# Patient Record
Sex: FEMALE | Race: BLACK OR AFRICAN AMERICAN | NOT HISPANIC OR LATINO | Employment: UNEMPLOYED | URBAN - METROPOLITAN AREA
[De-identification: names, ages, dates, MRNs, and addresses within clinical notes are randomized per-mention and may not be internally consistent; named-entity substitution may affect disease eponyms.]

---

## 2021-10-29 ENCOUNTER — HOSPITAL ENCOUNTER (INPATIENT)
Facility: HOSPITAL | Age: 51
LOS: 4 days | Discharge: HOME | DRG: 691 | End: 2021-11-03
Attending: EMERGENCY MEDICINE | Admitting: INTERNAL MEDICINE
Payer: COMMERCIAL

## 2021-10-29 ENCOUNTER — APPOINTMENT (EMERGENCY)
Dept: RADIOLOGY | Facility: HOSPITAL | Age: 51
DRG: 691 | End: 2021-10-29
Payer: COMMERCIAL

## 2021-10-29 DIAGNOSIS — E85.81 PRIMARY AMYLOIDOSIS (HCC): ICD-10-CM

## 2021-10-29 DIAGNOSIS — H05.9 DISORDER OF ORBIT: ICD-10-CM

## 2021-10-29 DIAGNOSIS — M54.41 CHRONIC BILATERAL LOW BACK PAIN WITH BILATERAL SCIATICA: ICD-10-CM

## 2021-10-29 DIAGNOSIS — N17.9 ACUTE KIDNEY INJURY (HCC): ICD-10-CM

## 2021-10-29 DIAGNOSIS — I10 PRIMARY HYPERTENSION: ICD-10-CM

## 2021-10-29 DIAGNOSIS — M54.42 CHRONIC BILATERAL LOW BACK PAIN WITH BILATERAL SCIATICA: ICD-10-CM

## 2021-10-29 DIAGNOSIS — C90.00 MULTIPLE MYELOMA (HCC): ICD-10-CM

## 2021-10-29 DIAGNOSIS — E86.1 HYPOVOLEMIA: ICD-10-CM

## 2021-10-29 DIAGNOSIS — A41.9 SEPSIS (HCC): Primary | ICD-10-CM

## 2021-10-29 DIAGNOSIS — Z72.0 TOBACCO ABUSE: ICD-10-CM

## 2021-10-29 DIAGNOSIS — G89.29 CHRONIC BILATERAL LOW BACK PAIN WITH BILATERAL SCIATICA: ICD-10-CM

## 2021-10-29 DIAGNOSIS — B33.8 RSV (RESPIRATORY SYNCYTIAL VIRUS INFECTION): ICD-10-CM

## 2021-10-29 LAB
ALBUMIN SERPL BCP-MCNC: 2.1 G/DL (ref 3.5–5)
ALP SERPL-CCNC: 70 U/L (ref 46–116)
ALT SERPL W P-5'-P-CCNC: 39 U/L (ref 12–78)
ANION GAP SERPL CALCULATED.3IONS-SCNC: 5 MMOL/L (ref 4–13)
APTT PPP: 37 SECONDS (ref 23–37)
AST SERPL W P-5'-P-CCNC: 85 U/L (ref 5–45)
ATRIAL RATE: 69 BPM
ATRIAL RATE: 71 BPM
BASOPHILS # BLD AUTO: 0.05 THOUSANDS/ΜL (ref 0–0.1)
BASOPHILS NFR BLD AUTO: 1 % (ref 0–1)
BILIRUB SERPL-MCNC: 0.31 MG/DL (ref 0.2–1)
BILIRUB UR QL STRIP: ABNORMAL
BUN SERPL-MCNC: 26 MG/DL (ref 5–25)
CALCIUM ALBUM COR SERPL-MCNC: 9.9 MG/DL (ref 8.3–10.1)
CALCIUM SERPL-MCNC: 8.4 MG/DL (ref 8.3–10.1)
CHLORIDE SERPL-SCNC: 96 MMOL/L (ref 100–108)
CLARITY UR: ABNORMAL
CO2 SERPL-SCNC: 27 MMOL/L (ref 21–32)
COLOR UR: ABNORMAL
CREAT SERPL-MCNC: 2.75 MG/DL (ref 0.6–1.3)
EOSINOPHIL # BLD AUTO: 0.03 THOUSAND/ΜL (ref 0–0.61)
EOSINOPHIL NFR BLD AUTO: 1 % (ref 0–6)
ERYTHROCYTE [DISTWIDTH] IN BLOOD BY AUTOMATED COUNT: 17.2 % (ref 11.6–15.1)
GFR SERPL CREATININE-BSD FRML MDRD: 22 ML/MIN/1.73SQ M
GLUCOSE SERPL-MCNC: 103 MG/DL (ref 65–140)
GLUCOSE UR STRIP-MCNC: NEGATIVE MG/DL
HCT VFR BLD AUTO: 30.7 % (ref 34.8–46.1)
HGB BLD-MCNC: 9.9 G/DL (ref 11.5–15.4)
HGB UR QL STRIP.AUTO: ABNORMAL
IMM GRANULOCYTES # BLD AUTO: 0.12 THOUSAND/UL (ref 0–0.2)
IMM GRANULOCYTES NFR BLD AUTO: 2 % (ref 0–2)
INR PPP: 1.15 (ref 0.84–1.19)
KETONES UR STRIP-MCNC: NEGATIVE MG/DL
LACTATE SERPL-SCNC: 0.9 MMOL/L (ref 0.5–2)
LEUKOCYTE ESTERASE UR QL STRIP: ABNORMAL
LYMPHOCYTES # BLD AUTO: 2.56 THOUSANDS/ΜL (ref 0.6–4.47)
LYMPHOCYTES NFR BLD AUTO: 45 % (ref 14–44)
MCH RBC QN AUTO: 26.1 PG (ref 26.8–34.3)
MCHC RBC AUTO-ENTMCNC: 32.2 G/DL (ref 31.4–37.4)
MCV RBC AUTO: 81 FL (ref 82–98)
MONOCYTES # BLD AUTO: 0.88 THOUSAND/ΜL (ref 0.17–1.22)
MONOCYTES NFR BLD AUTO: 16 % (ref 4–12)
NEUTROPHILS # BLD AUTO: 1.95 THOUSANDS/ΜL (ref 1.85–7.62)
NEUTS SEG NFR BLD AUTO: 35 % (ref 43–75)
NITRITE UR QL STRIP: NEGATIVE
NRBC BLD AUTO-RTO: 0 /100 WBCS
P AXIS: 82 DEGREES
P AXIS: 92 DEGREES
PH UR STRIP.AUTO: 5.5 [PH]
PLATELET # BLD AUTO: 553 THOUSANDS/UL (ref 149–390)
PMV BLD AUTO: 9.9 FL (ref 8.9–12.7)
POTASSIUM SERPL-SCNC: 3.5 MMOL/L (ref 3.5–5.3)
PR INTERVAL: 144 MS
PR INTERVAL: 146 MS
PROCALCITONIN SERPL-MCNC: 0.69 NG/ML
PROT SERPL-MCNC: 6.7 G/DL (ref 6.4–8.2)
PROT UR STRIP-MCNC: ABNORMAL MG/DL
PROTHROMBIN TIME: 14.2 SECONDS (ref 11.6–14.5)
QRS AXIS: -10 DEGREES
QRS AXIS: 5 DEGREES
QRSD INTERVAL: 110 MS
QRSD INTERVAL: 110 MS
QT INTERVAL: 396 MS
QT INTERVAL: 402 MS
QTC INTERVAL: 424 MS
QTC INTERVAL: 436 MS
RBC # BLD AUTO: 3.8 MILLION/UL (ref 3.81–5.12)
SODIUM SERPL-SCNC: 128 MMOL/L (ref 136–145)
SP GR UR STRIP.AUTO: 1.01 (ref 1–1.03)
T WAVE AXIS: 51 DEGREES
T WAVE AXIS: 51 DEGREES
UROBILINOGEN UR QL STRIP.AUTO: 1 E.U./DL
VENTRICULAR RATE: 69 BPM
VENTRICULAR RATE: 71 BPM
WBC # BLD AUTO: 5.59 THOUSAND/UL (ref 4.31–10.16)

## 2021-10-29 PROCEDURE — 80053 COMPREHEN METABOLIC PANEL: CPT

## 2021-10-29 PROCEDURE — 71046 X-RAY EXAM CHEST 2 VIEWS: CPT

## 2021-10-29 PROCEDURE — 36415 COLL VENOUS BLD VENIPUNCTURE: CPT

## 2021-10-29 PROCEDURE — 84145 PROCALCITONIN (PCT): CPT

## 2021-10-29 PROCEDURE — 81001 URINALYSIS AUTO W/SCOPE: CPT

## 2021-10-29 PROCEDURE — 83605 ASSAY OF LACTIC ACID: CPT

## 2021-10-29 PROCEDURE — 99291 CRITICAL CARE FIRST HOUR: CPT | Performed by: EMERGENCY MEDICINE

## 2021-10-29 PROCEDURE — 93010 ELECTROCARDIOGRAM REPORT: CPT | Performed by: INTERNAL MEDICINE

## 2021-10-29 PROCEDURE — 85730 THROMBOPLASTIN TIME PARTIAL: CPT

## 2021-10-29 PROCEDURE — 96366 THER/PROPH/DIAG IV INF ADDON: CPT

## 2021-10-29 PROCEDURE — 85610 PROTHROMBIN TIME: CPT

## 2021-10-29 PROCEDURE — 96365 THER/PROPH/DIAG IV INF INIT: CPT

## 2021-10-29 PROCEDURE — 99285 EMERGENCY DEPT VISIT HI MDM: CPT

## 2021-10-29 PROCEDURE — 85025 COMPLETE CBC W/AUTO DIFF WBC: CPT

## 2021-10-29 PROCEDURE — 87040 BLOOD CULTURE FOR BACTERIA: CPT

## 2021-10-29 PROCEDURE — 87086 URINE CULTURE/COLONY COUNT: CPT

## 2021-10-29 PROCEDURE — 93005 ELECTROCARDIOGRAM TRACING: CPT

## 2021-10-29 RX ORDER — SODIUM CHLORIDE, SODIUM GLUCONATE, SODIUM ACETATE, POTASSIUM CHLORIDE, MAGNESIUM CHLORIDE, SODIUM PHOSPHATE, DIBASIC, AND POTASSIUM PHOSPHATE .53; .5; .37; .037; .03; .012; .00082 G/100ML; G/100ML; G/100ML; G/100ML; G/100ML; G/100ML; G/100ML
1000 INJECTION, SOLUTION INTRAVENOUS ONCE
Status: COMPLETED | OUTPATIENT
Start: 2021-10-29 | End: 2021-10-30

## 2021-10-29 RX ORDER — HYDROMORPHONE HCL/PF 1 MG/ML
1 SYRINGE (ML) INJECTION ONCE
Status: COMPLETED | OUTPATIENT
Start: 2021-10-29 | End: 2021-10-30

## 2021-10-29 RX ORDER — PREGABALIN 100 MG/1
100 CAPSULE ORAL ONCE
Status: COMPLETED | OUTPATIENT
Start: 2021-10-29 | End: 2021-10-30

## 2021-10-29 RX ORDER — SODIUM CHLORIDE, SODIUM GLUCONATE, SODIUM ACETATE, POTASSIUM CHLORIDE, MAGNESIUM CHLORIDE, SODIUM PHOSPHATE, DIBASIC, AND POTASSIUM PHOSPHATE .53; .5; .37; .037; .03; .012; .00082 G/100ML; G/100ML; G/100ML; G/100ML; G/100ML; G/100ML; G/100ML
500 INJECTION, SOLUTION INTRAVENOUS ONCE
Status: COMPLETED | OUTPATIENT
Start: 2021-10-29 | End: 2021-10-30

## 2021-10-29 RX ADMIN — CEFTRIAXONE SODIUM 2000 MG: 10 INJECTION, POWDER, FOR SOLUTION INTRAVENOUS at 22:27

## 2021-10-29 RX ADMIN — SODIUM CHLORIDE, SODIUM GLUCONATE, SODIUM ACETATE, POTASSIUM CHLORIDE, MAGNESIUM CHLORIDE, SODIUM PHOSPHATE, DIBASIC, AND POTASSIUM PHOSPHATE 1000 ML: .53; .5; .37; .037; .03; .012; .00082 INJECTION, SOLUTION INTRAVENOUS at 23:40

## 2021-10-29 RX ADMIN — SODIUM CHLORIDE, SODIUM GLUCONATE, SODIUM ACETATE, POTASSIUM CHLORIDE, MAGNESIUM CHLORIDE, SODIUM PHOSPHATE, DIBASIC, AND POTASSIUM PHOSPHATE 1000 ML: .53; .5; .37; .037; .03; .012; .00082 INJECTION, SOLUTION INTRAVENOUS at 22:24

## 2021-10-30 ENCOUNTER — APPOINTMENT (INPATIENT)
Dept: RADIOLOGY | Facility: HOSPITAL | Age: 51
DRG: 691 | End: 2021-10-30
Payer: COMMERCIAL

## 2021-10-30 PROBLEM — R31.0 GROSS HEMATURIA: Status: ACTIVE | Noted: 2021-10-30

## 2021-10-30 PROBLEM — H05.9 DISORDER OF ORBIT: Status: ACTIVE | Noted: 2021-05-19

## 2021-10-30 PROBLEM — I95.9 HYPOTENSION: Status: RESOLVED | Noted: 2021-10-30 | Resolved: 2021-10-30

## 2021-10-30 PROBLEM — G89.29 CHRONIC BILATERAL LOW BACK PAIN WITH BILATERAL SCIATICA: Status: ACTIVE | Noted: 2020-07-11

## 2021-10-30 PROBLEM — R30.0 DYSURIA: Status: ACTIVE | Noted: 2021-10-30

## 2021-10-30 PROBLEM — N17.9 ACUTE KIDNEY INJURY (HCC): Status: ACTIVE | Noted: 2021-02-10

## 2021-10-30 PROBLEM — I10 HYPERTENSION: Status: ACTIVE | Noted: 2018-08-22

## 2021-10-30 PROBLEM — E85.81: Status: ACTIVE | Noted: 2021-10-30

## 2021-10-30 PROBLEM — I95.9 HYPOTENSION: Status: ACTIVE | Noted: 2021-10-30

## 2021-10-30 PROBLEM — C90.00 MULTIPLE MYELOMA (HCC): Status: ACTIVE | Noted: 2021-10-30

## 2021-10-30 PROBLEM — B33.8 RSV (RESPIRATORY SYNCYTIAL VIRUS INFECTION): Status: ACTIVE | Noted: 2021-10-30

## 2021-10-30 PROBLEM — F17.210 CIGARETTE NICOTINE DEPENDENCE WITHOUT COMPLICATION: Status: ACTIVE | Noted: 2021-10-30

## 2021-10-30 PROBLEM — E87.1 HYPONATREMIA: Status: ACTIVE | Noted: 2021-10-30

## 2021-10-30 PROBLEM — M54.41 CHRONIC BILATERAL LOW BACK PAIN WITH BILATERAL SCIATICA: Status: ACTIVE | Noted: 2020-07-11

## 2021-10-30 PROBLEM — M54.42 CHRONIC BILATERAL LOW BACK PAIN WITH BILATERAL SCIATICA: Status: ACTIVE | Noted: 2020-07-11

## 2021-10-30 LAB
ANION GAP SERPL CALCULATED.3IONS-SCNC: 6 MMOL/L (ref 4–13)
BACTERIA UR QL AUTO: ABNORMAL /HPF
BUN SERPL-MCNC: 22 MG/DL (ref 5–25)
CALCIUM SERPL-MCNC: 8 MG/DL (ref 8.3–10.1)
CHLORIDE SERPL-SCNC: 100 MMOL/L (ref 100–108)
CO2 SERPL-SCNC: 26 MMOL/L (ref 21–32)
CREAT SERPL-MCNC: 2.32 MG/DL (ref 0.6–1.3)
ERYTHROCYTE [DISTWIDTH] IN BLOOD BY AUTOMATED COUNT: 17.3 % (ref 11.6–15.1)
FLUAV RNA RESP QL NAA+PROBE: NEGATIVE
FLUBV RNA RESP QL NAA+PROBE: NEGATIVE
GFR SERPL CREATININE-BSD FRML MDRD: 27 ML/MIN/1.73SQ M
GLUCOSE SERPL-MCNC: 94 MG/DL (ref 65–140)
HCT VFR BLD AUTO: 28.4 % (ref 34.8–46.1)
HGB BLD-MCNC: 9.2 G/DL (ref 11.5–15.4)
MCH RBC QN AUTO: 26.4 PG (ref 26.8–34.3)
MCHC RBC AUTO-ENTMCNC: 32.4 G/DL (ref 31.4–37.4)
MCV RBC AUTO: 81 FL (ref 82–98)
NON-SQ EPI CELLS URNS QL MICRO: ABNORMAL /HPF
PLATELET # BLD AUTO: 471 THOUSANDS/UL (ref 149–390)
PMV BLD AUTO: 9.5 FL (ref 8.9–12.7)
POTASSIUM SERPL-SCNC: 3.4 MMOL/L (ref 3.5–5.3)
PROCALCITONIN SERPL-MCNC: 0.53 NG/ML
RBC # BLD AUTO: 3.49 MILLION/UL (ref 3.81–5.12)
RBC #/AREA URNS AUTO: ABNORMAL /HPF
RSV RNA RESP QL NAA+PROBE: POSITIVE
SARS-COV-2 RNA RESP QL NAA+PROBE: NEGATIVE
SODIUM SERPL-SCNC: 132 MMOL/L (ref 136–145)
TSH SERPL DL<=0.05 MIU/L-ACNC: 0.58 UIU/ML (ref 0.36–3.74)
WBC # BLD AUTO: 5.22 THOUSAND/UL (ref 4.31–10.16)
WBC #/AREA URNS AUTO: ABNORMAL /HPF

## 2021-10-30 PROCEDURE — 99254 IP/OBS CNSLTJ NEW/EST MOD 60: CPT | Performed by: INTERNAL MEDICINE

## 2021-10-30 PROCEDURE — 85027 COMPLETE CBC AUTOMATED: CPT | Performed by: INTERNAL MEDICINE

## 2021-10-30 PROCEDURE — 74176 CT ABD & PELVIS W/O CONTRAST: CPT

## 2021-10-30 PROCEDURE — G1004 CDSM NDSC: HCPCS

## 2021-10-30 PROCEDURE — 0241U HB NFCT DS VIR RESP RNA 4 TRGT: CPT

## 2021-10-30 PROCEDURE — 70480 CT ORBIT/EAR/FOSSA W/O DYE: CPT

## 2021-10-30 PROCEDURE — 84443 ASSAY THYROID STIM HORMONE: CPT | Performed by: INTERNAL MEDICINE

## 2021-10-30 PROCEDURE — 99223 1ST HOSP IP/OBS HIGH 75: CPT | Performed by: OTOLARYNGOLOGY

## 2021-10-30 PROCEDURE — 80048 BASIC METABOLIC PNL TOTAL CA: CPT | Performed by: INTERNAL MEDICINE

## 2021-10-30 PROCEDURE — 96375 TX/PRO/DX INJ NEW DRUG ADDON: CPT

## 2021-10-30 PROCEDURE — 84145 PROCALCITONIN (PCT): CPT | Performed by: INTERNAL MEDICINE

## 2021-10-30 PROCEDURE — 36415 COLL VENOUS BLD VENIPUNCTURE: CPT | Performed by: INTERNAL MEDICINE

## 2021-10-30 PROCEDURE — 99223 1ST HOSP IP/OBS HIGH 75: CPT | Performed by: INTERNAL MEDICINE

## 2021-10-30 PROCEDURE — 96368 THER/DIAG CONCURRENT INF: CPT

## 2021-10-30 RX ORDER — GUAIFENESIN/DEXTROMETHORPHAN 100-10MG/5
10 SYRUP ORAL EVERY 4 HOURS PRN
Status: DISCONTINUED | OUTPATIENT
Start: 2021-10-30 | End: 2021-11-03 | Stop reason: HOSPADM

## 2021-10-30 RX ORDER — POTASSIUM CHLORIDE 20 MEQ/1
40 TABLET, EXTENDED RELEASE ORAL ONCE
Status: COMPLETED | OUTPATIENT
Start: 2021-10-30 | End: 2021-10-30

## 2021-10-30 RX ORDER — ONDANSETRON 2 MG/ML
4 INJECTION INTRAMUSCULAR; INTRAVENOUS EVERY 6 HOURS PRN
Status: DISCONTINUED | OUTPATIENT
Start: 2021-10-30 | End: 2021-11-03 | Stop reason: HOSPADM

## 2021-10-30 RX ORDER — PANTOPRAZOLE SODIUM 40 MG/1
40 TABLET, DELAYED RELEASE ORAL DAILY
Status: DISCONTINUED | OUTPATIENT
Start: 2021-10-30 | End: 2021-11-03 | Stop reason: HOSPADM

## 2021-10-30 RX ORDER — ACETAMINOPHEN 325 MG/1
650 TABLET ORAL EVERY 6 HOURS PRN
Status: DISCONTINUED | OUTPATIENT
Start: 2021-10-30 | End: 2021-11-03 | Stop reason: HOSPADM

## 2021-10-30 RX ORDER — LISINOPRIL 30 MG/1
TABLET ORAL
COMMUNITY
Start: 2021-09-03 | End: 2021-11-03 | Stop reason: HOSPADM

## 2021-10-30 RX ORDER — OXYCODONE HYDROCHLORIDE 5 MG/1
5 TABLET ORAL EVERY 4 HOURS PRN
Status: DISCONTINUED | OUTPATIENT
Start: 2021-10-30 | End: 2021-11-01

## 2021-10-30 RX ORDER — DULOXETIN HYDROCHLORIDE 30 MG/1
30 CAPSULE, DELAYED RELEASE ORAL
COMMUNITY
Start: 2021-06-28 | End: 2022-01-06 | Stop reason: SDUPTHER

## 2021-10-30 RX ORDER — PREGABALIN 100 MG/1
100 CAPSULE ORAL
Status: ON HOLD | COMMUNITY
Start: 2021-09-21 | End: 2021-11-03 | Stop reason: SDUPTHER

## 2021-10-30 RX ORDER — ATORVASTATIN CALCIUM 40 MG/1
40 TABLET, FILM COATED ORAL
Status: DISCONTINUED | OUTPATIENT
Start: 2021-10-30 | End: 2021-11-03 | Stop reason: HOSPADM

## 2021-10-30 RX ORDER — ATORVASTATIN CALCIUM 40 MG/1
40 TABLET, FILM COATED ORAL
COMMUNITY

## 2021-10-30 RX ORDER — MONTELUKAST SODIUM 10 MG/1
TABLET ORAL
COMMUNITY
Start: 2021-11-01

## 2021-10-30 RX ORDER — HEPARIN SODIUM 5000 [USP'U]/ML
5000 INJECTION, SOLUTION INTRAVENOUS; SUBCUTANEOUS EVERY 8 HOURS SCHEDULED
Status: DISCONTINUED | OUTPATIENT
Start: 2021-10-30 | End: 2021-10-30

## 2021-10-30 RX ORDER — SODIUM CHLORIDE 9 MG/ML
75 INJECTION, SOLUTION INTRAVENOUS CONTINUOUS
Status: DISCONTINUED | OUTPATIENT
Start: 2021-10-30 | End: 2021-11-02

## 2021-10-30 RX ORDER — POLYVINYL ALCOHOL 14 MG/ML
1 SOLUTION/ DROPS OPHTHALMIC
Status: DISCONTINUED | OUTPATIENT
Start: 2021-10-30 | End: 2021-10-30

## 2021-10-30 RX ORDER — PANTOPRAZOLE SODIUM 40 MG/1
TABLET, DELAYED RELEASE ORAL
COMMUNITY
Start: 2021-09-03 | End: 2022-01-06

## 2021-10-30 RX ORDER — CYCLOBENZAPRINE HCL 10 MG
10 TABLET ORAL
COMMUNITY
End: 2021-11-03 | Stop reason: HOSPADM

## 2021-10-30 RX ORDER — HEPARIN SODIUM 5000 [USP'U]/ML
5000 INJECTION, SOLUTION INTRAVENOUS; SUBCUTANEOUS EVERY 8 HOURS SCHEDULED
Status: DISCONTINUED | OUTPATIENT
Start: 2021-10-30 | End: 2021-11-03 | Stop reason: HOSPADM

## 2021-10-30 RX ORDER — FENTANYL 12 UG/H
1 PATCH TRANSDERMAL
Status: DISCONTINUED | OUTPATIENT
Start: 2021-10-30 | End: 2021-11-03 | Stop reason: HOSPADM

## 2021-10-30 RX ORDER — POLYETHYLENE GLYCOL 400 AND PROPYLENE GLYCOL 4; 3 MG/ML; MG/ML
1 GEL OPHTHALMIC
COMMUNITY

## 2021-10-30 RX ORDER — ACYCLOVIR 400 MG/1
400 TABLET ORAL
COMMUNITY
Start: 2021-11-01

## 2021-10-30 RX ORDER — FLUTICASONE PROPIONATE 50 MCG
SPRAY, SUSPENSION (ML) NASAL
COMMUNITY
Start: 2021-09-03 | End: 2022-01-06 | Stop reason: SDUPTHER

## 2021-10-30 RX ORDER — NICOTINE 21 MG/24HR
1 PATCH, TRANSDERMAL 24 HOURS TRANSDERMAL DAILY
Status: DISCONTINUED | OUTPATIENT
Start: 2021-10-30 | End: 2021-11-03 | Stop reason: HOSPADM

## 2021-10-30 RX ORDER — PREDNISOLONE ACETATE 10 MG/ML
1 SUSPENSION/ DROPS OPHTHALMIC
COMMUNITY
End: 2022-01-06 | Stop reason: CLARIF

## 2021-10-30 RX ORDER — ALBUTEROL SULFATE 90 UG/1
2 AEROSOL, METERED RESPIRATORY (INHALATION) EVERY 6 HOURS PRN
Status: DISCONTINUED | OUTPATIENT
Start: 2021-10-30 | End: 2021-11-03 | Stop reason: HOSPADM

## 2021-10-30 RX ORDER — LORATADINE 10 MG/1
TABLET ORAL
COMMUNITY
Start: 2021-11-01

## 2021-10-30 RX ORDER — PREDNISOLONE ACETATE 10 MG/ML
1 SUSPENSION/ DROPS OPHTHALMIC 3 TIMES DAILY
Status: DISCONTINUED | OUTPATIENT
Start: 2021-10-30 | End: 2021-11-03 | Stop reason: HOSPADM

## 2021-10-30 RX ORDER — OXYCODONE HYDROCHLORIDE 5 MG/1
5 TABLET ORAL
COMMUNITY
Start: 2021-10-19 | End: 2021-11-03 | Stop reason: HOSPADM

## 2021-10-30 RX ORDER — FLUTICASONE PROPIONATE 50 MCG
2 SPRAY, SUSPENSION (ML) NASAL 2 TIMES DAILY
Status: DISCONTINUED | OUTPATIENT
Start: 2021-10-30 | End: 2021-11-03 | Stop reason: HOSPADM

## 2021-10-30 RX ORDER — ECHINACEA PURPUREA EXTRACT 125 MG
1 TABLET ORAL EVERY 2 HOUR PRN
Status: DISCONTINUED | OUTPATIENT
Start: 2021-10-30 | End: 2021-11-03 | Stop reason: HOSPADM

## 2021-10-30 RX ORDER — DULOXETIN HYDROCHLORIDE 30 MG/1
30 CAPSULE, DELAYED RELEASE ORAL DAILY
Status: DISCONTINUED | OUTPATIENT
Start: 2021-10-30 | End: 2021-11-03 | Stop reason: HOSPADM

## 2021-10-30 RX ORDER — MELATONIN
1000 DAILY
Status: DISCONTINUED | OUTPATIENT
Start: 2021-10-30 | End: 2021-11-03 | Stop reason: HOSPADM

## 2021-10-30 RX ORDER — FLUTICASONE PROPIONATE 50 MCG
2 SPRAY, SUSPENSION (ML) NASAL DAILY
Status: DISCONTINUED | OUTPATIENT
Start: 2021-10-30 | End: 2021-10-30

## 2021-10-30 RX ORDER — FENTANYL 12 UG/H
12 PATCH TRANSDERMAL
COMMUNITY
Start: 2021-10-19 | End: 2022-01-13 | Stop reason: ALTCHOICE

## 2021-10-30 RX ADMIN — FLUTICASONE PROPIONATE 2 SPRAY: 50 SPRAY, METERED NASAL at 08:50

## 2021-10-30 RX ADMIN — PREDNISOLONE ACETATE 1 DROP: 10 SUSPENSION/ DROPS OPHTHALMIC at 08:50

## 2021-10-30 RX ADMIN — GUAIFENESIN AND DEXTROMETHORPHAN 10 ML: 100; 10 SYRUP ORAL at 08:49

## 2021-10-30 RX ADMIN — VANCOMYCIN HYDROCHLORIDE 1500 MG: 10 INJECTION, POWDER, LYOPHILIZED, FOR SOLUTION INTRAVENOUS at 00:01

## 2021-10-30 RX ADMIN — ATORVASTATIN CALCIUM 40 MG: 40 TABLET, FILM COATED ORAL at 17:01

## 2021-10-30 RX ADMIN — SODIUM CHLORIDE, SODIUM GLUCONATE, SODIUM ACETATE, POTASSIUM CHLORIDE, MAGNESIUM CHLORIDE, SODIUM PHOSPHATE, DIBASIC, AND POTASSIUM PHOSPHATE 500 ML: .53; .5; .37; .037; .03; .012; .00082 INJECTION, SOLUTION INTRAVENOUS at 02:48

## 2021-10-30 RX ADMIN — PREDNISOLONE ACETATE 1 DROP: 10 SUSPENSION/ DROPS OPHTHALMIC at 22:28

## 2021-10-30 RX ADMIN — Medication 1000 UNITS: at 08:50

## 2021-10-30 RX ADMIN — GUAIFENESIN AND DEXTROMETHORPHAN 10 ML: 100; 10 SYRUP ORAL at 18:18

## 2021-10-30 RX ADMIN — OXYCODONE HYDROCHLORIDE 5 MG: 5 TABLET ORAL at 17:08

## 2021-10-30 RX ADMIN — OXYCODONE HYDROCHLORIDE 5 MG: 5 TABLET ORAL at 06:30

## 2021-10-30 RX ADMIN — PANTOPRAZOLE SODIUM 40 MG: 40 TABLET, DELAYED RELEASE ORAL at 08:50

## 2021-10-30 RX ADMIN — PREGABALIN 100 MG: 100 CAPSULE ORAL at 00:15

## 2021-10-30 RX ADMIN — SODIUM CHLORIDE 75 ML/HR: 0.9 INJECTION, SOLUTION INTRAVENOUS at 08:54

## 2021-10-30 RX ADMIN — CEFTRIAXONE SODIUM 1000 MG: 10 INJECTION, POWDER, FOR SOLUTION INTRAVENOUS at 22:01

## 2021-10-30 RX ADMIN — HEPARIN SODIUM 5000 UNITS: 5000 INJECTION INTRAVENOUS; SUBCUTANEOUS at 22:07

## 2021-10-30 RX ADMIN — PREDNISOLONE ACETATE 1 DROP: 10 SUSPENSION/ DROPS OPHTHALMIC at 17:01

## 2021-10-30 RX ADMIN — FENTANYL 1 PATCH: 12 PATCH, EXTENDED RELEASE TRANSDERMAL at 11:32

## 2021-10-30 RX ADMIN — ALBUTEROL SULFATE 2 PUFF: 90 AEROSOL, METERED RESPIRATORY (INHALATION) at 18:18

## 2021-10-30 RX ADMIN — OXYCODONE HYDROCHLORIDE 5 MG: 5 TABLET ORAL at 12:58

## 2021-10-30 RX ADMIN — FLUTICASONE PROPIONATE 2 SPRAY: 50 SPRAY, METERED NASAL at 17:07

## 2021-10-30 RX ADMIN — SODIUM CHLORIDE 75 ML/HR: 0.9 INJECTION, SOLUTION INTRAVENOUS at 20:09

## 2021-10-30 RX ADMIN — HYDROMORPHONE HYDROCHLORIDE 1 MG: 1 INJECTION, SOLUTION INTRAMUSCULAR; INTRAVENOUS; SUBCUTANEOUS at 00:15

## 2021-10-30 RX ADMIN — POTASSIUM CHLORIDE 40 MEQ: 1500 TABLET, EXTENDED RELEASE ORAL at 08:50

## 2021-10-30 RX ADMIN — DULOXETINE 30 MG: 30 CAPSULE, DELAYED RELEASE ORAL at 08:49

## 2021-10-31 LAB
ALBUMIN SERPL BCP-MCNC: 1.9 G/DL (ref 3.5–5)
ALP SERPL-CCNC: 61 U/L (ref 46–116)
ALT SERPL W P-5'-P-CCNC: 39 U/L (ref 12–78)
ANION GAP SERPL CALCULATED.3IONS-SCNC: 6 MMOL/L (ref 4–13)
AST SERPL W P-5'-P-CCNC: 73 U/L (ref 5–45)
BACTERIA UR CULT: NORMAL
BASOPHILS # BLD AUTO: 0.04 THOUSANDS/ΜL (ref 0–0.1)
BASOPHILS NFR BLD AUTO: 1 % (ref 0–1)
BILIRUB SERPL-MCNC: 0.27 MG/DL (ref 0.2–1)
BUN SERPL-MCNC: 21 MG/DL (ref 5–25)
CALCIUM ALBUM COR SERPL-MCNC: 10.3 MG/DL (ref 8.3–10.1)
CALCIUM SERPL-MCNC: 8.6 MG/DL (ref 8.3–10.1)
CHLORIDE SERPL-SCNC: 104 MMOL/L (ref 100–108)
CO2 SERPL-SCNC: 26 MMOL/L (ref 21–32)
CREAT SERPL-MCNC: 2.29 MG/DL (ref 0.6–1.3)
EOSINOPHIL # BLD AUTO: 0.04 THOUSAND/ΜL (ref 0–0.61)
EOSINOPHIL NFR BLD AUTO: 1 % (ref 0–6)
ERYTHROCYTE [DISTWIDTH] IN BLOOD BY AUTOMATED COUNT: 17.1 % (ref 11.6–15.1)
GFR SERPL CREATININE-BSD FRML MDRD: 27 ML/MIN/1.73SQ M
GLUCOSE SERPL-MCNC: 98 MG/DL (ref 65–140)
HCT VFR BLD AUTO: 27.5 % (ref 34.8–46.1)
HGB BLD-MCNC: 8.9 G/DL (ref 11.5–15.4)
IMM GRANULOCYTES # BLD AUTO: 0.05 THOUSAND/UL (ref 0–0.2)
IMM GRANULOCYTES NFR BLD AUTO: 1 % (ref 0–2)
LYMPHOCYTES # BLD AUTO: 2.59 THOUSANDS/ΜL (ref 0.6–4.47)
LYMPHOCYTES NFR BLD AUTO: 39 % (ref 14–44)
MAGNESIUM SERPL-MCNC: 2.2 MG/DL (ref 1.6–2.6)
MCH RBC QN AUTO: 26.4 PG (ref 26.8–34.3)
MCHC RBC AUTO-ENTMCNC: 32.4 G/DL (ref 31.4–37.4)
MCV RBC AUTO: 82 FL (ref 82–98)
MONOCYTES # BLD AUTO: 0.79 THOUSAND/ΜL (ref 0.17–1.22)
MONOCYTES NFR BLD AUTO: 12 % (ref 4–12)
NEUTROPHILS # BLD AUTO: 3.13 THOUSANDS/ΜL (ref 1.85–7.62)
NEUTS SEG NFR BLD AUTO: 46 % (ref 43–75)
NRBC BLD AUTO-RTO: 0 /100 WBCS
PHOSPHATE SERPL-MCNC: 4.3 MG/DL (ref 2.7–4.5)
PLATELET # BLD AUTO: 557 THOUSANDS/UL (ref 149–390)
PMV BLD AUTO: 9.7 FL (ref 8.9–12.7)
POTASSIUM SERPL-SCNC: 4.1 MMOL/L (ref 3.5–5.3)
PROT SERPL-MCNC: 6.1 G/DL (ref 6.4–8.2)
RBC # BLD AUTO: 3.37 MILLION/UL (ref 3.81–5.12)
SODIUM SERPL-SCNC: 136 MMOL/L (ref 136–145)
WBC # BLD AUTO: 6.64 THOUSAND/UL (ref 4.31–10.16)

## 2021-10-31 PROCEDURE — 99232 SBSQ HOSP IP/OBS MODERATE 35: CPT | Performed by: NURSE PRACTITIONER

## 2021-10-31 PROCEDURE — 85025 COMPLETE CBC W/AUTO DIFF WBC: CPT | Performed by: NURSE PRACTITIONER

## 2021-10-31 PROCEDURE — 99232 SBSQ HOSP IP/OBS MODERATE 35: CPT | Performed by: INTERNAL MEDICINE

## 2021-10-31 PROCEDURE — 80053 COMPREHEN METABOLIC PANEL: CPT | Performed by: NURSE PRACTITIONER

## 2021-10-31 PROCEDURE — 83735 ASSAY OF MAGNESIUM: CPT | Performed by: NURSE PRACTITIONER

## 2021-10-31 PROCEDURE — 84100 ASSAY OF PHOSPHORUS: CPT | Performed by: NURSE PRACTITIONER

## 2021-10-31 RX ORDER — PREGABALIN 75 MG/1
75 CAPSULE ORAL 2 TIMES DAILY
Status: DISCONTINUED | OUTPATIENT
Start: 2021-10-31 | End: 2021-11-03 | Stop reason: HOSPADM

## 2021-10-31 RX ADMIN — PANTOPRAZOLE SODIUM 40 MG: 40 TABLET, DELAYED RELEASE ORAL at 10:15

## 2021-10-31 RX ADMIN — Medication 1000 UNITS: at 10:15

## 2021-10-31 RX ADMIN — ATORVASTATIN CALCIUM 40 MG: 40 TABLET, FILM COATED ORAL at 17:06

## 2021-10-31 RX ADMIN — HEPARIN SODIUM 5000 UNITS: 5000 INJECTION INTRAVENOUS; SUBCUTANEOUS at 04:52

## 2021-10-31 RX ADMIN — FLUTICASONE PROPIONATE 2 SPRAY: 50 SPRAY, METERED NASAL at 17:06

## 2021-10-31 RX ADMIN — PREDNISOLONE ACETATE 1 DROP: 10 SUSPENSION/ DROPS OPHTHALMIC at 10:15

## 2021-10-31 RX ADMIN — PREDNISOLONE ACETATE 1 DROP: 10 SUSPENSION/ DROPS OPHTHALMIC at 17:06

## 2021-10-31 RX ADMIN — ACETAMINOPHEN 650 MG: 325 TABLET, FILM COATED ORAL at 13:18

## 2021-10-31 RX ADMIN — DULOXETINE 30 MG: 30 CAPSULE, DELAYED RELEASE ORAL at 10:15

## 2021-10-31 RX ADMIN — OXYCODONE HYDROCHLORIDE 5 MG: 5 TABLET ORAL at 06:36

## 2021-10-31 RX ADMIN — HEPARIN SODIUM 5000 UNITS: 5000 INJECTION INTRAVENOUS; SUBCUTANEOUS at 22:53

## 2021-10-31 RX ADMIN — HEPARIN SODIUM 5000 UNITS: 5000 INJECTION INTRAVENOUS; SUBCUTANEOUS at 13:19

## 2021-10-31 RX ADMIN — PREGABALIN 75 MG: 75 CAPSULE ORAL at 11:15

## 2021-10-31 RX ADMIN — SODIUM CHLORIDE 75 ML/HR: 0.9 INJECTION, SOLUTION INTRAVENOUS at 22:53

## 2021-10-31 RX ADMIN — OXYCODONE HYDROCHLORIDE 5 MG: 5 TABLET ORAL at 17:06

## 2021-10-31 RX ADMIN — PREGABALIN 75 MG: 75 CAPSULE ORAL at 17:08

## 2021-10-31 RX ADMIN — OXYCODONE HYDROCHLORIDE 5 MG: 5 TABLET ORAL at 10:17

## 2021-11-01 PROBLEM — H92.02 EAR PAIN, LEFT: Status: ACTIVE | Noted: 2021-11-01

## 2021-11-01 LAB
ALBUMIN SERPL BCP-MCNC: 2.1 G/DL (ref 3.5–5)
ALP SERPL-CCNC: 86 U/L (ref 46–116)
ALT SERPL W P-5'-P-CCNC: 45 U/L (ref 12–78)
ANION GAP SERPL CALCULATED.3IONS-SCNC: 6 MMOL/L (ref 4–13)
AST SERPL W P-5'-P-CCNC: 56 U/L (ref 5–45)
BACTERIA UR QL AUTO: ABNORMAL /HPF
BASOPHILS # BLD AUTO: 0.03 THOUSANDS/ΜL (ref 0–0.1)
BASOPHILS NFR BLD AUTO: 0 % (ref 0–1)
BILIRUB SERPL-MCNC: 0.43 MG/DL (ref 0.2–1)
BILIRUB UR QL STRIP: NEGATIVE
BUN SERPL-MCNC: 15 MG/DL (ref 5–25)
CALCIUM ALBUM COR SERPL-MCNC: 10.3 MG/DL (ref 8.3–10.1)
CALCIUM SERPL-MCNC: 8.8 MG/DL (ref 8.3–10.1)
CHLORIDE SERPL-SCNC: 104 MMOL/L (ref 100–108)
CLARITY UR: CLEAR
CO2 SERPL-SCNC: 25 MMOL/L (ref 21–32)
COLOR UR: YELLOW
CREAT SERPL-MCNC: 1.97 MG/DL (ref 0.6–1.3)
EOSINOPHIL # BLD AUTO: 0 THOUSAND/ΜL (ref 0–0.61)
EOSINOPHIL NFR BLD AUTO: 0 % (ref 0–6)
ERYTHROCYTE [DISTWIDTH] IN BLOOD BY AUTOMATED COUNT: 17.2 % (ref 11.6–15.1)
GFR SERPL CREATININE-BSD FRML MDRD: 33 ML/MIN/1.73SQ M
GLUCOSE SERPL-MCNC: 121 MG/DL (ref 65–140)
GLUCOSE UR STRIP-MCNC: NEGATIVE MG/DL
HCT VFR BLD AUTO: 32.1 % (ref 34.8–46.1)
HGB BLD-MCNC: 10.3 G/DL (ref 11.5–15.4)
HGB UR QL STRIP.AUTO: ABNORMAL
HYALINE CASTS #/AREA URNS LPF: ABNORMAL /LPF
IMM GRANULOCYTES # BLD AUTO: 0.13 THOUSAND/UL (ref 0–0.2)
IMM GRANULOCYTES NFR BLD AUTO: 1 % (ref 0–2)
KETONES UR STRIP-MCNC: NEGATIVE MG/DL
LEUKOCYTE ESTERASE UR QL STRIP: NEGATIVE
LYMPHOCYTES # BLD AUTO: 2.12 THOUSANDS/ΜL (ref 0.6–4.47)
LYMPHOCYTES NFR BLD AUTO: 23 % (ref 14–44)
MCH RBC QN AUTO: 26.4 PG (ref 26.8–34.3)
MCHC RBC AUTO-ENTMCNC: 32.1 G/DL (ref 31.4–37.4)
MCV RBC AUTO: 82 FL (ref 82–98)
MONOCYTES # BLD AUTO: 0.67 THOUSAND/ΜL (ref 0.17–1.22)
MONOCYTES NFR BLD AUTO: 7 % (ref 4–12)
NEUTROPHILS # BLD AUTO: 6.1 THOUSANDS/ΜL (ref 1.85–7.62)
NEUTS SEG NFR BLD AUTO: 69 % (ref 43–75)
NITRITE UR QL STRIP: NEGATIVE
NON-SQ EPI CELLS URNS QL MICRO: ABNORMAL /HPF
NRBC BLD AUTO-RTO: 0 /100 WBCS
PH UR STRIP.AUTO: 8 [PH]
PLATELET # BLD AUTO: 712 THOUSANDS/UL (ref 149–390)
PMV BLD AUTO: 9.8 FL (ref 8.9–12.7)
POTASSIUM SERPL-SCNC: 3.6 MMOL/L (ref 3.5–5.3)
PROT SERPL-MCNC: 7 G/DL (ref 6.4–8.2)
PROT UR STRIP-MCNC: ABNORMAL MG/DL
RBC # BLD AUTO: 3.9 MILLION/UL (ref 3.81–5.12)
RBC #/AREA URNS AUTO: ABNORMAL /HPF
SODIUM SERPL-SCNC: 135 MMOL/L (ref 136–145)
SP GR UR STRIP.AUTO: 1.01 (ref 1–1.03)
UROBILINOGEN UR QL STRIP.AUTO: 0.2 E.U./DL
WBC # BLD AUTO: 9.05 THOUSAND/UL (ref 4.31–10.16)
WBC #/AREA URNS AUTO: ABNORMAL /HPF

## 2021-11-01 PROCEDURE — 85025 COMPLETE CBC W/AUTO DIFF WBC: CPT | Performed by: NURSE PRACTITIONER

## 2021-11-01 PROCEDURE — 99232 SBSQ HOSP IP/OBS MODERATE 35: CPT | Performed by: INTERNAL MEDICINE

## 2021-11-01 PROCEDURE — 80053 COMPREHEN METABOLIC PANEL: CPT | Performed by: NURSE PRACTITIONER

## 2021-11-01 PROCEDURE — 99253 IP/OBS CNSLTJ NEW/EST LOW 45: CPT | Performed by: PHYSICIAN ASSISTANT

## 2021-11-01 PROCEDURE — 81001 URINALYSIS AUTO W/SCOPE: CPT | Performed by: INTERNAL MEDICINE

## 2021-11-01 RX ORDER — POTASSIUM CHLORIDE 20 MEQ/1
20 TABLET, EXTENDED RELEASE ORAL ONCE
Status: COMPLETED | OUTPATIENT
Start: 2021-11-01 | End: 2021-11-01

## 2021-11-01 RX ORDER — CIPROFLOXACIN AND DEXAMETHASONE 3; 1 MG/ML; MG/ML
4 SUSPENSION/ DROPS AURICULAR (OTIC) 2 TIMES DAILY
Status: DISCONTINUED | OUTPATIENT
Start: 2021-11-01 | End: 2021-11-03 | Stop reason: HOSPADM

## 2021-11-01 RX ORDER — OXYCODONE HYDROCHLORIDE 5 MG/1
5 TABLET ORAL EVERY 4 HOURS PRN
Status: DISCONTINUED | OUTPATIENT
Start: 2021-11-01 | End: 2021-11-03 | Stop reason: HOSPADM

## 2021-11-01 RX ORDER — HYDROMORPHONE HCL IN WATER/PF 6 MG/30 ML
0.2 PATIENT CONTROLLED ANALGESIA SYRINGE INTRAVENOUS EVERY 6 HOURS PRN
Status: DISCONTINUED | OUTPATIENT
Start: 2021-11-01 | End: 2021-11-03 | Stop reason: HOSPADM

## 2021-11-01 RX ORDER — OXYCODONE HYDROCHLORIDE 10 MG/1
10 TABLET ORAL EVERY 4 HOURS PRN
Status: DISCONTINUED | OUTPATIENT
Start: 2021-11-01 | End: 2021-11-03 | Stop reason: HOSPADM

## 2021-11-01 RX ORDER — AMLODIPINE BESYLATE 5 MG/1
5 TABLET ORAL DAILY
Status: DISCONTINUED | OUTPATIENT
Start: 2021-11-01 | End: 2021-11-02

## 2021-11-01 RX ADMIN — CIPROFLOXACIN AND DEXAMETHASONE 4 DROP: 3; 1 SUSPENSION/ DROPS AURICULAR (OTIC) at 17:30

## 2021-11-01 RX ADMIN — Medication 1000 UNITS: at 08:07

## 2021-11-01 RX ADMIN — HYDROMORPHONE HYDROCHLORIDE 0.2 MG: 0.2 INJECTION, SOLUTION INTRAMUSCULAR; INTRAVENOUS; SUBCUTANEOUS at 13:37

## 2021-11-01 RX ADMIN — AMLODIPINE BESYLATE 5 MG: 5 TABLET ORAL at 11:31

## 2021-11-01 RX ADMIN — HYDROMORPHONE HYDROCHLORIDE 0.2 MG: 0.2 INJECTION, SOLUTION INTRAMUSCULAR; INTRAVENOUS; SUBCUTANEOUS at 19:42

## 2021-11-01 RX ADMIN — HEPARIN SODIUM 5000 UNITS: 5000 INJECTION INTRAVENOUS; SUBCUTANEOUS at 05:15

## 2021-11-01 RX ADMIN — FLUTICASONE PROPIONATE 2 SPRAY: 50 SPRAY, METERED NASAL at 08:07

## 2021-11-01 RX ADMIN — OXYCODONE HYDROCHLORIDE 5 MG: 5 TABLET ORAL at 12:35

## 2021-11-01 RX ADMIN — FLUTICASONE PROPIONATE 2 SPRAY: 50 SPRAY, METERED NASAL at 17:30

## 2021-11-01 RX ADMIN — ATORVASTATIN CALCIUM 40 MG: 40 TABLET, FILM COATED ORAL at 17:30

## 2021-11-01 RX ADMIN — SODIUM CHLORIDE 75 ML/HR: 0.9 INJECTION, SOLUTION INTRAVENOUS at 11:31

## 2021-11-01 RX ADMIN — PREGABALIN 75 MG: 75 CAPSULE ORAL at 08:08

## 2021-11-01 RX ADMIN — DULOXETINE 30 MG: 30 CAPSULE, DELAYED RELEASE ORAL at 08:07

## 2021-11-01 RX ADMIN — OXYCODONE HYDROCHLORIDE 5 MG: 5 TABLET ORAL at 02:28

## 2021-11-01 RX ADMIN — OXYCODONE HYDROCHLORIDE 10 MG: 10 TABLET ORAL at 17:30

## 2021-11-01 RX ADMIN — POTASSIUM CHLORIDE 20 MEQ: 1500 TABLET, EXTENDED RELEASE ORAL at 09:29

## 2021-11-01 RX ADMIN — PREGABALIN 75 MG: 75 CAPSULE ORAL at 17:30

## 2021-11-01 RX ADMIN — OXYCODONE HYDROCHLORIDE 5 MG: 5 TABLET ORAL at 08:07

## 2021-11-01 RX ADMIN — PANTOPRAZOLE SODIUM 40 MG: 40 TABLET, DELAYED RELEASE ORAL at 08:08

## 2021-11-01 RX ADMIN — HEPARIN SODIUM 5000 UNITS: 5000 INJECTION INTRAVENOUS; SUBCUTANEOUS at 13:37

## 2021-11-01 RX ADMIN — GUAIFENESIN AND DEXTROMETHORPHAN 10 ML: 100; 10 SYRUP ORAL at 08:36

## 2021-11-01 RX ADMIN — PREDNISOLONE ACETATE 1 DROP: 10 SUSPENSION/ DROPS OPHTHALMIC at 17:30

## 2021-11-01 RX ADMIN — PREDNISOLONE ACETATE 1 DROP: 10 SUSPENSION/ DROPS OPHTHALMIC at 08:07

## 2021-11-01 RX ADMIN — HEPARIN SODIUM 5000 UNITS: 5000 INJECTION INTRAVENOUS; SUBCUTANEOUS at 21:02

## 2021-11-01 RX ADMIN — OXYCODONE HYDROCHLORIDE 10 MG: 10 TABLET ORAL at 22:55

## 2021-11-02 LAB
ANION GAP SERPL CALCULATED.3IONS-SCNC: 6 MMOL/L (ref 4–13)
BASOPHILS # BLD AUTO: 0.06 THOUSANDS/ΜL (ref 0–0.1)
BASOPHILS NFR BLD AUTO: 0 % (ref 0–1)
BUN SERPL-MCNC: 11 MG/DL (ref 5–25)
C3 SERPL-MCNC: 136 MG/DL (ref 90–180)
C4 SERPL-MCNC: 29 MG/DL (ref 10–40)
CALCIUM SERPL-MCNC: 8.8 MG/DL (ref 8.3–10.1)
CHLORIDE SERPL-SCNC: 101 MMOL/L (ref 100–108)
CO2 SERPL-SCNC: 26 MMOL/L (ref 21–32)
CREAT SERPL-MCNC: 1.53 MG/DL (ref 0.6–1.3)
EOSINOPHIL # BLD AUTO: 0 THOUSAND/ΜL (ref 0–0.61)
EOSINOPHIL NFR BLD AUTO: 0 % (ref 0–6)
ERYTHROCYTE [DISTWIDTH] IN BLOOD BY AUTOMATED COUNT: 17.4 % (ref 11.6–15.1)
GFR SERPL CREATININE-BSD FRML MDRD: 45 ML/MIN/1.73SQ M
GLUCOSE SERPL-MCNC: 119 MG/DL (ref 65–140)
HCT VFR BLD AUTO: 33.4 % (ref 34.8–46.1)
HGB BLD-MCNC: 10.9 G/DL (ref 11.5–15.4)
IMM GRANULOCYTES # BLD AUTO: 0.17 THOUSAND/UL (ref 0–0.2)
IMM GRANULOCYTES NFR BLD AUTO: 1 % (ref 0–2)
LYMPHOCYTES # BLD AUTO: 2.36 THOUSANDS/ΜL (ref 0.6–4.47)
LYMPHOCYTES NFR BLD AUTO: 17 % (ref 14–44)
MCH RBC QN AUTO: 25.8 PG (ref 26.8–34.3)
MCHC RBC AUTO-ENTMCNC: 32.6 G/DL (ref 31.4–37.4)
MCV RBC AUTO: 79 FL (ref 82–98)
MONOCYTES # BLD AUTO: 0.68 THOUSAND/ΜL (ref 0.17–1.22)
MONOCYTES NFR BLD AUTO: 5 % (ref 4–12)
NEUTROPHILS # BLD AUTO: 10.78 THOUSANDS/ΜL (ref 1.85–7.62)
NEUTS SEG NFR BLD AUTO: 77 % (ref 43–75)
NRBC BLD AUTO-RTO: 0 /100 WBCS
PLATELET # BLD AUTO: 731 THOUSANDS/UL (ref 149–390)
PMV BLD AUTO: 9.7 FL (ref 8.9–12.7)
POTASSIUM SERPL-SCNC: 3.1 MMOL/L (ref 3.5–5.3)
RBC # BLD AUTO: 4.22 MILLION/UL (ref 3.81–5.12)
SODIUM SERPL-SCNC: 133 MMOL/L (ref 136–145)
WBC # BLD AUTO: 14.05 THOUSAND/UL (ref 4.31–10.16)

## 2021-11-02 PROCEDURE — 86038 ANTINUCLEAR ANTIBODIES: CPT | Performed by: INTERNAL MEDICINE

## 2021-11-02 PROCEDURE — 99232 SBSQ HOSP IP/OBS MODERATE 35: CPT | Performed by: INTERNAL MEDICINE

## 2021-11-02 PROCEDURE — 99233 SBSQ HOSP IP/OBS HIGH 50: CPT | Performed by: INTERNAL MEDICINE

## 2021-11-02 PROCEDURE — 83520 IMMUNOASSAY QUANT NOS NONAB: CPT | Performed by: INTERNAL MEDICINE

## 2021-11-02 PROCEDURE — 99232 SBSQ HOSP IP/OBS MODERATE 35: CPT | Performed by: GENERAL PRACTICE

## 2021-11-02 PROCEDURE — 85025 COMPLETE CBC W/AUTO DIFF WBC: CPT

## 2021-11-02 PROCEDURE — 80048 BASIC METABOLIC PNL TOTAL CA: CPT

## 2021-11-02 PROCEDURE — 86160 COMPLEMENT ANTIGEN: CPT | Performed by: INTERNAL MEDICINE

## 2021-11-02 PROCEDURE — 86255 FLUORESCENT ANTIBODY SCREEN: CPT | Performed by: INTERNAL MEDICINE

## 2021-11-02 RX ORDER — POTASSIUM CHLORIDE 20 MEQ/1
40 TABLET, EXTENDED RELEASE ORAL ONCE
Status: COMPLETED | OUTPATIENT
Start: 2021-11-02 | End: 2021-11-02

## 2021-11-02 RX ORDER — AMLODIPINE BESYLATE 10 MG/1
10 TABLET ORAL DAILY
Status: DISCONTINUED | OUTPATIENT
Start: 2021-11-03 | End: 2021-11-03 | Stop reason: HOSPADM

## 2021-11-02 RX ORDER — AMOXICILLIN 250 MG
2 CAPSULE ORAL
Status: DISCONTINUED | OUTPATIENT
Start: 2021-11-02 | End: 2021-11-03 | Stop reason: HOSPADM

## 2021-11-02 RX ORDER — AMLODIPINE BESYLATE 5 MG/1
5 TABLET ORAL ONCE
Status: COMPLETED | OUTPATIENT
Start: 2021-11-02 | End: 2021-11-02

## 2021-11-02 RX ORDER — POLYETHYLENE GLYCOL 3350 17 G/17G
17 POWDER, FOR SOLUTION ORAL DAILY
Status: DISCONTINUED | OUTPATIENT
Start: 2021-11-02 | End: 2021-11-03 | Stop reason: HOSPADM

## 2021-11-02 RX ORDER — METHOCARBAMOL 750 MG/1
750 TABLET, FILM COATED ORAL EVERY 6 HOURS SCHEDULED
Status: DISCONTINUED | OUTPATIENT
Start: 2021-11-02 | End: 2021-11-03 | Stop reason: HOSPADM

## 2021-11-02 RX ADMIN — CIPROFLOXACIN AND DEXAMETHASONE 4 DROP: 3; 1 SUSPENSION/ DROPS AURICULAR (OTIC) at 09:22

## 2021-11-02 RX ADMIN — AMLODIPINE BESYLATE 5 MG: 5 TABLET ORAL at 09:22

## 2021-11-02 RX ADMIN — HEPARIN SODIUM 5000 UNITS: 5000 INJECTION INTRAVENOUS; SUBCUTANEOUS at 13:31

## 2021-11-02 RX ADMIN — POLYETHYLENE GLYCOL 3350 17 G: 17 POWDER, FOR SOLUTION ORAL at 12:31

## 2021-11-02 RX ADMIN — FLUTICASONE PROPIONATE 2 SPRAY: 50 SPRAY, METERED NASAL at 16:15

## 2021-11-02 RX ADMIN — DOCUSATE SODIUM AND SENNOSIDES 2 TABLET: 8.6; 5 TABLET ORAL at 23:40

## 2021-11-02 RX ADMIN — Medication 1000 UNITS: at 09:22

## 2021-11-02 RX ADMIN — AMLODIPINE BESYLATE 5 MG: 5 TABLET ORAL at 12:31

## 2021-11-02 RX ADMIN — ATORVASTATIN CALCIUM 40 MG: 40 TABLET, FILM COATED ORAL at 16:14

## 2021-11-02 RX ADMIN — OXYCODONE HYDROCHLORIDE 10 MG: 10 TABLET ORAL at 05:22

## 2021-11-02 RX ADMIN — POTASSIUM CHLORIDE 40 MEQ: 1500 TABLET, EXTENDED RELEASE ORAL at 10:32

## 2021-11-02 RX ADMIN — FLUTICASONE PROPIONATE 2 SPRAY: 50 SPRAY, METERED NASAL at 09:23

## 2021-11-02 RX ADMIN — PREDNISOLONE ACETATE 1 DROP: 10 SUSPENSION/ DROPS OPHTHALMIC at 16:15

## 2021-11-02 RX ADMIN — PREDNISOLONE ACETATE 1 DROP: 10 SUSPENSION/ DROPS OPHTHALMIC at 09:23

## 2021-11-02 RX ADMIN — OXYCODONE HYDROCHLORIDE 10 MG: 10 TABLET ORAL at 10:40

## 2021-11-02 RX ADMIN — METHOCARBAMOL TABLETS 750 MG: 750 TABLET, COATED ORAL at 23:40

## 2021-11-02 RX ADMIN — METHOCARBAMOL TABLETS 750 MG: 750 TABLET, COATED ORAL at 16:14

## 2021-11-02 RX ADMIN — HEPARIN SODIUM 5000 UNITS: 5000 INJECTION INTRAVENOUS; SUBCUTANEOUS at 23:40

## 2021-11-02 RX ADMIN — PREGABALIN 75 MG: 75 CAPSULE ORAL at 09:25

## 2021-11-02 RX ADMIN — CIPROFLOXACIN AND DEXAMETHASONE 4 DROP: 3; 1 SUSPENSION/ DROPS AURICULAR (OTIC) at 16:15

## 2021-11-02 RX ADMIN — PREGABALIN 75 MG: 75 CAPSULE ORAL at 17:51

## 2021-11-02 RX ADMIN — OXYCODONE HYDROCHLORIDE 10 MG: 10 TABLET ORAL at 23:40

## 2021-11-02 RX ADMIN — PANTOPRAZOLE SODIUM 40 MG: 40 TABLET, DELAYED RELEASE ORAL at 09:22

## 2021-11-02 RX ADMIN — DULOXETINE 30 MG: 30 CAPSULE, DELAYED RELEASE ORAL at 09:22

## 2021-11-02 RX ADMIN — ACETAMINOPHEN 650 MG: 325 TABLET, FILM COATED ORAL at 16:14

## 2021-11-02 RX ADMIN — HEPARIN SODIUM 5000 UNITS: 5000 INJECTION INTRAVENOUS; SUBCUTANEOUS at 05:22

## 2021-11-03 ENCOUNTER — TELEPHONE (OUTPATIENT)
Dept: SURGICAL ONCOLOGY | Facility: CLINIC | Age: 51
End: 2021-11-03

## 2021-11-03 VITALS
DIASTOLIC BLOOD PRESSURE: 81 MMHG | WEIGHT: 159 LBS | HEIGHT: 63 IN | BODY MASS INDEX: 28.17 KG/M2 | HEART RATE: 96 BPM | TEMPERATURE: 99.5 F | RESPIRATION RATE: 20 BRPM | OXYGEN SATURATION: 92 % | SYSTOLIC BLOOD PRESSURE: 119 MMHG

## 2021-11-03 LAB
ANION GAP SERPL CALCULATED.3IONS-SCNC: 7 MMOL/L (ref 4–13)
ANION GAP SERPL CALCULATED.3IONS-SCNC: 7 MMOL/L (ref 4–13)
BUN SERPL-MCNC: 14 MG/DL (ref 5–25)
BUN SERPL-MCNC: 15 MG/DL (ref 5–25)
CALCIUM SERPL-MCNC: 8.7 MG/DL (ref 8.3–10.1)
CALCIUM SERPL-MCNC: 8.8 MG/DL (ref 8.3–10.1)
CHLORIDE SERPL-SCNC: 102 MMOL/L (ref 100–108)
CHLORIDE SERPL-SCNC: 103 MMOL/L (ref 100–108)
CO2 SERPL-SCNC: 24 MMOL/L (ref 21–32)
CO2 SERPL-SCNC: 24 MMOL/L (ref 21–32)
CREAT SERPL-MCNC: 1.74 MG/DL (ref 0.6–1.3)
CREAT SERPL-MCNC: 1.87 MG/DL (ref 0.6–1.3)
GFR SERPL CREATININE-BSD FRML MDRD: 35 ML/MIN/1.73SQ M
GFR SERPL CREATININE-BSD FRML MDRD: 38 ML/MIN/1.73SQ M
GLUCOSE SERPL-MCNC: 110 MG/DL (ref 65–140)
GLUCOSE SERPL-MCNC: 123 MG/DL (ref 65–140)
POTASSIUM SERPL-SCNC: 3.7 MMOL/L (ref 3.5–5.3)
POTASSIUM SERPL-SCNC: 3.8 MMOL/L (ref 3.5–5.3)
RYE IGE QN: NEGATIVE
SODIUM SERPL-SCNC: 133 MMOL/L (ref 136–145)
SODIUM SERPL-SCNC: 134 MMOL/L (ref 136–145)

## 2021-11-03 PROCEDURE — 80048 BASIC METABOLIC PNL TOTAL CA: CPT | Performed by: GENERAL PRACTICE

## 2021-11-03 PROCEDURE — 99239 HOSP IP/OBS DSCHRG MGMT >30: CPT | Performed by: GENERAL PRACTICE

## 2021-11-03 PROCEDURE — 80048 BASIC METABOLIC PNL TOTAL CA: CPT | Performed by: INTERNAL MEDICINE

## 2021-11-03 PROCEDURE — 99232 SBSQ HOSP IP/OBS MODERATE 35: CPT | Performed by: INTERNAL MEDICINE

## 2021-11-03 RX ORDER — SODIUM CHLORIDE 9 MG/ML
100 INJECTION, SOLUTION INTRAVENOUS CONTINUOUS
Status: DISPENSED | OUTPATIENT
Start: 2021-11-03 | End: 2021-11-03

## 2021-11-03 RX ORDER — OXYCODONE HYDROCHLORIDE 10 MG/1
10 TABLET ORAL EVERY 8 HOURS PRN
Qty: 15 TABLET | Refills: 0 | Status: SHIPPED | OUTPATIENT
Start: 2021-11-03 | End: 2021-11-13

## 2021-11-03 RX ORDER — NICOTINE 21 MG/24HR
1 PATCH, TRANSDERMAL 24 HOURS TRANSDERMAL DAILY
Qty: 28 PATCH | Refills: 0 | Status: SHIPPED | OUTPATIENT
Start: 2021-11-04 | End: 2022-01-06 | Stop reason: CLARIF

## 2021-11-03 RX ORDER — ALBUTEROL SULFATE 90 UG/1
2 AEROSOL, METERED RESPIRATORY (INHALATION) EVERY 6 HOURS PRN
Qty: 18 G | Refills: 0 | Status: SHIPPED | OUTPATIENT
Start: 2021-11-03

## 2021-11-03 RX ORDER — BISACODYL 10 MG
10 SUPPOSITORY, RECTAL RECTAL DAILY PRN
Qty: 12 SUPPOSITORY | Refills: 0 | Status: SHIPPED | OUTPATIENT
Start: 2021-11-03

## 2021-11-03 RX ORDER — AMOXICILLIN 250 MG
2 CAPSULE ORAL
Qty: 60 TABLET | Refills: 0 | Status: SHIPPED | OUTPATIENT
Start: 2021-11-03

## 2021-11-03 RX ORDER — POLYETHYLENE GLYCOL 3350 17 G/17G
17 POWDER, FOR SOLUTION ORAL DAILY
Qty: 30 EACH | Refills: 0 | Status: SHIPPED | OUTPATIENT
Start: 2021-11-04

## 2021-11-03 RX ORDER — METHOCARBAMOL 750 MG/1
750 TABLET, FILM COATED ORAL EVERY 6 HOURS SCHEDULED
Qty: 120 TABLET | Refills: 0 | Status: SHIPPED | OUTPATIENT
Start: 2021-11-03 | End: 2022-01-06 | Stop reason: SDUPTHER

## 2021-11-03 RX ORDER — ACETAMINOPHEN 500 MG
1000 TABLET ORAL EVERY 8 HOURS
Qty: 180 TABLET | Refills: 0 | Status: SHIPPED | OUTPATIENT
Start: 2021-11-03 | End: 2022-01-06 | Stop reason: CLARIF

## 2021-11-03 RX ORDER — GUAIFENESIN/DEXTROMETHORPHAN 100-10MG/5
10 SYRUP ORAL EVERY 4 HOURS PRN
Qty: 118 ML | Refills: 0 | Status: SHIPPED | OUTPATIENT
Start: 2021-11-03 | End: 2022-01-11

## 2021-11-03 RX ORDER — PREGABALIN 100 MG/1
100 CAPSULE ORAL 2 TIMES DAILY
Qty: 60 CAPSULE | Refills: 0 | Status: SHIPPED | OUTPATIENT
Start: 2021-11-03 | End: 2022-01-06 | Stop reason: SDUPTHER

## 2021-11-03 RX ORDER — CIPROFLOXACIN AND DEXAMETHASONE 3; 1 MG/ML; MG/ML
4 SUSPENSION/ DROPS AURICULAR (OTIC) 2 TIMES DAILY
Qty: 7.5 ML | Refills: 0 | Status: SHIPPED | OUTPATIENT
Start: 2021-11-03 | End: 2022-01-06 | Stop reason: CLARIF

## 2021-11-03 RX ORDER — AMLODIPINE BESYLATE 10 MG/1
10 TABLET ORAL DAILY
Qty: 30 TABLET | Refills: 0 | Status: SHIPPED | OUTPATIENT
Start: 2021-11-04 | End: 2022-02-28 | Stop reason: SDUPTHER

## 2021-11-03 RX ADMIN — PREGABALIN 75 MG: 75 CAPSULE ORAL at 08:44

## 2021-11-03 RX ADMIN — FLUTICASONE PROPIONATE 2 SPRAY: 50 SPRAY, METERED NASAL at 08:45

## 2021-11-03 RX ADMIN — CIPROFLOXACIN AND DEXAMETHASONE 4 DROP: 3; 1 SUSPENSION/ DROPS AURICULAR (OTIC) at 08:45

## 2021-11-03 RX ADMIN — HEPARIN SODIUM 5000 UNITS: 5000 INJECTION INTRAVENOUS; SUBCUTANEOUS at 05:35

## 2021-11-03 RX ADMIN — METHOCARBAMOL TABLETS 750 MG: 750 TABLET, COATED ORAL at 05:35

## 2021-11-03 RX ADMIN — OXYCODONE HYDROCHLORIDE 10 MG: 10 TABLET ORAL at 13:39

## 2021-11-03 RX ADMIN — HEPARIN SODIUM 5000 UNITS: 5000 INJECTION INTRAVENOUS; SUBCUTANEOUS at 13:39

## 2021-11-03 RX ADMIN — GUAIFENESIN AND DEXTROMETHORPHAN 10 ML: 100; 10 SYRUP ORAL at 03:57

## 2021-11-03 RX ADMIN — POLYETHYLENE GLYCOL 3350 17 G: 17 POWDER, FOR SOLUTION ORAL at 08:43

## 2021-11-03 RX ADMIN — PANTOPRAZOLE SODIUM 40 MG: 40 TABLET, DELAYED RELEASE ORAL at 08:44

## 2021-11-03 RX ADMIN — Medication 1000 UNITS: at 08:44

## 2021-11-03 RX ADMIN — AMLODIPINE BESYLATE 10 MG: 10 TABLET ORAL at 08:44

## 2021-11-03 RX ADMIN — METHOCARBAMOL TABLETS 750 MG: 750 TABLET, COATED ORAL at 11:54

## 2021-11-03 RX ADMIN — SODIUM CHLORIDE 100 ML/HR: 0.9 INJECTION, SOLUTION INTRAVENOUS at 11:53

## 2021-11-03 RX ADMIN — OXYCODONE HYDROCHLORIDE 10 MG: 10 TABLET ORAL at 05:37

## 2021-11-03 RX ADMIN — PREDNISOLONE ACETATE 1 DROP: 10 SUSPENSION/ DROPS OPHTHALMIC at 08:45

## 2021-11-03 RX ADMIN — DULOXETINE 30 MG: 30 CAPSULE, DELAYED RELEASE ORAL at 08:44

## 2021-11-04 LAB
BACTERIA BLD CULT: NORMAL
BACTERIA BLD CULT: NORMAL
GBM AB SER IA-ACNC: 6 UNITS (ref 0–20)

## 2021-11-06 LAB
C-ANCA TITR SER IF: NORMAL TITER
MYELOPEROXIDASE AB SER IA-ACNC: <9 U/ML (ref 0–9)
P-ANCA ATYPICAL TITR SER IF: NORMAL TITER
P-ANCA TITR SER IF: NORMAL TITER
PROTEINASE3 AB SER IA-ACNC: <3.5 U/ML (ref 0–3.5)

## 2021-11-12 ENCOUNTER — APPOINTMENT (OUTPATIENT)
Dept: LAB | Facility: HOSPITAL | Age: 51
End: 2021-11-12
Payer: COMMERCIAL

## 2021-11-12 DIAGNOSIS — N17.9 ACUTE RENAL FAILURE, UNSPECIFIED ACUTE RENAL FAILURE TYPE (HCC): ICD-10-CM

## 2021-11-12 LAB
ANION GAP SERPL CALCULATED.3IONS-SCNC: 9 MMOL/L (ref 4–13)
BUN SERPL-MCNC: 19 MG/DL (ref 5–25)
CALCIUM SERPL-MCNC: 8.3 MG/DL (ref 8.3–10.1)
CHLORIDE SERPL-SCNC: 103 MMOL/L (ref 100–108)
CO2 SERPL-SCNC: 29 MMOL/L (ref 21–32)
CREAT SERPL-MCNC: 1.46 MG/DL (ref 0.6–1.3)
GFR SERPL CREATININE-BSD FRML MDRD: 47 ML/MIN/1.73SQ M
GLUCOSE P FAST SERPL-MCNC: 99 MG/DL (ref 65–99)
POTASSIUM SERPL-SCNC: 4.2 MMOL/L (ref 3.5–5.3)
SODIUM SERPL-SCNC: 141 MMOL/L (ref 136–145)

## 2021-11-12 PROCEDURE — 80048 BASIC METABOLIC PNL TOTAL CA: CPT

## 2021-11-12 PROCEDURE — 36415 COLL VENOUS BLD VENIPUNCTURE: CPT

## 2021-11-24 ENCOUNTER — CONSULT (OUTPATIENT)
Dept: PAIN MEDICINE | Facility: CLINIC | Age: 51
End: 2021-11-24
Payer: COMMERCIAL

## 2021-11-24 ENCOUNTER — TELEPHONE (OUTPATIENT)
Dept: HEMATOLOGY ONCOLOGY | Facility: CLINIC | Age: 51
End: 2021-11-24

## 2021-11-24 VITALS
HEART RATE: 85 BPM | WEIGHT: 157 LBS | BODY MASS INDEX: 27.82 KG/M2 | HEIGHT: 63 IN | SYSTOLIC BLOOD PRESSURE: 129 MMHG | DIASTOLIC BLOOD PRESSURE: 81 MMHG

## 2021-11-24 DIAGNOSIS — G89.4 CHRONIC PAIN SYNDROME: Primary | ICD-10-CM

## 2021-11-24 DIAGNOSIS — M54.41 CHRONIC BILATERAL LOW BACK PAIN WITH BILATERAL SCIATICA: ICD-10-CM

## 2021-11-24 DIAGNOSIS — G62.9 NEUROPATHY: ICD-10-CM

## 2021-11-24 DIAGNOSIS — M54.42 CHRONIC BILATERAL LOW BACK PAIN WITH BILATERAL SCIATICA: ICD-10-CM

## 2021-11-24 DIAGNOSIS — E85.81 PRIMARY AMYLOIDOSIS (HCC): ICD-10-CM

## 2021-11-24 DIAGNOSIS — G89.29 CHRONIC BILATERAL LOW BACK PAIN WITH BILATERAL SCIATICA: ICD-10-CM

## 2021-11-24 PROCEDURE — 99244 OFF/OP CNSLTJ NEW/EST MOD 40: CPT | Performed by: STUDENT IN AN ORGANIZED HEALTH CARE EDUCATION/TRAINING PROGRAM

## 2021-12-15 ENCOUNTER — OFFICE VISIT (OUTPATIENT)
Dept: HEMATOLOGY ONCOLOGY | Facility: CLINIC | Age: 51
End: 2021-12-15
Payer: COMMERCIAL

## 2021-12-15 VITALS
WEIGHT: 157 LBS | TEMPERATURE: 97.7 F | DIASTOLIC BLOOD PRESSURE: 92 MMHG | HEIGHT: 63 IN | SYSTOLIC BLOOD PRESSURE: 128 MMHG | HEART RATE: 85 BPM | BODY MASS INDEX: 27.82 KG/M2 | RESPIRATION RATE: 16 BRPM | OXYGEN SATURATION: 97 %

## 2021-12-15 DIAGNOSIS — C90.00 MULTIPLE MYELOMA NOT HAVING ACHIEVED REMISSION (HCC): Primary | ICD-10-CM

## 2021-12-15 DIAGNOSIS — Z51.11 ENCOUNTER FOR ANTINEOPLASTIC CHEMOTHERAPY: ICD-10-CM

## 2021-12-15 DIAGNOSIS — G89.3 CANCER ASSOCIATED PAIN: ICD-10-CM

## 2021-12-15 PROBLEM — B33.8 RSV (RESPIRATORY SYNCYTIAL VIRUS INFECTION): Status: RESOLVED | Noted: 2021-10-30 | Resolved: 2021-12-15

## 2021-12-15 PROCEDURE — 99214 OFFICE O/P EST MOD 30 MIN: CPT | Performed by: INTERNAL MEDICINE

## 2021-12-15 RX ORDER — DEXAMETHASONE 4 MG/1
TABLET ORAL
COMMUNITY
Start: 2021-10-23

## 2021-12-15 RX ORDER — METOPROLOL SUCCINATE 25 MG/1
TABLET, EXTENDED RELEASE ORAL
COMMUNITY
Start: 2021-11-01

## 2021-12-15 RX ORDER — PROCHLORPERAZINE MALEATE 10 MG
TABLET ORAL
COMMUNITY
Start: 2021-10-05

## 2021-12-15 RX ORDER — ONDANSETRON HYDROCHLORIDE 8 MG/1
TABLET, FILM COATED ORAL
COMMUNITY
Start: 2021-10-05

## 2021-12-15 RX ORDER — OXYCODONE HYDROCHLORIDE 5 MG/1
5-10 TABLET ORAL
COMMUNITY
Start: 2021-12-13 | End: 2022-01-13 | Stop reason: SDUPTHER

## 2021-12-15 RX ORDER — CYCLOPHOSPHAMIDE 50 MG/1
550 CAPSULE ORAL
COMMUNITY
Start: 2021-11-01

## 2022-01-06 ENCOUNTER — OFFICE VISIT (OUTPATIENT)
Dept: INTERNAL MEDICINE CLINIC | Facility: CLINIC | Age: 52
End: 2022-01-06
Payer: COMMERCIAL

## 2022-01-06 VITALS
TEMPERATURE: 98.1 F | DIASTOLIC BLOOD PRESSURE: 70 MMHG | HEIGHT: 63 IN | WEIGHT: 161 LBS | OXYGEN SATURATION: 97 % | BODY MASS INDEX: 28.53 KG/M2 | RESPIRATION RATE: 20 BRPM | SYSTOLIC BLOOD PRESSURE: 120 MMHG | HEART RATE: 82 BPM

## 2022-01-06 DIAGNOSIS — F11.20 CONTINUOUS OPIOID DEPENDENCE (HCC): ICD-10-CM

## 2022-01-06 DIAGNOSIS — R09.81 NASAL CONGESTION: ICD-10-CM

## 2022-01-06 DIAGNOSIS — G62.9 NEUROPATHY: ICD-10-CM

## 2022-01-06 DIAGNOSIS — Z12.31 ENCOUNTER FOR SCREENING MAMMOGRAM FOR BREAST CANCER: ICD-10-CM

## 2022-01-06 DIAGNOSIS — R12 HEARTBURN: ICD-10-CM

## 2022-01-06 DIAGNOSIS — I10 PRIMARY HYPERTENSION: Primary | Chronic | ICD-10-CM

## 2022-01-06 DIAGNOSIS — M54.42 CHRONIC BILATERAL LOW BACK PAIN WITH BILATERAL SCIATICA: ICD-10-CM

## 2022-01-06 DIAGNOSIS — M54.41 CHRONIC BILATERAL LOW BACK PAIN WITH BILATERAL SCIATICA: ICD-10-CM

## 2022-01-06 DIAGNOSIS — C90.00 MULTIPLE MYELOMA NOT HAVING ACHIEVED REMISSION (HCC): ICD-10-CM

## 2022-01-06 DIAGNOSIS — G89.29 CHRONIC BILATERAL LOW BACK PAIN WITH BILATERAL SCIATICA: ICD-10-CM

## 2022-01-06 PROBLEM — H92.02 EAR PAIN, LEFT: Status: RESOLVED | Noted: 2021-11-01 | Resolved: 2022-01-06

## 2022-01-06 PROBLEM — R30.0 DYSURIA: Status: RESOLVED | Noted: 2021-10-30 | Resolved: 2022-01-06

## 2022-01-06 PROBLEM — R31.0 GROSS HEMATURIA: Status: RESOLVED | Noted: 2021-10-30 | Resolved: 2022-01-06

## 2022-01-06 PROBLEM — H05.9 DISORDER OF ORBIT: Status: RESOLVED | Noted: 2021-05-19 | Resolved: 2022-01-06

## 2022-01-06 PROBLEM — E87.1 HYPONATREMIA: Status: RESOLVED | Noted: 2021-10-30 | Resolved: 2022-01-06

## 2022-01-06 PROBLEM — N17.9 ACUTE KIDNEY INJURY (HCC): Status: RESOLVED | Noted: 2021-02-10 | Resolved: 2022-01-06

## 2022-01-06 PROCEDURE — 99204 OFFICE O/P NEW MOD 45 MIN: CPT | Performed by: INTERNAL MEDICINE

## 2022-01-06 RX ORDER — PREGABALIN 100 MG/1
100 CAPSULE ORAL 2 TIMES DAILY
Qty: 90 CAPSULE | Refills: 3 | Status: SHIPPED | OUTPATIENT
Start: 2022-01-06 | End: 2022-08-09 | Stop reason: SDUPTHER

## 2022-01-06 RX ORDER — FAMOTIDINE 40 MG/1
40 TABLET, FILM COATED ORAL DAILY
Qty: 90 TABLET | Refills: 1 | Status: SHIPPED | OUTPATIENT
Start: 2022-01-06 | End: 2022-06-21

## 2022-01-06 RX ORDER — GUAIFENESIN/DEXTROMETHORPHAN 100-10MG/5
10 SYRUP ORAL EVERY 4 HOURS PRN
Qty: 118 ML | Refills: 0 | Status: CANCELLED | OUTPATIENT
Start: 2022-01-06

## 2022-01-06 RX ORDER — FLUTICASONE PROPIONATE 50 MCG
2 SPRAY, SUSPENSION (ML) NASAL DAILY
Qty: 16 G | Refills: 5 | Status: SHIPPED | OUTPATIENT
Start: 2022-01-06 | End: 2022-07-06

## 2022-01-06 RX ORDER — METHOCARBAMOL 750 MG/1
750 TABLET, FILM COATED ORAL EVERY 6 HOURS PRN
Qty: 120 TABLET | Refills: 3 | Status: SHIPPED | OUTPATIENT
Start: 2022-01-06

## 2022-01-06 RX ORDER — DULOXETIN HYDROCHLORIDE 30 MG/1
30 CAPSULE, DELAYED RELEASE ORAL DAILY
Qty: 90 CAPSULE | Refills: 1 | Status: SHIPPED | OUTPATIENT
Start: 2022-01-06 | End: 2022-06-21

## 2022-01-06 RX ORDER — PANTOPRAZOLE SODIUM 40 MG/1
40 TABLET, DELAYED RELEASE ORAL DAILY
Status: CANCELLED | OUTPATIENT
Start: 2022-01-06

## 2022-01-06 NOTE — PATIENT INSTRUCTIONS
Chronic Hypertension   AMBULATORY CARE:   Hypertension  is high blood pressure  Your blood pressure is the force of your blood moving against the walls of your arteries  Hypertension causes your blood pressure to get so high that your heart has to work much harder than normal  This can damage your heart  Even if you have hypertension for years, lifestyle changes, medicines, or both can help bring your blood pressure to normal   Call your local emergency number (911 in the [address] [facility]) or have someone call if:   · You have chest pain  · You have any of the following signs of a heart attack:      ? Squeezing, pressure, or pain in your chest    ? You may  also have any of the following:     § Discomfort or pain in your back, neck, jaw, stomach, or arm    § Shortness of breath    § Nausea or vomiting    § Lightheadedness or a sudden cold sweat    · You become confused or have difficulty speaking  · You suddenly feel lightheaded or have trouble breathing  Seek care immediately if:   · You have a severe headache or vision loss  · You have weakness in an arm or leg  Call your doctor or cardiologist if:   · You feel faint, dizzy, confused, or drowsy  · You have been taking your blood pressure medicine but your pressure is higher than your provider says it should be  · You have questions or concerns about your condition or care  Treatment for chronic hypertension  may include medicine to lower your blood pressure and cholesterol levels  A low cholesterol level helps prevent heart disease and makes it easier to control your blood pressure  Heart disease can make your blood pressure harder to control  You may also need to make lifestyle changes  What you need to know about the stages of hypertension:       · Normal blood pressure is 119/79 or lower   Your healthcare provider may only check your blood pressure each year if it stays at a normal level  · Elevated blood pressure is 120/79 to 129/79    This is sometimes called prehypertension  Your healthcare provider may suggest lifestyle changes to help lower your blood pressure to a normal level  He or she may then check it again in 3 to 6 months  · Stage 1 hypertension is 130/80  to 139/89   Your provider may recommend lifestyle changes, medication, and checks every 3 to 6 months until your blood pressure is controlled  · Stage 2 hypertension is 140/90 or higher   Your provider will recommend lifestyle changes and have you take 2 kinds of hypertension medicines  You will also need to have your blood pressure checked monthly until it is controlled  Manage chronic hypertension:   · Check your blood pressure at home  Avoid smoking, caffeine, and exercise at least 30 minutes before checking your blood pressure  Sit and rest for 5 minutes before you take your blood pressure  Extend your arm and support it on a flat surface  Your arm should be at the same level as your heart  Follow the directions that came with your blood pressure monitor  Check your blood pressure 2 times, 1 minute apart, before you take your medicine in the morning  Also check your blood pressure before your evening meal  Keep a record of your readings and bring it to your follow-up visits  Ask your healthcare provider what your blood pressure should be  · Manage any other health conditions you have  Health conditions such as diabetes can increase your risk for hypertension  Follow your healthcare provider's instructions and take all your medicines as directed  Talk to your healthcare provider about any new health conditions you have recently developed  · Ask about all medicines  Certain medicines can increase your blood pressure  Examples include oral birth control pills, decongestants, herbal supplements, and NSAIDs, such as ibuprofen  Your healthcare provider can tell you which medicines are safe for you to take   This includes prescription and over-the-counter medicines  Lifestyle changes you can make to lower your blood pressure: Your provider may want you to make more lifestyle changes if you are having trouble controlling your blood pressure  This may feel difficult over time, especially if you think you are making good changes but your pressure is still high  It might help to focus on one new change at a time  For example, try to add 1 more day of exercise, or exercise for an extra 10 minutes on 2 days  Small changes can make a big difference  Your healthcare provider can also refer you to specialists such as a dietitian who can help you make small changes  Your family members may be included in helping you learn to create lifestyle changes, such as the following:     · Limit sodium (salt) as directed  Too much sodium can affect your fluid balance  Check labels to find low-sodium or no-salt-added foods  Some low-sodium foods use potassium salts for flavor  Too much potassium can also cause health problems  Your healthcare provider will tell you how much sodium and potassium are safe for you to have in a day  He or she may recommend that you limit sodium to 2,300 mg a day  · Follow the meal plan recommended by your healthcare provider  A dietitian or your provider can give you more information on low-sodium plans or the DASH (Dietary Approaches to Stop Hypertension) eating plan  The DASH plan is low in sodium, processed sugar, unhealthy fats, and total fat  It is high in potassium, calcium, and fiber  These can be found in vegetables, fruit, and whole-grain foods  · Be physically active throughout the day  Physical activity, such as exercise, can help control your blood pressure and your weight  Be physically active for at least 30 minutes per day, on most days of the week  Include aerobic activity, such as walking or riding a bicycle  Also include strength training at least 2 times each week   Your healthcare providers can help you create a physical activity plan  · Decrease stress  This may help lower your blood pressure  Learn ways to relax, such as deep breathing or listening to music  · Limit alcohol as directed  Alcohol can increase your blood pressure  A drink of alcohol is 12 ounces of beer, 5 ounces of wine, or 1½ ounces of liquor  · Do not smoke  Nicotine and other chemicals in cigarettes and cigars can increase your blood pressure and also cause lung damage  Ask your healthcare provider for information if you currently smoke and need help to quit  E-cigarettes or smokeless tobacco still contain nicotine  Talk to your healthcare provider before you use these products  Follow up with your doctor or cardiologist as directed: You will need to return to have your blood pressure checked and to have other lab tests done  Write down your questions so you remember to ask them during your visits  © Copyright [employer] 2021 Information is for End User's use only and may not be sold, redistributed or otherwise used for commercial purposes  All illustrations and images included in CareNotes® are the copyrighted property of [employer]  or [facility] Devin Eid   The above information is an  only  It is not intended as medical advice for individual conditions or treatments  Talk to your doctor, nurse or pharmacist before following any medical regimen to see if it is safe and effective for you

## 2022-01-06 NOTE — PROGRESS NOTES
INTERNAL MEDICINE INITIAL OFFICE VISIT  [facility] - [facility] OF [address] [place] [address]    NAME: Martha Rich  AGE: 46 y o  SEX: female  : 1970     DATE: 2022     Assessment and Plan:     1  Primary hypertension    BP controlled  Continue anti-HTN medication  Tobacco cessation advised  2  Chronic bilateral low back pain with bilateral sciatica    Continue medications as prescribed  [place] PDMP [place]  Has seen pain management in the past  Has future appt to see palliative care  She has been on opiates from her former oncologist     - methocarbamol (ROBAXIN) 750 mg tablet; Take 1 tablet (750 mg total) by mouth every 6 (six) hours as needed for muscle spasms  Dispense: 120 tablet; Refill: 3  - pregabalin (LYRICA) 100 mg capsule; Take 1 capsule (100 mg total) by mouth 2 (two) times a day  Dispense: 90 capsule; Refill: 3    3  Heartburn    Would like her try Pepcid over PPI therapy  Would be a safer medication to control heartburn  - famotidine (PEPCID) 40 MG tablet; Take 1 tablet (40 mg total) by mouth daily  Dispense: 90 tablet; Refill: 1    4  Neuropathy    Continue lyrica and cymbalta  Has chronic back issues/radiculopathy     - DULoxetine (CYMBALTA) 30 mg delayed release capsule; Take 1 capsule (30 mg total) by mouth daily  Dispense: 90 capsule; Refill: 1    5  Nasal congestion  - fluticasone (FLONASE) 50 mcg/act nasal spray; 2 sprays into each nostril daily  Dispense: 16 g; Refill: 5    6  Multiple myeloma not having achieved remission ([facility] [address] )    On systemic antineoplastic therapy  Looking into stem cell transplant  She is following with [facility] and [facility]  She gets labs routinely monitored  7  Continuous opioid dependence ([facility] [address] )    On chronic opiates from her prior oncologist  [place]  Stable  8  Encounter for screening mammogram for breast cancer  - Mammo screening bilateral w 3d & cad; Future    BMI Counseling:  Body mass index is 28 52 kg/m²  The BMI is above normal  Nutrition recommendations include encouraging healthy choices of fruits and vegetables, limiting drinks that contain sugar, moderation in carbohydrate intake and increasing intake of lean protein  Rationale for BMI follow-up plan is due to patient being overweight or obese  Depression Screening and Follow-up Plan: Patient was screened for depression during today's encounter  They screened negative with a PHQ-2 score of 0  Tobacco Cessation Counseling: Tobacco cessation counseling was provided  The patient is sincerely urged to quit consumption of tobacco  She is not ready to quit tobacco       Return in about 6 months (around 7/6/2022) for Follow-up  Chief Complaint:     Chief Complaint   Patient presents with    Establish Care      History of Present Illness:     Marla presents to [facility]  Relocated from the [facility] where she was receiving care  Has been diagnosed with IgA kappa multiple myeloma in August 2021  She was seeing [facility]  and has been undergoing systemic antineoplastic therapy  She wants to get stem cell transplant and saw specialist down at [facility]  History of chronic pain and is on opiates  Has appt with palliative care due to chronic pain/cancer associated pain  Also saw Dr Pattie Herrera in the past      HTN is well controlled with medications  No history of stroke or heart attack  Cholesterol has been controlled  Has history of acid reflux and has been out of protonix for 1 month  Notes increase in acid reflux symptoms since being off medication  Takes cymbalta/lyrica for neuropathy  [place]      The following portions of the patient's history were reviewed and updated as appropriate: allergies, current medications, past family history, past medical history, past social history, past surgical history and problem list      Review of Systems:     Review of Systems Constitutional: Negative for appetite change, chills, fatigue and fever  HENT: Negative for congestion, hearing loss, postnasal drip, rhinorrhea, sore throat, tinnitus and trouble swallowing  Eyes: Negative for pain, discharge, redness and visual disturbance  Respiratory: Negative for cough, chest tightness, shortness of breath and wheezing  Cardiovascular: Negative for chest pain, palpitations and leg swelling  Gastrointestinal: Positive for constipation  Negative for abdominal distention, abdominal pain, blood in stool, diarrhea, nausea and vomiting  Endocrine: Negative for cold intolerance, heat intolerance, polydipsia, polyphagia and polyuria  Genitourinary: Negative for difficulty urinating, dysuria, frequency, hematuria and urgency  Musculoskeletal: Positive for arthralgias and back pain  Negative for gait problem, joint swelling, myalgias, neck pain and neck stiffness  Skin: Negative for color change and rash  Neurological: Negative for dizziness, tremors, seizures, syncope, speech difficulty, weakness, light-headedness, numbness and headaches  Hematological: Negative for adenopathy  Does not bruise/bleed easily  Psychiatric/Behavioral: Negative for agitation, behavioral problems, confusion, hallucinations, sleep disturbance and suicidal ideas  The patient is not nervous/anxious         Past Medical History:     Past Medical History:   Diagnosis Date    Cardiomyopathy ([address] )     Chronic back pain     GERD (gastroesophageal reflux disease)     Hypertension     Multiple myeloma (HCC)     Neuropathy        Past Surgical History:     Past Surgical History:   Procedure Laterality Date     SECTION        Social History:     Social History     Socioeconomic History    Marital status: Single     Spouse name: None    Number of children: None    Years of education: None    Highest education level: None   Occupational History    None   Tobacco Use    Smoking status: Heavy Tobacco Smoker     Packs/day: 1 00     Years: 26 00     Pack years: 26 00    Smokeless tobacco: Current User    Tobacco comment: started at 23   Vaping Use    Vaping Use: Never used   Substance and Sexual Activity    Alcohol use: Not Currently    Drug use: Never    Sexual activity: Not Currently     Partners: Male   Other Topics Concern    None   Social History Narrative    None     Social Determinants of Health     Financial Resource Strain: Not on file   Food Insecurity: Not on file   Transportation Needs: Not on file   Physical Activity: Not on file   Stress: Not on file   Social Connections: Not on file   Intimate Partner Violence: Not on file   Housing Stability: Not on file         Family History:     Family History   Problem Relation Age of Onset    No Known Problems Mother     No Known Problems Father     Colon cancer Neg Hx     Diabetes Neg Hx         Current Medications:     Current Outpatient Medications:     acyclovir (ZOVIRAX) 400 MG tablet, Take 400 mg by mouth, Disp: , Rfl:     albuterol (PROVENTIL HFA,VENTOLIN HFA) 90 mcg/act inhaler, Inhale 2 puffs every 6 (six) hours as needed for wheezing or shortness of breath, Disp: 18 g, Rfl: 0    amLODIPine (NORVASC) 10 mg tablet, Take 1 tablet (10 mg total) by mouth daily, Disp: 30 tablet, Rfl: 0    atorvastatin (LIPITOR) 40 mg tablet, Take 40 mg by mouth  , Disp: , Rfl:     bisacodyl (DULCOLAX) 10 mg suppository, Insert 1 suppository (10 mg total) into the rectum daily as needed for constipation, Disp: 12 suppository, Rfl: 0    cyclophosphamide (CYTOXAN) 50 mg capsule, Take 550 mg by mouth, Disp: , Rfl:     dexamethasone (DECADRON) 4 mg tablet, , Disp: , Rfl:     dextromethorphan-guaiFENesin (ROBITUSSIN DM)  mg/5 mL syrup, Take 10 mL by mouth every 4 (four) hours as needed (cough), Disp: 118 mL, Rfl: 0    DULoxetine (CYMBALTA) 30 mg delayed release capsule, Take 1 capsule (30 mg total) by mouth daily, Disp: 90 capsule, Rfl: 1   fentaNYL (DURAGESIC) 12 mcg/hr TD 72 hr patch, Place 12 mcg on the skin, Disp: , Rfl:     fluticasone (FLONASE) 50 mcg/act nasal spray, 2 sprays into each nostril daily, Disp: 16 g, Rfl: 5    ibuprofen (ADVIL,MOTRIN) 100 MG tablet, Take 100 mg by mouth, Disp: , Rfl:     loratadine (CLARITIN) 10 mg tablet, Take 1 tablet by mouth for 1 dose the morning of each Daratumumab Infusion  , Disp: , Rfl:     methocarbamol (ROBAXIN) 750 mg tablet, Take 1 tablet (750 mg total) by mouth every 6 (six) hours as needed for muscle spasms, Disp: 120 tablet, Rfl: 3    metoprolol succinate (TOPROL-XL) 25 mg 24 hr tablet, , Disp: , Rfl:     montelukast (SINGULAIR) 10 mg tablet, Take 1 tablet by mouth for 1 dose the morning of each Daratumumab Infusion  , Disp: , Rfl:     ondansetron (ZOFRAN) 8 mg tablet, take 1 tablet by mouth every 8 hours if needed for nausea or vomiting, Disp: , Rfl:     oxyCODONE (ROXICODONE) 5 immediate release tablet, Take 5-10 mg by mouth, Disp: , Rfl:     Polyethyl Glycol-Propyl Glycol (Systane) 0 4-0 3 % GEL, 1 drop, Disp: , Rfl:     polyethylene glycol (MIRALAX) 17 g packet, Take 17 g by mouth daily, Disp: 30 each, Rfl: 0    pregabalin (LYRICA) 100 mg capsule, Take 1 capsule (100 mg total) by mouth 2 (two) times a day, Disp: 90 capsule, Rfl: 3    prochlorperazine (COMPAZINE) 10 mg tablet, take 1 tablet by mouth every 6 hours if needed for nausea or vomiting, Disp: , Rfl:     senna-docusate sodium (SENOKOT S) 8 6-50 mg per tablet, Take 2 tablets by mouth daily at bedtime, Disp: 60 tablet, Rfl: 0    famotidine (PEPCID) 40 MG tablet, Take 1 tablet (40 mg total) by mouth daily, Disp: 90 tablet, Rfl: 1     Allergies:   No Known Allergies     Physical Exam:     /70 (BP Location: Left arm, Patient Position: Sitting, Cuff Size: Standard)   Pulse 82   Temp 98 1 °F (36 7 °C) (Tympanic)   Resp 20   Ht 5' 3" (1 6 m)   Wt 73 kg (161 lb)   SpO2 97%   BMI 28 52 kg/m²     Physical Exam  Vitals reviewed  Constitutional:       General: She is not in acute distress  Appearance: She is well-developed  She is not diaphoretic  Eyes:      General: No scleral icterus  Right eye: No discharge  Left eye: No discharge  Conjunctiva/sclera: Conjunctivae normal    Neck:      Thyroid: No thyromegaly  Vascular: No JVD  Cardiovascular:      Rate and Rhythm: Normal rate and regular rhythm  Heart sounds: Normal heart sounds  No murmur heard  Pulmonary:      Effort: Pulmonary effort is normal  No respiratory distress  Breath sounds: Normal breath sounds  No wheezing or rales  Abdominal:      General: Bowel sounds are normal  There is no distension  Palpations: Abdomen is soft  Tenderness: There is no abdominal tenderness  Musculoskeletal:      Cervical back: Neck supple  Right lower leg: No edema  Left lower leg: No edema  Lymphadenopathy:      Cervical: No cervical adenopathy  Skin:     General: Skin is warm and dry  Neurological:      Mental Status: She is alert     Psychiatric:         Mood and Affect: Mood normal          Behavior: Behavior normal         Marina Bullock DO  MEDICAL [address]

## 2022-01-07 ENCOUNTER — APPOINTMENT (OUTPATIENT)
Dept: LAB | Facility: HOSPITAL | Age: 52
End: 2022-01-07
Payer: COMMERCIAL

## 2022-01-07 ENCOUNTER — TELEPHONE (OUTPATIENT)
Dept: HEMATOLOGY ONCOLOGY | Facility: CLINIC | Age: 52
End: 2022-01-07

## 2022-01-07 DIAGNOSIS — Z51.11 ENCOUNTER FOR ANTINEOPLASTIC CHEMOTHERAPY: ICD-10-CM

## 2022-01-07 DIAGNOSIS — C90.00 MULTIPLE MYELOMA NOT HAVING ACHIEVED REMISSION (HCC): Primary | ICD-10-CM

## 2022-01-07 LAB
ALBUMIN SERPL BCP-MCNC: 3.4 G/DL (ref 3.5–5)
ALP SERPL-CCNC: 76 U/L (ref 46–116)
ALT SERPL W P-5'-P-CCNC: 27 U/L (ref 12–78)
ANION GAP SERPL CALCULATED.3IONS-SCNC: 8 MMOL/L (ref 4–13)
AST SERPL W P-5'-P-CCNC: 12 U/L (ref 5–45)
BASOPHILS # BLD AUTO: 0.02 THOUSANDS/ΜL (ref 0–0.1)
BASOPHILS NFR BLD AUTO: 0 % (ref 0–1)
BILIRUB SERPL-MCNC: 0.38 MG/DL (ref 0.2–1)
BUN SERPL-MCNC: 14 MG/DL (ref 5–25)
CALCIUM ALBUM COR SERPL-MCNC: 9.4 MG/DL (ref 8.3–10.1)
CALCIUM SERPL-MCNC: 8.9 MG/DL (ref 8.3–10.1)
CHLORIDE SERPL-SCNC: 104 MMOL/L (ref 100–108)
CO2 SERPL-SCNC: 27 MMOL/L (ref 21–32)
CREAT SERPL-MCNC: 0.94 MG/DL (ref 0.6–1.3)
EOSINOPHIL # BLD AUTO: 0.04 THOUSAND/ΜL (ref 0–0.61)
EOSINOPHIL NFR BLD AUTO: 0 % (ref 0–6)
ERYTHROCYTE [DISTWIDTH] IN BLOOD BY AUTOMATED COUNT: 19.1 % (ref 11.6–15.1)
GFR SERPL CREATININE-BSD FRML MDRD: 69 ML/MIN/1.73SQ M
GLUCOSE SERPL-MCNC: 120 MG/DL (ref 65–140)
HCT VFR BLD AUTO: 43.5 % (ref 34.8–46.1)
HGB BLD-MCNC: 13.9 G/DL (ref 11.5–15.4)
IMM GRANULOCYTES # BLD AUTO: 0.04 THOUSAND/UL (ref 0–0.2)
IMM GRANULOCYTES NFR BLD AUTO: 0 % (ref 0–2)
LYMPHOCYTES # BLD AUTO: 2.78 THOUSANDS/ΜL (ref 0.6–4.47)
LYMPHOCYTES NFR BLD AUTO: 26 % (ref 14–44)
MAGNESIUM SERPL-MCNC: 2 MG/DL (ref 1.6–2.6)
MCH RBC QN AUTO: 27.3 PG (ref 26.8–34.3)
MCHC RBC AUTO-ENTMCNC: 32 G/DL (ref 31.4–37.4)
MCV RBC AUTO: 86 FL (ref 82–98)
MONOCYTES # BLD AUTO: 0.8 THOUSAND/ΜL (ref 0.17–1.22)
MONOCYTES NFR BLD AUTO: 8 % (ref 4–12)
NEUTROPHILS # BLD AUTO: 6.9 THOUSANDS/ΜL (ref 1.85–7.62)
NEUTS SEG NFR BLD AUTO: 66 % (ref 43–75)
NRBC BLD AUTO-RTO: 0 /100 WBCS
PLATELET # BLD AUTO: 317 THOUSANDS/UL (ref 149–390)
PMV BLD AUTO: 10.7 FL (ref 8.9–12.7)
POTASSIUM SERPL-SCNC: 3.8 MMOL/L (ref 3.5–5.3)
PROT SERPL-MCNC: 6.8 G/DL (ref 6.4–8.2)
RBC # BLD AUTO: 5.09 MILLION/UL (ref 3.81–5.12)
SODIUM SERPL-SCNC: 139 MMOL/L (ref 136–145)
WBC # BLD AUTO: 10.58 THOUSAND/UL (ref 4.31–10.16)

## 2022-01-07 PROCEDURE — 80053 COMPREHEN METABOLIC PANEL: CPT

## 2022-01-07 PROCEDURE — 85025 COMPLETE CBC W/AUTO DIFF WBC: CPT

## 2022-01-07 PROCEDURE — 36415 COLL VENOUS BLD VENIPUNCTURE: CPT

## 2022-01-07 PROCEDURE — 83735 ASSAY OF MAGNESIUM: CPT

## 2022-01-07 RX ORDER — CYCLOPHOSPHAMIDE 50 MG/1
300 CAPSULE ORAL ONCE
Status: CANCELLED
Start: 2022-01-18 | End: 2022-01-25

## 2022-01-07 RX ORDER — DEXAMETHASONE 4 MG/1
40 TABLET ORAL ONCE
Status: CANCELLED
Start: 2022-01-25

## 2022-01-07 RX ORDER — CYCLOPHOSPHAMIDE 50 MG/1
300 CAPSULE ORAL ONCE
Status: CANCELLED
Start: 2022-01-11 | End: 2022-01-11

## 2022-01-07 RX ORDER — DEXAMETHASONE 4 MG/1
40 TABLET ORAL ONCE
Status: CANCELLED
Start: 2022-01-11

## 2022-01-07 RX ORDER — DEXAMETHASONE 4 MG/1
40 TABLET ORAL ONCE
Status: CANCELLED
Start: 2022-01-18

## 2022-01-07 RX ORDER — CYCLOPHOSPHAMIDE 50 MG/1
300 CAPSULE ORAL ONCE
Status: CANCELLED
Start: 2022-01-25 | End: 2022-02-01

## 2022-01-07 NOTE — TELEPHONE ENCOUNTER
ANA Gutiérrez to get her labs done prior to treatment on Tuesday  Asked she call us back should she have any further questions  Hope line number provided

## 2022-01-11 ENCOUNTER — HOSPITAL ENCOUNTER (OUTPATIENT)
Dept: INFUSION CENTER | Facility: CLINIC | Age: 52
Discharge: HOME | End: 2022-01-11
Payer: COMMERCIAL

## 2022-01-11 ENCOUNTER — OFFICE VISIT (OUTPATIENT)
Dept: HEMATOLOGY ONCOLOGY | Facility: CLINIC | Age: 52
End: 2022-01-11
Payer: COMMERCIAL

## 2022-01-11 ENCOUNTER — TELEPHONE (OUTPATIENT)
Dept: HEMATOLOGY ONCOLOGY | Facility: CLINIC | Age: 52
End: 2022-01-11

## 2022-01-11 VITALS
SYSTOLIC BLOOD PRESSURE: 188 MMHG | HEIGHT: 64 IN | TEMPERATURE: 97 F | RESPIRATION RATE: 18 BRPM | BODY MASS INDEX: 27.48 KG/M2 | WEIGHT: 160.94 LBS | DIASTOLIC BLOOD PRESSURE: 66 MMHG | HEART RATE: 85 BPM

## 2022-01-11 VITALS
SYSTOLIC BLOOD PRESSURE: 110 MMHG | BODY MASS INDEX: 28.35 KG/M2 | HEIGHT: 63 IN | TEMPERATURE: 97 F | WEIGHT: 160 LBS | DIASTOLIC BLOOD PRESSURE: 76 MMHG | HEART RATE: 85 BPM | RESPIRATION RATE: 16 BRPM

## 2022-01-11 DIAGNOSIS — Z51.11 ENCOUNTER FOR ANTINEOPLASTIC CHEMOTHERAPY: ICD-10-CM

## 2022-01-11 DIAGNOSIS — C90.00 MULTIPLE MYELOMA NOT HAVING ACHIEVED REMISSION (HCC): Primary | ICD-10-CM

## 2022-01-11 DIAGNOSIS — C90.00 MULTIPLE MYELOMA NOT HAVING ACHIEVED REMISSION (HCC): ICD-10-CM

## 2022-01-11 DIAGNOSIS — Z51.11 ENCOUNTER FOR ANTINEOPLASTIC CHEMOTHERAPY: Primary | ICD-10-CM

## 2022-01-11 LAB
IGA SERPL-MCNC: 87 MG/DL (ref 70–400)
IGG SERPL-MCNC: 1140 MG/DL (ref 700–1600)
IGM SERPL-MCNC: 40 MG/DL (ref 40–230)

## 2022-01-11 PROCEDURE — 84165 PROTEIN E-PHORESIS SERUM: CPT | Performed by: PATHOLOGY

## 2022-01-11 PROCEDURE — 83521 IG LIGHT CHAINS FREE EACH: CPT | Performed by: INTERNAL MEDICINE

## 2022-01-11 PROCEDURE — 86334 IMMUNOFIX E-PHORESIS SERUM: CPT | Performed by: PATHOLOGY

## 2022-01-11 PROCEDURE — 84165 PROTEIN E-PHORESIS SERUM: CPT | Performed by: INTERNAL MEDICINE

## 2022-01-11 PROCEDURE — 96401 CHEMO ANTI-NEOPL SQ/IM: CPT

## 2022-01-11 PROCEDURE — 86334 IMMUNOFIX E-PHORESIS SERUM: CPT | Performed by: INTERNAL MEDICINE

## 2022-01-11 PROCEDURE — 99214 OFFICE O/P EST MOD 30 MIN: CPT | Performed by: INTERNAL MEDICINE

## 2022-01-11 PROCEDURE — 82784 ASSAY IGA/IGD/IGG/IGM EACH: CPT | Performed by: INTERNAL MEDICINE

## 2022-01-11 RX ORDER — FAMOTIDINE 10 MG
20 TABLET ORAL 2 TIMES DAILY
Status: CANCELLED
Start: 2022-01-11

## 2022-01-11 RX ORDER — DEXAMETHASONE 4 MG/1
40 TABLET ORAL ONCE
Status: COMPLETED | OUTPATIENT
Start: 2022-01-11 | End: 2022-01-11

## 2022-01-11 RX ORDER — ACETAMINOPHEN 325 MG/1
650 TABLET ORAL ONCE
Status: CANCELLED
Start: 2022-01-11 | End: 2022-01-11

## 2022-01-11 RX ADMIN — DEXAMETHASONE 40 MG: 4 TABLET ORAL at 10:16

## 2022-01-11 RX ADMIN — BORTEZOMIB 2.3 MG: 3.5 INJECTION, POWDER, LYOPHILIZED, FOR SOLUTION INTRAVENOUS; SUBCUTANEOUS at 10:18

## 2022-01-11 NOTE — PROGRESS NOTES
Patient took cytoxan at home  Per Dr Jenny Ferrari, going forward, patient not to take cytoxan at home, it will be given at the infusion center  Alexandr Mann RN to inform patient today

## 2022-01-11 NOTE — PROGRESS NOTES
[facility] - Hematology & Medical Oncology  Outpatient Visit Encounter Note      Farhana Ferreira 46 y o  female [id number]/ [id number] Date:  2022      Valerie Escalera is a 46 y o  Is here for post hospital follow-up  She has a diagnosis of IgA kappa multiple myeloma  In review of the chart and talking with the patient,   The following information regarding her  Myeloma is listed    21: Bone marrow right posterior iliac crest, delcalcified trephine biopsy, touch imprint, direct aspirate smear, and peripheral blood smear:    - Plasma cell neoplasm characterized by:  Rosa Standing- Normocellular marrow (estimated cellularity 40%) showing trilineage hematopoiesis with 20% plasma cells,       kappa predominant by immunophenotypic evaluation     - Peripheral blood showing slight normochromic microcytic anemia and slight leukocytosis    - Negative for amyloid deposition confirmed by Congo Red stain  21: bone survey negative for lytic lesions  10/8/21: Free Ratio: 5 79              Immunofixation: IgA kappa monoclonal band present              IgA: 462              Ig              Started Vdr Days 1 and 14 (21 day Cycle)   10/15/21: Multiple myeloma panel in care everywhere              B2 Microglobulin 3 20              Calcium: 8 3              Hgb: 10 5              Free Kappa lambda ratio: 3 70              M Martinez: 0 27               IgA: <10              Ig              IgM: 77    As detailed in the chart, she sees a physician at [address] [place] [address] and also follows at [facility] for possibility of a stem cell transplant  After her diagnosis, she was started on first-line treatment with VRD and thereafter she was started on daratumumab and CyBorD on  and had a negative amyloid staining from an abdominal fat pad biopsy on   She was hospitalized in mid November with RSV infection    Her most recent oncology visit was on 12/07 at [facility] as per documentation she completed cycle 2D8 of María CyBorD on 12/14 and got a toxicity assessment done at that office visit  She has no acute complaints  Denies any fevers chills nausea vomiting abdominal pain shortness of breath cough  Is taking acyclovir for prophylaxis  THIS VISIT    Here for C3D8 treatment  Saw [facility] for initial intake  I have reviewed the relevant past medical, surgical, social and family history  I have also reviewed allergies and medications for this patient  Review of Systems  Review of Systems   All other systems reviewed and are negative  OBJECTIVE     Physical Exam  There were no vitals filed for this visit  Physical Exam  Vitals reviewed  Constitutional:       General: She is not in acute distress  Appearance: She is not ill-appearing, toxic-appearing or diaphoretic  HENT:      Head: Normocephalic and atraumatic  Eyes:      General: No scleral icterus  Extraocular Movements: Extraocular movements intact  Cardiovascular:      Rate and Rhythm: Normal rate  Pulmonary:      Effort: Pulmonary effort is normal  No respiratory distress  Abdominal:      General: There is no distension  Musculoskeletal:         General: Normal range of motion  Cervical back: Normal range of motion and neck supple  Neurological:      General: No focal deficit present  Mental Status: She is alert and oriented to person, place, and time  Gait: Gait normal           Imaging  Relevant imaging reviewed in chart    Labs  Relevant labs reviewed in chart   ASSESSMENT & PLAN      Diagnosis ICD-10-CM Associated Orders   1  Multiple myeloma not having achieved remission (HCC)  C90 00    2  Encounter for antineoplastic chemotherapy  Z51 11          46 y o  Female diagnosed with IgA kappa multiple myeloma in August 2021  She is deemed to be transplant eligible and is undergoing systemic antineoplastic therapy   she got her initial oncology care at [facility], [facility]  and transferred to to [facility] in January 2022 as she moved  · Discussion  · Treatment History  · As listed before, patient was placed on 1L VRD and then switched to María-CyBorD upon assessment by BMT expert and since then has finished 2C of the aforementioned regimen before she transferred her care to [place] [address]  · Started C3D18 on 1/11/22 at [place] [address] with plan to complete up to 7-8C  She saw Dr Noble Bloom yesterday and plan is for autoSCT once she finishes the planned anti-neoplastic therapy  · Disease Details  · Clinical stage from 9/4/2021: RISS Stage II (Beta-2-microglobulin (mg/L): 4 1, Albumin (g/dL): 3 5, ISS: Stage II, High-risk cytogenetics: Absent, LDH: Normal) - Per Signed by Morteza Bejarano MD on 9/4/2021  · Supportive medications  · Acyclovir  · Plan/Labs  · CBC CMP Mg - once a week  · Myeloma labs - D1 of each cycle  To be drawn in the infusion center as ordered in the system    Follow Up    day 1 of each cycle      All questions were answered to the patient's satisfaction during this encounter  They appreciated and thanked me for spending time with them  The patient knows the contact information for our office and know to reach out for any relevant concerns related to this encounter  For all other listed problems and medical diagnosis in his chart - they are managed by PCP and/or other specialists, which patient acknowledges  Dr Bernard Ferrari MD  Hematology & Medical Oncology

## 2022-01-11 NOTE — PROGRESS NOTES
Pt to clinic for darzalex, cytoxan, and velcade, offers no complaints today  Spoke with Caroline Acosta RN and confirmed pt is to receive velcade 1 3 mg/m2 and she is to receive darzalex faspro rather than IV as mentioned MD note  Also confirmed pt is not to receive additional IV hydration while on PO cytoxan per Dr Amrik Rowley  Pt stated she took her PO cytoxan at home this morning prior to coming in, relayed this to Jacquie who stated per Dr Amrik Rowley we can continue with today's treatment minus the cytoxan but for future treatments pt is to get cytoxan at infusion center only  Jacquie confirmed via Dr Amrik Rowley pt is to come in to infusion center on Day 22 of each cycle for PO cytoxan and decadron  After further investigation, noted pt was now on week 10 of her total treatment and had Cycle 3 Day 1 (week 9) last week at [address] [place] [address]  Informed Jacquie who confirmed today is Cycle 3 Day 8 with [facility] and since pt has transitioned to receiving darzalex every other week, she is not due for darzalex today  Dr Amrik Rowley made aware and darzalex taken out of plan today  Janee Arriola also asked if myeloma labs could be drawn today  Pt informed on updates to plan and verbalized understanding  Pt tolerated lab draw via peripheral stick and injection in RUQ of abdomen without complications  Pt aware of next appointment, avs declined

## 2022-01-11 NOTE — TELEPHONE ENCOUNTER
Spoke to Dr Ana Sebastian RN, Sindhu Welsh, to confirm which cycle patient received on 1/4  Sindhu Welsh confirmed patient received G6Y8  Informed Dr Jenny Ferrari, adjusted plan so patient will receive C3D2 today  Alexandr Mann RN aware

## 2022-01-11 NOTE — PROGRESS NOTES
Removed premedications for darzalex on days 8 and 22 as patient is not getting darzalex on those days

## 2022-01-12 ENCOUNTER — HOSPITAL ENCOUNTER (OUTPATIENT)
Dept: INFUSION CENTER | Facility: CLINIC | Age: 52
Discharge: HOME | End: 2022-01-12

## 2022-01-12 LAB
ALBUMIN SERPL ELPH-MCNC: 4.82 G/DL (ref 3.5–5)
ALBUMIN SERPL ELPH-MCNC: 70.9 % (ref 52–65)
ALPHA1 GLOB SERPL ELPH-MCNC: 0.25 G/DL (ref 0.1–0.4)
ALPHA1 GLOB SERPL ELPH-MCNC: 3.7 % (ref 2.5–5)
ALPHA2 GLOB SERPL ELPH-MCNC: 0.55 G/DL (ref 0.4–1.2)
ALPHA2 GLOB SERPL ELPH-MCNC: 8.1 % (ref 7–13)
BETA GLOB ABNORMAL SERPL ELPH-MCNC: 0.29 G/DL (ref 0.4–0.8)
BETA1 GLOB SERPL ELPH-MCNC: 4.2 % (ref 5–13)
BETA2 GLOB SERPL ELPH-MCNC: 2.2 % (ref 2–8)
BETA2+GAMMA GLOB SERPL ELPH-MCNC: 0.15 G/DL (ref 0.2–0.5)
GAMMA GLOB ABNORMAL SERPL ELPH-MCNC: 0.74 G/DL (ref 0.5–1.6)
GAMMA GLOB SERPL ELPH-MCNC: 10.9 % (ref 12–22)
IGG/ALB SER: 2.44 {RATIO} (ref 1.1–1.8)
INTERPRETATION UR IFE-IMP: NORMAL
KAPPA LC FREE SER-MCNC: 18.8 MG/L (ref 3.3–19.4)
KAPPA LC FREE/LAMBDA FREE SER: 2.04 {RATIO} (ref 0.26–1.65)
LAMBDA LC FREE SERPL-MCNC: 9.2 MG/L (ref 5.7–26.3)
M PROTEIN 1 MFR SERPL ELPH: 3.4 %
M PROTEIN 1 SERPL ELPH-MCNC: 0.23 G/DL
PROT PATTERN SERPL ELPH-IMP: ABNORMAL
PROT SERPL-MCNC: 6.8 G/DL (ref 6.4–8.2)

## 2022-01-13 ENCOUNTER — CONSULT (OUTPATIENT)
Dept: PALLIATIVE MEDICINE | Facility: CLINIC | Age: 52
End: 2022-01-13
Payer: COMMERCIAL

## 2022-01-13 ENCOUNTER — SOCIAL WORK (OUTPATIENT)
Dept: PALLIATIVE MEDICINE | Facility: CLINIC | Age: 52
End: 2022-01-13
Payer: COMMERCIAL

## 2022-01-13 VITALS
OXYGEN SATURATION: 96 % | SYSTOLIC BLOOD PRESSURE: 100 MMHG | HEIGHT: 63 IN | DIASTOLIC BLOOD PRESSURE: 76 MMHG | RESPIRATION RATE: 12 BRPM | TEMPERATURE: 97.8 F | HEART RATE: 67 BPM | BODY MASS INDEX: 28.88 KG/M2 | WEIGHT: 163 LBS

## 2022-01-13 DIAGNOSIS — Z51.11 ENCOUNTER FOR ANTINEOPLASTIC CHEMOTHERAPY: ICD-10-CM

## 2022-01-13 DIAGNOSIS — C90.00 MULTIPLE MYELOMA NOT HAVING ACHIEVED REMISSION (HCC): Primary | ICD-10-CM

## 2022-01-13 DIAGNOSIS — Z71.89 COUNSELING AND COORDINATION OF CARE: Primary | ICD-10-CM

## 2022-01-13 DIAGNOSIS — G89.3 CANCER ASSOCIATED PAIN: ICD-10-CM

## 2022-01-13 DIAGNOSIS — R45.89 DEPRESSED MOOD: ICD-10-CM

## 2022-01-13 DIAGNOSIS — Z51.5 PALLIATIVE CARE PATIENT: ICD-10-CM

## 2022-01-13 DIAGNOSIS — F41.8 ANXIETY ABOUT HEALTH: ICD-10-CM

## 2022-01-13 PROCEDURE — 99245 OFF/OP CONSLTJ NEW/EST HI 55: CPT | Performed by: STUDENT IN AN ORGANIZED HEALTH CARE EDUCATION/TRAINING PROGRAM

## 2022-01-13 RX ORDER — OXYCODONE 18 MG/1
18 CAPSULE, EXTENDED RELEASE ORAL
Qty: 14 CAPSULE | Refills: 0 | Status: SHIPPED | OUTPATIENT
Start: 2022-01-13 | End: 2022-01-14 | Stop reason: DRUGHIGH

## 2022-01-13 RX ORDER — OXYCODONE HYDROCHLORIDE 10 MG/1
10-15 TABLET ORAL EVERY 4 HOURS PRN
Qty: 50 TABLET | Refills: 0 | Status: SHIPPED | OUTPATIENT
Start: 2022-01-13 | End: 2022-01-24 | Stop reason: SDUPTHER

## 2022-01-13 RX ORDER — NALOXONE HYDROCHLORIDE 4 MG/.1ML
SPRAY NASAL
Qty: 1 EACH | Refills: 1 | Status: SHIPPED | OUTPATIENT
Start: 2022-01-13

## 2022-01-13 RX ORDER — ACETAMINOPHEN 500 MG
1000 TABLET ORAL EVERY 8 HOURS PRN
Qty: 90 TABLET | Refills: 0 | Status: SHIPPED | OUTPATIENT
Start: 2022-01-13

## 2022-01-13 NOTE — PROGRESS NOTES
Per Gigi Day RN regarding pt's treatment plan:   "After looking at [facility] and Dr Joey Agarwal notes, what is ordered is correct  So she is ordered CYBORD according to the ANDROMEDA Trial   So that is darzalex weekly weeks 1-8, bi-weekly weeks 9-24   Week 25 will be monthly        She came to us week 10, which was this week which is bi-weekly   Week 24 is our C4D22  Gris Barraza is most likely going for a transplant after week 24 and therefore, no additional cycles have been ordered  ANDROMEDA ([id number]) is an ongoing Phase 3, randomized, open-label study investigating the safety and efficacy of DARZALEX FASPRO® (daratumumab and hyaluronidase-fihj) in combination with bortezomib, cyclophosphamide and dexamethasone (D-VCd), compared to VCd alone, for the treatment of adult patients with newly diagnosed light chain (AL) amyloidosis  The study includes 56 patients with newly diagnosed AL amyloidosis with measurable hematologic disease and one or more organs affected  The primary endpoint is overall complete hematologic response rate by intent-to-treat (ITT)  Patients received DARZALEX FASPRO® 1,800 mg/ 30,000 units administered subcutaneously once weekly from weeks 1 to 8, once every 2 weeks from weeks 9 to 24 and once every 4 weeks starting with week 25 until disease progression or unacceptable toxicity or a maximum of 2 years   Among patients who received D-VCd, 74 percent were exposed for 6 months or longer and 32 percent were exposed for greater than one year "

## 2022-01-13 NOTE — PROGRESS NOTES
[facility] after 1st appointment with [facility] Provider, Dr Celia Taveras  Patient had requested information on local therapists which [facility] was able to provide  [facility] VERNELL introduced the roll of [facility] in her care/treatment  Patient was given the opportunity to have questions/concerns addressed  [facility] VERNELL encouraged patient to reach out to  as needed for support and/or resources as needed

## 2022-01-13 NOTE — PROGRESS NOTES
Outpatient Consultation - Palliative and Supportive Care   Sen Clements 46 y o  female [phone]    Assessment & Plan  Problem List Items Addressed This Visit        Other    Multiple myeloma not having achieved remission ([facility] [address] ) - Primary    Relevant Medications    oxyCODONE (ROXICODONE) 10 MG TABS    naloxone (NARCAN) 4 mg/0 1 mL nasal spray    acetaminophen (TYLENOL) 500 mg tablet    oxyCODONE ER (Xtampza ER) 9 MG C12A    Other Relevant Orders    Ambulatory Referral to [facility]    Encounter for antineoplastic chemotherapy    Cancer associated pain    Relevant Medications    oxyCODONE (ROXICODONE) 10 MG TABS    naloxone (NARCAN) 4 mg/0 1 mL nasal spray    acetaminophen (TYLENOL) 500 mg tablet    oxyCODONE ER (Xtampza ER) 9 MG C12A    Palliative care patient    Relevant Medications    oxyCODONE (ROXICODONE) 10 MG TABS    naloxone (NARCAN) 4 mg/0 1 mL nasal spray    acetaminophen (TYLENOL) 500 mg tablet    oxyCODONE ER (Xtampza ER) 9 MG C12A    Other Relevant Orders    Ambulatory Referral to [facility]      Other Visit Diagnoses     Depressed mood        Relevant Orders    Ambulatory Referral to [phone] about health        Relevant Orders    Ambulatory Referral to [facility]        #symptom management  #cancer-related pain   -continue APAP 1000 mg PO Q8H PRN   - max daily 4000 mg   - increase oxy-IR 10-15 mg PO Q4H PRN   - trial oxy-ER 9 mg PO QHS   - Rx for naloxone    Extensive discussion regarding proper and safe opioid use discussed  Discussed opioid agreement in detail with all questions/concerns addressed, signed in office  Discussed contacting [facility] office prior to any deviation from opioid Rx  Patient was taking oxy-IR 30 mg [6 tabs] QAM and QPM PRN [6-12 tabs/day]; discussed increased risk for overdose/oversedation with improper use   Previous use of fentanyl patch 12 5 mcg/h in the past  Will trial oxy-ER 9 mg PO QHS x 2 weeks to see if improved overnight coverage minimizes bolus dosing of IR medications in the AM  Goal is to identify lowest effective dose and over time, goal is to taper off of opioids if not required  Patient agreeable to plan with all questions/concerns addressed  Patient's mother works in correctional facility, reported knowledge of indications + proper administration of naloxone  #neuropathic pain   - continue pregabalin 100 mg PO BID   - continue duloxetine 30 mg PO QDaily    #muscle spasms   - continue methocarbamol 500 mg PO Q6H PRN    #chemotherapy induced nausea   - continue ondansetron 8 mg PO Q8H PRN   - continue prochlorperazine 10 mg PO Q6H PRN    #OIC   - continue senna-docusate 2 tabs PO QHS   - continue miralax 17 g PO QDaily   - continue bisacodyl 10 mg MI QDaily PRN    #anxiety/depression   - continue duloxetine 30 mg PO QDaily   - referral for behavioral health placed    #goals of care   - introduced Palliative and Supportive Care   - no prior AD/LW completed, 5-Wishes provided for review, LSW met independently for introduction for supportive services   - treatment focused care with no limitations at this time   - current plan to compete antineoplastic therapy infusions with ultimate goal to obtain SCT with [facility] in the spring of 2022    #psychosocial support   - emotional support provided   - not currently    - two adult children   - Lyndsay Manzano [daughter]   - Marisa Lucero [mother] [phone]   - one sibling   - Mayi [sister]   - two grandchildren [aged 3 and 2]   - recently moved to [facility] from [facility] one month ago      Next [facility] follow up in 4 weeks  Controlled Substance Review    [place] or [place]  reviewed: No red flags were identified; safe to proceed with prescription  Dara Soulier     PDMP Review       Value Time User    PDMP Reviewed  Yes (P)  1/13/2022  3:57 PM Anthony Wu MD          Medications adjusted this encounter:  Requested Prescriptions     Signed Prescriptions Disp Refills    oxyCODONE (ROXICODONE) 10 MG TABS 50 tablet 0     Sig: Take 1-1 5 tablets (10-15 mg total) by mouth every 4 (four) hours as needed for moderate pain Max Daily Amount: 90 mg    naloxone (NARCAN) 4 mg/0 1 mL nasal spray 1 each 1     Sig: Administer 1 spray into a nostril  If no response after 2-3 minutes, give another dose in the other nostril using a new spray   acetaminophen (TYLENOL) 500 mg tablet 90 tablet 0     Sig: Take 2 tablets (1,000 mg total) by mouth every 8 (eight) hours as needed for mild pain    oxyCODONE ER (Xtampza ER) 9 MG C12A 14 capsule 0     Sig: Take 9 mg by mouth daily at bedtime Max Daily Amount: 9 mg     Orders Placed This Encounter   Procedures    Ambulatory Referral to [facility]       Medications Discontinued During This Encounter   Medication Reason    fentaNYL (DURAGESIC) 12 mcg/hr TD 72 hr patch Therapy completed    oxyCODONE (ROXICODONE) 5 immediate release tablet Reorder    oxyCODONE ER (Xtampza ER) 18 MG C12A Dose adjustment       PPS: 100%      Marla Negron was seen today for symptoms and planning cares related to above illnesses  Above orders were sent electronically, or provided in hardcopy in clinic  I have reviewed the patient's controlled substance dispensing history in the Prescription Drug Monitoring Program in compliance with the [facility] regulations before prescribing any controlled substances  We appreciate the referral, and wish for her to continue to follow with us  If there are questions or concerns, please contact us through our clinic/answering service 24 hours a day, seven days a week      Maria Luz Doherty MD  [facility] Palliative [facility] Support[facility]  [phone]        Visit Information    Accompanied By: No one    Source of History: Self, Medical record    History Limitations: None      History of Present Illness    Marla Escamilla is a 46 y o  female who presents as a referral from Dr Clara Ocampo of Medical Oncology for primary palliative diagnosis of Multiple Myeloma  Consultation is requested for: symptom management, goal of care assessment and decisional support, disease process education and discussion of prognosis, advance care planning, emotional support in the setting of serious illness  Patient currently reports diffuse joint pain, constant, worse at night, onset 2 years ago, which patient attributes to early cancer-related pain, recently diagnosed 08/2021 in [place]  Previous use of gabapentin inefficacious, switched to Lyrica, which initially worked, now less effective, however, wishes to continue  Does not use oxy-IR as prescribed [Rx for oxy-IR 5 mg PO Q4H PRN], patient uses 6 tabs [30 mg oxy-IR] QAM + recently added a 6 tabs [30 mg oxy-IR] QHS for a daily total of 60 mg oxy-IR  Extensive discussion on safe, proper use of opioids and to use only as prescribed  Also use of APAP PRN for mild pain  Also reports neuropathy in bilateral hands  As stated above, gabapentin not effective  Recently started duloxetine, will observe for symptomatic relief  Aggravated with cold weather  Occasional constipation, use of Fe supplement + vitamin C supplementation  Use of bowel regimen with BM daily  Daily fatigue, which limits patient's QOL and ability to perform ADLs for prolonged period, requires multiple rests/naps throughout the day  Denies nausea, vomiting  Appetite variable, completes 1-3 full meals/day  Weight stable  Adequate sleep nightly        Oncology History   Multiple myeloma not having achieved remission ([facility] [address] )   12/15/2021 Initial Diagnosis    Multiple myeloma not having achieved remission ([facility] [address] )     1/11/2022 -  Chemotherapy    daratumumab-hyaluronidase (DARZALEX FASPRO), 1,800 mg, Subcutaneous daratumumab-hyaluronidase, Once, 1 of 4 cycles  bortezomib (VELCADE), 1 3 mg/m2 = 2 3 mg (86 7 % of original dose 1 5 mg/m2), Subcutaneous, Once, 1 of 4 cycles  Dose modification: 1 3 mg/m2 (original dose 1 5 mg/m2, Cycle 1, Reason: Other (Must fill in a comment), Comment: as per previous treating oncologist)  Administration: 2 3 mg (2022)  Cyclophosphamide (CYTOXAN), 300 mg/m2 = 525 mg (100 % of original dose 300 mg/m2), Oral, Once, 1 of 4 cycles  Dose modification: 300 mg/m2 (original dose 300 mg/m2, Cycle 1)         Pertinent Palliative Care Domains    Physical Symptoms: ambulates    Psychological Symptoms: mild anxiety, good insight, coping well    Social Aspects: support system strong with family and friends, recently moved from [facility] and Living Will:   Not on File, not previously completed  Power of :   n/a  POLST:   n/a    Historical Data  Past Medical History:   Diagnosis Date    Cardiomyopathy ([facility] )     Chronic back pain     GERD (gastroesophageal reflux disease)     Hypertension     Multiple myeloma ([facility] )     Neuropathy      Past Surgical History:   Procedure Laterality Date     SECTION       Social History     Socioeconomic History    Marital status: Single     Spouse name: Not on file    Number of children: Not on file    Years of education: Not on file    Highest education level: Not on file   Occupational History    Not on file   Tobacco Use    Smoking status: Heavy Tobacco Smoker     Packs/day: 1 00     Years: 26 00     Pack years: 26 00    Smokeless tobacco: Current User    Tobacco comment: started at 23   Vaping Use    Vaping Use: Never used   Substance and Sexual Activity    Alcohol use: Not Currently    Drug use: Never    Sexual activity: Not Currently     Partners: Male   Other Topics Concern    Not on file   Social History Narrative    Not on file     Social Determinants of Health     Financial Resource Strain: Not on file   Food Insecurity: Not on file   Transportation Needs: Not on file   Physical Activity: Not on file   Stress: Not on file   Social Connections: Not on file   Intimate Partner Violence: Not on file   Housing Stability: Not on file     Family History   Problem Relation Age of Onset    No Known Problems Mother     No Known Problems Father     Colon cancer Neg Hx     Diabetes Neg Hx      No Known Allergies  Current Outpatient Medications   Medication Sig Dispense Refill    acyclovir (ZOVIRAX) 400 MG tablet Take 400 mg by mouth      albuterol (PROVENTIL HFA,VENTOLIN HFA) 90 mcg/act inhaler Inhale 2 puffs every 6 (six) hours as needed for wheezing or shortness of breath 18 g 0    amLODIPine (NORVASC) 10 mg tablet Take 1 tablet (10 mg total) by mouth daily 30 tablet 0    atorvastatin (LIPITOR) 40 mg tablet Take 40 mg by mouth        bisacodyl (DULCOLAX) 10 mg suppository Insert 1 suppository (10 mg total) into the rectum daily as needed for constipation 12 suppository 0    cyclophosphamide (CYTOXAN) 50 mg capsule Take 550 mg by mouth      dexamethasone (DECADRON) 4 mg tablet       DULoxetine (CYMBALTA) 30 mg delayed release capsule Take 1 capsule (30 mg total) by mouth daily 90 capsule 1    famotidine (PEPCID) 40 MG tablet Take 1 tablet (40 mg total) by mouth daily 90 tablet 1    fluticasone (FLONASE) 50 mcg/act nasal spray 2 sprays into each nostril daily 16 g 5    loratadine (CLARITIN) 10 mg tablet Take 1 tablet by mouth for 1 dose the morning of each Daratumumab Infusion   methocarbamol (ROBAXIN) 750 mg tablet Take 1 tablet (750 mg total) by mouth every 6 (six) hours as needed for muscle spasms 120 tablet 3    metoprolol succinate (TOPROL-XL) 25 mg 24 hr tablet       montelukast (SINGULAIR) 10 mg tablet Take 1 tablet by mouth for 1 dose the morning of each Daratumumab Infusion        ondansetron (ZOFRAN) 8 mg tablet take 1 tablet by mouth every 8 hours if needed for nausea or vomiting      oxyCODONE (ROXICODONE) 10 MG TABS Take 1-1 5 tablets (10-15 mg total) by mouth every 4 (four) hours as needed for moderate pain Max Daily Amount: 90 mg 50 tablet 0    Polyethyl Glycol-Propyl Glycol (Systane) 0 4-0 3 % GEL 1 drop      polyethylene glycol (MIRALAX) 17 g packet Take 17 g by mouth daily 30 each 0    pregabalin (LYRICA) 100 mg capsule Take 1 capsule (100 mg total) by mouth 2 (two) times a day 90 capsule 3    prochlorperazine (COMPAZINE) 10 mg tablet take 1 tablet by mouth every 6 hours if needed for nausea or vomiting      senna-docusate sodium (SENOKOT S) 8 6-50 mg per tablet Take 2 tablets by mouth daily at bedtime 60 tablet 0    acetaminophen (TYLENOL) 500 mg tablet Take 2 tablets (1,000 mg total) by mouth every 8 (eight) hours as needed for mild pain 90 tablet 0    naloxone (NARCAN) 4 mg/0 1 mL nasal spray Administer 1 spray into a nostril  If no response after 2-3 minutes, give another dose in the other nostril using a new spray  1 each 1    oxyCODONE ER (Xtampza ER) 9 MG C12A Take 9 mg by mouth daily at bedtime Max Daily Amount: 9 mg 14 capsule 0     No current facility-administered medications for this visit  Review of Systems   Constitutional: Positive for malaise/fatigue  Negative for chills, decreased appetite, fever and weight loss  HENT: Negative for congestion  Eyes: Negative for visual disturbance  Cardiovascular: Negative for chest pain  Respiratory: Negative for shortness of breath  Musculoskeletal: Positive for joint pain  Negative for falls  Gastrointestinal: Positive for constipation  Negative for abdominal pain, nausea and vomiting  Genitourinary: Negative for frequency  Neurological: Positive for paresthesias  Negative for headaches  Psychiatric/Behavioral: The patient does not have insomnia  All other systems reviewed and are negative  Vital Signs    /76 (BP Location: Left arm, Patient Position: Sitting)   Pulse 67   Temp 97 8 °F (36 6 °C) (Tympanic)   Resp 12   Ht 5' 3" (1 6 m)   Wt 73 9 kg (163 lb)   SpO2 96%   BMI 28 87 kg/m²     Physical Exam and Objective Data  Physical Exam  Vitals and nursing note reviewed  Constitutional:       General: She is awake  Appearance: She is not diaphoretic  Comments: Sitting up in chair in NAD  Non-toxic appearing   HENT:      Head: Normocephalic and atraumatic  Right Ear: External ear normal       Left Ear: External ear normal       Nose: No rhinorrhea  Eyes:      Comments: No gaze preference   Cardiovascular:      Rate and Rhythm: Normal rate  Pulmonary:      Effort: No tachypnea, accessory muscle usage or respiratory distress  Comments: Completes full sentences without difficulty  Musculoskeletal:      Cervical back: Normal range of motion  Neurological:      General: No focal deficit present  Mental Status: She is alert and oriented to person, place, and time  Psychiatric:         Attention and Perception: Attention normal          Mood and Affect: Mood and affect normal          Speech: Speech normal          Cognition and Memory: Cognition and memory normal            Radiology and Laboratory:  I personally reviewed and interpreted the following results:    Most Recent COVID-19 Results:  Lab Results   Component Value Date/Time    SARSCOV2 Negative 10/30/2021 12:10 AM       Most Recent Lab Work:  Lab Results   Component Value Date/Time    SODIUM 139 01/07/2022 12:28 PM    K 3 8 01/07/2022 12:28 PM    BUN 14 01/07/2022 12:28 PM    CREATININE 0 94 01/07/2022 12:28 PM    GLUC 120 01/07/2022 12:28 PM     Lab Results   Component Value Date/Time    AST 12 01/07/2022 12:28 PM    ALT 27 01/07/2022 12:28 PM    ALB 3 4 (L) 01/07/2022 12:28 PM     Lab Results   Component Value Date/Time    HGB 13 9 01/07/2022 12:28 PM    WBC 10 58 (H) 01/07/2022 12:28 PM     01/07/2022 12:28 PM    INR 1 15 10/29/2021 09:35 PM    PTT 37 10/29/2021 09:35 PM       Most Recent Imaging [last 30 days]:  No results found        60 minutes was spent face to face with Muller Comment with greater than 50% of the time spent in counseling or coordination of care including discussions of provided medical updates, discussed palliative care, determined goals of care, determined social/family support, discussed plans of care, discussed symptom management, provided psychosocial support  Opioid regimen adjustments with addition of ER formulation + narcan Rx written  Safe opioid use discussion + signed opioid agreement in office  PDMP Reviewed   All of the patient's questions were answered during this discussion

## 2022-01-14 RX ORDER — OXYCODONE 9 MG/1
9 CAPSULE, EXTENDED RELEASE ORAL
Qty: 14 CAPSULE | Refills: 0 | Status: SHIPPED | OUTPATIENT
Start: 2022-01-14 | End: 2022-02-17 | Stop reason: SDUPTHER

## 2022-01-18 ENCOUNTER — HOSPITAL ENCOUNTER (OUTPATIENT)
Dept: INFUSION CENTER | Facility: CLINIC | Age: 52
Discharge: HOME | End: 2022-01-18
Payer: COMMERCIAL

## 2022-01-18 ENCOUNTER — APPOINTMENT (OUTPATIENT)
Dept: LAB | Facility: HOSPITAL | Age: 52
End: 2022-01-18
Payer: COMMERCIAL

## 2022-01-18 VITALS
DIASTOLIC BLOOD PRESSURE: 55 MMHG | HEART RATE: 74 BPM | WEIGHT: 166.01 LBS | TEMPERATURE: 98 F | HEIGHT: 64 IN | BODY MASS INDEX: 28.34 KG/M2 | RESPIRATION RATE: 18 BRPM | SYSTOLIC BLOOD PRESSURE: 101 MMHG

## 2022-01-18 DIAGNOSIS — Z51.11 ENCOUNTER FOR ANTINEOPLASTIC CHEMOTHERAPY: Primary | ICD-10-CM

## 2022-01-18 DIAGNOSIS — C90.00 MYELOMA ASSOCIATED AMYLOIDOSIS (HCC): Primary | ICD-10-CM

## 2022-01-18 DIAGNOSIS — E85.9 MYELOMA ASSOCIATED AMYLOIDOSIS (HCC): Primary | ICD-10-CM

## 2022-01-18 DIAGNOSIS — C90.00 MULTIPLE MYELOMA NOT HAVING ACHIEVED REMISSION (HCC): ICD-10-CM

## 2022-01-18 LAB
ALBUMIN SERPL BCP-MCNC: 3.3 G/DL (ref 3.5–5)
ALP SERPL-CCNC: 74 U/L (ref 46–116)
ALT SERPL W P-5'-P-CCNC: 18 U/L (ref 12–78)
ANION GAP SERPL CALCULATED.3IONS-SCNC: 7 MMOL/L (ref 4–13)
AST SERPL W P-5'-P-CCNC: 11 U/L (ref 5–45)
BASOPHILS # BLD AUTO: 0.02 THOUSANDS/ΜL (ref 0–0.1)
BASOPHILS NFR BLD AUTO: 0 % (ref 0–1)
BILIRUB SERPL-MCNC: 0.38 MG/DL (ref 0.2–1)
BUN SERPL-MCNC: 13 MG/DL (ref 5–25)
CALCIUM ALBUM COR SERPL-MCNC: 9.4 MG/DL (ref 8.3–10.1)
CALCIUM SERPL-MCNC: 8.8 MG/DL (ref 8.3–10.1)
CHLORIDE SERPL-SCNC: 107 MMOL/L (ref 100–108)
CO2 SERPL-SCNC: 27 MMOL/L (ref 21–32)
CREAT SERPL-MCNC: 1.1 MG/DL (ref 0.6–1.3)
EOSINOPHIL # BLD AUTO: 0.2 THOUSAND/ΜL (ref 0–0.61)
EOSINOPHIL NFR BLD AUTO: 2 % (ref 0–6)
ERYTHROCYTE [DISTWIDTH] IN BLOOD BY AUTOMATED COUNT: 18.6 % (ref 11.6–15.1)
GFR SERPL CREATININE-BSD FRML MDRD: 57 ML/MIN/1.73SQ M
GLUCOSE P FAST SERPL-MCNC: 100 MG/DL (ref 65–99)
HCT VFR BLD AUTO: 43.1 % (ref 34.8–46.1)
HGB BLD-MCNC: 13.6 G/DL (ref 11.5–15.4)
IMM GRANULOCYTES # BLD AUTO: 0.03 THOUSAND/UL (ref 0–0.2)
IMM GRANULOCYTES NFR BLD AUTO: 0 % (ref 0–2)
LYMPHOCYTES # BLD AUTO: 4.09 THOUSANDS/ΜL (ref 0.6–4.47)
LYMPHOCYTES NFR BLD AUTO: 46 % (ref 14–44)
MAGNESIUM SERPL-MCNC: 2.2 MG/DL (ref 1.6–2.6)
MCH RBC QN AUTO: 27.5 PG (ref 26.8–34.3)
MCHC RBC AUTO-ENTMCNC: 31.6 G/DL (ref 31.4–37.4)
MCV RBC AUTO: 87 FL (ref 82–98)
MONOCYTES # BLD AUTO: 0.74 THOUSAND/ΜL (ref 0.17–1.22)
MONOCYTES NFR BLD AUTO: 8 % (ref 4–12)
NEUTROPHILS # BLD AUTO: 4 THOUSANDS/ΜL (ref 1.85–7.62)
NEUTS SEG NFR BLD AUTO: 44 % (ref 43–75)
NRBC BLD AUTO-RTO: 0 /100 WBCS
PLATELET # BLD AUTO: 327 THOUSANDS/UL (ref 149–390)
PMV BLD AUTO: 10.7 FL (ref 8.9–12.7)
POTASSIUM SERPL-SCNC: 4.4 MMOL/L (ref 3.5–5.3)
PROT SERPL-MCNC: 6.3 G/DL (ref 6.4–8.2)
RBC # BLD AUTO: 4.94 MILLION/UL (ref 3.81–5.12)
SODIUM SERPL-SCNC: 141 MMOL/L (ref 136–145)
WBC # BLD AUTO: 9.08 THOUSAND/UL (ref 4.31–10.16)

## 2022-01-18 PROCEDURE — 80053 COMPREHEN METABOLIC PANEL: CPT

## 2022-01-18 PROCEDURE — 96401 CHEMO ANTI-NEOPL SQ/IM: CPT

## 2022-01-18 PROCEDURE — 83735 ASSAY OF MAGNESIUM: CPT

## 2022-01-18 PROCEDURE — 36415 COLL VENOUS BLD VENIPUNCTURE: CPT

## 2022-01-18 PROCEDURE — 85025 COMPLETE CBC W/AUTO DIFF WBC: CPT

## 2022-01-18 RX ORDER — DIPHENHYDRAMINE HCL 25 MG
25 TABLET ORAL ONCE
Status: CANCELLED
Start: 2022-01-18 | End: 2022-01-18

## 2022-01-18 RX ORDER — DEXAMETHASONE 4 MG/1
40 TABLET ORAL ONCE
Status: COMPLETED | OUTPATIENT
Start: 2022-01-18 | End: 2022-01-18

## 2022-01-18 RX ORDER — ACETAMINOPHEN 325 MG/1
650 TABLET ORAL ONCE
Status: COMPLETED | OUTPATIENT
Start: 2022-01-18 | End: 2022-01-18

## 2022-01-18 RX ORDER — ACETAMINOPHEN 325 MG/1
650 TABLET ORAL ONCE
Status: CANCELLED
Start: 2022-01-18 | End: 2022-01-18

## 2022-01-18 RX ORDER — DIPHENHYDRAMINE HCL 25 MG
25 TABLET ORAL ONCE
Status: COMPLETED | OUTPATIENT
Start: 2022-01-18 | End: 2022-01-18

## 2022-01-18 RX ORDER — FAMOTIDINE 20 MG/1
20 TABLET, FILM COATED ORAL ONCE
Status: COMPLETED | OUTPATIENT
Start: 2022-01-18 | End: 2022-01-18

## 2022-01-18 RX ORDER — CYCLOPHOSPHAMIDE 50 MG/1
300 CAPSULE ORAL ONCE
Status: COMPLETED | OUTPATIENT
Start: 2022-01-18 | End: 2022-01-18

## 2022-01-18 RX ORDER — FAMOTIDINE 10 MG
20 TABLET ORAL DAILY
Status: CANCELLED
Start: 2022-01-18

## 2022-01-18 RX ORDER — DIPHENHYDRAMINE HCL 25 MG
25 TABLET ORAL EVERY 6 HOURS PRN
Status: CANCELLED
Start: 2022-01-18

## 2022-01-18 RX ADMIN — CYCLOPHOSPHAMIDE 500 MG: 50 CAPSULE ORAL at 15:20

## 2022-01-18 RX ADMIN — BORTEZOMIB 2.3 MG: 3.5 INJECTION, POWDER, LYOPHILIZED, FOR SOLUTION INTRAVENOUS; SUBCUTANEOUS at 15:51

## 2022-01-18 RX ADMIN — FAMOTIDINE 20 MG: 20 TABLET ORAL at 15:13

## 2022-01-18 RX ADMIN — DARATUMUMAB AND HYALURONIDASE-FIHJ (HUMAN RECOMBINANT) 1800 MG: 1800; 30000 INJECTION SUBCUTANEOUS at 16:14

## 2022-01-18 RX ADMIN — DIPHENHYDRAMINE HYDROCHLORIDE 25 MG: 25 TABLET ORAL at 15:13

## 2022-01-18 RX ADMIN — DEXAMETHASONE 40 MG: 4 TABLET ORAL at 15:13

## 2022-01-18 RX ADMIN — ACETAMINOPHEN 650 MG: 325 TABLET, FILM COATED ORAL at 15:13

## 2022-01-18 NOTE — PROGRESS NOTES
Pt presents for Darzalex Faspro and Velcade injection  Pt offers no complaints  Pt took PO pre medications and oral cytoxan without complications  Velcade Injection placed in Left arm  Darzalex Faspro placed in RLQ and LLQ of abdomen  Pt tolerated treatment well  AVS declined  Next appointment reviewed

## 2022-01-18 NOTE — PROGRESS NOTES
Patient taking dexamethasone and cyclophosphamide at the infusion center  Removed communication regarding taking at home

## 2022-01-21 ENCOUNTER — APPOINTMENT (OUTPATIENT)
Dept: LAB | Facility: HOSPITAL | Age: 52
End: 2022-01-21
Payer: COMMERCIAL

## 2022-01-21 DIAGNOSIS — N17.9 ACUTE RENAL FAILURE, UNSPECIFIED ACUTE RENAL FAILURE TYPE (HCC): Primary | ICD-10-CM

## 2022-01-21 LAB
ALBUMIN SERPL BCP-MCNC: 3.4 G/DL (ref 3.5–5)
ALP SERPL-CCNC: 65 U/L (ref 46–116)
ALT SERPL W P-5'-P-CCNC: 24 U/L (ref 12–78)
ANION GAP SERPL CALCULATED.3IONS-SCNC: 8 MMOL/L (ref 4–13)
AST SERPL W P-5'-P-CCNC: 11 U/L (ref 5–45)
BASOPHILS # BLD AUTO: 0.03 THOUSANDS/ΜL (ref 0–0.1)
BASOPHILS NFR BLD AUTO: 0 % (ref 0–1)
BILIRUB SERPL-MCNC: 0.47 MG/DL (ref 0.2–1)
BUN SERPL-MCNC: 14 MG/DL (ref 5–25)
CALCIUM ALBUM COR SERPL-MCNC: 9.2 MG/DL (ref 8.3–10.1)
CALCIUM SERPL-MCNC: 8.7 MG/DL (ref 8.3–10.1)
CHLORIDE SERPL-SCNC: 105 MMOL/L (ref 100–108)
CO2 SERPL-SCNC: 28 MMOL/L (ref 21–32)
CREAT SERPL-MCNC: 1.11 MG/DL (ref 0.6–1.3)
EOSINOPHIL # BLD AUTO: 0.06 THOUSAND/ΜL (ref 0–0.61)
EOSINOPHIL NFR BLD AUTO: 1 % (ref 0–6)
ERYTHROCYTE [DISTWIDTH] IN BLOOD BY AUTOMATED COUNT: 18.3 % (ref 11.6–15.1)
GFR SERPL CREATININE-BSD FRML MDRD: 56 ML/MIN/1.73SQ M
GLUCOSE P FAST SERPL-MCNC: 97 MG/DL (ref 65–99)
HCT VFR BLD AUTO: 43 % (ref 34.8–46.1)
HGB BLD-MCNC: 14 G/DL (ref 11.5–15.4)
IMM GRANULOCYTES # BLD AUTO: 0.02 THOUSAND/UL (ref 0–0.2)
IMM GRANULOCYTES NFR BLD AUTO: 0 % (ref 0–2)
LYMPHOCYTES # BLD AUTO: 3.42 THOUSANDS/ΜL (ref 0.6–4.47)
LYMPHOCYTES NFR BLD AUTO: 39 % (ref 14–44)
MAGNESIUM SERPL-MCNC: 2.1 MG/DL (ref 1.6–2.6)
MCH RBC QN AUTO: 27.9 PG (ref 26.8–34.3)
MCHC RBC AUTO-ENTMCNC: 32.6 G/DL (ref 31.4–37.4)
MCV RBC AUTO: 86 FL (ref 82–98)
MONOCYTES # BLD AUTO: 1 THOUSAND/ΜL (ref 0.17–1.22)
MONOCYTES NFR BLD AUTO: 11 % (ref 4–12)
NEUTROPHILS # BLD AUTO: 4.26 THOUSANDS/ΜL (ref 1.85–7.62)
NEUTS SEG NFR BLD AUTO: 49 % (ref 43–75)
NRBC BLD AUTO-RTO: 0 /100 WBCS
PLATELET # BLD AUTO: 277 THOUSANDS/UL (ref 149–390)
PMV BLD AUTO: 10.8 FL (ref 8.9–12.7)
POTASSIUM SERPL-SCNC: 3.5 MMOL/L (ref 3.5–5.3)
PROT SERPL-MCNC: 6.3 G/DL (ref 6.4–8.2)
RBC # BLD AUTO: 5.01 MILLION/UL (ref 3.81–5.12)
SODIUM SERPL-SCNC: 141 MMOL/L (ref 136–145)
WBC # BLD AUTO: 8.79 THOUSAND/UL (ref 4.31–10.16)

## 2022-01-21 PROCEDURE — 80053 COMPREHEN METABOLIC PANEL: CPT

## 2022-01-21 PROCEDURE — 83735 ASSAY OF MAGNESIUM: CPT

## 2022-01-21 PROCEDURE — 36415 COLL VENOUS BLD VENIPUNCTURE: CPT

## 2022-01-21 PROCEDURE — 85025 COMPLETE CBC W/AUTO DIFF WBC: CPT

## 2022-01-24 ENCOUNTER — TELEPHONE (OUTPATIENT)
Dept: PALLIATIVE MEDICINE | Facility: CLINIC | Age: 52
End: 2022-01-24

## 2022-01-24 DIAGNOSIS — C90.00 MULTIPLE MYELOMA NOT HAVING ACHIEVED REMISSION (HCC): ICD-10-CM

## 2022-01-24 DIAGNOSIS — Z51.5 PALLIATIVE CARE PATIENT: ICD-10-CM

## 2022-01-24 DIAGNOSIS — G89.3 CANCER ASSOCIATED PAIN: ICD-10-CM

## 2022-01-24 NOTE — TELEPHONE ENCOUNTER
[facility] called according to Billie Maldonado need to call [address] number on back of card as they manage prescription     [phone] with nohelia additional information required and documents were faxed to [phone] for urgent review   Should receive a fax back within 24 hrs     REF# [id number]

## 2022-01-25 ENCOUNTER — TELEPHONE (OUTPATIENT)
Dept: PALLIATIVE MEDICINE | Facility: CLINIC | Age: 52
End: 2022-01-25

## 2022-01-25 ENCOUNTER — HOSPITAL ENCOUNTER (OUTPATIENT)
Dept: INFUSION CENTER | Facility: CLINIC | Age: 52
Discharge: HOME | End: 2022-01-25
Payer: COMMERCIAL

## 2022-01-25 VITALS
RESPIRATION RATE: 18 BRPM | SYSTOLIC BLOOD PRESSURE: 139 MMHG | DIASTOLIC BLOOD PRESSURE: 86 MMHG | TEMPERATURE: 97.4 F | HEART RATE: 84 BPM | HEIGHT: 64 IN | WEIGHT: 157.85 LBS | BODY MASS INDEX: 26.95 KG/M2

## 2022-01-25 DIAGNOSIS — Z51.11 ENCOUNTER FOR ANTINEOPLASTIC CHEMOTHERAPY: Primary | ICD-10-CM

## 2022-01-25 DIAGNOSIS — C90.00 MULTIPLE MYELOMA NOT HAVING ACHIEVED REMISSION (HCC): ICD-10-CM

## 2022-01-25 RX ORDER — CYCLOPHOSPHAMIDE 50 MG/1
300 CAPSULE ORAL ONCE
Status: COMPLETED | OUTPATIENT
Start: 2022-01-25 | End: 2022-01-25

## 2022-01-25 RX ORDER — OXYCODONE HYDROCHLORIDE 10 MG/1
10-15 TABLET ORAL EVERY 4 HOURS PRN
Qty: 50 TABLET | Refills: 0 | Status: SHIPPED | OUTPATIENT
Start: 2022-01-25 | End: 2022-02-01 | Stop reason: SDUPTHER

## 2022-01-25 RX ORDER — DEXAMETHASONE 4 MG/1
40 TABLET ORAL ONCE
Status: COMPLETED | OUTPATIENT
Start: 2022-01-25 | End: 2022-01-25

## 2022-01-25 RX ADMIN — DEXAMETHASONE 40 MG: 4 TABLET ORAL at 09:18

## 2022-01-25 RX ADMIN — CYCLOPHOSPHAMIDE 500 MG: 50 CAPSULE ORAL at 09:19

## 2022-01-25 NOTE — TELEPHONE ENCOUNTER
PA approval for xtampza medication received  Faxed the approval letter to [facility]  They will let the patient know when ready for

## 2022-01-25 NOTE — TELEPHONE ENCOUNTER
This nurse received call from patient that the [employer] told her they did not receive [place] approval for the [facility] and that her roxicodone is maxed out per insurance and she is unable to get any more  I tried to reach pharmacy and was on a hold for greater than 30 minutes  I then faxed the approval letter again and wrote a note asking for pharmacy to call [facility] office regarding the Roxicodone issue  Awaiting call back

## 2022-01-25 NOTE — TELEPHONE ENCOUNTER
Dr Shayla Redd can you kindly send new script of oxycodone 10 mg to [facility] in [place] [facility] home star I will resend to you

## 2022-01-25 NOTE — PROGRESS NOTES
Pt to clinic for PO cytoxan and decadron, offers no complaints today, pt lost 9lbs since last week, pt denies she has been having any difficulties with eating and refusing to speak with RD at this time, informed Ramya Goodwin RN of pt's situation, María verbalized understanding and informed Dr Christine Han, pt tolerated infusion without complications, aware of next appointment, avs declined

## 2022-01-26 NOTE — TELEPHONE ENCOUNTER
This nurse called [employer] and spoke with pharmacist that she is aware of approval for xtampza but still rejecting  She will call the insurance for override codes if needed

## 2022-01-31 ENCOUNTER — TELEPHONE (OUTPATIENT)
Dept: INTERNAL MEDICINE CLINIC | Facility: CLINIC | Age: 52
End: 2022-01-31

## 2022-01-31 ENCOUNTER — APPOINTMENT (OUTPATIENT)
Dept: LAB | Facility: HOSPITAL | Age: 52
End: 2022-01-31
Payer: COMMERCIAL

## 2022-01-31 DIAGNOSIS — J01.90 ACUTE NON-RECURRENT SINUSITIS, UNSPECIFIED LOCATION: Primary | ICD-10-CM

## 2022-01-31 DIAGNOSIS — C90.00 MYELOMA ASSOCIATED AMYLOIDOSIS (HCC): Primary | ICD-10-CM

## 2022-01-31 DIAGNOSIS — E85.9 MYELOMA ASSOCIATED AMYLOIDOSIS (HCC): Primary | ICD-10-CM

## 2022-01-31 LAB
ALBUMIN SERPL BCP-MCNC: 3.6 G/DL (ref 3.5–5)
ALP SERPL-CCNC: 72 U/L (ref 46–116)
ALT SERPL W P-5'-P-CCNC: 22 U/L (ref 12–78)
ANION GAP SERPL CALCULATED.3IONS-SCNC: 12 MMOL/L (ref 4–13)
AST SERPL W P-5'-P-CCNC: 14 U/L (ref 5–45)
BASOPHILS # BLD AUTO: 0.03 THOUSANDS/ΜL (ref 0–0.1)
BASOPHILS NFR BLD AUTO: 0 % (ref 0–1)
BILIRUB SERPL-MCNC: 0.43 MG/DL (ref 0.2–1)
BUN SERPL-MCNC: 8 MG/DL (ref 5–25)
CALCIUM SERPL-MCNC: 8.9 MG/DL (ref 8.3–10.1)
CHLORIDE SERPL-SCNC: 104 MMOL/L (ref 100–108)
CO2 SERPL-SCNC: 23 MMOL/L (ref 21–32)
CREAT SERPL-MCNC: 1 MG/DL (ref 0.6–1.3)
EOSINOPHIL # BLD AUTO: 0.19 THOUSAND/ΜL (ref 0–0.61)
EOSINOPHIL NFR BLD AUTO: 2 % (ref 0–6)
ERYTHROCYTE [DISTWIDTH] IN BLOOD BY AUTOMATED COUNT: 17.4 % (ref 11.6–15.1)
GFR SERPL CREATININE-BSD FRML MDRD: 64 ML/MIN/1.73SQ M
GLUCOSE P FAST SERPL-MCNC: 130 MG/DL (ref 65–99)
HCT VFR BLD AUTO: 47.2 % (ref 34.8–46.1)
HGB BLD-MCNC: 15.2 G/DL (ref 11.5–15.4)
IMM GRANULOCYTES # BLD AUTO: 0.02 THOUSAND/UL (ref 0–0.2)
IMM GRANULOCYTES NFR BLD AUTO: 0 % (ref 0–2)
LYMPHOCYTES # BLD AUTO: 1.77 THOUSANDS/ΜL (ref 0.6–4.47)
LYMPHOCYTES NFR BLD AUTO: 22 % (ref 14–44)
MAGNESIUM SERPL-MCNC: 2.3 MG/DL (ref 1.6–2.6)
MCH RBC QN AUTO: 27.9 PG (ref 26.8–34.3)
MCHC RBC AUTO-ENTMCNC: 32.2 G/DL (ref 31.4–37.4)
MCV RBC AUTO: 87 FL (ref 82–98)
MONOCYTES # BLD AUTO: 0.84 THOUSAND/ΜL (ref 0.17–1.22)
MONOCYTES NFR BLD AUTO: 10 % (ref 4–12)
NEUTROPHILS # BLD AUTO: 5.31 THOUSANDS/ΜL (ref 1.85–7.62)
NEUTS SEG NFR BLD AUTO: 66 % (ref 43–75)
NRBC BLD AUTO-RTO: 0 /100 WBCS
PLATELET # BLD AUTO: 258 THOUSANDS/UL (ref 149–390)
PMV BLD AUTO: 11.1 FL (ref 8.9–12.7)
POTASSIUM SERPL-SCNC: 3.3 MMOL/L (ref 3.5–5.3)
PROT SERPL-MCNC: 6.8 G/DL (ref 6.4–8.2)
RBC # BLD AUTO: 5.44 MILLION/UL (ref 3.81–5.12)
SODIUM SERPL-SCNC: 139 MMOL/L (ref 136–145)
WBC # BLD AUTO: 8.16 THOUSAND/UL (ref 4.31–10.16)

## 2022-01-31 PROCEDURE — 36415 COLL VENOUS BLD VENIPUNCTURE: CPT

## 2022-01-31 PROCEDURE — 84165 PROTEIN E-PHORESIS SERUM: CPT

## 2022-01-31 PROCEDURE — 80053 COMPREHEN METABOLIC PANEL: CPT

## 2022-01-31 PROCEDURE — 85025 COMPLETE CBC W/AUTO DIFF WBC: CPT

## 2022-01-31 PROCEDURE — 83735 ASSAY OF MAGNESIUM: CPT

## 2022-01-31 PROCEDURE — 84165 PROTEIN E-PHORESIS SERUM: CPT | Performed by: PATHOLOGY

## 2022-01-31 RX ORDER — AMOXICILLIN AND CLAVULANATE POTASSIUM 875; 125 MG/1; MG/1
1 TABLET, FILM COATED ORAL EVERY 12 HOURS SCHEDULED
Qty: 14 TABLET | Refills: 0 | Status: SHIPPED | OUTPATIENT
Start: 2022-01-31 | End: 2022-02-07

## 2022-01-31 NOTE — TELEPHONE ENCOUNTER
Patient states she is developing a sinus infection with a slight sore throat  She was around small children who had cold symptoms  She is receiving cancer treatments and would like to get something to take before symptoms get worse      Send any medication to [facility] [address]

## 2022-02-01 ENCOUNTER — TELEPHONE (OUTPATIENT)
Dept: HEMATOLOGY ONCOLOGY | Facility: CLINIC | Age: 52
End: 2022-02-01

## 2022-02-01 ENCOUNTER — HOSPITAL ENCOUNTER (OUTPATIENT)
Dept: INFUSION CENTER | Facility: CLINIC | Age: 52
Discharge: HOME | End: 2022-02-01
Payer: COMMERCIAL

## 2022-02-01 VITALS
WEIGHT: 156.6 LBS | DIASTOLIC BLOOD PRESSURE: 88 MMHG | HEIGHT: 64 IN | SYSTOLIC BLOOD PRESSURE: 131 MMHG | HEART RATE: 84 BPM | TEMPERATURE: 97.3 F | RESPIRATION RATE: 17 BRPM | BODY MASS INDEX: 26.73 KG/M2

## 2022-02-01 DIAGNOSIS — C90.00 MULTIPLE MYELOMA NOT HAVING ACHIEVED REMISSION (HCC): ICD-10-CM

## 2022-02-01 DIAGNOSIS — Z51.11 ENCOUNTER FOR ANTINEOPLASTIC CHEMOTHERAPY: ICD-10-CM

## 2022-02-01 DIAGNOSIS — G89.3 CANCER ASSOCIATED PAIN: ICD-10-CM

## 2022-02-01 DIAGNOSIS — C90.00 MULTIPLE MYELOMA NOT HAVING ACHIEVED REMISSION (HCC): Primary | ICD-10-CM

## 2022-02-01 DIAGNOSIS — Z51.11 ENCOUNTER FOR ANTINEOPLASTIC CHEMOTHERAPY: Primary | ICD-10-CM

## 2022-02-01 DIAGNOSIS — Z51.5 PALLIATIVE CARE PATIENT: ICD-10-CM

## 2022-02-01 LAB
IGA SERPL-MCNC: 80 MG/DL (ref 70–400)
IGG SERPL-MCNC: 1030 MG/DL (ref 700–1600)
IGM SERPL-MCNC: 42 MG/DL (ref 40–230)

## 2022-02-01 PROCEDURE — 96401 CHEMO ANTI-NEOPL SQ/IM: CPT

## 2022-02-01 PROCEDURE — 82784 ASSAY IGA/IGD/IGG/IGM EACH: CPT | Performed by: INTERNAL MEDICINE

## 2022-02-01 PROCEDURE — 83521 IG LIGHT CHAINS FREE EACH: CPT | Performed by: INTERNAL MEDICINE

## 2022-02-01 RX ORDER — CYCLOPHOSPHAMIDE 50 MG/1
300 CAPSULE ORAL ONCE
Status: CANCELLED
Start: 2022-02-15 | End: 2022-02-15

## 2022-02-01 RX ORDER — CYCLOPHOSPHAMIDE 50 MG/1
300 CAPSULE ORAL ONCE
Status: CANCELLED
Start: 2022-02-08 | End: 2022-02-08

## 2022-02-01 RX ORDER — DEXAMETHASONE 4 MG/1
40 TABLET ORAL ONCE
Status: CANCELLED
Start: 2022-02-01

## 2022-02-01 RX ORDER — DIPHENHYDRAMINE HCL 25 MG
25 TABLET ORAL ONCE
Status: CANCELLED
Start: 2022-02-01 | End: 2022-02-01

## 2022-02-01 RX ORDER — OXYCODONE HYDROCHLORIDE 10 MG/1
10-15 TABLET ORAL EVERY 4 HOURS PRN
Qty: 50 TABLET | Refills: 0 | Status: SHIPPED | OUTPATIENT
Start: 2022-02-01 | End: 2022-02-08 | Stop reason: SDUPTHER

## 2022-02-01 RX ORDER — FAMOTIDINE 20 MG/1
20 TABLET, FILM COATED ORAL DAILY
Status: DISCONTINUED | OUTPATIENT
Start: 2022-02-01 | End: 2022-02-04 | Stop reason: HOSPADM

## 2022-02-01 RX ORDER — ACETAMINOPHEN 325 MG/1
650 TABLET ORAL ONCE
Status: CANCELLED
Start: 2022-02-01 | End: 2022-02-01

## 2022-02-01 RX ORDER — FAMOTIDINE 20 MG/1
20 TABLET, FILM COATED ORAL DAILY
Status: CANCELLED
Start: 2022-02-01

## 2022-02-01 RX ORDER — CYCLOPHOSPHAMIDE 50 MG/1
300 CAPSULE ORAL ONCE
Status: COMPLETED | OUTPATIENT
Start: 2022-02-01 | End: 2022-02-01

## 2022-02-01 RX ORDER — DIPHENHYDRAMINE HCL 25 MG
25 TABLET ORAL EVERY 6 HOURS PRN
Status: CANCELLED
Start: 2022-02-15

## 2022-02-01 RX ORDER — DEXAMETHASONE 4 MG/1
40 TABLET ORAL ONCE
Status: CANCELLED
Start: 2022-02-08

## 2022-02-01 RX ORDER — DEXAMETHASONE 4 MG/1
40 TABLET ORAL ONCE
Status: CANCELLED
Start: 2022-02-24

## 2022-02-01 RX ORDER — DIPHENHYDRAMINE HCL 25 MG
25 TABLET ORAL ONCE
Status: COMPLETED | OUTPATIENT
Start: 2022-02-01 | End: 2022-02-01

## 2022-02-01 RX ORDER — FAMOTIDINE 20 MG/1
20 TABLET, FILM COATED ORAL DAILY
Status: CANCELLED
Start: 2022-02-15

## 2022-02-01 RX ORDER — ACETAMINOPHEN 325 MG/1
650 TABLET ORAL ONCE
Status: CANCELLED
Start: 2022-02-15 | End: 2022-02-15

## 2022-02-01 RX ORDER — CYCLOPHOSPHAMIDE 50 MG/1
300 CAPSULE ORAL ONCE
Status: CANCELLED
Start: 2022-02-01 | End: 2022-02-01

## 2022-02-01 RX ORDER — ACETAMINOPHEN 325 MG/1
650 TABLET ORAL ONCE
Status: COMPLETED | OUTPATIENT
Start: 2022-02-01 | End: 2022-02-01

## 2022-02-01 RX ORDER — DEXAMETHASONE 4 MG/1
40 TABLET ORAL ONCE
Status: COMPLETED | OUTPATIENT
Start: 2022-02-01 | End: 2022-02-01

## 2022-02-01 RX ORDER — DEXAMETHASONE 4 MG/1
40 TABLET ORAL ONCE
Status: CANCELLED
Start: 2022-02-15

## 2022-02-01 RX ADMIN — DIPHENHYDRAMINE HYDROCHLORIDE 25 MG: 25 TABLET ORAL at 14:23

## 2022-02-01 RX ADMIN — DARATUMUMAB AND HYALURONIDASE-FIHJ (HUMAN RECOMBINANT) 1800 MG: 1800; 30000 INJECTION SUBCUTANEOUS at 15:30

## 2022-02-01 RX ADMIN — ACETAMINOPHEN 650 MG: 325 TABLET, FILM COATED ORAL at 14:23

## 2022-02-01 RX ADMIN — BORTEZOMIB 2.3 MG: 3.5 INJECTION, POWDER, LYOPHILIZED, FOR SOLUTION INTRAVENOUS; SUBCUTANEOUS at 14:40

## 2022-02-01 RX ADMIN — DEXAMETHASONE 40 MG: 4 TABLET ORAL at 14:23

## 2022-02-01 RX ADMIN — CYCLOPHOSPHAMIDE 500 MG: 50 CAPSULE ORAL at 14:36

## 2022-02-01 RX ADMIN — FAMOTIDINE 20 MG: 20 TABLET ORAL at 14:23

## 2022-02-01 NOTE — TELEPHONE ENCOUNTER
Patient called she stated she is also in need of the oxycodone 10 mg and it will need a prior auth  Per patient she will call pharmacy to fax over the form needed to do auth

## 2022-02-01 NOTE — PROGRESS NOTES
Pt requested that she get PO oksana, Fortino Decker RN, switched it for today for her as she has gotten it in the past

## 2022-02-01 NOTE — TELEPHONE ENCOUNTER
Phoned pt to ask what her symptoms were per Dr Schwartz Ship request  Pt feels like she has sinus congestion as she does not leave the house except for blood draw  Pt has not had any Covid exposure that she is aware of  No fever, no vomiting or diarrhea and no loss of sense of taste or smell  Per Dr Fareed Carrasquillo pt may be treated  Pt verbalized understanding

## 2022-02-01 NOTE — PROGRESS NOTES
Pt here for chemotherapy, she states that she has a sinus infection, her PCP started her on augmentin and we are ok to treat  Other then that she is feeling ok  Tolerated treatment without incident  velcade given in the upper right arm, Darzalex Faspro given in the RLQ, LLQ abdomen  Labs drawn periphrally  AVS declined    Walked out in stable condition

## 2022-02-02 LAB
ALBUMIN SERPL ELPH-MCNC: 4.51 G/DL (ref 3.5–5)
ALBUMIN SERPL ELPH-MCNC: 66.3 % (ref 52–65)
ALPHA1 GLOB SERPL ELPH-MCNC: 0.25 G/DL (ref 0.1–0.4)
ALPHA1 GLOB SERPL ELPH-MCNC: 3.7 % (ref 2.5–5)
ALPHA2 GLOB SERPL ELPH-MCNC: 0.64 G/DL (ref 0.4–1.2)
ALPHA2 GLOB SERPL ELPH-MCNC: 9.4 % (ref 7–13)
BETA GLOB ABNORMAL SERPL ELPH-MCNC: 0.34 G/DL (ref 0.4–0.8)
BETA1 GLOB SERPL ELPH-MCNC: 5 % (ref 5–13)
BETA2 GLOB SERPL ELPH-MCNC: 3.3 % (ref 2–8)
BETA2+GAMMA GLOB SERPL ELPH-MCNC: 0.22 G/DL (ref 0.2–0.5)
GAMMA GLOB ABNORMAL SERPL ELPH-MCNC: 0.84 G/DL (ref 0.5–1.6)
GAMMA GLOB SERPL ELPH-MCNC: 12.3 % (ref 12–22)
IGG/ALB SER: 1.97 {RATIO} (ref 1.1–1.8)
M PROTEIN 1 MFR SERPL ELPH: 3.6 %
M PROTEIN 1 SERPL ELPH-MCNC: 0.24 G/DL
PROT PATTERN SERPL ELPH-IMP: ABNORMAL
PROT SERPL-MCNC: 6.8 G/DL (ref 6.4–8.2)

## 2022-02-02 NOTE — TELEPHONE ENCOUNTER
Emeterio  called to check if prior authorization is needed     According to Franca patient picked up her medication and it was covered by insurance

## 2022-02-02 NOTE — TELEPHONE ENCOUNTER
Patient called office today regarding her Ramon Kim  She stated per pharmacy she is not able to pick this medication up till 2/12/2022

## 2022-02-03 LAB — MISCELLANEOUS LAB TEST RESULT: NORMAL

## 2022-02-07 ENCOUNTER — APPOINTMENT (OUTPATIENT)
Dept: LAB | Facility: HOSPITAL | Age: 52
End: 2022-02-07
Payer: COMMERCIAL

## 2022-02-07 DIAGNOSIS — C90.00 MYELOMA ASSOCIATED AMYLOIDOSIS (HCC): ICD-10-CM

## 2022-02-07 DIAGNOSIS — Z51.11 ENCOUNTER FOR ANTINEOPLASTIC CHEMOTHERAPY: Primary | ICD-10-CM

## 2022-02-07 DIAGNOSIS — Z51.11 ENCOUNTER FOR ANTINEOPLASTIC CHEMOTHERAPY: ICD-10-CM

## 2022-02-07 DIAGNOSIS — E85.9 MYELOMA ASSOCIATED AMYLOIDOSIS (HCC): ICD-10-CM

## 2022-02-07 DIAGNOSIS — C90.00 MULTIPLE MYELOMA NOT HAVING ACHIEVED REMISSION (HCC): Primary | ICD-10-CM

## 2022-02-07 LAB
ALBUMIN SERPL BCP-MCNC: 3.3 G/DL (ref 3.5–5)
ALP SERPL-CCNC: 62 U/L (ref 46–116)
ALT SERPL W P-5'-P-CCNC: 19 U/L (ref 12–78)
ANION GAP SERPL CALCULATED.3IONS-SCNC: 9 MMOL/L (ref 4–13)
AST SERPL W P-5'-P-CCNC: 17 U/L (ref 5–45)
BASOPHILS # BLD AUTO: 0.02 THOUSANDS/ΜL (ref 0–0.1)
BASOPHILS NFR BLD AUTO: 0 % (ref 0–1)
BILIRUB SERPL-MCNC: 0.64 MG/DL (ref 0.2–1)
BUN SERPL-MCNC: 9 MG/DL (ref 5–25)
CALCIUM ALBUM COR SERPL-MCNC: 9.2 MG/DL (ref 8.3–10.1)
CALCIUM SERPL-MCNC: 8.6 MG/DL (ref 8.3–10.1)
CHLORIDE SERPL-SCNC: 104 MMOL/L (ref 100–108)
CO2 SERPL-SCNC: 24 MMOL/L (ref 21–32)
CREAT SERPL-MCNC: 0.8 MG/DL (ref 0.6–1.3)
EOSINOPHIL # BLD AUTO: 0.05 THOUSAND/ΜL (ref 0–0.61)
EOSINOPHIL NFR BLD AUTO: 1 % (ref 0–6)
ERYTHROCYTE [DISTWIDTH] IN BLOOD BY AUTOMATED COUNT: 16.3 % (ref 11.6–15.1)
GFR SERPL CREATININE-BSD FRML MDRD: 84 ML/MIN/1.73SQ M
GLUCOSE SERPL-MCNC: 93 MG/DL (ref 65–140)
HCT VFR BLD AUTO: 43.3 % (ref 34.8–46.1)
HGB BLD-MCNC: 14.5 G/DL (ref 11.5–15.4)
IMM GRANULOCYTES # BLD AUTO: 0.03 THOUSAND/UL (ref 0–0.2)
IMM GRANULOCYTES NFR BLD AUTO: 0 % (ref 0–2)
LYMPHOCYTES # BLD AUTO: 2.63 THOUSANDS/ΜL (ref 0.6–4.47)
LYMPHOCYTES NFR BLD AUTO: 24 % (ref 14–44)
MAGNESIUM SERPL-MCNC: 2.2 MG/DL (ref 1.6–2.6)
MCH RBC QN AUTO: 28.4 PG (ref 26.8–34.3)
MCHC RBC AUTO-ENTMCNC: 33.5 G/DL (ref 31.4–37.4)
MCV RBC AUTO: 85 FL (ref 82–98)
MONOCYTES # BLD AUTO: 0.81 THOUSAND/ΜL (ref 0.17–1.22)
MONOCYTES NFR BLD AUTO: 7 % (ref 4–12)
NEUTROPHILS # BLD AUTO: 7.57 THOUSANDS/ΜL (ref 1.85–7.62)
NEUTS SEG NFR BLD AUTO: 68 % (ref 43–75)
NRBC BLD AUTO-RTO: 0 /100 WBCS
PLATELET # BLD AUTO: 261 THOUSANDS/UL (ref 149–390)
PMV BLD AUTO: 10.4 FL (ref 8.9–12.7)
POTASSIUM SERPL-SCNC: 4.3 MMOL/L (ref 3.5–5.3)
PROT SERPL-MCNC: 6.7 G/DL (ref 6.4–8.2)
RBC # BLD AUTO: 5.11 MILLION/UL (ref 3.81–5.12)
SODIUM SERPL-SCNC: 137 MMOL/L (ref 136–145)
WBC # BLD AUTO: 11.11 THOUSAND/UL (ref 4.31–10.16)

## 2022-02-07 PROCEDURE — 85025 COMPLETE CBC W/AUTO DIFF WBC: CPT

## 2022-02-07 PROCEDURE — 83735 ASSAY OF MAGNESIUM: CPT

## 2022-02-07 PROCEDURE — 36415 COLL VENOUS BLD VENIPUNCTURE: CPT

## 2022-02-07 PROCEDURE — 80053 COMPREHEN METABOLIC PANEL: CPT

## 2022-02-08 ENCOUNTER — HOSPITAL ENCOUNTER (OUTPATIENT)
Dept: INFUSION CENTER | Facility: CLINIC | Age: 52
Discharge: HOME | End: 2022-02-08
Payer: COMMERCIAL

## 2022-02-08 ENCOUNTER — TELEPHONE (OUTPATIENT)
Dept: HEMATOLOGY ONCOLOGY | Facility: CLINIC | Age: 52
End: 2022-02-08

## 2022-02-08 ENCOUNTER — OFFICE VISIT (OUTPATIENT)
Dept: HEMATOLOGY ONCOLOGY | Facility: CLINIC | Age: 52
End: 2022-02-08
Payer: COMMERCIAL

## 2022-02-08 VITALS
BODY MASS INDEX: 26.98 KG/M2 | DIASTOLIC BLOOD PRESSURE: 83 MMHG | HEART RATE: 77 BPM | RESPIRATION RATE: 18 BRPM | TEMPERATURE: 98 F | WEIGHT: 158 LBS | SYSTOLIC BLOOD PRESSURE: 124 MMHG | HEIGHT: 64 IN

## 2022-02-08 VITALS
WEIGHT: 158 LBS | BODY MASS INDEX: 26.98 KG/M2 | RESPIRATION RATE: 18 BRPM | HEART RATE: 77 BPM | DIASTOLIC BLOOD PRESSURE: 83 MMHG | HEIGHT: 64 IN | SYSTOLIC BLOOD PRESSURE: 124 MMHG | TEMPERATURE: 98 F

## 2022-02-08 DIAGNOSIS — Z51.5 PALLIATIVE CARE PATIENT: ICD-10-CM

## 2022-02-08 DIAGNOSIS — C90.00 MULTIPLE MYELOMA NOT HAVING ACHIEVED REMISSION (HCC): ICD-10-CM

## 2022-02-08 DIAGNOSIS — G89.3 CANCER ASSOCIATED PAIN: ICD-10-CM

## 2022-02-08 DIAGNOSIS — Z51.11 ENCOUNTER FOR ANTINEOPLASTIC CHEMOTHERAPY: ICD-10-CM

## 2022-02-08 DIAGNOSIS — C90.00 MULTIPLE MYELOMA NOT HAVING ACHIEVED REMISSION (HCC): Primary | ICD-10-CM

## 2022-02-08 DIAGNOSIS — Z51.11 ENCOUNTER FOR ANTINEOPLASTIC CHEMOTHERAPY: Primary | ICD-10-CM

## 2022-02-08 PROCEDURE — 96401 CHEMO ANTI-NEOPL SQ/IM: CPT

## 2022-02-08 PROCEDURE — 99214 OFFICE O/P EST MOD 30 MIN: CPT | Performed by: INTERNAL MEDICINE

## 2022-02-08 RX ORDER — DEXAMETHASONE 4 MG/1
40 TABLET ORAL ONCE
Status: COMPLETED | OUTPATIENT
Start: 2022-02-08 | End: 2022-02-08

## 2022-02-08 RX ORDER — CYCLOPHOSPHAMIDE 50 MG/1
300 CAPSULE ORAL ONCE
Status: COMPLETED | OUTPATIENT
Start: 2022-02-08 | End: 2022-02-08

## 2022-02-08 RX ORDER — OXYCODONE HYDROCHLORIDE 10 MG/1
10-15 TABLET ORAL EVERY 4 HOURS PRN
Qty: 100 TABLET | Refills: 0 | Status: SHIPPED | OUTPATIENT
Start: 2022-02-08 | End: 2022-02-17 | Stop reason: SDUPTHER

## 2022-02-08 RX ADMIN — CYCLOPHOSPHAMIDE 500 MG: 50 CAPSULE ORAL at 09:29

## 2022-02-08 RX ADMIN — DEXAMETHASONE 40 MG: 4 TABLET ORAL at 09:29

## 2022-02-08 RX ADMIN — BORTEZOMIB 2.3 MG: 3.5 INJECTION, POWDER, LYOPHILIZED, FOR SOLUTION INTRAVENOUS; SUBCUTANEOUS at 09:53

## 2022-02-08 NOTE — TELEPHONE ENCOUNTER
----- Message from Magnolia Morrow RN sent at 2/8/2022  1:21 PM EST -----  Regarding: Cancel  Please cancel treatment starting 3/1 which will be cycle 3 and 4    Thanks,  Cynthia Diggs

## 2022-02-08 NOTE — PROGRESS NOTES
[facility] - Hematology & Medical Oncology  Outpatient Visit Encounter Note      Glory Hughes 46 y o  female [id number] Date:  2022      Jonah Dacosta is a 46 y o  Is here for post hospital follow-up  She has a diagnosis of IgA kappa multiple myeloma  In review of the chart and talking with the patient,   The following information regarding her  Myeloma is listed    21: Bone marrow right posterior iliac crest, delcalcified trephine biopsy, touch imprint, direct aspirate smear, and peripheral blood smear:    - Plasma cell neoplasm characterized by:  Herminio Palafox- Normocellular marrow (estimated cellularity 40%) showing trilineage hematopoiesis with 20% plasma cells,       kappa predominant by immunophenotypic evaluation     - Peripheral blood showing slight normochromic microcytic anemia and slight leukocytosis    - Negative for amyloid deposition confirmed by Congo Red stain  21: bone survey negative for lytic lesions  10/8/21: Free Ratio: 5 79              Immunofixation: IgA kappa monoclonal band present              IgA: 462              Ig              Started Vdr Days 1 and 14 (21 day Cycle)   10/15/21: Multiple myeloma panel in care everywhere              B2 Microglobulin 3 20              Calcium: 8 3              Hgb: 10 5              Free Kappa lambda ratio: 3 70              M Martinez: 0 27               IgA: <10              Ig              IgM: 77    As detailed in the chart, she sees a physician at [facility], [facility]  and also follows at [facility] for possibility of a stem cell transplant  After her diagnosis, she was started on first-line treatment with VRD and thereafter she was started on daratumumab and CyBorD on  and had a negative amyloid staining from an abdominal fat pad biopsy on   She was hospitalized in mid November with RSV infection    Her most recent oncology visit was on 12/07 at [facility] as per documentation she completed cycle 2D8 of María CyBorD on 12/14 and got a toxicity assessment done at that office visit  She has no acute complaints  Denies any fevers chills nausea vomiting abdominal pain shortness of breath cough  Is taking acyclovir for prophylaxis  THIS VISIT    Doing well  No acute complaints  Has fatigue  Appetite okay  Denies any f/c/n/v/cp/ap/sob/cough  Denies diarrhea or skin rash  I have reviewed the relevant past medical, surgical, social and family history  I have also reviewed allergies and medications for this patient  Review of Systems  Review of Systems   All other systems reviewed and are negative  OBJECTIVE     Physical Exam  Vitals:    02/08/22 1025   BP: 124/83   Pulse: 77   Resp: 18   Temp: 98 °F (36 7 °C)   Weight: 71 7 kg (158 lb)   Height: 5' 3 9" (1 623 m)       Physical Exam  Vitals reviewed  Constitutional:       General: She is not in acute distress  Appearance: She is not ill-appearing or toxic-appearing  HENT:      Head: Normocephalic and atraumatic  Eyes:      General: No scleral icterus  Extraocular Movements: Extraocular movements intact  Cardiovascular:      Rate and Rhythm: Normal rate  Pulmonary:      Effort: Pulmonary effort is normal  No respiratory distress  Abdominal:      General: There is no distension  Musculoskeletal:         General: Normal range of motion  Cervical back: Normal range of motion and neck supple  Neurological:      General: No focal deficit present  Mental Status: She is alert and oriented to person, place, and time  Gait: Gait normal           Imaging  Relevant imaging reviewed in chart    Labs  Relevant labs reviewed in chart   ASSESSMENT & PLAN      Diagnosis ICD-10-CM Associated Orders   1  Multiple myeloma not having achieved remission (HCC)  C90 00    2  Encounter for antineoplastic chemotherapy  Z51 11    3   Cancer associated pain  G89 3 46 y o  Female diagnosed with IgA kappa multiple myeloma in August 2021  She is deemed to be transplant eligible and is undergoing systemic antineoplastic therapy  she got her initial oncology care at [facility]  and transferred to to [facility] in January 2022 as she moved  · Discussion  · Treatment History  · As listed before, patient was placed on 1L VRD and then switched to María-CyBorD upon assessment by BMT expert and since then has finished 2C of the aforementioned regimen before she transferred her care to [place] [id number]  · Started C4D1 today  · I reached out to Dr Sony Hoang to learn about the timing for planned autoSCT  I look forward to hearing from him  Appreciate his guidance for this patient  · Disease Details  · Clinical stage from 9/4/2021: RISS Stage II (Beta-2-microglobulin (mg/L): 4 1, Albumin (g/dL): 3 5, ISS: Stage II, High-risk cytogenetics: Absent, LDH: Normal) - Per Signed by Federico Ríos MD on 9/4/2021  · Supportive medications  · Acyclovir  · Plan/Labs  · CBC CMP Mg - once a week  · Myeloma labs - D1 of each cycle  To be drawn in the infusion center as ordered in the system    Follow Up    day 1 of each cycle      All questions were answered to the patient's satisfaction during this encounter  They appreciated and thanked me for spending time with them  The patient knows the contact information for our office and know to reach out for any relevant concerns related to this encounter  For all other listed problems and medical diagnosis in his chart - they are managed by PCP and/or other specialists, which patient acknowledges  Dr Dexter Dillon MD  Hematology & Medical Oncology

## 2022-02-08 NOTE — TELEPHONE ENCOUNTER
xtampza was approved and 2 approval lettres were faxed to the pharmacy 1 25     Spoke with patient who verified approval by calling the insurance herself She will [facility] to speak with them She also has her oxycodone refill to   Will check with her in the morning  Patient in agreement with plan

## 2022-02-08 NOTE — TELEPHONE ENCOUNTER
[facility] for Marla asking her to call me back if she has any further questions regarding the patient message I sent her through [address] her that she will be going to [facility] for a transplant later this month and her treatments starting 3/1 will be cancelled  Message sent to [facility]UR[facility] [place]     ===View-only below this line===  ----- Message -----  From: Renaldo Victoria MD  Sent: 2/8/2022  11:11 AM EST  To: JESE Miller,    I got communication from Dr Kirill Palmer for this patient  Please cancel treatment appointments starting 3/1  She will be going down to [facility] for her transplant later this month  Please call patient and inform her of the same when able  This is non-urgent  Dr Kirill Palmer will tell me when its time for her to return to the office with me      Ty!

## 2022-02-08 NOTE — TELEPHONE ENCOUNTER
Rx for oxy-IR increased to 100 tablets given frequency of refills  Patient taking script as written appropriately

## 2022-02-08 NOTE — TELEPHONE ENCOUNTER
Jorje can you look into Gabriel Barahona because she never received her 1st script on it and she is still awaiting for fill   oxyCODONE ER (Xtampza ER) 9 MG C12A

## 2022-02-08 NOTE — PROGRESS NOTES
Pt presents for Velcade injection and PO Cytoxan  Pt offers no complaints  Injection placed in Left abdomen, tolerated well  AVS declined  Next appointment reviewed

## 2022-02-14 ENCOUNTER — APPOINTMENT (OUTPATIENT)
Dept: LAB | Facility: HOSPITAL | Age: 52
End: 2022-02-14
Payer: COMMERCIAL

## 2022-02-14 DIAGNOSIS — C90.00 MULTIPLE MYELOMA NOT HAVING ACHIEVED REMISSION (HCC): ICD-10-CM

## 2022-02-14 DIAGNOSIS — Z51.11 ENCOUNTER FOR ANTINEOPLASTIC CHEMOTHERAPY: ICD-10-CM

## 2022-02-14 LAB
ALBUMIN SERPL BCP-MCNC: 3.5 G/DL (ref 3.5–5)
ALP SERPL-CCNC: 81 U/L (ref 46–116)
ALT SERPL W P-5'-P-CCNC: 28 U/L (ref 12–78)
ANION GAP SERPL CALCULATED.3IONS-SCNC: 8 MMOL/L (ref 4–13)
AST SERPL W P-5'-P-CCNC: 17 U/L (ref 5–45)
BASOPHILS # BLD AUTO: 0.02 THOUSANDS/ΜL (ref 0–0.1)
BASOPHILS NFR BLD AUTO: 0 % (ref 0–1)
BILIRUB SERPL-MCNC: 0.45 MG/DL (ref 0.2–1)
BUN SERPL-MCNC: 14 MG/DL (ref 5–25)
CALCIUM SERPL-MCNC: 9.1 MG/DL (ref 8.3–10.1)
CHLORIDE SERPL-SCNC: 102 MMOL/L (ref 100–108)
CO2 SERPL-SCNC: 28 MMOL/L (ref 21–32)
CREAT SERPL-MCNC: 1.05 MG/DL (ref 0.6–1.3)
EOSINOPHIL # BLD AUTO: 0.11 THOUSAND/ΜL (ref 0–0.61)
EOSINOPHIL NFR BLD AUTO: 1 % (ref 0–6)
ERYTHROCYTE [DISTWIDTH] IN BLOOD BY AUTOMATED COUNT: 16.4 % (ref 11.6–15.1)
GFR SERPL CREATININE-BSD FRML MDRD: 60 ML/MIN/1.73SQ M
GLUCOSE SERPL-MCNC: 127 MG/DL (ref 65–140)
HCT VFR BLD AUTO: 46 % (ref 34.8–46.1)
HGB BLD-MCNC: 15.1 G/DL (ref 11.5–15.4)
IMM GRANULOCYTES # BLD AUTO: 0.04 THOUSAND/UL (ref 0–0.2)
IMM GRANULOCYTES NFR BLD AUTO: 0 % (ref 0–2)
LYMPHOCYTES # BLD AUTO: 2.71 THOUSANDS/ΜL (ref 0.6–4.47)
LYMPHOCYTES NFR BLD AUTO: 26 % (ref 14–44)
MAGNESIUM SERPL-MCNC: 2.3 MG/DL (ref 1.6–2.6)
MCH RBC QN AUTO: 28.8 PG (ref 26.8–34.3)
MCHC RBC AUTO-ENTMCNC: 32.8 G/DL (ref 31.4–37.4)
MCV RBC AUTO: 88 FL (ref 82–98)
MONOCYTES # BLD AUTO: 0.76 THOUSAND/ΜL (ref 0.17–1.22)
MONOCYTES NFR BLD AUTO: 7 % (ref 4–12)
NEUTROPHILS # BLD AUTO: 6.65 THOUSANDS/ΜL (ref 1.85–7.62)
NEUTS SEG NFR BLD AUTO: 66 % (ref 43–75)
NRBC BLD AUTO-RTO: 0 /100 WBCS
PLATELET # BLD AUTO: 382 THOUSANDS/UL (ref 149–390)
PMV BLD AUTO: 10.1 FL (ref 8.9–12.7)
POTASSIUM SERPL-SCNC: 4.1 MMOL/L (ref 3.5–5.3)
PROT SERPL-MCNC: 6.9 G/DL (ref 6.4–8.2)
RBC # BLD AUTO: 5.25 MILLION/UL (ref 3.81–5.12)
SODIUM SERPL-SCNC: 138 MMOL/L (ref 136–145)
WBC # BLD AUTO: 10.29 THOUSAND/UL (ref 4.31–10.16)

## 2022-02-14 PROCEDURE — 85025 COMPLETE CBC W/AUTO DIFF WBC: CPT

## 2022-02-14 PROCEDURE — 80053 COMPREHEN METABOLIC PANEL: CPT

## 2022-02-14 PROCEDURE — 83735 ASSAY OF MAGNESIUM: CPT

## 2022-02-14 PROCEDURE — 36415 COLL VENOUS BLD VENIPUNCTURE: CPT

## 2022-02-15 ENCOUNTER — HOSPITAL ENCOUNTER (OUTPATIENT)
Dept: INFUSION CENTER | Facility: CLINIC | Age: 52
Discharge: HOME | End: 2022-02-15
Payer: COMMERCIAL

## 2022-02-15 VITALS
BODY MASS INDEX: 27.83 KG/M2 | HEIGHT: 64 IN | HEART RATE: 85 BPM | RESPIRATION RATE: 18 BRPM | SYSTOLIC BLOOD PRESSURE: 147 MMHG | WEIGHT: 163 LBS | TEMPERATURE: 98.8 F | DIASTOLIC BLOOD PRESSURE: 86 MMHG | OXYGEN SATURATION: 97 %

## 2022-02-15 DIAGNOSIS — C90.00 MULTIPLE MYELOMA NOT HAVING ACHIEVED REMISSION (HCC): ICD-10-CM

## 2022-02-15 DIAGNOSIS — Z51.11 ENCOUNTER FOR ANTINEOPLASTIC CHEMOTHERAPY: Primary | ICD-10-CM

## 2022-02-15 PROCEDURE — 96401 CHEMO ANTI-NEOPL SQ/IM: CPT

## 2022-02-15 RX ORDER — ACETAMINOPHEN 325 MG/1
650 TABLET ORAL ONCE
Status: COMPLETED | OUTPATIENT
Start: 2022-02-15 | End: 2022-02-15

## 2022-02-15 RX ORDER — DIPHENHYDRAMINE HCL 25 MG
25 TABLET ORAL EVERY 6 HOURS PRN
Status: DISCONTINUED | OUTPATIENT
Start: 2022-02-15 | End: 2022-02-18 | Stop reason: HOSPADM

## 2022-02-15 RX ORDER — FAMOTIDINE 20 MG/1
20 TABLET, FILM COATED ORAL DAILY
Status: DISCONTINUED | OUTPATIENT
Start: 2022-02-15 | End: 2022-02-18 | Stop reason: HOSPADM

## 2022-02-15 RX ORDER — CYCLOPHOSPHAMIDE 50 MG/1
300 CAPSULE ORAL ONCE
Status: COMPLETED | OUTPATIENT
Start: 2022-02-15 | End: 2022-02-15

## 2022-02-15 RX ORDER — DEXAMETHASONE 4 MG/1
40 TABLET ORAL ONCE
Status: COMPLETED | OUTPATIENT
Start: 2022-02-15 | End: 2022-02-15

## 2022-02-15 RX ADMIN — DARATUMUMAB AND HYALURONIDASE-FIHJ (HUMAN RECOMBINANT) 1800 MG: 1800; 30000 INJECTION SUBCUTANEOUS at 16:24

## 2022-02-15 RX ADMIN — DEXAMETHASONE 40 MG: 4 TABLET ORAL at 14:36

## 2022-02-15 RX ADMIN — FAMOTIDINE 20 MG: 20 TABLET ORAL at 14:36

## 2022-02-15 RX ADMIN — BORTEZOMIB 2.3 MG: 3.5 INJECTION, POWDER, LYOPHILIZED, FOR SOLUTION INTRAVENOUS; SUBCUTANEOUS at 16:34

## 2022-02-15 RX ADMIN — CYCLOPHOSPHAMIDE 500 MG: 50 CAPSULE ORAL at 14:37

## 2022-02-15 RX ADMIN — ACETAMINOPHEN 650 MG: 325 TABLET, FILM COATED ORAL at 14:36

## 2022-02-15 RX ADMIN — DIPHENHYDRAMINE HYDROCHLORIDE 25 MG: 25 TABLET ORAL at 15:19

## 2022-02-15 NOTE — PROGRESS NOTES
Pt to clinic for cytoxan, velcade, and darzalex faspro, offers no complaints today, tolerated darzalex in RLQ and LLQ of abdomen and velcade in L thigh without complications, aware of next appointment, avs declined

## 2022-02-16 ENCOUNTER — TELEPHONE (OUTPATIENT)
Dept: HEMATOLOGY ONCOLOGY | Facility: CLINIC | Age: 52
End: 2022-02-16

## 2022-02-16 NOTE — TELEPHONE ENCOUNTER
Confirmed with Dr Kath Bloom, Dr Clem Marsh does not want patient to receive chemotherapy after 2/22 in preparation for BMT  Informed patient, all questions answered  She voiced understanding

## 2022-02-16 NOTE — TELEPHONE ENCOUNTER
----- Message from Joss Logan RN sent at 2/15/2022  2:26 PM EST -----  Regarding: Call TL 2/16  Hey girl,   You wanted me to send you a reminder to call Mario Pandey 2/16 as she just wants to go over her plan again being that she hasn't gotten any schedule from [place] yet as to her transplant

## 2022-02-17 ENCOUNTER — TELEPHONE (OUTPATIENT)
Dept: HEMATOLOGY ONCOLOGY | Facility: CLINIC | Age: 52
End: 2022-02-17

## 2022-02-17 ENCOUNTER — OFFICE VISIT (OUTPATIENT)
Dept: PALLIATIVE MEDICINE | Facility: CLINIC | Age: 52
End: 2022-02-17
Payer: COMMERCIAL

## 2022-02-17 VITALS
SYSTOLIC BLOOD PRESSURE: 130 MMHG | BODY MASS INDEX: 28.76 KG/M2 | HEART RATE: 88 BPM | OXYGEN SATURATION: 99 % | TEMPERATURE: 97.8 F | RESPIRATION RATE: 20 BRPM | DIASTOLIC BLOOD PRESSURE: 80 MMHG | WEIGHT: 167 LBS

## 2022-02-17 DIAGNOSIS — Z51.5 PALLIATIVE CARE PATIENT: ICD-10-CM

## 2022-02-17 DIAGNOSIS — G89.3 CANCER ASSOCIATED PAIN: ICD-10-CM

## 2022-02-17 DIAGNOSIS — C90.00 MULTIPLE MYELOMA NOT HAVING ACHIEVED REMISSION (HCC): Primary | ICD-10-CM

## 2022-02-17 DIAGNOSIS — M54.41 CHRONIC BILATERAL LOW BACK PAIN WITH BILATERAL SCIATICA: ICD-10-CM

## 2022-02-17 DIAGNOSIS — M54.42 CHRONIC BILATERAL LOW BACK PAIN WITH BILATERAL SCIATICA: ICD-10-CM

## 2022-02-17 DIAGNOSIS — G89.29 CHRONIC BILATERAL LOW BACK PAIN WITH BILATERAL SCIATICA: ICD-10-CM

## 2022-02-17 DIAGNOSIS — F11.20 CONTINUOUS OPIOID DEPENDENCE (HCC): ICD-10-CM

## 2022-02-17 PROCEDURE — 99214 OFFICE O/P EST MOD 30 MIN: CPT | Performed by: STUDENT IN AN ORGANIZED HEALTH CARE EDUCATION/TRAINING PROGRAM

## 2022-02-17 RX ORDER — OXYCODONE 9 MG/1
9 CAPSULE, EXTENDED RELEASE ORAL
Qty: 30 CAPSULE | Refills: 0 | Status: SHIPPED | OUTPATIENT
Start: 2022-02-24 | End: 2022-03-24 | Stop reason: SDUPTHER

## 2022-02-17 RX ORDER — OXYCODONE HYDROCHLORIDE 10 MG/1
10-15 TABLET ORAL EVERY 4 HOURS PRN
Qty: 100 TABLET | Refills: 0 | Status: SHIPPED | OUTPATIENT
Start: 2022-02-19 | End: 2022-03-01 | Stop reason: SDUPTHER

## 2022-02-17 NOTE — TELEPHONE ENCOUNTER
Patient will be [place] on 2/22 regarding her stem cell transplant  She is asking for her tx to be rescheduled to either early morning on Monday, 2/21 or any time on Thursday, 2/24  Thank you!

## 2022-02-17 NOTE — TELEPHONE ENCOUNTER
Patient wants to reschedule her treatment on 2/22 due to transportation issues  Can you help? Akiko Campbell - Wife

## 2022-02-17 NOTE — PROGRESS NOTES
Outpatient Follow-Up - Palliative and Supportive Care   Juancarlos Bolaños 46 y o  female [id number]    Assessment & Plan  Problem List Items Addressed This Visit        Nervous and Auditory    Chronic bilateral low back pain with bilateral sciatica       Other    Multiple myeloma not having achieved remission (HCC) - Primary    Relevant Medications    oxyCODONE ER (Xtampza ER) 9 MG C12A (Start on 2/24/2022)    oxyCODONE (ROXICODONE) 10 MG TABS (Start on 2/19/2022)    Cancer associated pain    Relevant Medications    oxyCODONE ER (Xtampza ER) 9 MG C12A (Start on 2/24/2022)    oxyCODONE (ROXICODONE) 10 MG TABS (Start on 2/19/2022)    Continuous opioid dependence ([facility] [address] )    Palliative care patient    Relevant Medications    oxyCODONE ER (Xtampza ER) 9 MG C12A (Start on 2/24/2022)    oxyCODONE (ROXICODONE) 10 MG TABS (Start on 2/19/2022)        #symptom management  #cancer-related pain   -continue APAP 1000 mg PO Q8H PRN              - max daily 4000 mg   - continue oxy-IR 10-15 mg PO Q4H PRN   - reports use of 6 tabs/day   - continue oxy-ER 9 mg PO QHS   - prior Rx for naloxone     #neuropathic pain   - continue pregabalin 100 mg PO BID   - continue duloxetine 30 mg PO QDaily     #muscle spasms   - continue methocarbamol 500 mg PO Q6H PRN     #chemotherapy induced nausea   - continue ondansetron 8 mg PO Q8H PRN   - continue prochlorperazine 10 mg PO Q6H PRN     #OIC   - continue senna-docusate 2 tabs PO QHS   - continue miralax 17 g PO QDaily   - continue bisacodyl 10 mg WA QDaily PRN     #anxiety/depression   - continue duloxetine 30 mg PO QDaily   - referral for behavioral health placed    #goals of care   - scheduled appointment for initial testing for SCT on 02/23/2022   - follow up appointment for scheduling of SCT on 03/10/2022    #psychosocial support   - emotional support provided   - not currently    - two adult children              - Maxmara [daughter]              - Percilla Distad [mother] [phone]   - one sibling              - Mayi [sister]   - two grandchildren [aged 3 and 2]   - recently moved to [place] from [place] one month ago      Next [facility] Follow up in 4 weeks  Controlled Substance Review    [place] or [place]  reviewed: No red flags were identified; safe to proceed with prescription  [place] PDMP Review       Value Time User    PDMP Reviewed  Yes (P)  2/17/2022  2:04 PM Abraham Harkins MD          Medications adjusted this encounter:  Requested Prescriptions     Signed Prescriptions Disp Refills    oxyCODONE ER (Xtampza ER) 9 MG C12A 30 capsule 0     Sig: Take 9 mg by mouth daily at bedtime Max Daily Amount: 9 mg    oxyCODONE (ROXICODONE) 10 MG TABS 100 tablet 0     Sig: Take 1-1 5 tablets (10-15 mg total) by mouth every 4 (four) hours as needed (cancer-related pain) Max Daily Amount: 90 mg     No orders of the defined types were placed in this encounter  Medications Discontinued During This Encounter   Medication Reason    oxyCODONE ER (Xtampza ER) 9 MG C12A Reorder    oxyCODONE (ROXICODONE) 10 MG TABS Reorder         Parth Pleasure was seen today for symptoms and planning cares related to above illnesses  I have reviewed the patient's controlled substance dispensing history in the Prescription Drug Monitoring Program in compliance with the [place] regulations before prescribing any controlled substances  They are invited to continue to follow with us  If there are questions or concerns, please contact us through our clinic/answering service 24 hours a day, seven days a week  Abraham Harkins MD  [facility] Palliative and Supportive Care  [phone]      Visit Information    Accompanied By: No one    Source of History: Self, Medical record    History Limitations: None      History of Present Illness    Marla Rose is a 46 y o  female who presents in follow up of symptoms related to multiple myeloma   Pertinent issues include: symptom management, pain, neoplasm related, assessment of goals of care, advance care planning  Patient reports overall doing well  Pain adequately managed on current regimen, addition of oxy-ER overnight with improved overnight symptoms  Overall decreased use of oxy-IR since initiation of oxy-ER, with use of 6 tabs/day  Pain remains diffuse bony + diffuse myalgia + neuropathic pain  Denies nausea, vomiting  Appetite intact  Completes 2 meals/day + snacks  Recently 4 lb weight gain over the past week  BM every other day, regular, no use of bowel regimen  Sleep improves, now 9-10 hours continuous with no overnight awakenings given improved pain management  Past medical, surgical, social, and family histories are reviewed and pertinent updates are made  Review of Systems   Constitutional: Negative for chills, decreased appetite, fever, malaise/fatigue and weight loss  HENT: Negative for congestion  Eyes: Negative for visual disturbance  Cardiovascular: Negative for chest pain  Respiratory: Negative for shortness of breath  Musculoskeletal: Positive for back pain and myalgias  Negative for falls and neck pain  Diffuse osseous pain   Gastrointestinal: Negative for abdominal pain, constipation, nausea and vomiting  Genitourinary: Negative for frequency  Neurological: Negative for headaches  Psychiatric/Behavioral: The patient does not have insomnia  All other systems reviewed and are negative  Vital Signs    /80 (BP Location: Left arm, Cuff Size: Standard)   Pulse 88   Temp 97 8 °F (36 6 °C) (Temporal)   Resp 20   Wt 75 8 kg (167 lb)   SpO2 99%   BMI 28 76 kg/m²     Physical Exam and Objective Data  Physical Exam  Vitals and nursing note reviewed  Constitutional:       General: She is awake  Appearance: She is not diaphoretic  Comments: Sitting up comfortably in NAD  Non-toxic appearing   HENT:      Head: Normocephalic and atraumatic        Right Ear: External ear normal  Left Ear: External ear normal       Nose: No rhinorrhea  Eyes:      Comments: No gaze preference   Cardiovascular:      Rate and Rhythm: Normal rate  Pulmonary:      Effort: No tachypnea, accessory muscle usage or respiratory distress  Comments: Completes full sentences without difficulty  Musculoskeletal:      Cervical back: Normal range of motion  Neurological:      General: No focal deficit present  Mental Status: She is alert and oriented to person, place, and time  Psychiatric:         Attention and Perception: Attention normal          Mood and Affect: Mood and affect normal          Speech: Speech normal          Cognition and Memory: Cognition and memory normal            Radiology and Laboratory:  I personally reviewed and interpreted the following results:    Most Recent COVID-19 Results:  Lab Results   Component Value Date/Time    SARSCOV2 Negative 10/30/2021 12:10 AM       Most Recent Lab Work:  Lab Results   Component Value Date/Time    SODIUM 138 02/14/2022 10:41 AM    K 4 1 02/14/2022 10:41 AM    BUN 14 02/14/2022 10:41 AM    CREATININE 1 05 02/14/2022 10:41 AM    GLUC 127 02/14/2022 10:41 AM     Lab Results   Component Value Date/Time    AST 17 02/14/2022 10:41 AM    ALT 28 02/14/2022 10:41 AM    ALB 3 5 02/14/2022 10:41 AM     Lab Results   Component Value Date/Time    HGB 15 1 02/14/2022 10:41 AM    WBC 10 29 (H) 02/14/2022 10:41 AM     02/14/2022 10:41 AM    INR 1 15 10/29/2021 09:35 PM    PTT 37 10/29/2021 09:35 PM       Most Recent Imaging [last 30 days]:  No results found  30 minutes was spent face to face with Rogelio Snyder with greater than 50% of the time spent in counseling or coordination of care including discussions of provided medical updates, discussed palliative care, determined goals of care, determined social/family support, discussed plans of care, discussed symptom management, provided psychosocial support  Medication refills  PDMP Reviewed  All of the patient's or agent's questions were answered during this discussion

## 2022-02-21 ENCOUNTER — APPOINTMENT (OUTPATIENT)
Dept: LAB | Facility: HOSPITAL | Age: 52
End: 2022-02-21
Payer: COMMERCIAL

## 2022-02-21 DIAGNOSIS — Z51.11 ENCOUNTER FOR ANTINEOPLASTIC CHEMOTHERAPY: ICD-10-CM

## 2022-02-21 DIAGNOSIS — C90.00 MULTIPLE MYELOMA NOT HAVING ACHIEVED REMISSION (HCC): ICD-10-CM

## 2022-02-21 LAB
ALBUMIN SERPL BCP-MCNC: 3.3 G/DL (ref 3.5–5)
ALP SERPL-CCNC: 90 U/L (ref 46–116)
ALT SERPL W P-5'-P-CCNC: 20 U/L (ref 12–78)
ANION GAP SERPL CALCULATED.3IONS-SCNC: 9 MMOL/L (ref 4–13)
AST SERPL W P-5'-P-CCNC: 9 U/L (ref 5–45)
BASOPHILS # BLD AUTO: 0.03 THOUSANDS/ΜL (ref 0–0.1)
BASOPHILS NFR BLD AUTO: 0 % (ref 0–1)
BILIRUB SERPL-MCNC: 0.31 MG/DL (ref 0.2–1)
BUN SERPL-MCNC: 12 MG/DL (ref 5–25)
CALCIUM ALBUM COR SERPL-MCNC: 8.9 MG/DL (ref 8.3–10.1)
CALCIUM SERPL-MCNC: 8.3 MG/DL (ref 8.3–10.1)
CHLORIDE SERPL-SCNC: 105 MMOL/L (ref 100–108)
CO2 SERPL-SCNC: 26 MMOL/L (ref 21–32)
CREAT SERPL-MCNC: 0.94 MG/DL (ref 0.6–1.3)
EOSINOPHIL # BLD AUTO: 0.21 THOUSAND/ΜL (ref 0–0.61)
EOSINOPHIL NFR BLD AUTO: 2 % (ref 0–6)
ERYTHROCYTE [DISTWIDTH] IN BLOOD BY AUTOMATED COUNT: 16.2 % (ref 11.6–15.1)
GFR SERPL CREATININE-BSD FRML MDRD: 69 ML/MIN/1.73SQ M
GLUCOSE SERPL-MCNC: 91 MG/DL (ref 65–140)
HCT VFR BLD AUTO: 41.5 % (ref 34.8–46.1)
HGB BLD-MCNC: 13.5 G/DL (ref 11.5–15.4)
IMM GRANULOCYTES # BLD AUTO: 0.05 THOUSAND/UL (ref 0–0.2)
IMM GRANULOCYTES NFR BLD AUTO: 1 % (ref 0–2)
LYMPHOCYTES # BLD AUTO: 3.71 THOUSANDS/ΜL (ref 0.6–4.47)
LYMPHOCYTES NFR BLD AUTO: 36 % (ref 14–44)
MAGNESIUM SERPL-MCNC: 2.3 MG/DL (ref 1.6–2.6)
MCH RBC QN AUTO: 28.7 PG (ref 26.8–34.3)
MCHC RBC AUTO-ENTMCNC: 32.5 G/DL (ref 31.4–37.4)
MCV RBC AUTO: 88 FL (ref 82–98)
MONOCYTES # BLD AUTO: 0.91 THOUSAND/ΜL (ref 0.17–1.22)
MONOCYTES NFR BLD AUTO: 9 % (ref 4–12)
NEUTROPHILS # BLD AUTO: 5.46 THOUSANDS/ΜL (ref 1.85–7.62)
NEUTS SEG NFR BLD AUTO: 52 % (ref 43–75)
NRBC BLD AUTO-RTO: 0 /100 WBCS
PLATELET # BLD AUTO: 339 THOUSANDS/UL (ref 149–390)
PMV BLD AUTO: 10.5 FL (ref 8.9–12.7)
POTASSIUM SERPL-SCNC: 3.8 MMOL/L (ref 3.5–5.3)
PROT SERPL-MCNC: 6.2 G/DL (ref 6.4–8.2)
RBC # BLD AUTO: 4.71 MILLION/UL (ref 3.81–5.12)
SODIUM SERPL-SCNC: 140 MMOL/L (ref 136–145)
WBC # BLD AUTO: 10.37 THOUSAND/UL (ref 4.31–10.16)

## 2022-02-21 PROCEDURE — 85025 COMPLETE CBC W/AUTO DIFF WBC: CPT

## 2022-02-21 PROCEDURE — 83735 ASSAY OF MAGNESIUM: CPT

## 2022-02-21 PROCEDURE — 80053 COMPREHEN METABOLIC PANEL: CPT

## 2022-02-21 PROCEDURE — 36415 COLL VENOUS BLD VENIPUNCTURE: CPT

## 2022-02-24 ENCOUNTER — HOSPITAL ENCOUNTER (OUTPATIENT)
Dept: INFUSION CENTER | Facility: CLINIC | Age: 52
Discharge: HOME | End: 2022-02-24
Payer: COMMERCIAL

## 2022-02-24 VITALS
DIASTOLIC BLOOD PRESSURE: 89 MMHG | HEIGHT: 64 IN | SYSTOLIC BLOOD PRESSURE: 147 MMHG | BODY MASS INDEX: 28.6 KG/M2 | HEART RATE: 76 BPM | TEMPERATURE: 98.9 F | WEIGHT: 167.55 LBS | RESPIRATION RATE: 20 BRPM | OXYGEN SATURATION: 95 %

## 2022-02-24 DIAGNOSIS — Z51.11 ENCOUNTER FOR ANTINEOPLASTIC CHEMOTHERAPY: Primary | ICD-10-CM

## 2022-02-24 DIAGNOSIS — C90.00 MULTIPLE MYELOMA NOT HAVING ACHIEVED REMISSION (HCC): ICD-10-CM

## 2022-02-24 RX ORDER — DEXAMETHASONE 4 MG/1
40 TABLET ORAL ONCE
Status: COMPLETED | OUTPATIENT
Start: 2022-02-24 | End: 2022-02-24

## 2022-02-24 RX ORDER — CYCLOPHOSPHAMIDE 50 MG/1
300 CAPSULE ORAL ONCE
Status: COMPLETED | OUTPATIENT
Start: 2022-02-24 | End: 2022-02-24

## 2022-02-24 RX ADMIN — DEXAMETHASONE 40 MG: 4 TABLET ORAL at 09:43

## 2022-02-24 RX ADMIN — CYCLOPHOSPHAMIDE 550 MG: 50 CAPSULE ORAL at 09:45

## 2022-02-24 NOTE — PROGRESS NOTES
Pt to clinic for PO cytoxan and decadron, offers no complaints today, confirmed with Tory Pedersen RN pt is to be treated today and this is her last treatment at infusion center d/t future stem cell transplant, pt tolerated cytoxan and decadron without complications, aware of this is her last infusion appointment, avs declined

## 2022-02-28 ENCOUNTER — TELEPHONE (OUTPATIENT)
Dept: PALLIATIVE MEDICINE | Facility: CLINIC | Age: 52
End: 2022-02-28

## 2022-02-28 DIAGNOSIS — I10 PRIMARY HYPERTENSION: ICD-10-CM

## 2022-02-28 RX ORDER — AMLODIPINE BESYLATE 10 MG/1
10 TABLET ORAL DAILY
Qty: 30 TABLET | Refills: 0 | Status: SHIPPED | OUTPATIENT
Start: 2022-02-28 | End: 2022-04-21 | Stop reason: SDUPTHER

## 2022-02-28 NOTE — TELEPHONE ENCOUNTER
Prior Authorization needed for [facility] ER 9 Mg    ID#  [id number]  Phone#      Pharmacy:    [facility] [phone]

## 2022-02-28 NOTE — TELEPHONE ENCOUNTER
Call made to [address] [place] [address] to the pharmacist regarding Chalice Bastrop refill and she stated it is approved and refill is ready for her     Patient called to relay this message but her mailbox is full and was not able to leave a message

## 2022-02-28 NOTE — TELEPHONE ENCOUNTER
Yoel Hdz is calling to say she cant get her medication per pharmacy needs a prior auth again when it was already done last month please advise

## 2022-02-28 NOTE — TELEPHONE ENCOUNTER
Patient called stated " I call the insurance and a PA is not needed for both of the medications, health insurance requires a quantity for 30 days and not 15 days  "       Please advise

## 2022-03-01 DIAGNOSIS — G89.3 CANCER ASSOCIATED PAIN: ICD-10-CM

## 2022-03-01 DIAGNOSIS — Z51.5 PALLIATIVE CARE PATIENT: ICD-10-CM

## 2022-03-01 DIAGNOSIS — C90.00 MULTIPLE MYELOMA NOT HAVING ACHIEVED REMISSION (HCC): ICD-10-CM

## 2022-03-01 RX ORDER — OXYCODONE HYDROCHLORIDE 10 MG/1
10-15 TABLET ORAL EVERY 4 HOURS PRN
Qty: 100 TABLET | Refills: 0 | Status: SHIPPED | OUTPATIENT
Start: 2022-03-01 | End: 2022-03-10 | Stop reason: SDUPTHER

## 2022-03-01 NOTE — TELEPHONE ENCOUNTER
Primary palliative medicine provider: Dr Chente Carrington      Medication requested: oxycodone 10 mg     If for pain, how has the patient been taking their pain medicine?  As prescribed    Last appointment: 2/17    Next scheduled appointment: 3/17       PDMP review:      02/27/2022 02/17/2022 Xtampza Er 30 0 30 9 MG NA [facility] [employer] 00 / 00 [facility]      1 [id number] 02/27/2022 01/06/2022 Pregabalin 90 0 45 100 MG NA KRISTINA [employer] 74 / 65 [facility]    1 [id number] 02/19/2022 02/17/2022 oxyCODONE  0 11 10  36 [employer] 00 / 00 [place]    1 [id number] 02/10/2022 01/14/2022 Xtampza Er 14 0 14 9 MG NA [facility] [employer] 59 / 32 [place]    1 [id number] 02/08/2022 02/08/2022 oxyCODONE  0 11 10  98 [id number] [employer] 00 / 00 [place]

## 2022-03-02 ENCOUNTER — TELEPHONE (OUTPATIENT)
Dept: HEMATOLOGY ONCOLOGY | Facility: CLINIC | Age: 52
End: 2022-03-02

## 2022-03-03 ENCOUNTER — TELEPHONE (OUTPATIENT)
Dept: HEMATOLOGY ONCOLOGY | Facility: CLINIC | Age: 52
End: 2022-03-03

## 2022-03-08 ENCOUNTER — TELEMEDICINE (OUTPATIENT)
Dept: HEMATOLOGY ONCOLOGY | Facility: CLINIC | Age: 52
End: 2022-03-08
Payer: COMMERCIAL

## 2022-03-08 DIAGNOSIS — Z51.11 ENCOUNTER FOR ANTINEOPLASTIC CHEMOTHERAPY: ICD-10-CM

## 2022-03-08 DIAGNOSIS — C90.00 MULTIPLE MYELOMA NOT HAVING ACHIEVED REMISSION (HCC): Primary | ICD-10-CM

## 2022-03-08 PROCEDURE — G2012 BRIEF CHECK IN BY MD/QHP: HCPCS | Performed by: INTERNAL MEDICINE

## 2022-03-09 ENCOUNTER — TELEPHONE (OUTPATIENT)
Dept: HEMATOLOGY ONCOLOGY | Facility: CLINIC | Age: 52
End: 2022-03-09

## 2022-03-09 NOTE — TELEPHONE ENCOUNTER
Received a voicemail from [facility]  Patient was referred by Dr Jose Alberto Coronado and Tom Victoria is put together a schedule for the patient  She is calling because there's a week that the patient would need a central line dressing change done, on 3/22 or 3/23  She is asking if someone in the office would be able to do the dressing change if she provides patient with all of the supplies   Tom Victoria can be reached at [phone]

## 2022-03-09 NOTE — TELEPHONE ENCOUNTER
LVM for Johnathon Sanford to call back  Infusion center will be able to do a dressing change, we will just need instructions  Once we have this, we will have her added on the days specified

## 2022-03-10 DIAGNOSIS — Z51.5 PALLIATIVE CARE PATIENT: ICD-10-CM

## 2022-03-10 DIAGNOSIS — G89.3 CANCER ASSOCIATED PAIN: ICD-10-CM

## 2022-03-10 DIAGNOSIS — C90.00 MULTIPLE MYELOMA NOT HAVING ACHIEVED REMISSION (HCC): ICD-10-CM

## 2022-03-10 RX ORDER — OXYCODONE HYDROCHLORIDE 10 MG/1
10-15 TABLET ORAL EVERY 4 HOURS PRN
Qty: 100 TABLET | Refills: 0 | Status: SHIPPED | OUTPATIENT
Start: 2022-03-10 | End: 2022-03-22 | Stop reason: SDUPTHER

## 2022-03-10 NOTE — TELEPHONE ENCOUNTER
[id number] 03/01/2022 03/01/2022 oxyCODONE  0 11 10 MG 1239 67 [address] / 00 [place]     1 [id number] 02/27/2022 02/17/2022 Xtampza Er 30 0 30 9 MG NA [employer] 38 / 47 [facility]    1 [id number] 02/27/2022 01/06/2022 Pregabalin 90 0 45 100 [facility] [employer] 83 / 03 [place]    1 [id number] 02/19/2022 02/17/2022 oxyCODONE  0 11 10 MG 1239 67 [id number] [employer] 00 / 00 [place]    1                   Patient called from [facility] area  She is having workup done for her upcoming stem cell transplant and would like a one time fill at this pharmacy

## 2022-03-11 ENCOUNTER — TELEPHONE (OUTPATIENT)
Dept: HEMATOLOGY ONCOLOGY | Facility: CLINIC | Age: 52
End: 2022-03-11

## 2022-03-11 PROBLEM — Z45.2 ENCOUNTER FOR CENTRAL LINE CARE: Status: ACTIVE | Noted: 2022-03-11

## 2022-03-11 NOTE — TELEPHONE ENCOUNTER
Spoke with Joe Mendoza to inform her the infusion center will do the dressing change  Per Joe Mendoza, the line need to be flushed and the flush volume is indicated on the side of each lumen  Line flush protocol was ordered with dressing change instruction included

## 2022-03-11 NOTE — TELEPHONE ENCOUNTER
Joanne Ho calling in from bone marrow transplant, requesting a fax of patients biopsy report from 8/20/21 fax to [phone]

## 2022-03-11 NOTE — PROGRESS NOTES
[facility] - Hematology & Medical Oncology  Outpatient Visit Encounter Note      Jj Avelar 46 y o  female [id number] Date:  3/11/2022    It was my intent to perform this visit via video technology but the patient was not able to do a video connection so the visit was completed via audio telephone only  Telemedicine consent    Patient: Jj Avelar  Provider: Negrito Proctor MD  Provider located at [address] [address]    The patient was identified by name and date of birth  Jj Avelar was informed that this is a telemedicine visit and that the visit is being conducted through Telephone  My office door was closed  No one else was in the room  She acknowledged consent and understanding of privacy and security of the video platform  The patient has agreed to participate and understands they can discontinue the visit at any time  Patient is aware this is a billable service  Marie Poole is a 46 y o  Is here for post hospital follow-up  She has a diagnosis of IgA kappa multiple myeloma  In review of the chart and talking with the patient,   The following information regarding her  Myeloma is listed    8/20/21: Bone marrow right posterior iliac crest, delcalcified trephine biopsy, touch imprint, direct aspirate smear, and peripheral blood smear:    - Plasma cell neoplasm characterized by:  Viki Zapata- Normocellular marrow (estimated cellularity 40%) showing trilineage hematopoiesis with 20% plasma cells,       kappa predominant by immunophenotypic evaluation     - Peripheral blood showing slight normochromic microcytic anemia and slight leukocytosis    - Negative for amyloid deposition confirmed by Congo Red stain  9/9/21: bone survey negative for lytic lesions     10/8/21: Free Ratio: 5 79 Immunofixation: IgA kappa monoclonal band present              IgA: 462              Ig              Started Vdr Days 1 and 14 (21 day Cycle)   10/15/21: Multiple myeloma panel in care everywhere              B2 Microglobulin 3 20              Calcium: 8 3              Hgb: 10 5              Free Kappa lambda ratio: 3 70              M Martinez: 0 27               IgA: <10              Ig              IgM: 77    As detailed in the chart, she sees a physician at [address] [place] [address] and also follows at [facility] for possibility of a stem cell transplant  After her diagnosis, she was started on first-line treatment with VRD and thereafter she was started on daratumumab and CyBorD on  and had a negative amyloid staining from an abdominal fat pad biopsy on   She was hospitalized in mid November with RSV infection  Her most recent oncology visit was on  at [address] [place] [address] as per documentation she completed cycle 2D8 of María CyBorD on  and got a toxicity assessment done at that office visit  She has no acute complaints  Denies any fevers chills nausea vomiting abdominal pain shortness of breath cough  Is taking acyclovir for prophylaxis  THIS VISIT    Doing well  No acute complaints  Is undergoing her stem cell transplant starting the process tomorrow down at [facility]  No fevers chills nausea vomiting abdominal pain chest pain shortness of breath  I have reviewed the relevant past medical, surgical, social and family history  I have also reviewed allergies and medications for this patient  Review of Systems  Review of Systems   All other systems reviewed and are negative          OBJECTIVE     Physical Exam    1  no sign of distress  2  no signs of respiratory distress  3  speaking full sentences  4  alert and oriented  5  organized thought pattern  6  no coughing on the phone  7  no wheezes audible  8  voice is clear, speech not slurred      Imaging  Relevant imaging reviewed in chart    Labs  Relevant labs reviewed in chart   ASSESSMENT & PLAN      Diagnosis ICD-10-CM Associated Orders   1  Multiple myeloma not having achieved remission (HCC)  C90 00    2  Encounter for antineoplastic chemotherapy  Z51 11          46 y o  Female diagnosed with IgA kappa multiple myeloma in August 2021  She is deemed to be transplant eligible and is undergoing systemic antineoplastic therapy  she got her initial oncology care at [facility]  and transferred to to [facility] in January 2022 as she moved  · Discussion  · Treatment History  · As listed before, patient was placed on 1L VRD and then switched to María-CyBorD upon assessment by BMT expert and since then has finished 2C of the aforementioned regimen before she transferred her care to [facility]  · From now onwards, her myeloma therapy is going to be under supervision of her bone marrow transplant specialist as she undergoes tolerated stem cell transplant with process beginning tomorrow  I wish the patient well and look forward to providing her local care when deemed appropriate by her treating physician down in [place]  · Disease Details  · Clinical stage from 9/4/2021: RISS Stage II (Beta-2-microglobulin (mg/L): 4 1, Albumin (g/dL): 3 5, ISS: Stage II, High-risk cytogenetics: Absent, LDH: Normal) - Per Signed by Elsie Martins MD on 9/4/2021  · Supportive medications  · Acyclovir  · Plan/Labs  · Not applicable    Follow Up   To be determined when contacted by her physicians from [place]      All questions were answered to the patient's satisfaction during this encounter  They appreciated and thanked me for spending time with them  The patient knows the contact information for our office and know to reach out for any relevant concerns related to this encounter   For all other listed problems and medical diagnosis in his chart - they are managed by PCP and/or other specialists, which patient acknowledges  Dr Jordon Ocampo MD  Hematology & Medical Oncology

## 2022-03-11 NOTE — PROGRESS NOTES
Port flush protocol added for transplant catheter dressing change that was requested by [facility]

## 2022-03-11 NOTE — TELEPHONE ENCOUNTER
Called patient to notify her of appt  Voicemail is full  Will attempt to call again later in the day

## 2022-03-11 NOTE — TELEPHONE ENCOUNTER
It is a trifusion catheter  10cc sterile saline then high dose heparin    Will ask Jacquie to work on these orders when she is back in the office on Monday

## 2022-03-14 RX ORDER — HEPARIN SODIUM 1000 [USP'U]/ML
1000 INJECTION, SOLUTION INTRAVENOUS; SUBCUTANEOUS ONCE
Status: CANCELLED
Start: 2022-03-23 | End: 2022-03-23

## 2022-03-22 DIAGNOSIS — G89.3 CANCER ASSOCIATED PAIN: ICD-10-CM

## 2022-03-22 DIAGNOSIS — Z51.5 PALLIATIVE CARE PATIENT: ICD-10-CM

## 2022-03-22 DIAGNOSIS — C90.00 MULTIPLE MYELOMA NOT HAVING ACHIEVED REMISSION (HCC): ICD-10-CM

## 2022-03-23 ENCOUNTER — HOSPITAL ENCOUNTER (OUTPATIENT)
Dept: INFUSION CENTER | Facility: CLINIC | Age: 52
Discharge: HOME | End: 2022-03-23
Payer: COMMERCIAL

## 2022-03-23 DIAGNOSIS — Z45.2 ENCOUNTER FOR CENTRAL LINE CARE: ICD-10-CM

## 2022-03-23 DIAGNOSIS — Z51.11 ENCOUNTER FOR ANTINEOPLASTIC CHEMOTHERAPY: ICD-10-CM

## 2022-03-23 DIAGNOSIS — C90.00 MULTIPLE MYELOMA NOT HAVING ACHIEVED REMISSION (HCC): Primary | ICD-10-CM

## 2022-03-23 LAB
ALBUMIN SERPL BCP-MCNC: 3.2 G/DL (ref 3.5–5)
ALP SERPL-CCNC: 107 U/L (ref 46–116)
ALT SERPL W P-5'-P-CCNC: 22 U/L (ref 12–78)
ANION GAP SERPL CALCULATED.3IONS-SCNC: 9 MMOL/L (ref 4–13)
AST SERPL W P-5'-P-CCNC: 10 U/L (ref 5–45)
BASOPHILS # BLD AUTO: 0.03 THOUSANDS/ΜL (ref 0–0.1)
BASOPHILS NFR BLD AUTO: 0 % (ref 0–1)
BILIRUB SERPL-MCNC: 0.74 MG/DL (ref 0.2–1)
BUN SERPL-MCNC: 20 MG/DL (ref 5–25)
CALCIUM ALBUM COR SERPL-MCNC: 9.7 MG/DL (ref 8.3–10.1)
CALCIUM SERPL-MCNC: 9.1 MG/DL (ref 8.3–10.1)
CHLORIDE SERPL-SCNC: 101 MMOL/L (ref 100–108)
CO2 SERPL-SCNC: 27 MMOL/L (ref 21–32)
CREAT SERPL-MCNC: 1 MG/DL (ref 0.6–1.3)
EOSINOPHIL # BLD AUTO: 0.04 THOUSAND/ΜL (ref 0–0.61)
EOSINOPHIL NFR BLD AUTO: 0 % (ref 0–6)
ERYTHROCYTE [DISTWIDTH] IN BLOOD BY AUTOMATED COUNT: 15.2 % (ref 11.6–15.1)
GFR SERPL CREATININE-BSD FRML MDRD: 64 ML/MIN/1.73SQ M
GLUCOSE SERPL-MCNC: 87 MG/DL (ref 65–140)
HCT VFR BLD AUTO: 42.2 % (ref 34.8–46.1)
HGB BLD-MCNC: 13.9 G/DL (ref 11.5–15.4)
IMM GRANULOCYTES # BLD AUTO: 0.09 THOUSAND/UL (ref 0–0.2)
IMM GRANULOCYTES NFR BLD AUTO: 1 % (ref 0–2)
LYMPHOCYTES # BLD AUTO: 2.59 THOUSANDS/ΜL (ref 0.6–4.47)
LYMPHOCYTES NFR BLD AUTO: 17 % (ref 14–44)
MAGNESIUM SERPL-MCNC: 2.6 MG/DL (ref 1.6–2.6)
MCH RBC QN AUTO: 28.3 PG (ref 26.8–34.3)
MCHC RBC AUTO-ENTMCNC: 32.9 G/DL (ref 31.4–37.4)
MCV RBC AUTO: 86 FL (ref 82–98)
MONOCYTES # BLD AUTO: 0.71 THOUSAND/ΜL (ref 0.17–1.22)
MONOCYTES NFR BLD AUTO: 5 % (ref 4–12)
NEUTROPHILS # BLD AUTO: 11.65 THOUSANDS/ΜL (ref 1.85–7.62)
NEUTS SEG NFR BLD AUTO: 77 % (ref 43–75)
NRBC BLD AUTO-RTO: 0 /100 WBCS
PLATELET # BLD AUTO: 463 THOUSANDS/UL (ref 149–390)
PMV BLD AUTO: 10.3 FL (ref 8.9–12.7)
POTASSIUM SERPL-SCNC: 3.4 MMOL/L (ref 3.5–5.3)
PROT SERPL-MCNC: 7.5 G/DL (ref 6.4–8.2)
RBC # BLD AUTO: 4.91 MILLION/UL (ref 3.81–5.12)
SODIUM SERPL-SCNC: 137 MMOL/L (ref 136–145)
WBC # BLD AUTO: 15.11 THOUSAND/UL (ref 4.31–10.16)

## 2022-03-23 PROCEDURE — 80053 COMPREHEN METABOLIC PANEL: CPT | Performed by: INTERNAL MEDICINE

## 2022-03-23 PROCEDURE — 85025 COMPLETE CBC W/AUTO DIFF WBC: CPT | Performed by: INTERNAL MEDICINE

## 2022-03-23 PROCEDURE — 36593 DECLOT VASCULAR DEVICE: CPT

## 2022-03-23 PROCEDURE — 83735 ASSAY OF MAGNESIUM: CPT | Performed by: INTERNAL MEDICINE

## 2022-03-23 RX ORDER — OXYCODONE HYDROCHLORIDE 10 MG/1
10-15 TABLET ORAL EVERY 4 HOURS PRN
Qty: 100 TABLET | Refills: 0 | Status: SHIPPED | OUTPATIENT
Start: 2022-03-23 | End: 2022-04-12 | Stop reason: SDUPTHER

## 2022-03-23 RX ORDER — HEPARIN SODIUM 1000 [USP'U]/ML
1000 INJECTION, SOLUTION INTRAVENOUS; SUBCUTANEOUS ONCE
Status: CANCELLED
Start: 2022-03-23 | End: 2022-03-23

## 2022-03-23 RX ORDER — HEPARIN SODIUM 1000 [USP'U]/ML
1000 INJECTION, SOLUTION INTRAVENOUS; SUBCUTANEOUS ONCE
Status: COMPLETED | OUTPATIENT
Start: 2022-03-23 | End: 2022-03-23

## 2022-03-23 RX ADMIN — HEPARIN SODIUM 1000 UNITS: 1000 INJECTION INTRAVENOUS; SUBCUTANEOUS at 14:32

## 2022-03-23 RX ADMIN — ALTEPLASE 2 MG: 2.2 INJECTION, POWDER, LYOPHILIZED, FOR SOLUTION INTRAVENOUS at 12:11

## 2022-03-23 RX ADMIN — HEPARIN SODIUM 1000 UNITS: 1000 INJECTION INTRAVENOUS; SUBCUTANEOUS at 14:29

## 2022-03-23 RX ADMIN — HEPARIN SODIUM 1000 UNITS: 1000 INJECTION INTRAVENOUS; SUBCUTANEOUS at 14:34

## 2022-03-23 RX ADMIN — ALTEPLASE 2 MG: 2.2 INJECTION, POWDER, LYOPHILIZED, FOR SOLUTION INTRAVENOUS at 12:52

## 2022-03-23 NOTE — PLAN OF CARE
Problem: Potential for Falls  Goal: Patient will remain free of falls  Description: INTERVENTIONS:  - Educate patient/family on patient safety including physical limitations  - Instruct patient to call for assistance with activity   - Consult OT/PT to assist with strengthening/mobility   - Keep Call bell within reach  - Apply yellow socks and bracelet for high fall risk patients  - Consider moving patient to room near nurses station  Outcome: Progressing

## 2022-03-23 NOTE — PROGRESS NOTES
Pt presented for apheresis line dressing change  Pt offered no complaints  1mL of heparin was withdrawn from the blueport without incident  2 2ml of cathflo was instilled into the red and white ports and after 2 hours  blood return was achieved from both ports  All ports were heparin locked  Dressing and caps were changed without incident  Pt declined AVS and was discharged in stable condition

## 2022-03-24 DIAGNOSIS — Z51.5 PALLIATIVE CARE PATIENT: ICD-10-CM

## 2022-03-24 DIAGNOSIS — C90.00 MULTIPLE MYELOMA NOT HAVING ACHIEVED REMISSION (HCC): ICD-10-CM

## 2022-03-24 DIAGNOSIS — G89.3 CANCER ASSOCIATED PAIN: ICD-10-CM

## 2022-03-24 RX ORDER — OXYCODONE 9 MG/1
9 CAPSULE, EXTENDED RELEASE ORAL
Qty: 30 CAPSULE | Refills: 0 | Status: SHIPPED | OUTPATIENT
Start: 2022-03-24 | End: 2022-04-21 | Stop reason: SDUPTHER

## 2022-03-24 NOTE — TELEPHONE ENCOUNTER
Primary palliative medicine provider: Melita Horne    Medication requested: Lucianne Epley    If for pain, how has the patient been taking their pain medicine?      Last appointment:  2 17    Next scheduled appointment:  5 2    PDMP review:    Last filled on 2 27 30/30

## 2022-04-12 DIAGNOSIS — C90.00 MULTIPLE MYELOMA NOT HAVING ACHIEVED REMISSION (HCC): ICD-10-CM

## 2022-04-12 DIAGNOSIS — G89.3 CANCER ASSOCIATED PAIN: ICD-10-CM

## 2022-04-12 DIAGNOSIS — Z51.5 PALLIATIVE CARE PATIENT: ICD-10-CM

## 2022-04-12 RX ORDER — OXYCODONE HYDROCHLORIDE 10 MG/1
10-15 TABLET ORAL EVERY 4 HOURS PRN
Qty: 100 TABLET | Refills: 0 | Status: SHIPPED | OUTPATIENT
Start: 2022-04-12 | End: 2022-04-21 | Stop reason: SDUPTHER

## 2022-04-12 NOTE — TELEPHONE ENCOUNTER
I tried calling patient to inform script was sent  Her mailbox is full and I couldn't leave a message

## 2022-04-21 DIAGNOSIS — C90.00 MULTIPLE MYELOMA NOT HAVING ACHIEVED REMISSION (HCC): ICD-10-CM

## 2022-04-21 DIAGNOSIS — G89.3 CANCER ASSOCIATED PAIN: ICD-10-CM

## 2022-04-21 DIAGNOSIS — Z51.5 PALLIATIVE CARE PATIENT: ICD-10-CM

## 2022-04-21 DIAGNOSIS — I10 PRIMARY HYPERTENSION: ICD-10-CM

## 2022-04-21 RX ORDER — OXYCODONE HYDROCHLORIDE 10 MG/1
10-15 TABLET ORAL EVERY 4 HOURS PRN
Qty: 100 TABLET | Refills: 0 | Status: SHIPPED | OUTPATIENT
Start: 2022-04-23 | End: 2022-05-06 | Stop reason: SDUPTHER

## 2022-04-21 RX ORDER — OXYCODONE 9 MG/1
9 CAPSULE, EXTENDED RELEASE ORAL
Qty: 30 CAPSULE | Refills: 0 | Status: SHIPPED | OUTPATIENT
Start: 2022-04-21 | End: 2022-05-24 | Stop reason: SDUPTHER

## 2022-04-21 RX ORDER — AMLODIPINE BESYLATE 10 MG/1
10 TABLET ORAL DAILY
Qty: 30 TABLET | Refills: 0 | Status: SHIPPED | OUTPATIENT
Start: 2022-04-21 | End: 2022-05-23 | Stop reason: SDUPTHER

## 2022-04-21 NOTE — TELEPHONE ENCOUNTER
Primary palliative medicine provider:   Dr Poornima Arias  Medication requested:  Oxycodone (Roxicodone) 10 mg tabs  Oxycodone ER (XtampzaER) 9 mg C12A    If for pain, how has the patient been taking their pain medicine?      Last appointment: 02/17/2022    Next scheduled appointment: 05/02/2022    PDMP review:      [id number] 03/23/2022 03/23/2022 Morphine Sulfate (Tablet, Extended Release) 60 0 30 30 MG 60 0 [facility] Medicaid 00 / 00 [place]    1 [id number] 03/10/2022 03/09/2022 Morphine Sulfate (Tablet, Extended Release) 30 0 15 30 MG 60 0 [facility] Medicaid 00 / 00 [place]       [id number] 02/27/2022 02/17/2022 Xtampza Er (Capsule, Extended Release) 30 0 30 9 MG NA [employer] 00 / [facility]       [id number] 02/10/2022 01/14/2022 Xtampza Er (Capsule, Extended Release) 14 0 14 9 MG NA [employer] 00 / 00 [place]

## 2022-05-02 ENCOUNTER — OFFICE VISIT (OUTPATIENT)
Dept: PALLIATIVE MEDICINE | Facility: CLINIC | Age: 52
End: 2022-05-02
Payer: COMMERCIAL

## 2022-05-02 VITALS
TEMPERATURE: 98.1 F | DIASTOLIC BLOOD PRESSURE: 60 MMHG | HEART RATE: 90 BPM | OXYGEN SATURATION: 95 % | SYSTOLIC BLOOD PRESSURE: 110 MMHG | WEIGHT: 160.6 LBS | RESPIRATION RATE: 18 BRPM | BODY MASS INDEX: 27.66 KG/M2

## 2022-05-02 DIAGNOSIS — F11.20 CONTINUOUS OPIOID DEPENDENCE (HCC): ICD-10-CM

## 2022-05-02 DIAGNOSIS — C90.00 MULTIPLE MYELOMA NOT HAVING ACHIEVED REMISSION (HCC): Primary | ICD-10-CM

## 2022-05-02 DIAGNOSIS — G89.3 CANCER ASSOCIATED PAIN: ICD-10-CM

## 2022-05-02 DIAGNOSIS — M79.2 NEUROPATHIC PAIN: ICD-10-CM

## 2022-05-02 DIAGNOSIS — Z51.5 PALLIATIVE CARE PATIENT: ICD-10-CM

## 2022-05-02 PROCEDURE — 99214 OFFICE O/P EST MOD 30 MIN: CPT | Performed by: STUDENT IN AN ORGANIZED HEALTH CARE EDUCATION/TRAINING PROGRAM

## 2022-05-02 RX ORDER — LIDOCAINE HCL 4% 4 G/100G
CREAM TOPICAL 4 TIMES DAILY PRN
Qty: 133 G | Refills: 0 | Status: SHIPPED | OUTPATIENT
Start: 2022-05-02

## 2022-05-02 NOTE — PROGRESS NOTES
Outpatient Follow-Up - Palliative and Supportive Care   Socorro Hall 46 y o  female [id number]    Assessment & Plan  Problem List Items Addressed This Visit        Other    Multiple myeloma not having achieved remission ([facility] ) - Primary    Relevant Medications    Lidocaine HCl 4 % CREA    Cancer associated pain    Continuous opioid dependence ([facility] )    Palliative care patient      Other Visit Diagnoses     Neuropathic pain        Relevant Medications    Lidocaine HCl 4 % CREA        #symptom management  #cancer-related pain   -continue APAP 1000 mg PO Q8H PRN              - max daily 4000 mg   - continue oxy-IR 10-15 mg PO Q4H PRN   - continue oxy-ER 9 mg PO QHS    Discussed consideration for opioid taper in upcoming appointments to identify lowest effective dosing  All questions/concerns addressed  Will continue current dosing, as upcoming appointment with [facility] to evaluate post-PBSCT therapy      #neuropathic pain   - continue pregabalin 100 mg PO BID   - continue duloxetine 30 mg PO QDaily   - trial lidocaine cream QID PRN   - information provided for acupressure and acupuncture    Discussed other medications, including TCA, that would require discontinuation of duloxetine x 14 days prior to initiation of other therapies   Will continue current regimen, patient will contact [facility] if plan to taper from duloxetine to trial TCA      #muscle spasms   - continue methocarbamol 500 mg PO Q6H PRN     #chemotherapy induced nausea   - continue ondansetron 8 mg PO Q8H PRN   - continue prochlorperazine 10 mg PO Q6H PRN     #OIC   - continue senna-docusate 2 tabs PO QHS   - continue miralax 17 g PO QDaily   - continue bisacodyl 10 mg SC QDaily PRN     #anxiety/depression   - continue duloxetine 30 mg PO QDaily    #goals of care   - s/p PBSCT on 03/30/2022   - follow up appointment with [facility] on 06/01/2022   - treatment focused care with no limitations at this time   - will continue to follow [facility] for symptoms management and supportive cares    #psychosocial support   - emotional support provided   - not currently    - two adult children              - Dom Saenz [daughter]              - Lyle Yeboah [mother] [phone]   - one sibling              - Mayi [sister]   - two grandchildren [aged 4 and 2]   - recently moved to [facility] from [place] one month ago      Next [facility] Follow up in 4 weeks  Controlled Substance Review    [place]MP or [place]  reviewed: No red flags were identified; safe to proceed with prescription  [place] PDMP Review       Value Time User    PDMP Reviewed  Yes (P)  5/2/2022  7:39 AM Tobin Malagon MD          Medications adjusted this encounter:  Requested Prescriptions     Signed Prescriptions Disp Refills    Lidocaine HCl 4 % CREA 133 g 0     Sig: Apply topically 4 (four) times a day as needed (neuropathic pain)     No orders of the defined types were placed in this encounter  There are no discontinued medications  Sandra Proctor was seen today for symptoms and planning cares related to above illnesses  I have reviewed the patient's controlled substance dispensing history in the Prescription Drug Monitoring Program in compliance with the [facility] regulations before prescribing any controlled substances  They are invited to continue to follow with us  If there are questions or concerns, please contact us through our clinic/answering service 24 hours a day, seven days a week  Tobin Malagon MD  [facility] Palliative and Supportive Care  [phone]      Visit Information    Accompanied By: Mother    Source of History: Self, Medical record    History Limitations: None      History of Present Illness    Marla Madrigal is a 46 y o  female who presents in follow up of symptoms related to multiple myeloma s/p PBSCT [03/30/2022]  Pertinent issues include: symptom management, pain, neoplasm related, depression or anxiety, assessment of goals of care      Patient reports overall "feeling great " Major symptom of distress is persistent neuropathic pain in bilateral hands/feet, use of pregabalin and duloxetine QDaily  Previous use of gabapentin ineffective  Also reports intermittent diffuse body pain, aggravated by cold and rain, known cancer-related pain  Use of oxy-IR x 6 tabs/day, onset 20 minutes, lasting 4-5 hours  Denies nausea, vomiting  Appetite intact, completes 3 meals/day, weight stable  BM every day, no use of bowel regimen  Sleep adequate  Past medical, surgical, social, and family histories are reviewed and pertinent updates are made  Review of Systems   Constitutional: Positive for malaise/fatigue  Negative for chills, decreased appetite, fever and weight loss  HENT: Negative for congestion  Eyes: Negative for visual disturbance  Cardiovascular: Negative for chest pain  Respiratory: Negative for shortness of breath  Musculoskeletal: Positive for back pain and myalgias  Negative for falls  Gastrointestinal: Negative for abdominal pain, constipation, nausea and vomiting  Genitourinary: Negative for frequency  Neurological: Positive for paresthesias  Negative for headaches  Psychiatric/Behavioral: The patient does not have insomnia  All other systems reviewed and are negative  Vital Signs    /60 (BP Location: Left arm, Cuff Size: Standard)   Pulse 90   Temp 98 1 °F (36 7 °C) (Temporal)   Resp 18   Wt 72 8 kg (160 lb 9 6 oz)   SpO2 95%   BMI 27 66 kg/m²     Physical Exam and Objective Data  Physical Exam  Vitals and nursing note reviewed  Constitutional:       General: She is awake  Appearance: She is not diaphoretic  Comments: Sitting up comfortably in NAD  BMI 27 7  Non-toxic appearing   HENT:      Head: Normocephalic and atraumatic  Right Ear: External ear normal       Left Ear: External ear normal       Nose: No rhinorrhea     Eyes:      Comments: No gaze preference   Cardiovascular:      Rate and Rhythm: Normal rate  Pulmonary:      Effort: No tachypnea, accessory muscle usage or respiratory distress  Comments: Completes full sentences without difficulty  Musculoskeletal:      Cervical back: Normal range of motion  Neurological:      General: No focal deficit present  Mental Status: She is alert and oriented to person, place, and time  Psychiatric:         Attention and Perception: Attention normal          Mood and Affect: Mood and affect normal          Speech: Speech normal          Cognition and Memory: Cognition and memory normal            Radiology and Laboratory:  I personally reviewed and interpreted the following results:    Most Recent COVID-19 Results:  Lab Results   Component Value Date/Time    SARSCOV2 Negative 10/30/2021 12:10 AM       Most Recent Lab Work:  Lab Results   Component Value Date/Time    SODIUM 137 03/23/2022 12:04 PM    K 3 4 (L) 03/23/2022 12:04 PM    BUN 20 03/23/2022 12:04 PM    CREATININE 1 00 03/23/2022 12:04 PM    GLUC 87 03/23/2022 12:04 PM     Lab Results   Component Value Date/Time    AST 10 03/23/2022 12:04 PM    ALT 22 03/23/2022 12:04 PM    ALB 3 2 (L) 03/23/2022 12:04 PM     Lab Results   Component Value Date/Time    HGB 13 9 03/23/2022 12:04 PM    WBC 15 11 (H) 03/23/2022 12:04 PM     (H) 03/23/2022 12:04 PM    INR 1 15 10/29/2021 09:35 PM    PTT 37 10/29/2021 09:35 PM       Most Recent Imaging [last 30 days]:  No results found  30 minutes was spent face to face with Giacomo Aguilar and her mother with greater than 50% of the time spent in counseling or coordination of care including discussions of provided medical updates, discussed palliative care, determined goals of care, determined social/family support, discussed plans of care, discussed symptom management, provided psychosocial support  Trial lidocaine cream for neuropathic pain  PDMP Reviewed  All of the patient's or agent's questions were answered during this discussion

## 2022-05-06 DIAGNOSIS — Z51.5 PALLIATIVE CARE PATIENT: ICD-10-CM

## 2022-05-06 DIAGNOSIS — C90.00 MULTIPLE MYELOMA NOT HAVING ACHIEVED REMISSION (HCC): ICD-10-CM

## 2022-05-06 DIAGNOSIS — G89.3 CANCER ASSOCIATED PAIN: ICD-10-CM

## 2022-05-06 RX ORDER — OXYCODONE HYDROCHLORIDE 10 MG/1
10-15 TABLET ORAL EVERY 4 HOURS PRN
Qty: 100 TABLET | Refills: 0 | Status: SHIPPED | OUTPATIENT
Start: 2022-05-06 | End: 2022-05-19 | Stop reason: SDUPTHER

## 2022-05-06 NOTE — TELEPHONE ENCOUNTER
Primary palliative medicine provider:   Ho Barron    Medication requested:  Oxycodone IR 10 mg     If for pain, how has the patient been taking their pain medicine?  1 to 1 5 every 4 hours as needed     Last appointment:    Next scheduled appointment:06/02/22    PDMP review:    04/23/2022 04/21/2022 oxyCODONE HCL (Tablet) 100 0 11 10  36 MYAH GALVAN     Pt needs by tomorrow

## 2022-05-19 DIAGNOSIS — G89.3 CANCER ASSOCIATED PAIN: ICD-10-CM

## 2022-05-19 DIAGNOSIS — C90.00 MULTIPLE MYELOMA NOT HAVING ACHIEVED REMISSION (HCC): ICD-10-CM

## 2022-05-19 DIAGNOSIS — Z51.5 PALLIATIVE CARE PATIENT: ICD-10-CM

## 2022-05-19 RX ORDER — OXYCODONE HYDROCHLORIDE 10 MG/1
10-15 TABLET ORAL EVERY 4 HOURS PRN
Qty: 100 TABLET | Refills: 0 | Status: SHIPPED | OUTPATIENT
Start: 2022-05-19 | End: 2022-06-02 | Stop reason: SDUPTHER

## 2022-05-19 NOTE — TELEPHONE ENCOUNTER
Primary palliative medicine provider:   Dr Ele Mccord    Medication requested:  Oxycodone (Roxicodone) 10 mg tabs    If for pain, how has the patient been taking their pain medicine? Last appointment: 05/02/2022    Next scheduled appointment: 06/02/2022    PDMP review:    [id number] 05/06/2022 05/06/2022 oxyCODONE HCL (Tablet) 100 0 11 10  36 [employer] 00 / 00 [place]       [id number] 04/23/2022 04/21/2022 oxyCODONE HCL (Tablet) 100 0 11 10  16 [address] [employer] 16 / 99 [place]    2 [id number] 04/12/2022 04/12/2022 oxyCODONE HCL IR 10 MG TAB (non-liquid) 100 0 12 10  0 CHRISTOPHER SMITH [employer]   [employer] 0 / 0 [place]

## 2022-05-23 DIAGNOSIS — I10 PRIMARY HYPERTENSION: ICD-10-CM

## 2022-05-23 RX ORDER — AMLODIPINE BESYLATE 10 MG/1
10 TABLET ORAL DAILY
Qty: 30 TABLET | Refills: 3 | Status: SHIPPED | OUTPATIENT
Start: 2022-05-23 | End: 2022-06-22

## 2022-05-24 DIAGNOSIS — Z51.5 PALLIATIVE CARE PATIENT: ICD-10-CM

## 2022-05-24 DIAGNOSIS — G89.3 CANCER ASSOCIATED PAIN: ICD-10-CM

## 2022-05-24 DIAGNOSIS — C90.00 MULTIPLE MYELOMA NOT HAVING ACHIEVED REMISSION (HCC): ICD-10-CM

## 2022-05-24 RX ORDER — OXYCODONE 9 MG/1
9 CAPSULE, EXTENDED RELEASE ORAL
Qty: 30 CAPSULE | Refills: 0 | Status: SHIPPED | OUTPATIENT
Start: 2022-05-24 | End: 2022-06-09 | Stop reason: SDUPTHER

## 2022-05-24 NOTE — TELEPHONE ENCOUNTER
Primary palliative medicine provider:   Rony Leroy    Medication requested:  Xtampza ER 9 mg       If for pain, how has the patient been taking their pain medicine?  1 tablet at HS    Last appointment: 5/02/22    Next scheduled appointment: 06/02/22    PDMP review:    04/25/2022 04/21/2022 Xtampza Er (Capsule, Extended Release) 30 0 30 9 MG NA MYAH NAVA

## 2022-06-02 ENCOUNTER — OFFICE VISIT (OUTPATIENT)
Dept: PALLIATIVE MEDICINE | Facility: CLINIC | Age: 52
End: 2022-06-02
Payer: COMMERCIAL

## 2022-06-02 VITALS
RESPIRATION RATE: 24 BRPM | HEART RATE: 86 BPM | OXYGEN SATURATION: 98 % | BODY MASS INDEX: 27.52 KG/M2 | DIASTOLIC BLOOD PRESSURE: 68 MMHG | WEIGHT: 159.8 LBS | SYSTOLIC BLOOD PRESSURE: 120 MMHG | TEMPERATURE: 98.5 F

## 2022-06-02 DIAGNOSIS — G89.3 CANCER ASSOCIATED PAIN: ICD-10-CM

## 2022-06-02 DIAGNOSIS — G89.29 CHRONIC BILATERAL LOW BACK PAIN WITH BILATERAL SCIATICA: ICD-10-CM

## 2022-06-02 DIAGNOSIS — Z51.5 PALLIATIVE CARE PATIENT: ICD-10-CM

## 2022-06-02 DIAGNOSIS — M54.42 CHRONIC BILATERAL LOW BACK PAIN WITH BILATERAL SCIATICA: ICD-10-CM

## 2022-06-02 DIAGNOSIS — F11.20 CONTINUOUS OPIOID DEPENDENCE (HCC): ICD-10-CM

## 2022-06-02 DIAGNOSIS — M54.41 CHRONIC BILATERAL LOW BACK PAIN WITH BILATERAL SCIATICA: ICD-10-CM

## 2022-06-02 DIAGNOSIS — C90.00 MULTIPLE MYELOMA NOT HAVING ACHIEVED REMISSION (HCC): Primary | ICD-10-CM

## 2022-06-02 DIAGNOSIS — C90.00 MULTIPLE MYELOMA NOT HAVING ACHIEVED REMISSION (HCC): ICD-10-CM

## 2022-06-02 PROCEDURE — 99214 OFFICE O/P EST MOD 30 MIN: CPT | Performed by: STUDENT IN AN ORGANIZED HEALTH CARE EDUCATION/TRAINING PROGRAM

## 2022-06-02 RX ORDER — OXYCODONE HYDROCHLORIDE 10 MG/1
10-15 TABLET ORAL EVERY 4 HOURS PRN
Qty: 100 TABLET | Refills: 0 | Status: CANCELLED | OUTPATIENT
Start: 2022-06-02

## 2022-06-02 RX ORDER — OXYCODONE HYDROCHLORIDE 10 MG/1
10-15 TABLET ORAL EVERY 4 HOURS PRN
Qty: 63 TABLET | Refills: 0 | Status: SHIPPED | OUTPATIENT
Start: 2022-06-02 | End: 2022-06-09

## 2022-06-02 NOTE — TELEPHONE ENCOUNTER
One-week Rx sent to pharmacy  See [facility] clinic note from 06/02/2022

## 2022-06-02 NOTE — TELEPHONE ENCOUNTER
Primary palliative medicine provider: Sue Aschoff    Medication requested: Oxycodone 10 mg   If for pain, how has the patient been taking their pain medicine?      Last appointment:  5 2     Next scheduled appointment:  TODAY    PDMP review:    Last filled 5 20  #100/11 The patient is CLEARED FOR CATARACT SURGERY.

## 2022-06-02 NOTE — PROGRESS NOTES
Outpatient Follow-Up - Palliative and Supportive Care   Carly Montalvo 46 y o  female [id number]    Assessment & Plan  Problem List Items Addressed This Visit        Nervous and Auditory    Chronic bilateral low back pain with bilateral sciatica       Other    Multiple myeloma not having achieved remission (HCC) - Primary    Relevant Medications    oxyCODONE (ROXICODONE) 10 MG TABS    Cancer associated pain    Relevant Medications    oxyCODONE (ROXICODONE) 10 MG TABS    Continuous opioid dependence (HCC)    Palliative care patient    Relevant Medications    oxyCODONE (ROXICODONE) 10 MG TABS        #symptom management  #cancer-related pain   -continue APAP 1000 mg PO Q8H PRN              - max daily 4000 mg   - continue oxy-IR 10-15 mg PO Q4H PRN   - continue oxy-ER 9 mg PO QHS     Patient disclosed concern of her daughter's substance abuse and strong suspicion that some of the patient's medications have been stolen  Recommendation for lock box for safe storage and ensure proper/safe dispensing to patient only  Also recommend one-week prescriptions to ensure low pill count to deter theft and minimize amount of opioids in household  Patient also discussed that the daughter may be moving out in the future, but in the time-being, a lock box and decreased pill count will be the initial management recommendations  All questions/concerns addressed      #neuropathic pain   - continue pregabalin 100 mg PO BID   - continue duloxetine 30 mg PO QDaily   - continuelidocaine cream QID PRN   - information provided for acupressure and acupuncture     Discussed other medications, including TCA, that would require discontinuation of duloxetine x 14 days prior to initiation of other therapies   Will continue current regimen, patient will contact [facility] if plan to taper from duloxetine to trial TCA      #muscle spasms   - continue methocarbamol 500 mg PO Q6H PRN     #chemotherapy induced nausea   - continue ondansetron 8 mg PO Q8H PRN   - continue prochlorperazine 10 mg PO Q6H PRN     #OIC   - continue senna-docusate 2 tabs PO QHS   - continue miralax 17 g PO QDaily   - continue bisacodyl 10 mg [place] QDaily PRN     #anxiety/depression   - continue duloxetine 30 mg PO QDaily    #goals of care   - recent bone marrow biopsy yesterday, results pending   - appointment in 2 weeks with [facility] to discuss treatment plan    #psychosocial support   - emotional support provided   - not currently    - two adult children              - Greg Mead [daughter]              - Richard Howard [mother] [phone]   - one sibling              - Mayi [sister]   - two grandchildren [aged 4 and 2]   - recently moved to [facility] from [place] one month ago      Next [address] Follow up in 4 weeks  Cont[place] Sub[place]    [place]  reviewed: No red flags were identified; safe to proceed with prescription  Irl Riddhi PDMP Review       Value Time User    PDMP Reviewed  Yes (P)  6/2/2022  8:12 AM Ramirez Gibbons MD          Medications adjusted this encounter:  Requested Prescriptions     Signed Prescriptions Disp Refills    oxyCODONE (ROXICODONE) 10 MG TABS 63 tablet 0     Sig: Take 1-1 5 tablets (10-15 mg total) by mouth every 4 (four) hours as needed (cancer-related pain) Max Daily Amount: 90 mg     No orders of the defined types were placed in this encounter  Medications Discontinued During This Encounter   Medication Reason    oxyCODONE (ROXICODONE) 10 MG TABS Reorder         Clau Ness was seen today for symptoms and planning cares related to above illnesses  I have reviewed the patient's controlled substance dispensing history in the Prescription Drug Monitoring Program in compliance with the [facility] regulations before prescribing any controlled substances  They are invited to continue to follow with us    If there are questions or concerns, please contact us through our clinic/answering service 24 hours a day, seven days a week     Gloria Torres MD  [facility] Palliative and Supportive Care  [phone]      Visit Information    Accompanied By: No one    Source of History: Self, Medical record    History Limitations: None      History of Present Illness    Marla Abdullahi is a 46 y o  female who presents in follow up of symptoms related to multiple myeloma s/p PBSCT [03/30/2022]  Pertinent issues include: symptom management, pain, neoplasm related, assessment of goals of care  Patient reports overall doing well from a symptoms management standpoint  Patient with concern for daughter's Toby Tompkins resides in household] substance abuse, suspects theft of patient's opioid medication [at least 7 oxy-ER tablets reported missing]  Patient with previous training in substance abuse counseling  Persistent pain in diffuse joints with localization of shoulders/wrists  Use of oxy-IR x 6-8 tabs/day, onset 30 minutes, lasting 2-3 hours  Denies nausea, vomiting  Appetite intact, completing 3 meals/day  Weight stable  BM regular, every day  Sleep adequate  Past medical, surgical, social, and family histories are reviewed and pertinent updates are made  Review of Systems   Constitutional: Negative for chills, decreased appetite, fever, malaise/fatigue and weight loss  HENT: Negative for congestion  Eyes: Negative for visual disturbance  Cardiovascular: Negative for chest pain  Respiratory: Negative for shortness of breath  Musculoskeletal: Positive for joint pain  Negative for falls  Bilateral shoulder pain  Bilateral wrist pain   Gastrointestinal: Negative for abdominal pain, constipation, nausea and vomiting  Genitourinary: Negative for frequency  Neurological: Negative for headaches  Psychiatric/Behavioral: The patient does not have insomnia  All other systems reviewed and are negative          Vital Signs    /68 (BP Location: Left arm, Cuff Size: Standard)   Pulse 86   Temp 98 5 °F (36 9 °C) (Temporal)   Resp (!) 24   Wt 72 5 kg (159 lb 12 8 oz)   SpO2 98%   BMI 27 52 kg/m²     Physical Exam and Objective Data  Physical Exam  Vitals and nursing note reviewed  Constitutional:       General: She is awake  Appearance: She is not diaphoretic  Comments: Sitting up comfortably in NAD  Non-toxic appearing   HENT:      Head: Normocephalic and atraumatic  Right Ear: External ear normal       Left Ear: External ear normal       Nose: No rhinorrhea  Eyes:      Comments: No gaze preference   Cardiovascular:      Rate and Rhythm: Normal rate  Pulmonary:      Effort: No tachypnea, accessory muscle usage or respiratory distress  Comments: Completes full sentences without difficulty  Musculoskeletal:      Cervical back: Normal range of motion  Neurological:      General: No focal deficit present  Mental Status: She is alert and oriented to person, place, and time     Psychiatric:         Attention and Perception: Attention normal          Mood and Affect: Mood and affect normal          Speech: Speech normal          Cognition and Memory: Cognition and memory normal            Radiology and Laboratory:  I personally reviewed and interpreted the following results:    Most Recent COVID-19 Results:  Lab Results   Component Value Date/Time    SARSCOV2 Negative 10/30/2021 12:10 AM       Most Recent Lab Work:  Lab Results   Component Value Date/Time    SODIUM 137 03/23/2022 12:04 PM    K 3 4 (L) 03/23/2022 12:04 PM    BUN 20 03/23/2022 12:04 PM    CREATININE 1 00 03/23/2022 12:04 PM    GLUC 87 03/23/2022 12:04 PM     Lab Results   Component Value Date/Time    AST 10 03/23/2022 12:04 PM    ALT 22 03/23/2022 12:04 PM    ALB 3 2 (L) 03/23/2022 12:04 PM     Lab Results   Component Value Date/Time    HGB 13 9 03/23/2022 12:04 PM    WBC 15 11 (H) 03/23/2022 12:04 PM     (H) 03/23/2022 12:04 PM    INR 1 15 10/29/2021 09:35 PM    PTT 37 10/29/2021 09:35 PM       Most Recent Imaging [last 30 days]:  No results found  40 minutes was spent face to face with Aakash Jenkins with greater than 50% of the time spent in counseling or coordination of care including discussions of provided medical updates, discussed palliative care, determined goals of care, determined social/family support, discussed plans of care, discussed symptom management, provided psychosocial support  Safe opioid management in household, recommendation for lock box and one-week Rx  PDMP Reviewed  All of the patient's or agent's questions were answered during this discussion

## 2022-06-09 DIAGNOSIS — Z51.5 PALLIATIVE CARE PATIENT: ICD-10-CM

## 2022-06-09 DIAGNOSIS — C90.00 MULTIPLE MYELOMA NOT HAVING ACHIEVED REMISSION (HCC): ICD-10-CM

## 2022-06-09 DIAGNOSIS — G89.3 CANCER ASSOCIATED PAIN: ICD-10-CM

## 2022-06-09 RX ORDER — OXYCODONE 9 MG/1
9 CAPSULE, EXTENDED RELEASE ORAL
Qty: 7 CAPSULE | Refills: 0 | Status: SHIPPED | OUTPATIENT
Start: 2022-06-09 | End: 2022-06-24 | Stop reason: SDUPTHER

## 2022-06-09 RX ORDER — OXYCODONE HYDROCHLORIDE 10 MG/1
10-15 TABLET ORAL EVERY 4 HOURS PRN
Qty: 63 TABLET | Refills: 0 | Status: SHIPPED | OUTPATIENT
Start: 2022-06-09 | End: 2022-06-15 | Stop reason: SDUPTHER

## 2022-06-09 NOTE — TELEPHONE ENCOUNTER
Primary palliative medicine provider: Renzo Polo    Medication requested: OXYCODONE    If for pain, how has the patient been taking their pain medicine?      Last appointment: 6/2/22    Next scheduled appointment: 7/14/22    PDMP review:  7 DAY SUPPLY ON 6/2/22  XTAMPZA: 30 DAY SUPPLY 5/24/22

## 2022-06-15 DIAGNOSIS — Z51.5 PALLIATIVE CARE PATIENT: ICD-10-CM

## 2022-06-15 DIAGNOSIS — G89.3 CANCER ASSOCIATED PAIN: ICD-10-CM

## 2022-06-15 DIAGNOSIS — C90.00 MULTIPLE MYELOMA NOT HAVING ACHIEVED REMISSION (HCC): ICD-10-CM

## 2022-06-15 RX ORDER — OXYCODONE HYDROCHLORIDE 10 MG/1
10-15 TABLET ORAL EVERY 4 HOURS PRN
Qty: 63 TABLET | Refills: 0 | Status: SHIPPED | OUTPATIENT
Start: 2022-06-15 | End: 2022-06-22 | Stop reason: SDUPTHER

## 2022-06-15 NOTE — TELEPHONE ENCOUNTER
Primary palliative medicine provider:   Dr Saray Chahal    Medication requested: Oxycodone(Roxicodone) 10 mg tabs    If for pain, how has the patient been taking their pain medicine?      Last appointment:06/02/2022    Next scheduled appointment: 07/17/2022    PDMP review:  Unable to look at [facility]

## 2022-06-21 DIAGNOSIS — R12 HEARTBURN: ICD-10-CM

## 2022-06-21 DIAGNOSIS — G62.9 NEUROPATHY: ICD-10-CM

## 2022-06-21 RX ORDER — FAMOTIDINE 40 MG/1
TABLET, FILM COATED ORAL
Qty: 90 TABLET | Refills: 1 | Status: SHIPPED | OUTPATIENT
Start: 2022-06-21

## 2022-06-21 RX ORDER — DULOXETIN HYDROCHLORIDE 30 MG/1
CAPSULE, DELAYED RELEASE ORAL
Qty: 90 CAPSULE | Refills: 1 | Status: SHIPPED | OUTPATIENT
Start: 2022-06-21

## 2022-06-22 DIAGNOSIS — C90.00 MULTIPLE MYELOMA NOT HAVING ACHIEVED REMISSION (HCC): ICD-10-CM

## 2022-06-22 DIAGNOSIS — G89.3 CANCER ASSOCIATED PAIN: ICD-10-CM

## 2022-06-22 DIAGNOSIS — Z51.5 PALLIATIVE CARE PATIENT: ICD-10-CM

## 2022-06-22 RX ORDER — OXYCODONE HYDROCHLORIDE 10 MG/1
10-15 TABLET ORAL EVERY 4 HOURS PRN
Qty: 63 TABLET | Refills: 0 | Status: SHIPPED | OUTPATIENT
Start: 2022-06-22 | End: 2022-06-29 | Stop reason: SDUPTHER

## 2022-06-22 NOTE — TELEPHONE ENCOUNTER
Primary palliative medicine provider:   Dr Mark Orellana    Medication requested: Oxycodone (Roxicodone) 10 mg tabs    If for pain, how has the patient been taking their pain medicine?      Last appointment: 06/02/2022    Next scheduled appointment: 07/14/2022    PDMP review:    [id number] 06/15/2022 06/15/2022 oxyCODONE HCL (Tablet) 63 0 7 10  0 [employer] Commercial Insurance 0 / 0 [place]     1 [id number] 06/09/2022 06/09/2022 oxyCODONE HCL (Tablet) 63 0 7 10  0 [employer] 0 / 0 [place]    1 [id number] 06/02/2022 06/02/2022 oxyCODONE HCL (Tablet) 63 0 7 10  0 [employer] 0 / 0 [place]

## 2022-06-24 DIAGNOSIS — C90.00 MULTIPLE MYELOMA NOT HAVING ACHIEVED REMISSION (HCC): ICD-10-CM

## 2022-06-24 DIAGNOSIS — Z51.5 PALLIATIVE CARE PATIENT: ICD-10-CM

## 2022-06-24 DIAGNOSIS — G89.3 CANCER ASSOCIATED PAIN: ICD-10-CM

## 2022-06-24 RX ORDER — OXYCODONE 9 MG/1
9 CAPSULE, EXTENDED RELEASE ORAL
Qty: 7 CAPSULE | Refills: 0 | Status: SHIPPED | OUTPATIENT
Start: 2022-06-24 | End: 2022-06-30 | Stop reason: SDUPTHER

## 2022-06-24 NOTE — TELEPHONE ENCOUNTER
Primary palliative medicine provider:   Marciano Gunn    Medication requested:  Xtampza 9 mg     If for pain, how has the patient been taking their pain medicine?      Last appointment: 6/02    Next scheduled appointment: 7/14    PDMP review:    05/24/2022 05/24/2022 Xtampza Er (Capsule, Extended Release) 30 0 30 9 MG BOBBY Thompson

## 2022-06-28 ENCOUNTER — TELEPHONE (OUTPATIENT)
Dept: HEMATOLOGY ONCOLOGY | Facility: CLINIC | Age: 52
End: 2022-06-28

## 2022-06-28 NOTE — TELEPHONE ENCOUNTER
Call back to Zahra Pineda at [facility] [place] in regards to questions about specialty testing reviewed that [facility] would be the usual testing facility and reviewed that the testing is to rule out cardiac amyloidosis  Zahra Pineda stated will talk with Dr Samir Olvera in regards to having patient get testing done at [facility] [place] due to difficulty of ordering test, per Zahra Pineda  Reviewed call back number if further questions or concerns need to be discussed

## 2022-06-28 NOTE — TELEPHONE ENCOUNTER
CALL RETURN FORM   Reason for patient call? Minerva Anthony from  [facility] is calling in regards to Dr Pierre Azar wanting to have the patient get a Nuclear Med test done  Minerva Anthony wants to know how to go about getting the patient scheduled and what location offers it for the patient to have it done  Patient's primary oncologist? Dr Anshul Appiah   Name of person the patient was calling for? Jacquie   Any additional information to add, if applicable? Best call back number for Minerva Anthony is [phone]   Informed patient that the message will be forwarded to the team and someone will get back to them as soon as possible    Did you relay this information to the patient?  Yes

## 2022-06-29 DIAGNOSIS — C90.00 MULTIPLE MYELOMA NOT HAVING ACHIEVED REMISSION (HCC): ICD-10-CM

## 2022-06-29 DIAGNOSIS — Z51.5 PALLIATIVE CARE PATIENT: ICD-10-CM

## 2022-06-29 DIAGNOSIS — G89.3 CANCER ASSOCIATED PAIN: ICD-10-CM

## 2022-06-29 RX ORDER — OXYCODONE HYDROCHLORIDE 10 MG/1
10-15 TABLET ORAL EVERY 4 HOURS PRN
Qty: 63 TABLET | Refills: 0 | Status: SHIPPED | OUTPATIENT
Start: 2022-06-29 | End: 2022-07-05 | Stop reason: SDUPTHER

## 2022-06-29 NOTE — TELEPHONE ENCOUNTER
Primary palliative medicine provider: Jayna Green    Medication requested: OXY 10    If for pain, how has the patient been taking their pain medicine?      Last appointment: 6/2/22    Next scheduled appointment: 7/14/22    PDMP review:  LAST FILLED 9 DAY SUPPLY ON 6/22

## 2022-06-30 ENCOUNTER — TELEPHONE (OUTPATIENT)
Dept: PALLIATIVE MEDICINE | Facility: CLINIC | Age: 52
End: 2022-06-30

## 2022-06-30 DIAGNOSIS — G89.3 CANCER ASSOCIATED PAIN: ICD-10-CM

## 2022-06-30 DIAGNOSIS — Z51.5 PALLIATIVE CARE PATIENT: ICD-10-CM

## 2022-06-30 DIAGNOSIS — C90.00 MULTIPLE MYELOMA NOT HAVING ACHIEVED REMISSION (HCC): ICD-10-CM

## 2022-06-30 NOTE — TELEPHONE ENCOUNTER
Pt LM on medline this am   Reports following  " I will be getting a new insurance that will start 07/15/22( did not provide name of insurance)  Stated she has a temporary insurance via [address] [place] [address] with ID number [id number]  This is same number currently on record ( [employer])   States she will need Prior Auth for her Oxycodone 10 mg IR last filled 06/29/22 63/9   Xtampza 9 mg ER last filled 6/24 7/7    Called placed to pt for more information to clarify new insurance and need for PA  Unable to leave message as VM is full  Call to [facility]  / [place] scripts at   Spoke to representative Rosalba Nunez  Reviewed pt profile   Policy [id number] effective 09/15/21 to 6/9/22  No indication of new policy set to begin 07/51/01 with this insurance  Ref Number for call [id number]    Call to pharmacy  Pt has been paying out of pocket  Oxycodone 10 mg IR  $10 for 63 tablets  Xtampza 9 mg ER $39 63 for 7 capsules  Provided with Access ph 0   Call to ACCESS Per representative meds are covered but need the proper ICD 10 code   Forms being faxed to office for both meds  Will complete and send with supportive documentation   Karey Molina

## 2022-06-30 NOTE — TELEPHONE ENCOUNTER
[place] from [place] [employer] of [employer] and completed  To be faxed to [phone] along with supportive documentation   Per PDMP , pt will need refill on Xtampza 9 mg ER  Filling weekly  Last filled 06/24/22 7/7     Refill request submitted

## 2022-07-01 NOTE — TELEPHONE ENCOUNTER
Received prior authorization approval for  1  Xtampza  ER 9 mg  through 07/01/23  2  Oxycodone 10 mg IR through  07/01/223     Notified Pharmacy  Pt notified of results  Pt states she has 2 days of xtampza remaining and will need by Tuesday   Please submit refill for Monday or Tuesday fill

## 2022-07-03 RX ORDER — OXYCODONE 9 MG/1
9 CAPSULE, EXTENDED RELEASE ORAL
Qty: 7 CAPSULE | Refills: 0 | Status: SHIPPED | OUTPATIENT
Start: 2022-07-03 | End: 2022-07-13 | Stop reason: SDUPTHER

## 2022-07-05 DIAGNOSIS — G89.3 CANCER ASSOCIATED PAIN: ICD-10-CM

## 2022-07-05 DIAGNOSIS — C90.00 MULTIPLE MYELOMA NOT HAVING ACHIEVED REMISSION (HCC): ICD-10-CM

## 2022-07-05 DIAGNOSIS — Z51.5 PALLIATIVE CARE PATIENT: ICD-10-CM

## 2022-07-05 RX ORDER — OXYCODONE HYDROCHLORIDE 10 MG/1
10-15 TABLET ORAL EVERY 4 HOURS PRN
Qty: 63 TABLET | Refills: 0 | Status: SHIPPED | OUTPATIENT
Start: 2022-07-05 | End: 2022-07-13 | Stop reason: SDUPTHER

## 2022-07-05 NOTE — TELEPHONE ENCOUNTER
Primary palliative medicine provider:   Torrey Espinoza    Medication requested:  Oxycodone 10 mg IR     If for pain, how has the patient been taking their pain medicine? 1 to 1 5 tablets every 4 hours as needed     Last appointment: 05/02/22    Next scheduled appointment: 7/14/22    PDMP review:    06/20/2022 06/20/2022 oxyCODONE HCL (Tablet) 90 0 15 15  0 [facility],        Pt gets refills weekly  See office visit notes

## 2022-07-06 DIAGNOSIS — R09.81 NASAL CONGESTION: ICD-10-CM

## 2022-07-06 RX ORDER — FLUTICASONE PROPIONATE 50 MCG
SPRAY, SUSPENSION (ML) NASAL
Qty: 16 G | Refills: 5 | Status: SHIPPED | OUTPATIENT
Start: 2022-07-06

## 2022-07-13 ENCOUNTER — TELEPHONE (OUTPATIENT)
Dept: PALLIATIVE MEDICINE | Facility: CLINIC | Age: 52
End: 2022-07-13

## 2022-07-13 DIAGNOSIS — G89.3 CANCER ASSOCIATED PAIN: ICD-10-CM

## 2022-07-13 DIAGNOSIS — C90.00 MULTIPLE MYELOMA NOT HAVING ACHIEVED REMISSION (HCC): ICD-10-CM

## 2022-07-13 DIAGNOSIS — Z51.5 PALLIATIVE CARE PATIENT: ICD-10-CM

## 2022-07-13 RX ORDER — OXYCODONE HYDROCHLORIDE 10 MG/1
10-15 TABLET ORAL EVERY 4 HOURS PRN
Qty: 63 TABLET | Refills: 0 | Status: SHIPPED | OUTPATIENT
Start: 2022-07-13 | End: 2022-07-21 | Stop reason: SDUPTHER

## 2022-07-13 RX ORDER — OXYCODONE 9 MG/1
9 CAPSULE, EXTENDED RELEASE ORAL
Qty: 7 CAPSULE | Refills: 0 | Status: SHIPPED | OUTPATIENT
Start: 2022-07-13 | End: 2022-07-21 | Stop reason: SDUPTHER

## 2022-07-13 NOTE — TELEPHONE ENCOUNTER
Per pharmacy and cover my meds, request to do [place] for oxycodone 10 mg IR  Approved 07/01/22 x 1 year  No change in order  Pt is to begin new insurnace this month  Pt pd $10 with discount card  Pharmacist ran x 2 and insists needs pa or need to call insurance  insurnace closed at 430 pm     Attempt to call pt to update and ask if new insurance has begun  Unable to connect   Busy signal      Will try in am

## 2022-07-13 NOTE — TELEPHONE ENCOUNTER
Primary palliative medicine provider:   Dr Bhavesh Tadoe    Medication requested: Oxycodone (roxicodone) 10 mg tabs  Oxycodone ER Tiajuana Petties ER) 9 mg C12A    If for pain, how has the patient been taking their pain medicine?      Last appointment: 06/02/2022    Next scheduled appointment: 07/14/2022    PDMP review:    [id number] 07/06/2022 07/05/2022 oxyCODONE HCL (Tablet) 63 0 7 10  0 [facility] Insurance [employer] 0 / 0 [place]     1 [id number] 07/03/2022 07/03/2022 Xtampza Er (Capsule, Extended Release) 7 0 7 9 MG NA [employer] Medicaid 0 / 0 [place]    1 [id number] 06/29/2022 06/29/2022 oxyCODONE HCL (Tablet) 63 0 9 10  0 [employer] 0 / 0 [place]    1 [id number] 06/24/2022 06/24/2022 Xtampza Er (Capsule, Extended Release) 7 0 7 9 MG NA [employer] 0 / 0 [place]

## 2022-07-14 ENCOUNTER — OFFICE VISIT (OUTPATIENT)
Dept: PALLIATIVE MEDICINE | Facility: CLINIC | Age: 52
End: 2022-07-14
Payer: COMMERCIAL

## 2022-07-14 VITALS
DIASTOLIC BLOOD PRESSURE: 70 MMHG | SYSTOLIC BLOOD PRESSURE: 120 MMHG | OXYGEN SATURATION: 94 % | RESPIRATION RATE: 26 BRPM | WEIGHT: 164.4 LBS | TEMPERATURE: 97.3 F | HEART RATE: 85 BPM | BODY MASS INDEX: 28.31 KG/M2

## 2022-07-14 DIAGNOSIS — C90.00 MULTIPLE MYELOMA NOT HAVING ACHIEVED REMISSION (HCC): Primary | ICD-10-CM

## 2022-07-14 DIAGNOSIS — F11.20 CONTINUOUS OPIOID DEPENDENCE (HCC): ICD-10-CM

## 2022-07-14 DIAGNOSIS — G89.29 CHRONIC BILATERAL LOW BACK PAIN WITH BILATERAL SCIATICA: ICD-10-CM

## 2022-07-14 DIAGNOSIS — G89.3 CANCER ASSOCIATED PAIN: ICD-10-CM

## 2022-07-14 DIAGNOSIS — Z51.5 PALLIATIVE CARE PATIENT: ICD-10-CM

## 2022-07-14 DIAGNOSIS — M54.42 CHRONIC BILATERAL LOW BACK PAIN WITH BILATERAL SCIATICA: ICD-10-CM

## 2022-07-14 DIAGNOSIS — M54.41 CHRONIC BILATERAL LOW BACK PAIN WITH BILATERAL SCIATICA: ICD-10-CM

## 2022-07-14 PROCEDURE — 99214 OFFICE O/P EST MOD 30 MIN: CPT | Performed by: STUDENT IN AN ORGANIZED HEALTH CARE EDUCATION/TRAINING PROGRAM

## 2022-07-14 NOTE — TELEPHONE ENCOUNTER
LMOM requesting pt return call to office to review current and future insurance re medication coverage       Await call back

## 2022-07-14 NOTE — PROGRESS NOTES
Outpatient Follow-Up - Palliative and Supportive Care   Jj Avelar 46 y o  female [id number]    Assessment & Plan  Problem List Items Addressed This Visit        Nervous and Auditory    Chronic bilateral low back pain with bilateral sciatica       Other    Multiple myeloma not having achieved remission (HCC) - Primary    Cancer associated pain    Continuous opioid dependence (HCC)    Palliative care patient        #symptom management  #cancer-related pain   -continue APAP 1000 mg PO Q8H PRN              - max daily 4000 mg   - continue oxy-IR 10-15 mg PO Q4H PRN   - continue oxy-ER 9 mg PO QHS     Patient disclosed concern of her daughter's substance abuse and strong suspicion that some of the patient's medications have been stolen  Recommendation for lock box for safe storage and ensure proper/safe dispensing to patient only  Also recommend one-week prescriptions to ensure low pill count to deter theft and minimize amount of opioids in household  Improved medication safety reported with use of lockbox and one-week prescriptions  Will continue management plan to ensure safe/proper opioid use for patient      #neuropathic pain   - continue pregabalin 100 mg PO BID   - continue duloxetine 30 mg PO QDaily   - continue lidocaine cream QID PRN     #muscle spasms   - continue methocarbamol 500 mg PO Q6H PRN     #chemotherapy induced nausea   - continue ondansetron 8 mg PO Q8H PRN   - continue prochlorperazine 10 mg PO Q6H PRN     #OIC   - continue senna-docusate 2 tabs PO QHS   - continue miralax 17 g PO QDaily   - continue bisacodyl 10 mg [place] QDaily PRN     #anxiety/depression   - continue duloxetine 30 mg PO QDaily    #psychosocial support   - emotional support provided   - not currently    - two adult children              - Michelle Ax [daughter]              - Richard Romero [mother] [phone]   - one sibling              - Mayi [sister]   - two grandchildren [aged 4 and 2]   - recently moved to region from [place] one month ago      Next [address] Follow up in 4 weeks  Controlled Substance Review    [place] or [place]  reviewed: No red flags were identified; safe to proceed with prescription  Melissa Mike PDMP Review       Value Time User    PDMP Reviewed  Yes 7/14/2022 11:30 AM Wale Ribeiro MD          Medications adjusted this encounter:  Requested Prescriptions      No prescriptions requested or ordered in this encounter     No orders of the defined types were placed in this encounter  There are no discontinued medications  Alison Brown was seen today for symptoms and planning cares related to above illnesses  I have reviewed the patient's controlled substance dispensing history in the Prescription Drug Monitoring Program in compliance with the  [employer] regulations before prescribing any controlled substances  They are invited to continue to follow with us  If there are questions or concerns, please contact us through our clinic/answering service 24 hours a day, seven days a week  Wale Ribeiro MD  [facility] Palliative and Supportive Care  [phone]      Visit Information    Accompanied By: No one    Source of History: Self, Medical record    History Limitations: None      History of Present Illness    Marla Sampson is a 46 y o  female who presents in follow up of symptoms related to multiple myeloma s/p PBSCT [03/30/2022]  Pertinent issues include: symptom management, pain, neoplasm related, assessment of goals of care, advance care planning  Patient reports overall doing well  Cancer-related pain adequately managed with current regimen  [address]  currently working patient up for amyloidosis [CT orbits + CT heart], pending insurance change   Patient reports that new [employer] [started date tomorrow] is not accepted by [address] ; Dr Najera Both will try and [facility]  Neuropathy persists, no worsening, however, patient reports that symptom is a distress at times  Denies nausea, vomiting  Appetite intact  Weight stable  Adequate sleep, at time, multiple awakenings in the setting of nocturia  Past medical, surgical, social, and family histories are reviewed and pertinent updates are made  Review of Systems   Constitutional: Positive for malaise/fatigue  Negative for chills, decreased appetite, fever and weight loss  HENT: Negative for congestion  Eyes: Negative for visual disturbance  Cardiovascular: Negative for chest pain  Respiratory: Negative for shortness of breath  Musculoskeletal: Positive for joint pain  Negative for falls and neck pain  Gastrointestinal: Negative for abdominal pain, constipation, nausea and vomiting  Genitourinary: Negative for frequency  Neurological: Positive for paresthesias  Negative for headaches  Psychiatric/Behavioral: The patient does not have insomnia  All other systems reviewed and are negative  Vital Signs    /70 (BP Location: Left arm, Cuff Size: Standard)   Pulse 85   Temp (!) 97 3 °F (36 3 °C) (Temporal)   Resp (!) 26   Wt 74 6 kg (164 lb 6 4 oz)   SpO2 94%   BMI 28 31 kg/m²     Physical Exam and Objective Data  Physical Exam  Vitals and nursing note reviewed  Constitutional:       General: She is awake  Appearance: She is not diaphoretic  Comments: Sitting up comfortably in NAD  BMI 28 3  Non-toxic appearing   HENT:      Head: Normocephalic and atraumatic  Right Ear: External ear normal       Left Ear: External ear normal       Nose: No rhinorrhea  Eyes:      Comments: No gaze preference   Cardiovascular:      Rate and Rhythm: Normal rate  Pulmonary:      Effort: No tachypnea, accessory muscle usage or respiratory distress  Comments: Completes full sentences without difficulty  Musculoskeletal:      Cervical back: Normal range of motion  Neurological:      General: No focal deficit present        Mental Status: She is alert and oriented to person, place, and time  Psychiatric:         Attention and Perception: Attention normal          Mood and Affect: Mood and affect normal          Speech: Speech normal          Cognition and Memory: Cognition and memory normal            Radiology and Laboratory:  I personally reviewed and interpreted the following results:    Most Recent COVID-19 Results:  Lab Results   Component Value Date/Time    SARSCOV2 Negative 10/30/2021 12:10 AM       Most Recent Lab Work:  Lab Results   Component Value Date/Time    SODIUM 137 03/23/2022 12:04 PM    K 3 4 (L) 03/23/2022 12:04 PM    BUN 20 03/23/2022 12:04 PM    CREATININE 1 00 03/23/2022 12:04 PM    GLUC 87 03/23/2022 12:04 PM     Lab Results   Component Value Date/Time    AST 10 03/23/2022 12:04 PM    ALT 22 03/23/2022 12:04 PM    ALB 3 2 (L) 03/23/2022 12:04 PM     Lab Results   Component Value Date/Time    HGB 13 9 03/23/2022 12:04 PM    WBC 15 11 (H) 03/23/2022 12:04 PM     (H) 03/23/2022 12:04 PM    INR 1 15 10/29/2021 09:35 PM    PTT 37 10/29/2021 09:35 PM       Most Recent Imaging [last 30 days]:  No results found  30 minutes was spent face to face with Malina Giordano with greater than 50% of the time spent in counseling or coordination of care including discussions of provided medical updates, discussed palliative care, determined goals of care, determined social/family support, discussed plans of care, discussed symptom management, provided psychosocial support  PDMP Reviewed  All of the patient's or agent's questions were answered during this discussion

## 2022-07-15 ENCOUNTER — HOSPITAL ENCOUNTER (OUTPATIENT)
Dept: NON INVASIVE DIAGNOSTICS | Facility: HOSPITAL | Age: 52
Discharge: HOME | End: 2022-07-15
Payer: COMMERCIAL

## 2022-07-15 VITALS
SYSTOLIC BLOOD PRESSURE: 120 MMHG | HEIGHT: 63 IN | DIASTOLIC BLOOD PRESSURE: 70 MMHG | WEIGHT: 164 LBS | HEART RATE: 85 BPM | BODY MASS INDEX: 29.06 KG/M2

## 2022-07-15 DIAGNOSIS — C90.00 MYELOMA ASSOCIATED AMYLOIDOSIS (HCC): ICD-10-CM

## 2022-07-15 DIAGNOSIS — C90.00 MULTIPLE MYELOMA, REMISSION STATUS UNSPECIFIED (HCC): ICD-10-CM

## 2022-07-15 DIAGNOSIS — E85.9 MYELOMA ASSOCIATED AMYLOIDOSIS (HCC): ICD-10-CM

## 2022-07-15 LAB
AORTIC ROOT: 3 CM
APICAL FOUR CHAMBER EJECTION FRACTION: 60 %
ASCENDING AORTA: 3.3 CM
E WAVE DECELERATION TIME: 214 MS
FRACTIONAL SHORTENING: 40 % (ref 28–44)
INTERVENTRICULAR SEPTUM IN DIASTOLE (PARASTERNAL SHORT AXIS VIEW): 0.9 CM
INTERVENTRICULAR SEPTUM: 0.9 CM (ref 0.6–1.1)
LAAS-AP2: 20.6 CM2
LAAS-AP4: 18.7 CM2
LEFT ATRIUM AREA SYSTOLE SINGLE PLANE A4C: 20 CM2
LEFT ATRIUM SIZE: 4 CM
LEFT INTERNAL DIMENSION IN SYSTOLE: 3.1 CM (ref 2.1–4)
LEFT VENTRICULAR INTERNAL DIMENSION IN DIASTOLE: 5.2 CM (ref 3.5–6)
LEFT VENTRICULAR POSTERIOR WALL IN END DIASTOLE: 0.9 CM
LEFT VENTRICULAR STROKE VOLUME: 89 ML
LVSV (TEICH): 89 ML
MV E'TISSUE VEL-SEP: 6 CM/S
MV PEAK A VEL: 0.82 M/S
MV PEAK E VEL: 67 CM/S
MV STENOSIS PRESSURE HALF TIME: 62 MS
MV VALVE AREA P 1/2 METHOD: 3.55 CM2
RIGHT ATRIUM AREA SYSTOLE A4C: 14.4 CM2
RIGHT VENTRICLE ID DIMENSION: 3.5 CM
SL CV LEFT ATRIUM LENGTH A2C: 5.4 CM
SL CV LV EF: 60
SL CV PED ECHO LEFT VENTRICLE DIASTOLIC VOLUME (MOD BIPLANE) 2D: 129 ML
SL CV PED ECHO LEFT VENTRICLE SYSTOLIC VOLUME (MOD BIPLANE) 2D: 39 ML
TR MAX PG: 35 MMHG
TR PEAK VELOCITY: 3 M/S
TRICUSPID VALVE PEAK REGURGITATION VELOCITY: 2.97 M/S

## 2022-07-15 PROCEDURE — 93306 TTE W/DOPPLER COMPLETE: CPT

## 2022-07-15 PROCEDURE — 93306 TTE W/DOPPLER COMPLETE: CPT | Performed by: INTERNAL MEDICINE

## 2022-07-20 NOTE — TELEPHONE ENCOUNTER
Prior authorization for pt's   Ade Spinner 9 MG er  has been completed and sent along with OFFICE NOTE    VIA COVER MY MEDS    Key  [id number]    REQUESTED URGENT   Awaiting determination

## 2022-07-20 NOTE — TELEPHONE ENCOUNTER
Patient stated all new updated ins  Is now in system  Please advise she is still awaiting her auth to be completed for oxycodone and xtampza  Please advise

## 2022-07-20 NOTE — TELEPHONE ENCOUNTER
Prior authorization for pt's   OXYCODONE 10 MG IR  has been completed and sent along with OFFICE NOTE VIA COVER MY MEDS      Key   [id number]      REQUEST URGENT  Awaiting determination

## 2022-07-21 ENCOUNTER — TELEPHONE (OUTPATIENT)
Dept: HEMATOLOGY ONCOLOGY | Facility: CLINIC | Age: 52
End: 2022-07-21

## 2022-07-21 DIAGNOSIS — G89.3 CANCER ASSOCIATED PAIN: ICD-10-CM

## 2022-07-21 DIAGNOSIS — C90.00 MULTIPLE MYELOMA NOT HAVING ACHIEVED REMISSION (HCC): ICD-10-CM

## 2022-07-21 DIAGNOSIS — Z51.5 PALLIATIVE CARE PATIENT: ICD-10-CM

## 2022-07-21 RX ORDER — OXYCODONE HYDROCHLORIDE 10 MG/1
10-15 TABLET ORAL EVERY 4 HOURS PRN
Qty: 63 TABLET | Refills: 0 | Status: SHIPPED | OUTPATIENT
Start: 2022-07-21 | End: 2022-07-27 | Stop reason: SDUPTHER

## 2022-07-21 RX ORDER — OXYCODONE 9 MG/1
9 CAPSULE, EXTENDED RELEASE ORAL
Qty: 7 CAPSULE | Refills: 0 | Status: SHIPPED | OUTPATIENT
Start: 2022-07-21 | End: 2022-07-27 | Stop reason: SDUPTHER

## 2022-07-21 NOTE — TELEPHONE ENCOUNTER
Patient calling wanting to know if we had her [employer];th ins information to move forward with these auths      [employer]- [place]# [id number] [place] # [id number]

## 2022-07-21 NOTE — TELEPHONE ENCOUNTER
Approval     Oxycodone 10 mg qty 306 for 34 days from 7/21/22-1/21/23    Xtampza ER 9 mg approved 34 qty for 34 days 7/21/22-1/21/23    Patient notified and PW sent to pharmacy

## 2022-07-21 NOTE — TELEPHONE ENCOUNTER
Primary palliative medicine provider:   Dianne Rosen     Medication requested:  Xtampza 9 mg ER  Oxycodone 10 mg     If for pain, how has the patient been taking their pain medicine? Last appointment:    Next scheduled appointment:    PDMP review:      PLEASE SEND ASAP FOR 1 WEEK ORDER

## 2022-07-21 NOTE — TELEPHONE ENCOUNTER
LVM with Dr Margarita Cruz office to call back, direct teams number provided  Need to discuss what studies are required so they can be ordered

## 2022-07-21 NOTE — TELEPHONE ENCOUNTER
----- Message from Peter Carrington MD sent at 7/21/2022 12:46 PM EDT -----  Regarding: FW: Testing requested by Dr Jody Saenz,    If you can please find out what studies are required from Dr Rosalina tracy, I can certainly order them for her       ----- Message -----  From: Natali Latham RN  Sent: 7/21/2022   9:45 AM EDT  To: Peter Carrington MD  Subject: FW: Testing requested by Dr Jeannie Wesley                 How would you like to proceed?    ----- Message -----  From: Deepa Villa  Sent: 7/21/2022   7:55 AM EDT  To: Natali Latham RN  Subject: FW: Testing requested by Dr Jeannie Wesley                   ----- Message -----  From: Lev Ruiz  Sent: 7/20/2022   3:34 PM EDT  To: [facility]  Subject: Testing requested by Dr Misa Barrett afternoon Dr Vanessa Morgan  This is [employer]  I was your patient and you then referred me to Dr Jeannie Wesley at [facility] for a stem cell transplant  It went well! After my 6 week check up he put in for some CAT scans and other testing  I was in between insurances at the time, and unfortunately the new insurance I have is not excepted at [facility]  I was hoping you could reach out to him to discuss this testing and then you could schedule the same tests  I don't know how all this works so I pray that's the solution    Thank you  [employer]   [phone] details…

## 2022-07-27 DIAGNOSIS — Z51.5 PALLIATIVE CARE PATIENT: ICD-10-CM

## 2022-07-27 DIAGNOSIS — G89.3 CANCER ASSOCIATED PAIN: ICD-10-CM

## 2022-07-27 DIAGNOSIS — C90.00 MULTIPLE MYELOMA NOT HAVING ACHIEVED REMISSION (HCC): ICD-10-CM

## 2022-07-27 RX ORDER — OXYCODONE HYDROCHLORIDE 10 MG/1
10-15 TABLET ORAL EVERY 4 HOURS PRN
Qty: 63 TABLET | Refills: 0 | Status: SHIPPED | OUTPATIENT
Start: 2022-07-27 | End: 2022-08-03 | Stop reason: SDUPTHER

## 2022-07-27 RX ORDER — OXYCODONE 9 MG/1
9 CAPSULE, EXTENDED RELEASE ORAL
Qty: 7 CAPSULE | Refills: 0 | Status: SHIPPED | OUTPATIENT
Start: 2022-07-27 | End: 2022-08-03 | Stop reason: SDUPTHER

## 2022-07-27 NOTE — TELEPHONE ENCOUNTER
Primary palliative medicine provider:   Alexis Jones    Medication requested:  Oxycodone 10 mg   Xtampza ER 9 mg     If for pain, how has the patient been taking their pain medicine?      Last appointment: 7/14    Next scheduled appointment: 08/22    PDMP review:    07/21/2022 07/21/2022 Xtampza Er (Capsule, Extended Release) 7 0 7 9 MG NA [employer] 0 / 0 [address]    1 [id number] 07/21/2022 07/21/2022 oxyCODONE HCL (Tablet) 63 0 7 10  0 [address]

## 2022-07-28 ENCOUNTER — TELEPHONE (OUTPATIENT)
Dept: HEMATOLOGY ONCOLOGY | Facility: CLINIC | Age: 52
End: 2022-07-28

## 2022-07-28 DIAGNOSIS — Z51.11 ENCOUNTER FOR ANTINEOPLASTIC CHEMOTHERAPY: ICD-10-CM

## 2022-07-28 DIAGNOSIS — C90.00 MULTIPLE MYELOMA NOT HAVING ACHIEVED REMISSION (HCC): Primary | ICD-10-CM

## 2022-07-28 NOTE — TELEPHONE ENCOUNTER
Spoke to Alphonso at [facility], Dr Esteban Mcginnis office [phone]  Reviewed the tests Dr Regina Hendricks is recommending  CT Scans orbits/temporal bones/skull and CT TAP scheduled for 8/5 at 12:30 and 1pm   Patient also needs a [employer] scan for cardiac amyloidosis  Patient already had the echo completed on 7/15  Informed patient of the above scheduled scan and also let her know I have to order a scan for her heart but I have to be sure I'm ordering the right one therefore, I need to call Nuclear Medicine  She is ok with that, informed her I will reach back out to her on MOnday        Nuclear medicine number in [place] is [phone] open until 2:30pm and in  [phone] open until 2:30pm

## 2022-08-01 DIAGNOSIS — C90.00 MULTIPLE MYELOMA NOT HAVING ACHIEVED REMISSION (HCC): Primary | ICD-10-CM

## 2022-08-01 NOTE — TELEPHONE ENCOUNTER
ANA and sent a [employer] message to patient that her Nuclear Medicine scan for cardiac amyloidosis (NM Cardiac amyloidosis Spect) has been scheduled for 8/10 at 11:30 am at the [facility]  Sent her a Nevro as well  Asked patient to confirm she received my message either by replying to the [employer] message or called the teams line number provided

## 2022-08-03 DIAGNOSIS — C90.00 MULTIPLE MYELOMA NOT HAVING ACHIEVED REMISSION (HCC): ICD-10-CM

## 2022-08-03 DIAGNOSIS — G89.3 CANCER ASSOCIATED PAIN: ICD-10-CM

## 2022-08-03 DIAGNOSIS — Z51.5 PALLIATIVE CARE PATIENT: ICD-10-CM

## 2022-08-03 RX ORDER — OXYCODONE HYDROCHLORIDE 10 MG/1
10-15 TABLET ORAL EVERY 4 HOURS PRN
Qty: 63 TABLET | Refills: 0 | Status: SHIPPED | OUTPATIENT
Start: 2022-08-03 | End: 2022-08-09 | Stop reason: SDUPTHER

## 2022-08-03 RX ORDER — OXYCODONE 9 MG/1
9 CAPSULE, EXTENDED RELEASE ORAL
Qty: 7 CAPSULE | Refills: 0 | Status: SHIPPED | OUTPATIENT
Start: 2022-08-03 | End: 2022-08-09 | Stop reason: SDUPTHER

## 2022-08-03 NOTE — TELEPHONE ENCOUNTER
Primary palliative medicine provider:   Dr Evangelist Lala    Medication requested: Oxycodone (Roxicodone) 10 mg tablets  Oxycodone ER (Xtampza ER) 9 mg C12A    If for pain, how has the patient been taking their pain medicine?      Last appointment: 07/14/2022    Next scheduled appointment:08/22/2022    PDMP review:    [id number] 07/27/2022 07/27/2022 oxyCODONE HCL (Tablet) 63 0 7 10  0 [employer] Commercial Insurance 0 / 0 [place]     1 [id number] 07/27/2022 07/27/2022 Xtampza Er (Capsule, Extended Release) 7 0 7 9 MG NA [employer] 0 / 0 [place]    1 [id number] 07/21/2022 07/21/2022 Xtampza Er (Capsule, Extended Release) 7 0 7 9 MG NA [employer] 0 / 0 [place]    1 [id number] 07/21/2022 07/21/2022 oxyCODONE HCL (Tablet) 63 0 7 10  0 [employer] Commercial Insurance 0 / 0 [place]    1 [id number] 07/13/2022 07/13/2022 Xtampza Er (Capsule, Extended Release) 7 0 7 9 MG NA [employer] Medicaid 0 / 0 [place]

## 2022-08-05 ENCOUNTER — HOSPITAL ENCOUNTER (OUTPATIENT)
Dept: RADIOLOGY | Facility: MEDICAL CENTER | Age: 52
End: 2022-08-05
Payer: COMMERCIAL

## 2022-08-05 ENCOUNTER — HOSPITAL ENCOUNTER (OUTPATIENT)
Dept: RADIOLOGY | Facility: MEDICAL CENTER | Age: 52
Discharge: HOME | End: 2022-08-05
Payer: COMMERCIAL

## 2022-08-05 DIAGNOSIS — C90.00 MULTIPLE MYELOMA NOT HAVING ACHIEVED REMISSION (HCC): ICD-10-CM

## 2022-08-05 DIAGNOSIS — Z51.11 ENCOUNTER FOR ANTINEOPLASTIC CHEMOTHERAPY: ICD-10-CM

## 2022-08-05 PROCEDURE — G1004 CDSM NDSC: HCPCS

## 2022-08-05 PROCEDURE — 70481 CT ORBIT/EAR/FOSSA W/DYE: CPT

## 2022-08-05 PROCEDURE — 71260 CT THORAX DX C+: CPT

## 2022-08-05 PROCEDURE — 74177 CT ABD & PELVIS W/CONTRAST: CPT

## 2022-08-05 RX ADMIN — IOHEXOL 65 ML: 350 INJECTION, SOLUTION INTRAVENOUS at 12:20

## 2022-08-09 DIAGNOSIS — M54.41 CHRONIC BILATERAL LOW BACK PAIN WITH BILATERAL SCIATICA: ICD-10-CM

## 2022-08-09 DIAGNOSIS — C90.00 MULTIPLE MYELOMA NOT HAVING ACHIEVED REMISSION (HCC): ICD-10-CM

## 2022-08-09 DIAGNOSIS — Z51.5 PALLIATIVE CARE PATIENT: ICD-10-CM

## 2022-08-09 DIAGNOSIS — G89.3 CANCER ASSOCIATED PAIN: ICD-10-CM

## 2022-08-09 DIAGNOSIS — G89.29 CHRONIC BILATERAL LOW BACK PAIN WITH BILATERAL SCIATICA: ICD-10-CM

## 2022-08-09 DIAGNOSIS — M54.42 CHRONIC BILATERAL LOW BACK PAIN WITH BILATERAL SCIATICA: ICD-10-CM

## 2022-08-09 RX ORDER — OXYCODONE HYDROCHLORIDE 10 MG/1
10-15 TABLET ORAL EVERY 4 HOURS PRN
Qty: 63 TABLET | Refills: 0 | Status: SHIPPED | OUTPATIENT
Start: 2022-08-09 | End: 2022-08-16 | Stop reason: SDUPTHER

## 2022-08-09 RX ORDER — OXYCODONE 9 MG/1
9 CAPSULE, EXTENDED RELEASE ORAL
Qty: 7 CAPSULE | Refills: 0 | Status: SHIPPED | OUTPATIENT
Start: 2022-08-09 | End: 2022-08-16 | Stop reason: SDUPTHER

## 2022-08-09 RX ORDER — PREGABALIN 100 MG/1
100 CAPSULE ORAL 2 TIMES DAILY
Qty: 90 CAPSULE | Refills: 0 | Status: SHIPPED | OUTPATIENT
Start: 2022-08-09 | End: 2022-09-30

## 2022-08-09 NOTE — TELEPHONE ENCOUNTER
PDMP - Xtampza 9 mg filled 5/24/22 qty 30                   Oxycodone 10 mg 5/20/22 qty 100    Dr Maame Blackwell apt scd- 8/22/22

## 2022-08-11 ENCOUNTER — TELEPHONE (OUTPATIENT)
Dept: HEMATOLOGY ONCOLOGY | Facility: CLINIC | Age: 52
End: 2022-08-11

## 2022-08-11 DIAGNOSIS — E04.1 LEFT THYROID NODULE: Primary | ICD-10-CM

## 2022-08-11 NOTE — TELEPHONE ENCOUNTER
Spoke with patient and reviewed CT result  She was agreeable to proceed with thyroid ultrasound  Pt is okay with return call and VM being left with apt  Pt also verified she received my message for rescheduled scan on 8/15/22 at 1:00 PM, she was agreeable to this apt  Pt is scheduled Tomorrow 8/12/2022 MO at 2:00 PM  Pt was agreeable to this apt  She was advised to go 15 minutes early to register  She was appreciative of my call

## 2022-08-11 NOTE — TELEPHONE ENCOUNTER
----- Message from Billee Essex, MD sent at 8/11/2022 10:44 AM EDT -----  RN to help with placing thyroid ultrasound and helping patient get this appointment

## 2022-08-12 ENCOUNTER — HOSPITAL ENCOUNTER (OUTPATIENT)
Dept: ULTRASOUND IMAGING | Facility: HOSPITAL | Age: 52
Discharge: HOME | End: 2022-08-12
Payer: COMMERCIAL

## 2022-08-12 DIAGNOSIS — E04.1 LEFT THYROID NODULE: ICD-10-CM

## 2022-08-12 PROCEDURE — 76536 US EXAM OF HEAD AND NECK: CPT

## 2022-08-15 ENCOUNTER — HOSPITAL ENCOUNTER (OUTPATIENT)
Dept: NUCLEAR MEDICINE | Facility: HOSPITAL | Age: 52
Discharge: HOME | End: 2022-08-15
Payer: COMMERCIAL

## 2022-08-15 DIAGNOSIS — C90.00 MULTIPLE MYELOMA NOT HAVING ACHIEVED REMISSION (HCC): ICD-10-CM

## 2022-08-15 PROCEDURE — A9538 TC99M PYROPHOSPHATE: HCPCS

## 2022-08-15 PROCEDURE — 78803 RP LOCLZJ TUM SPECT 1 AREA: CPT

## 2022-08-15 PROCEDURE — G1004 CDSM NDSC: HCPCS

## 2022-08-16 ENCOUNTER — PATIENT MESSAGE (OUTPATIENT)
Dept: HEMATOLOGY ONCOLOGY | Facility: CLINIC | Age: 52
End: 2022-08-16

## 2022-08-16 ENCOUNTER — TELEPHONE (OUTPATIENT)
Dept: HEMATOLOGY ONCOLOGY | Facility: CLINIC | Age: 52
End: 2022-08-16

## 2022-08-16 DIAGNOSIS — Z51.5 PALLIATIVE CARE PATIENT: ICD-10-CM

## 2022-08-16 DIAGNOSIS — C90.00 MULTIPLE MYELOMA NOT HAVING ACHIEVED REMISSION (HCC): ICD-10-CM

## 2022-08-16 DIAGNOSIS — G89.3 CANCER ASSOCIATED PAIN: ICD-10-CM

## 2022-08-16 RX ORDER — OXYCODONE 9 MG/1
9 CAPSULE, EXTENDED RELEASE ORAL
Qty: 7 CAPSULE | Refills: 0 | Status: SHIPPED | OUTPATIENT
Start: 2022-08-16 | End: 2022-08-22 | Stop reason: SDUPTHER

## 2022-08-16 RX ORDER — OXYCODONE HYDROCHLORIDE 10 MG/1
10-15 TABLET ORAL EVERY 4 HOURS PRN
Qty: 63 TABLET | Refills: 0 | Status: SHIPPED | OUTPATIENT
Start: 2022-08-16 | End: 2022-08-22 | Stop reason: SDUPTHER

## 2022-08-16 NOTE — TELEPHONE ENCOUNTER
Asked Marla if she has been established with in network BMT specialist   Gianluca Castañeda her Dr Clara Ocampo would like her to be established with a BMT Specialist due to the complexity of her diagnosis  Patient has not looked to see who is in network  Advised her Dr Clara Ocampo and I did some research on [place] to look for in network providers and they do not have any BMT specialists  Informed her I spoke to a representative at [employer] and asked for Dr Kelly Alston to be covered  Patient does not have out of network benefits therefore, a prior authorization form along with patient's clinical history is required to be sent to [employer]  Patient agreeable  Faxed form to [phone]  Form and fax receipt in   Per [employer] rep, it will take up to 3 business days to get an answer  Marla aware, Dr Clara Ocampo aware  Marla would like to see Dr Clara Ocampo to review her results  Scheduled for tomorrow afternoon

## 2022-08-16 NOTE — TELEPHONE ENCOUNTER
Primary palliative medicine provider:   Dr Jesus Manuel Aly    Medication requested: Oxycodone (roxicodone) 10 mg tabs  Oxycodone ER (Xtampza ER) 9 mg C12A    If for pain, how has the patient been taking their pain medicine?      Last appointment:07/14/2022    Next scheduled appointment:08/22/2022    PDMP review:    [id number] 08/09/2022 08/09/2022 Xtampza Er (Capsule, Extended Release) 7 0 7 9 MG NA [employer] 0 / 0 [address]     1 [id number] 08/09/2022 08/09/2022 oxyCODONE HCL (Tablet) 63 0 7 10  0 [employer] 0 / 0 [address]          [id number] 08/03/2022 08/03/2022 oxyCODONE HCL (Tablet) 63 0 7 10  0 [employer] 0 / 0 [address]     1 [id number] 08/03/2022 08/03/2022 Xtampza Er (Capsule, Extended Release) 7 0 7 9 MG NA [employer] 0 / 0 [address]    1 [id number] 07/27/2022 07/27/2022 oxyCODONE HCL (Tablet) 63 0 7 10  0 [employer] 0 / 0 [address]    1 [id number] 07/27/2022 07/27/2022 Xtampza Er (Capsule, Extended Release) 7 0 7 9 MG NA [employer] 0 / 0 [place]

## 2022-08-16 NOTE — TELEPHONE ENCOUNTER
Patient's insurance no longer covers Dr Kirill Palmer  Per Dr Horvath Headings she needs a follow up with BMT  LVM for patient to call back so we can determine if she has established care with someone

## 2022-08-17 ENCOUNTER — OFFICE VISIT (OUTPATIENT)
Dept: HEMATOLOGY ONCOLOGY | Facility: CLINIC | Age: 52
End: 2022-08-17
Payer: COMMERCIAL

## 2022-08-17 VITALS
TEMPERATURE: 98.2 F | SYSTOLIC BLOOD PRESSURE: 105 MMHG | OXYGEN SATURATION: 93 % | HEART RATE: 78 BPM | DIASTOLIC BLOOD PRESSURE: 70 MMHG | WEIGHT: 162 LBS | BODY MASS INDEX: 28.7 KG/M2 | HEIGHT: 63 IN | RESPIRATION RATE: 16 BRPM

## 2022-08-17 DIAGNOSIS — Z94.84 H/O AUTOLOGOUS STEM CELL TRANSPLANT (HCC): ICD-10-CM

## 2022-08-17 DIAGNOSIS — E04.1 THYROID NODULE: ICD-10-CM

## 2022-08-17 DIAGNOSIS — C90.01 MULTIPLE MYELOMA IN REMISSION (HCC): Primary | ICD-10-CM

## 2022-08-17 DIAGNOSIS — E85.9 AMYLOIDOSIS, UNSPECIFIED TYPE (HCC): ICD-10-CM

## 2022-08-17 PROCEDURE — 99215 OFFICE O/P EST HI 40 MIN: CPT | Performed by: INTERNAL MEDICINE

## 2022-08-19 ENCOUNTER — TELEPHONE (OUTPATIENT)
Dept: HEMATOLOGY ONCOLOGY | Facility: CLINIC | Age: 52
End: 2022-08-19

## 2022-08-22 ENCOUNTER — SOCIAL WORK (OUTPATIENT)
Dept: PALLIATIVE MEDICINE | Facility: CLINIC | Age: 52
End: 2022-08-22

## 2022-08-22 ENCOUNTER — OFFICE VISIT (OUTPATIENT)
Dept: PALLIATIVE MEDICINE | Facility: CLINIC | Age: 52
End: 2022-08-22
Payer: COMMERCIAL

## 2022-08-22 ENCOUNTER — TELEPHONE (OUTPATIENT)
Dept: HEMATOLOGY ONCOLOGY | Facility: CLINIC | Age: 52
End: 2022-08-22

## 2022-08-22 VITALS
TEMPERATURE: 97.6 F | DIASTOLIC BLOOD PRESSURE: 60 MMHG | SYSTOLIC BLOOD PRESSURE: 100 MMHG | BODY MASS INDEX: 29.8 KG/M2 | HEART RATE: 75 BPM | WEIGHT: 168.2 LBS | OXYGEN SATURATION: 96 %

## 2022-08-22 DIAGNOSIS — C90.00 MULTIPLE MYELOMA NOT HAVING ACHIEVED REMISSION (HCC): ICD-10-CM

## 2022-08-22 DIAGNOSIS — Z94.84 H/O AUTOLOGOUS STEM CELL TRANSPLANT (HCC): ICD-10-CM

## 2022-08-22 DIAGNOSIS — Z51.5 PALLIATIVE CARE PATIENT: ICD-10-CM

## 2022-08-22 DIAGNOSIS — Z71.89 COUNSELING AND COORDINATION OF CARE: Primary | ICD-10-CM

## 2022-08-22 DIAGNOSIS — G89.3 CANCER ASSOCIATED PAIN: ICD-10-CM

## 2022-08-22 DIAGNOSIS — C90.01 MULTIPLE MYELOMA IN REMISSION (HCC): Primary | ICD-10-CM

## 2022-08-22 DIAGNOSIS — F11.20 CONTINUOUS OPIOID DEPENDENCE (HCC): ICD-10-CM

## 2022-08-22 PROBLEM — Z51.11 ENCOUNTER FOR ANTINEOPLASTIC CHEMOTHERAPY: Status: RESOLVED | Noted: 2021-12-15 | Resolved: 2022-08-22

## 2022-08-22 PROBLEM — E85.9 AMYLOIDOSIS (HCC): Status: ACTIVE | Noted: 2022-08-22

## 2022-08-22 PROCEDURE — 99214 OFFICE O/P EST MOD 30 MIN: CPT | Performed by: STUDENT IN AN ORGANIZED HEALTH CARE EDUCATION/TRAINING PROGRAM

## 2022-08-22 PROCEDURE — NC001 PR NO CHARGE

## 2022-08-22 RX ORDER — OXYCODONE HYDROCHLORIDE 10 MG/1
10-15 TABLET ORAL EVERY 4 HOURS PRN
Qty: 63 TABLET | Refills: 0 | Status: SHIPPED | OUTPATIENT
Start: 2022-08-22 | End: 2022-08-30 | Stop reason: SDUPTHER

## 2022-08-22 RX ORDER — OXYCODONE 9 MG/1
9 CAPSULE, EXTENDED RELEASE ORAL
Qty: 7 CAPSULE | Refills: 0 | Status: SHIPPED | OUTPATIENT
Start: 2022-08-22 | End: 2022-08-30 | Stop reason: SDUPTHER
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
## 2022-08-22 NOTE — PROGRESS NOTES
[facility] - Hematology & Medical Oncology  Outpatient Visit Encounter Note        SUBJECTIVE      Angle Archibald is a 46 y o  Is here for post hospital follow-up  She has a diagnosis of IgA kappa multiple myeloma  In review of the chart and talking with the patient,   The following information regarding her  Myeloma is listed    21: Bone marrow right posterior iliac crest, delcalcified trephine biopsy, touch imprint, direct aspirate smear, and peripheral blood smear:    - Plasma cell neoplasm characterized by:  Ayana Tam- Normocellular marrow (estimated cellularity 40%) showing trilineage hematopoiesis with 20% plasma cells,       kappa predominant by immunophenotypic evaluation     - Peripheral blood showing slight normochromic microcytic anemia and slight leukocytosis    - Negative for amyloid deposition confirmed by Congo Red stain  21: bone survey negative for lytic lesions  10/8/21: Free Ratio: 5 79              Immunofixation: IgA kappa monoclonal band present              IgA: 462              Ig              Started Vdr Days 1 and 14 (21 day Cycle)   10/15/21: Multiple myeloma panel in care everywhere              B2 Microglobulin 3 20              Calcium: 8 3              Hgb: 10 5              Free Kappa lambda ratio: 3 70              M Martinez: 0 27               IgA: <10              Ig              IgM: 77    As detailed in the chart, she sees a physician at [address] [place] [address] and also follows at [facility] for possibility of a stem cell transplant  After her diagnosis, she was started on first-line treatment with VRD and thereafter she was started on daratumumab and CyBorD on  and had a negative amyloid staining from an abdominal fat pad biopsy on   She was hospitalized in mid November with RSV infection    Her most recent oncology visit was on  at [address] [place] [address] as per documentation she completed cycle 2D8 of María Sukumar on 12/14 and got a toxicity assessment done at that office visit  She has no acute complaints  Denies any fevers chills nausea vomiting abdominal pain shortness of breath cough  Is taking acyclovir for prophylaxis  THIS VISIT    She seeing for the 1st time after undergoing autologous stem cell transplant  She had consolidation management with melphalan that was thereafter followed by an autologous stem cell transplant infusion on 03/30/2022  Since then, due to insurance changes, her oncologist at [facility] is not in network  The most recent visit with him was on 06/16/2022  At that time, per his assessment, patient was felt to be in remission and recommendations were for active surveillance with no antineoplastic systemic therapy management  Today she is here with her mother  She has no acute complaints  She says she is doing well and back to her usual life but remains fatigued  No headaches visual oral or throat symptoms  No chest or abdominal symptoms  No neurological or genitourinary symptoms  I have reviewed the relevant past medical, surgical, social and family history  I have also reviewed allergies and medications for this patient  Review of Systems  Review of Systems   All other systems reviewed and are negative  OBJECTIVE     Physical Exam    Vitals:    08/17/22 1425   BP: 105/70   Pulse: 78   Resp: 16   Temp: 98 2 °F (36 8 °C)   SpO2: 93%       Physical Exam  Vitals and nursing note reviewed  Constitutional:       General: She is not in acute distress  Appearance: She is well-developed  HENT:      Head: Normocephalic and atraumatic  Eyes:      Conjunctiva/sclera: Conjunctivae normal    Cardiovascular:      Rate and Rhythm: Normal rate and regular rhythm  Heart sounds: No murmur heard  Pulmonary:      Effort: Pulmonary effort is normal  No respiratory distress  Breath sounds: Normal breath sounds  Abdominal:      Palpations: Abdomen is soft  Tenderness: There is no abdominal tenderness  Musculoskeletal:      Cervical back: Neck supple  Skin:     General: Skin is warm and dry  Neurological:      Mental Status: She is alert  Imaging  Relevant imaging reviewed in chart    Labs  Relevant labs reviewed in chart   ASSESSMENT & PLAN      Diagnosis ICD-10-CM Associated Orders   1  Multiple myeloma in remission (HCC)  C90 01    2  Thyroid nodule  E04 1 Ambulatory referral to Surgical Oncology   3  H/O autologous stem cell transplant ([facility] )  Z94 84    4  Amyloidosis, unspecified type ([facility] )  E85 9          46 y o  Female diagnosed with IgA kappa multiple myeloma in August 2021  This was R-ISS Stage 2 with induction therapy with RVD and then María-CyBorD followed by autoSCT on 3/30/22 at [facility]  Per her BMT doctor, clinically, signs/symptoms were highly suspicious for clinical amyloidosis as well  After autoSCT, she is deemed to be in CR1 with MRD negative state with negative SPEP, normal KLR with repeat PETCT showing no lytic lesions  · Discussion  · Treatment History  · As listed before, patient was placed on 1L VRD and then switched to María-CyBorD upon assessment by BMT expert and since then has finished 2C of the aforementioned regimen before she transferred her care to [facility]  · As detailed in Dr Cortés Linear note, recommendation from him for surveillance  Unfortunately, her insurance change leads to a situation with no BMT coverage per review of local BMT doctors  I have requested a prior authorization to allow her to continue care with Dr Modesto Johnson as his speciality is required for best practice care for her  I explained this to the patient  · If not, then she plans to change her insurance that allows coverage with Dr Modesto Johnson and [facility]  · Recommend consult with Dr Carlos Pedro for thyroid nodule assessment    · Disease Details  · Clinical stage from 9/4/2021: RISS Stage II (Beta-2-microglobulin (mg/L): 4 1, Albumin (g/dL): 3 5, ISS: Stage II, High-risk cytogenetics: Absent, LDH: Normal) - Per Signed by Nasir Wilson MD on 9/4/2021  · Plan/Labs  · TBD based on pending insurance    Follow Up   TBD      All questions were answered to the patient's satisfaction during this encounter  They appreciated and thanked me for spending time with them  The patient knows the contact information for our office and know to reach out for any relevant concerns related to this encounter  For all other listed problems and medical diagnosis in his chart - they are managed by PCP and/or other specialists, which patient acknowledges  Dr Candace Gibbs MD  Hematology & Medical Oncology

## 2022-08-22 NOTE — TELEPHONE ENCOUNTER
Spoke to [employer] at [employer] regarding prior auth request to see Dr Lanette Mccoy at [facility]  See notes from last week  Authorization was approved, Auth# [id number] approved from 8/16/22 to 11/16/22  An  Extension will need to be sent to renew every three months by sending a prior auth request as we did before  The same form can be sent per the representative  Informed Trinette of the above and asked her to get in touch with Dr Meryle Rummer office to setup a follow up  Spoke with Anand Armendariz at Dr Meryle Rummer office, provided the update and auth number  She will inform her finance office at [facility] and also inform Dr Lanette Mccoy to coordinate a follow up appt  Reminder set to resend form November 1st 2022  Dr Fitz Sosa updated
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
## 2022-08-23 NOTE — TELEPHONE ENCOUNTER
Received a call back from Alphonso from [facility], she stated that [facility] will not accept Marla's insurance with or without an authorization  Patient was counseled in [place] on which insurance to choose however, patient had declinded per Alphonso  Informed Alphonso I will provide Marla with the financial counselor's phone number  Informed Marla of the above  Informed her that Dr Leverette Gaucher and I spent over an hour on [employer] website to find a specialist for her and we were unable to find one  It is extremely important she establish with a BMT specialist, therefore we would like her to touch base with the financial counselor at [place] [phone]  I advised her that Dr Leverette Gaucher would like an update after her conversation  Patient stated she will call us at the end of the week  She thanked Eduardo Davis for spending so much time on her case  Advised her again to please provide us with an update after she speaks with [facility]  She voiced understanding

## 2022-08-24 ENCOUNTER — PATIENT OUTREACH (OUTPATIENT)
Dept: HEMATOLOGY ONCOLOGY | Facility: CLINIC | Age: 52
End: 2022-08-24

## 2022-08-30 DIAGNOSIS — C90.00 MULTIPLE MYELOMA NOT HAVING ACHIEVED REMISSION (HCC): ICD-10-CM

## 2022-08-30 DIAGNOSIS — G89.3 CANCER ASSOCIATED PAIN: ICD-10-CM

## 2022-08-30 DIAGNOSIS — Z51.5 PALLIATIVE CARE PATIENT: ICD-10-CM

## 2022-08-30 RX ORDER — OXYCODONE HYDROCHLORIDE 10 MG/1
10-15 TABLET ORAL EVERY 4 HOURS PRN
Qty: 63 TABLET | Refills: 0 | Status: SHIPPED | OUTPATIENT
Start: 2022-08-30 | End: 2022-09-05 | Stop reason: SDUPTHER

## 2022-08-30 RX ORDER — OXYCODONE 9 MG/1
9 CAPSULE, EXTENDED RELEASE ORAL
Qty: 7 CAPSULE | Refills: 0 | Status: SHIPPED | OUTPATIENT
Start: 2022-08-30 | End: 2022-09-05 | Stop reason: SDUPTHER

## 2022-08-30 NOTE — TELEPHONE ENCOUNTER
Primary palliative medicine provider: Dr Frank Ellison     Medication requested: oxycodone 10 mg, oxycodone ER (extampza ER) 9 mg     If for pain, how has the patient been taking their pain medicine? Last appointment: 8/22     Next scheduled appointment: 9/22     PDMP review:  PDMP web down at this time

## 2022-08-31 RX ORDER — ACYCLOVIR 800 MG/1
TABLET ORAL
COMMUNITY
Start: 2022-08-22

## 2022-09-02 ENCOUNTER — OFFICE VISIT (OUTPATIENT)
Dept: INTERNAL MEDICINE CLINIC | Facility: CLINIC | Age: 52
End: 2022-09-02
Payer: COMMERCIAL

## 2022-09-02 VITALS
OXYGEN SATURATION: 96 % | DIASTOLIC BLOOD PRESSURE: 78 MMHG | SYSTOLIC BLOOD PRESSURE: 120 MMHG | RESPIRATION RATE: 20 BRPM | HEIGHT: 63 IN | BODY MASS INDEX: 29.95 KG/M2 | WEIGHT: 169 LBS | TEMPERATURE: 98.8 F | HEART RATE: 80 BPM

## 2022-09-02 DIAGNOSIS — G89.29 CHRONIC BILATERAL LOW BACK PAIN WITH BILATERAL SCIATICA: Chronic | ICD-10-CM

## 2022-09-02 DIAGNOSIS — M54.41 CHRONIC BILATERAL LOW BACK PAIN WITH BILATERAL SCIATICA: Chronic | ICD-10-CM

## 2022-09-02 DIAGNOSIS — Z12.31 ENCOUNTER FOR SCREENING MAMMOGRAM FOR BREAST CANCER: Primary | ICD-10-CM

## 2022-09-02 DIAGNOSIS — H93.13 TINNITUS OF BOTH EARS: ICD-10-CM

## 2022-09-02 DIAGNOSIS — R06.02 SHORTNESS OF BREATH: ICD-10-CM

## 2022-09-02 DIAGNOSIS — I10 PRIMARY HYPERTENSION: Chronic | ICD-10-CM

## 2022-09-02 DIAGNOSIS — M54.42 CHRONIC BILATERAL LOW BACK PAIN WITH BILATERAL SCIATICA: Chronic | ICD-10-CM

## 2022-09-02 PROCEDURE — 99214 OFFICE O/P EST MOD 30 MIN: CPT | Performed by: INTERNAL MEDICINE

## 2022-09-02 RX ORDER — ALBUTEROL SULFATE 90 UG/1
2 AEROSOL, METERED RESPIRATORY (INHALATION) EVERY 6 HOURS PRN
Qty: 18 G | Refills: 5 | Status: SHIPPED | OUTPATIENT
Start: 2022-09-02

## 2022-09-02 NOTE — PATIENT INSTRUCTIONS
Chronic Hypertension   AMBULATORY CARE:   Hypertension  is high blood pressure  Your blood pressure is the force of your blood moving against the walls of your arteries  Hypertension causes your blood pressure to get so high that your heart has to work much harder than normal  This can damage your heart  Even if you have hypertension for years, lifestyle changes, medicines, or both can help bring your blood pressure to normal   Call your local emergency number (911 in the [facility] [facility]) or have someone call if:   You have chest pain  You have any of the following signs of a heart attack:      Squeezing, pressure, or pain in your chest    You may  also have any of the following:     Discomfort or pain in your back, neck, jaw, stomach, or arm    Shortness of breath    Nausea or vomiting    Lightheadedness or a sudden cold sweat    You become confused or have difficulty speaking  You suddenly feel lightheaded or have trouble breathing  Seek care immediately if:   You have a severe headache or vision loss  You have weakness in an arm or leg  Call your doctor or cardiologist if:   You feel faint, dizzy, confused, or drowsy  You have been taking your blood pressure medicine but your pressure is higher than your provider says it should be  You have questions or concerns about your condition or care  Treatment for chronic hypertension  may include medicine to lower your blood pressure and cholesterol levels  A low cholesterol level helps prevent heart disease and makes it easier to control your blood pressure  Heart disease can make your blood pressure harder to control  You may also need to make lifestyle changes  What you need to know about the stages of hypertension:       Normal blood pressure is 119/79 or lower   Your healthcare provider may only check your blood pressure each year if it stays at a normal level  Elevated blood pressure is 120/79 to 129/79   This is sometimes called prehypertension  Your healthcare provider may suggest lifestyle changes to help lower your blood pressure to a normal level  He or she may then check it again in 3 to 6 months  Stage 1 hypertension is 130/80  to 139/89   Your provider may recommend lifestyle changes, medication, and checks every 3 to 6 months until your blood pressure is controlled  Stage 2 hypertension is 140/90 or higher   Your provider will recommend lifestyle changes and have you take 2 kinds of hypertension medicines  You will also need to have your blood pressure checked monthly until it is controlled  Manage chronic hypertension:   Check your blood pressure at home  Avoid smoking, caffeine, and exercise at least 30 minutes before checking your blood pressure  Sit and rest for 5 minutes before you take your blood pressure  Extend your arm and support it on a flat surface  Your arm should be at the same level as your heart  Follow the directions that came with your blood pressure monitor  Check your blood pressure 2 times, 1 minute apart, before you take your medicine in the morning  Also check your blood pressure before your evening meal  Keep a record of your readings and bring it to your follow-up visits  Ask your healthcare provider what your blood pressure should be  Manage any other health conditions you have  Health conditions such as diabetes can increase your risk for hypertension  Follow your healthcare provider's instructions and take all your medicines as directed  Talk to your healthcare provider about any new health conditions you have recently developed  Ask about all medicines  Certain medicines can increase your blood pressure  Examples include oral birth control pills, decongestants, herbal supplements, and NSAIDs, such as ibuprofen  Your healthcare provider can tell you which medicines are safe for you to take  This includes prescription and over-the-counter medicines      Lifestyle changes you can make to lower your blood pressure: Your provider may want you to make more lifestyle changes if you are having trouble controlling your blood pressure  This may feel difficult over time, especially if you think you are making good changes but your pressure is still high  It might help to focus on one new change at a time  For example, try to add 1 more day of exercise, or exercise for an extra 10 minutes on 2 days  Small changes can make a big difference  Your healthcare provider can also refer you to specialists such as a dietitian who can help you make small changes  Your family members may be included in helping you learn to create lifestyle changes, such as the following:     Limit sodium (salt) as directed  Too much sodium can affect your fluid balance  Check labels to find low-sodium or no-salt-added foods  Some low-sodium foods use potassium salts for flavor  Too much potassium can also cause health problems  Your healthcare provider will tell you how much sodium and potassium are safe for you to have in a day  He or she may recommend that you limit sodium to 2,300 mg a day  Follow the meal plan recommended by your healthcare provider  A dietitian or your provider can give you more information on low-sodium plans or the DASH (Dietary Approaches to Stop Hypertension) eating plan  The DASH plan is low in sodium, processed sugar, unhealthy fats, and total fat  It is high in potassium, calcium, and fiber  These can be found in vegetables, fruit, and whole-grain foods  Be physically active throughout the day  Physical activity, such as exercise, can help control your blood pressure and your weight  Be physically active for at least 30 minutes per day, on most days of the week  Include aerobic activity, such as walking or riding a bicycle  Also include strength training at least 2 times each week  Your healthcare providers can help you create a physical activity plan  Decrease stress    This may help lower your blood pressure  Learn ways to relax, such as deep breathing or listening to music  Limit alcohol as directed  Alcohol can increase your blood pressure  A drink of alcohol is 12 ounces of beer, 5 ounces of wine, or 1½ ounces of liquor  Do not smoke  Nicotine and other chemicals in cigarettes and cigars can increase your blood pressure and also cause lung damage  Ask your healthcare provider for information if you currently smoke and need help to quit  E-cigarettes or smokeless tobacco still contain nicotine  Talk to your healthcare provider before you use these products  Follow up with your doctor or cardiologist as directed: You will need to return to have your blood pressure checked and to have other lab tests done  Write down your questions so you remember to ask them during your visits  © Copyright [employer] 2022 Information is for End User's use only and may not be sold, redistributed or otherwise used for commercial purposes  All illustrations and images included in CareNotes® are the copyrighted property of A D A M , Inc  or [facility] Devin Eid   The above information is an  only  It is not intended as medical advice for individual conditions or treatments  Talk to your doctor, nurse or pharmacist before following any medical regimen to see if it is safe and effective for you

## 2022-09-03 PROBLEM — M54.41 CHRONIC BILATERAL LOW BACK PAIN WITH BILATERAL SCIATICA: Chronic | Status: ACTIVE | Noted: 2020-07-11

## 2022-09-03 PROBLEM — F11.20 CONTINUOUS OPIOID DEPENDENCE (HCC): Chronic | Status: ACTIVE | Noted: 2022-01-06

## 2022-09-03 PROBLEM — M54.42 CHRONIC BILATERAL LOW BACK PAIN WITH BILATERAL SCIATICA: Chronic | Status: ACTIVE | Noted: 2020-07-11

## 2022-09-03 PROBLEM — F17.210 CIGARETTE NICOTINE DEPENDENCE WITHOUT COMPLICATION: Chronic | Status: ACTIVE | Noted: 2021-10-30

## 2022-09-03 PROBLEM — G89.29 CHRONIC BILATERAL LOW BACK PAIN WITH BILATERAL SCIATICA: Chronic | Status: ACTIVE | Noted: 2020-07-11

## 2022-09-03 PROBLEM — C90.01 MULTIPLE MYELOMA IN REMISSION (HCC): Chronic | Status: ACTIVE | Noted: 2021-12-15

## 2022-09-03 NOTE — PROGRESS NOTES
[facility] - [facility]ETTE [facility]    NAME: Jay Cruz  AGE: 46 y o  SEX: female  : 1970     DATE: 9/3/2022     Assessment and Plan:     1  Primary hypertension    Blood pressure is controlled  Continue anti-HTN therapy as prescribed  2  Shortness of breath    Needs refill of albuterol  Sent to pharmacy  - albuterol (PROVENTIL HFA,VENTOLIN HFA) 90 mcg/act inhaler; Inhale 2 puffs every 6 (six) hours as needed for wheezing or shortness of breath  Dispense: 18 g; Refill: 5    3  Tinnitus of both ears    Denies any significant hearing loss  ENT referral was provided  - Ambulatory Referral to Otolaryngology; Future    4  Encounter for screening mammogram for breast cancer  - Mammo screening bilateral w 3d & cad; Future     5  Chronic bilateral low back pain with bilateral sciatica    On opiates from palliative care  Continue cymbalta and lyrica due to neuropathic component  Tobacco Cessation Counseling: Tobacco cessation counseling was provided  The patient is sincerely urged to quit consumption of tobacco  She is not ready to quit tobacco         Return in about 6 months (around 3/2/2023) for Follow-up  History of Present Illness:     Since she was last seen, she has undergone autologous stem cell transplant  She continues to deal with fatigue, chronic pain, neuropathy  On chronic opiates from palliative care  On lyrica for neuropathy  Also complaining of tinnitus  Would like to see ENT  Objective:     /78 (BP Location: Left arm, Patient Position: Sitting, Cuff Size: Standard)   Pulse 80   Temp 98 8 °F (37 1 °C) (Tympanic)   Resp 20   Ht 5' 3" (1 6 m)   Wt 76 7 kg (169 lb)   SpO2 96%   BMI 29 94 kg/m²     Physical Exam  Constitutional:       General: She is not in acute distress  Appearance: She is not ill-appearing  Cardiovascular:      Rate and Rhythm: Normal rate and regular rhythm  Heart sounds: No murmur heard    Pulmonary: Effort: Pulmonary effort is normal  No respiratory distress  Breath sounds: No wheezing  Abdominal:      General: Bowel sounds are normal  There is no distension  Tenderness: There is no abdominal tenderness  Musculoskeletal:      Right lower leg: No edema  Left lower leg: No edema  Neurological:      Mental Status: She is alert         Regi Hernandez DO  [facility]

## 2022-09-05 DIAGNOSIS — Z51.5 PALLIATIVE CARE PATIENT: ICD-10-CM

## 2022-09-05 DIAGNOSIS — G89.3 CANCER ASSOCIATED PAIN: ICD-10-CM

## 2022-09-05 DIAGNOSIS — C90.00 MULTIPLE MYELOMA NOT HAVING ACHIEVED REMISSION (HCC): ICD-10-CM

## 2022-09-05 NOTE — TELEPHONE ENCOUNTER
**Needs pre-auth for both meds**        Medication Refill Request     oxyCODONE (ROXICODONE) 10 MG TABS    Take 1-1 5 tablets (10-15 mg total) by mouth every 4 (four) hours as needed (cancer-related pain) Max Daily Amount: 90 mg,     #63     Verified pharmacy   [x]  Verified ordering Provider   [x]  Does patient have enough for the next 3 days? Yes [x] No []    Medication Refill Request     oxyCODONE ER (Xtampza ER) 9 MG C12A    Take 9 mg by mouth daily at bedtime Max Daily Amount: 9 mg, 8/30/2022,    Dispense: 7 capsule      #7    Verified pharmacy   [x]  Verified ordering Provider   [x]  Does patient have enough for the next 3 days?  Yes [x] No []

## 2022-09-07 RX ORDER — OXYCODONE HYDROCHLORIDE 10 MG/1
10-15 TABLET ORAL EVERY 4 HOURS PRN
Qty: 63 TABLET | Refills: 0 | Status: SHIPPED | OUTPATIENT
Start: 2022-09-07 | End: 2022-09-13 | Stop reason: SDUPTHER

## 2022-09-07 RX ORDER — OXYCODONE 9 MG/1
9 CAPSULE, EXTENDED RELEASE ORAL
Qty: 7 CAPSULE | Refills: 0 | Status: SHIPPED | OUTPATIENT
Start: 2022-09-07 | End: 2022-09-13 | Stop reason: SDUPTHER

## 2022-09-07 NOTE — TELEPHONE ENCOUNTER
No pa needed fotr Xtampza or Oxycodone  See approvals in media dated 7/21/22  Effective to 01/21/23  Verified with        Pt has been notified of active approval

## 2022-09-13 DIAGNOSIS — C90.00 MULTIPLE MYELOMA NOT HAVING ACHIEVED REMISSION (HCC): ICD-10-CM

## 2022-09-13 DIAGNOSIS — G89.3 CANCER ASSOCIATED PAIN: ICD-10-CM

## 2022-09-13 DIAGNOSIS — Z51.5 PALLIATIVE CARE PATIENT: ICD-10-CM

## 2022-09-13 PROBLEM — E04.1 THYROID NODULE: Status: ACTIVE | Noted: 2022-09-13

## 2022-09-13 RX ORDER — OXYCODONE 9 MG/1
9 CAPSULE, EXTENDED RELEASE ORAL
Qty: 7 CAPSULE | Refills: 0 | Status: SHIPPED | OUTPATIENT
Start: 2022-09-13 | End: 2022-09-22 | Stop reason: SDUPTHER

## 2022-09-13 RX ORDER — OXYCODONE HYDROCHLORIDE 10 MG/1
10-15 TABLET ORAL EVERY 4 HOURS PRN
Qty: 63 TABLET | Refills: 0 | Status: SHIPPED | OUTPATIENT
Start: 2022-09-13 | End: 2022-09-22 | Stop reason: SDUPTHER

## 2022-09-13 NOTE — TELEPHONE ENCOUNTER
Primary palliative medicine provider: Dr Norman Sharpe     Medication requested: oxycodone 10 mg   xtampza er 9 mg     If for pain, how has the patient been taking their pain medicine?      Last appointment: 8/22     Next scheduled appointment:9/22     PDMP review:    PATIENT ID PRESCRIPTION # FILLED WRITTEN DRUG LABEL QTY DAYS STRENGTH MME** PRESCRIBER PHARMACY PAYMENT REFILL #/AUTH STATE DETAIL   1 [id number] 09/07/2022 09/07/2022 oxyCODONE HCL (Tablet) 63 0 7 10  0 [employer] 0 / 0 [place]    1 [id number] 09/07/2022 09/07/2022 Xtampza Er (Capsule, Extended Release) 7 0 7 9 MG NA [employer] 0 / 0 [place]    1 [id number] 08/30/2022 08/30/2022 Xtampza Er (Capsule, Extended Release) 5 0 5 9 MG NA [employer] 0 / 0 [place]    1 [id number] 08/30/2022 08/30/2022 oxyCODONE HCL (Tablet) 63 0 16 10 MG 59 06 [employer] 0 / 0 [place]    1 [id number] 08/22/2022 08/22/2022 Xtampza Er (Capsule, Extended Release) 7 0 7 9 MG NA [employer] 0 / 0 [place]

## 2022-09-15 ENCOUNTER — CONSULT (OUTPATIENT)
Dept: SURGICAL ONCOLOGY | Facility: CLINIC | Age: 52
End: 2022-09-15
Payer: COMMERCIAL

## 2022-09-15 VITALS
WEIGHT: 166.2 LBS | SYSTOLIC BLOOD PRESSURE: 108 MMHG | OXYGEN SATURATION: 95 % | DIASTOLIC BLOOD PRESSURE: 68 MMHG | BODY MASS INDEX: 29.45 KG/M2 | RESPIRATION RATE: 18 BRPM | TEMPERATURE: 98.7 F | HEART RATE: 102 BPM | HEIGHT: 63 IN

## 2022-09-15 DIAGNOSIS — E04.1 THYROID NODULE: Primary | ICD-10-CM

## 2022-09-15 PROCEDURE — 99244 OFF/OP CNSLTJ NEW/EST MOD 40: CPT | Performed by: SURGERY

## 2022-09-15 NOTE — PROGRESS NOTES
Surgical Oncology Consult       [facility] [address] [facility] [address] [facility]  [address] [facility]  [address]  [place] [address]  [phone]    Julien Hernandez  1970  [id number]  [facility] ONC [facility] [place]  [address] [facility]  [address]  [address] [facility]  [phone]    Diagnoses and all orders for this visit:    Thyroid nodule  -     Ambulatory referral to Surgical Oncology  -     [place] [facility] Future  -     T4, free; Future  -     TSH, 3rd generation; Future        Chief Complaint   Patient presents with    Consult     Single 2 2 cm Left sided nodule       Return in about 4 weeks (around 10/13/2022) for Office Visit      Oncology History   Multiple myeloma in remission ([facility] [address] )   12/15/2021 Initial Diagnosis    Multiple myeloma not having achieved remission ([facility] [address] )     1/11/2022 - 2/24/2022 Chemotherapy    daratumumab-hyaluronidase (DARZALEX FASPRO), 1,800 mg, Subcutaneous daratumumab-hyaluronidase, Once, 2 of 4 cycles  Administration: 1,800 mg (1/18/2022), 1,800 mg (2/1/2022), 1,800 mg (2/15/2022)  bortezomib (VELCADE), 1 3 mg/m2 = 2 3 mg (86 7 % of original dose 1 5 mg/m2), Subcutaneous, Once, 2 of 4 cycles  Dose modification: 1 3 mg/m2 (original dose 1 5 mg/m2, Cycle 1, Reason: Other (Must fill in a comment), Comment: as per previous treating oncologist)  Administration: 2 3 mg (1/18/2022), 2 3 mg (1/11/2022), 2 3 mg (2/1/2022), 2 3 mg (2/8/2022), 2 3 mg (2/15/2022)  Cyclophosphamide (CYTOXAN), 300 mg/m2 = 500 mg (100 % of original dose 300 mg/m2), Oral, Once, 2 of 4 cycles  Dose modification: 300 mg/m2 (original dose 300 mg/m2, Cycle 1)  Administration: 500 mg (1/18/2022), 500 mg (1/25/2022), 500 mg (2/1/2022), 500 mg (2/8/2022), 500 mg (2/15/2022), 550 mg (2/24/2022)         History of Present Illness:  49-year-old female who was having imaging in follow-up of her multiple myeloma that was treated with stem cell transplant  CT from August 5, 2022 revealed a 1 8 cm left thyroid nodule  Thyroid ultrasound from August 12, 2022 reveals a left lower pole nodule measuring 2 2 x 2 x 1 6 cm  Biopsy was recommended  This was TR 4  Personally reviewed the films  She comes in now to discuss further therapy  She denies any dysphagia or hoarseness  No history of radiation to her head or neck  No family history of thyroid cancer  No family history of thyroid problems  She has not had any recent thyroid function studies  Review of Systems  Complete ROS Surg Onc:   Constitutional: The patient denies new or recent history of general fatigue, no recent weight loss, no change in appetite  Eyes: No complaints of visual problems, no scleral icterus  ENT: no complaints of ear pain, no hoarseness, no difficulty swallowing,  no tinnitus and no new masses in head, oral cavity, or neck  Cardiovascular: No complaints of chest pain, no palpitations, no ankle edema  Respiratory: No complaints of shortness of breath, no cough  Gastrointestinal: No complaints of jaundice, no bloody stools, no pale stools  Genitourinary: No complaints of dysuria, no hematuria, no nocturia, no frequent urination, no urethral discharge  Musculoskeletal: No complaints of weakness, paralysis, joint stiffness or arthralgias  Integumentary: No complaints of rash, no new lesions  Neurological: No complaints of convulsions, no seizures, no dizziness  Hematologic/Lymphatic: No complaints of easy bruising  Endocrine:  No hot or cold intolerance  No polydipsia, polyphagia, or polyuria  Allergy/immunology:  No environmental allergies  No food allergies  Not immunocompromised  Skin:  No pallor or rash  No wound            Patient Active Problem List   Diagnosis    Hypertension    Chronic bilateral low back pain with bilateral sciatica    Cigarette nicotine dependence without complication    Multiple myeloma in remission ([place] [address] )    Continuous opioid dependence ([place] [address] )    Palliative care patient    Encounter for central line care    H/O autologous stem cell transplant ([place] [address] )    Amyloidosis ([place] [address] )    Thyroid nodule     Past Medical History:   Diagnosis Date    Cardiomyopathy ([place] [address] )     Chronic back pain     GERD (gastroesophageal reflux disease)     H/O autologous stem cell transplant ([place] [address] ) 2022    Hypertension     Multiple myeloma ([place] [address] )     Neuropathy      Past Surgical History:   Procedure Laterality Date     SECTION       Family History   Problem Relation Age of Onset    No Known Problems Mother     No Known Problems Father     Colon cancer Neg Hx     Diabetes Neg Hx      Social History     Socioeconomic History    Marital status: Single     Spouse name: Not on file    Number of children: Not on file    Years of education: Not on file    Highest education level: Not on file   Occupational History    Not on file   Tobacco Use    Smoking status: Heavy Tobacco Smoker     Packs/day:  00     Years: 26 00     Pack years:  00     Start date: 1992    Smokeless tobacco: Current User   Vaping Use    Vaping Use: Never used   Substance and Sexual Activity    Alcohol use: Not Currently    Drug use: Never    Sexual activity: Not Currently     Partners: Male   Other Topics Concern    Not on file   Social History Narrative    Not on file     Social Determinants of Health     Financial Resource Strain: Not on file   Food Insecurity: Not on file   Transportation Needs: Not on file   Physical Activity: Not on file   Stress: Not on file   Social Connections: Not on file   Intimate Partner Violence: Not on file   Housing Stability: Not on file       Current Outpatient Medications:     acetaminophen (TYLENOL) 500 mg tablet, Take 2 tablets (1,000 mg total) by mouth every 8 (eight) hours as needed for mild pain, Disp: 90 tablet, Rfl: 0    acyclovir (ZOVIRAX) 800 mg tablet, take 1 tablet by mouth twice a day  DAYS AS DIRECTED, Disp: , Rfl:     albuterol (PROVENTIL HFA,VENTOLIN HFA) 90 mcg/act inhaler, Inhale 2 puffs every 6 (six) hours as needed for wheezing or shortness of breath, Disp: 18 g, Rfl: 5    DULoxetine (CYMBALTA) 30 mg delayed release capsule, take 1 capsule by mouth once daily, Disp: 90 capsule, Rfl: 1    famotidine (PEPCID) 40 MG tablet, TAKE 1 TABLET BY MOUTH ONCE DAILY, Disp: 90 tablet, Rfl: 1    fluticasone (FLONASE) 50 mcg/act nasal spray, instill 2 sprays into each nostril once daily, Disp: 16 g, Rfl: 5    Lidocaine HCl 4 % CREA, Apply topically 4 (four) times a day as needed (neuropathic pain), Disp: 133 g, Rfl: 0    methocarbamol (ROBAXIN) 750 mg tablet, Take 1 tablet (750 mg total) by mouth every 6 (six) hours as needed for muscle spasms, Disp: 120 tablet, Rfl: 3    naloxone (NARCAN) 4 mg/0 1 mL nasal spray, Administer 1 spray into a nostril   If no response after 2-3 minutes, give another dose in the other nostril using a new spray , Disp: 1 each, Rfl: 1    oxyCODONE (ROXICODONE) 10 MG TABS, Take 1-1 5 tablets (10-15 mg total) by mouth every 4 (four) hours as needed (cancer-related pain) Max Daily Amount: 90 mg, Disp: 63 tablet, Rfl: 0    oxyCODONE ER (Xtampza ER) 9 MG C12A, Take 9 mg by mouth daily at bedtime Max Daily Amount: 9 mg, Disp: 7 capsule, Rfl: 0    pregabalin (LYRICA) 100 mg capsule, Take 1 capsule (100 mg total) by mouth 2 (two) times a day, Disp: 90 capsule, Rfl: 0    acyclovir (ZOVIRAX) 400 MG tablet, Take 400 mg by mouth (Patient not taking: Reported on 9/15/2022), Disp: , Rfl:     amLODIPine (NORVASC) 10 mg tablet, Take 1 tablet (10 mg total) by mouth in the morning , Disp: 30 tablet, Rfl: 3    atorvastatin (LIPITOR) 40 mg tablet, Take 40 mg by mouth   (Patient not taking: Reported on 9/15/2022), Disp: , Rfl:     bisacodyl (DULCOLAX) 10 mg suppository, Insert 1 suppository (10 mg total) into the rectum daily as needed for constipation (Patient not taking: Reported on 9/15/2022), Disp: 12 suppository, Rfl: 0    cyclophosphamide (CYTOXAN) 50 mg capsule, Take 550 mg by mouth (Patient not taking: Reported on 9/15/2022), Disp: , Rfl:     dexamethasone (DECADRON) 4 mg tablet, , Disp: , Rfl:     loratadine (CLARITIN) 10 mg tablet, Take 1 tablet by mouth for 1 dose the morning of each Daratumumab Infusion  (Patient not taking: Reported on 9/15/2022), Disp: , Rfl:     metoprolol succinate (TOPROL-XL) 25 mg 24 hr tablet, , Disp: , Rfl:     montelukast (SINGULAIR) 10 mg tablet, Take 1 tablet by mouth for 1 dose the morning of each Daratumumab Infusion  (Patient not taking: Reported on 9/15/2022), Disp: , Rfl:     ondansetron (ZOFRAN) 8 mg tablet, take 1 tablet by mouth every 8 hours if needed for nausea or vomiting (Patient not taking: Reported on 9/15/2022), Disp: , Rfl:     Polyethyl Glycol-Propyl Glycol (Systane) 0 4-0 3 % GEL, 1 drop (Patient not taking: Reported on 9/15/2022), Disp: , Rfl:     polyethylene glycol (MIRALAX) 17 g packet, Take 17 g by mouth daily (Patient not taking: Reported on 9/15/2022), Disp: 30 each, Rfl: 0    prochlorperazine (COMPAZINE) 10 mg tablet, take 1 tablet by mouth every 6 hours if needed for nausea or vomiting (Patient not taking: Reported on 9/15/2022), Disp: , Rfl:     senna-docusate sodium (SENOKOT S) 8 6-50 mg per tablet, Take 2 tablets by mouth daily at bedtime (Patient not taking: Reported on 9/15/2022), Disp: 60 tablet, Rfl: 0  No Known Allergies  Vitals:    09/15/22 1408   BP: 108/68   Pulse: 102   Resp: 18   Temp: 98 7 °F (37 1 °C)   SpO2: 95%       Physical Exam   Constitutional: General appearance: The Patient is well-developed and well-nourished who appears the stated age in no acute distress  Patient is pleasant and talkative  HEENT:  Normocephalic  Sclerae are anicteric  Mucous membranes are moist  Neck is supple without adenopathy  No JVD    Palpable thyroid nodules bilaterally  These are not fixed to any underlying structures  Chest: The lungs are clear to auscultation  Cardiac: Heart is regular rate  Abdomen: Abdomen is soft, non-tender, non-distended and without masses  Extremities: There is no clubbing or cyanosis  There is no edema  Symmetric  Neuro: Grossly nonfocal  Gait is normal      Lymphatic: No evidence of cervical adenopathy bilaterally  Skin: Warm, anicteric  Psych:  Patient is pleasant and talkative  Breasts:      Pathology:  [unfilled]    Labs:      Imaging  Ultrasound is as above  I personally reviewed the films  I reviewed the above laboratory and imaging data  Discussion/Summary:  20-year-old female with an incidentally discovered 2 6 cm left thyroid nodule  I would recommend that she undergo biopsy of this nodule  We will set this up with [facility] testing if needed  We discussed that if the biopsy is negative, there is a 4% false negative rate  If the biopsy is either [place] 3 year [place] 4 we will send this for [facility] and surgical recommendations will be based on those results  I will plan on obtaining thyroid function studies now to make sure that this is not hyperfunctioning  I will see her again once we have the results of the biopsy  Assuming this is benign, I would follow her with yearly ultrasound as the lesion is under 4 cm in size  She is agreeable to this plan  All her questions were answered

## 2022-09-21 DIAGNOSIS — Z51.5 PALLIATIVE CARE PATIENT: ICD-10-CM

## 2022-09-21 DIAGNOSIS — G89.3 CANCER ASSOCIATED PAIN: ICD-10-CM

## 2022-09-21 DIAGNOSIS — C90.00 MULTIPLE MYELOMA NOT HAVING ACHIEVED REMISSION (HCC): ICD-10-CM

## 2022-09-21 RX ORDER — OXYCODONE HYDROCHLORIDE 10 MG/1
10-15 TABLET ORAL EVERY 4 HOURS PRN
Qty: 63 TABLET | Refills: 0 | Status: CANCELLED | OUTPATIENT
Start: 2022-09-21

## 2022-09-21 RX ORDER — OXYCODONE 9 MG/1
9 CAPSULE, EXTENDED RELEASE ORAL
Qty: 7 CAPSULE | Refills: 0 | Status: CANCELLED | OUTPATIENT
Start: 2022-09-21

## 2022-09-21 NOTE — TELEPHONE ENCOUNTER
Primary palliative medicine provider:   Benny Skaggs    Medication requested:  Lynn Nine  Oxycodone     If for pain, how has the patient been taking their pain medicine?      Last appointment: 08/22/22    Next scheduled appointment: 09/22/22    PDMP review:

## 2022-09-22 ENCOUNTER — TELEPHONE (OUTPATIENT)
Dept: HEMATOLOGY ONCOLOGY | Facility: CLINIC | Age: 52
End: 2022-09-22

## 2022-09-22 ENCOUNTER — OFFICE VISIT (OUTPATIENT)
Dept: PALLIATIVE MEDICINE | Facility: CLINIC | Age: 52
End: 2022-09-22
Payer: COMMERCIAL

## 2022-09-22 VITALS
DIASTOLIC BLOOD PRESSURE: 72 MMHG | BODY MASS INDEX: 29.41 KG/M2 | SYSTOLIC BLOOD PRESSURE: 112 MMHG | HEART RATE: 98 BPM | RESPIRATION RATE: 16 BRPM | TEMPERATURE: 97.8 F | WEIGHT: 166 LBS | OXYGEN SATURATION: 97 % | HEIGHT: 63 IN

## 2022-09-22 DIAGNOSIS — I10 PRIMARY HYPERTENSION: ICD-10-CM

## 2022-09-22 DIAGNOSIS — Z51.5 PALLIATIVE CARE PATIENT: ICD-10-CM

## 2022-09-22 DIAGNOSIS — C90.00 MULTIPLE MYELOMA NOT HAVING ACHIEVED REMISSION (HCC): ICD-10-CM

## 2022-09-22 DIAGNOSIS — G89.3 CANCER ASSOCIATED PAIN: ICD-10-CM

## 2022-09-22 DIAGNOSIS — M54.41 CHRONIC BILATERAL LOW BACK PAIN WITH BILATERAL SCIATICA: ICD-10-CM

## 2022-09-22 DIAGNOSIS — G89.29 CHRONIC BILATERAL LOW BACK PAIN WITH BILATERAL SCIATICA: ICD-10-CM

## 2022-09-22 DIAGNOSIS — C90.01 MULTIPLE MYELOMA IN REMISSION (HCC): Primary | Chronic | ICD-10-CM

## 2022-09-22 DIAGNOSIS — M54.42 CHRONIC BILATERAL LOW BACK PAIN WITH BILATERAL SCIATICA: ICD-10-CM

## 2022-09-22 DIAGNOSIS — M54.42 CHRONIC BILATERAL LOW BACK PAIN WITH BILATERAL SCIATICA: Chronic | ICD-10-CM

## 2022-09-22 DIAGNOSIS — M54.41 CHRONIC BILATERAL LOW BACK PAIN WITH BILATERAL SCIATICA: Chronic | ICD-10-CM

## 2022-09-22 DIAGNOSIS — F11.20 CONTINUOUS OPIOID DEPENDENCE (HCC): Chronic | ICD-10-CM

## 2022-09-22 DIAGNOSIS — G89.29 CHRONIC BILATERAL LOW BACK PAIN WITH BILATERAL SCIATICA: Chronic | ICD-10-CM

## 2022-09-22 PROCEDURE — 99214 OFFICE O/P EST MOD 30 MIN: CPT | Performed by: STUDENT IN AN ORGANIZED HEALTH CARE EDUCATION/TRAINING PROGRAM

## 2022-09-22 RX ORDER — OXYCODONE HYDROCHLORIDE 10 MG/1
10-15 TABLET ORAL EVERY 4 HOURS PRN
Qty: 63 TABLET | Refills: 0 | Status: SHIPPED | OUTPATIENT
Start: 2022-09-22 | End: 2022-09-28 | Stop reason: SDUPTHER

## 2022-09-22 RX ORDER — AMLODIPINE BESYLATE 10 MG/1
TABLET ORAL
Qty: 30 TABLET | Refills: 3 | Status: SHIPPED | OUTPATIENT
Start: 2022-09-22

## 2022-09-22 RX ORDER — OXYCODONE 9 MG/1
9 CAPSULE, EXTENDED RELEASE ORAL
Qty: 7 CAPSULE | Refills: 0 | Status: SHIPPED | OUTPATIENT
Start: 2022-09-22 | End: 2022-09-28 | Stop reason: SDUPTHER

## 2022-09-22 RX ORDER — METHOCARBAMOL 750 MG/1
750 TABLET, FILM COATED ORAL EVERY 6 HOURS PRN
Qty: 120 TABLET | Refills: 3 | Status: SHIPPED | OUTPATIENT
Start: 2022-09-22

## 2022-09-22 NOTE — PROGRESS NOTES
Outpatient Follow-Up - Palliative and Supportive Care   Vivienne Johnson 46 y o  female [id number]    Assessment & Plan  Problem List Items Addressed This Visit        Nervous and Auditory    Chronic bilateral low back pain with bilateral sciatica (Chronic)       Other    Multiple myeloma in remission (HCC) - Primary (Chronic)    Relevant Medications    oxyCODONE (ROXICODONE) 10 MG TABS    oxyCODONE ER (Xtampza ER) 9 MG C12A    Continuous opioid dependence (HCC) (Chronic)    Palliative care patient    Relevant Medications    oxyCODONE (ROXICODONE) 10 MG TABS    oxyCODONE ER (Xtampza ER) 9 MG C12A      Other Visit Diagnoses     Multiple myeloma not having achieved remission (HCC)        Relevant Medications    oxyCODONE (ROXICODONE) 10 MG TABS    oxyCODONE ER (Xtampza ER) 9 MG C12A    Cancer associated pain        Relevant Medications    oxyCODONE (ROXICODONE) 10 MG TABS    oxyCODONE ER (Xtampza ER) 9 MG C12A        #symptom management  #cancer-related pain   -continue APAP 1000 mg PO Q8H PRN              - max daily 4000 mg   - continue oxy-IR 10-15 mg PO Q4H PRN   - continue oxy-ER 9 mg PO QHS     Patient disclosed concern of her daughter's substance abuse and strong suspicion that some of the patient's medications have been stolen  Recommendation for lock box for safe storage and ensure proper/safe dispensing to patient only  Also recommend one-week prescriptions to ensure low pill count to deter theft and minimize amount of opioids in household       Patient use of locked safe  Daughter with no access  No reported concern of misuse  Discussed opioid taper plan in upcoming appointments  Will d/c QHS ER formulation first, then decrease daily IR max  All questions/concerns addressed   Patient in agreement with plan      #neuropathic pain   - continue pregabalin 100 mg PO BID   - continue duloxetine 30 mg PO QDaily   - continue lidocaine cream QID PRN     #muscle spasms   - continue methocarbamol 500 mg PO Q6H PRN     #chemotherapy induced nausea   - continue ondansetron 8 mg PO Q8H PRN   - continue prochlorperazine 10 mg PO Q6H PRN     #OIC   - continue senna-docusate 2 tabs PO QHS   - continue miralax 17 g PO QDaily   - continue bisacodyl 10 mg ND QDaily PRN     #anxiety/depression   - continue duloxetine 30 mg PO QDaily    #psychosocial support   - emotional support provided   - not currently    - two adult children              - Amilcar Selby [daughter]              - Richard Renteria [mother] [phone]   - one sibling              - Mayi [sister]   - two grandchildren [aged 5 and 3]      Next [address] Follow up in 4 weeks  Controlled Substance Review    [place] or [place]  reviewed: No red flags were identified; safe to proceed with prescription  Josh Manifold PDMP Review       Value Time User    PDMP Reviewed  Yes (P)  9/22/2022  7:30 AM Yogesh Franco MD          Medications adjusted this encounter:  Requested Prescriptions     Signed Prescriptions Disp Refills    oxyCODONE (ROXICODONE) 10 MG TABS 63 tablet 0     Sig: Take 1-1 5 tablets (10-15 mg total) by mouth every 4 (four) hours as needed (cancer-related pain) Max Daily Amount: 90 mg    oxyCODONE ER (Xtampza ER) 9 MG C12A 7 capsule 0     Sig: Take 9 mg by mouth daily at bedtime Max Daily Amount: 9 mg     No orders of the defined types were placed in this encounter  Medications Discontinued During This Encounter   Medication Reason    oxyCODONE ER (Xtampza ER) 9 MG C12A Reorder    oxyCODONE (ROXICODONE) 10 MG TABS Reorder         Adam Cannon Ball was seen today for symptoms and planning cares related to above illnesses  I have reviewed the patient's controlled substance dispensing history in the Prescription Drug Monitoring Program in compliance with the [facility] regulations before prescribing any controlled substances  They are invited to continue to follow with us    If there are questions or concerns, please contact us through our clinic/answering service 24 hours a day, seven days a week  Enedelia Russo MD  [facility] Palliative and Supportive Care        Visit Information    Accompanied By: No one    Source of History: Self, Medical record    History Limitations: None      History of Present Illness    Marla Tony is a 46 y o  female who presents in follow up of symptoms related to multiple myeloma s/p PBSCT [03/30/2022]  Pertinent issues include: symptom management, depression or anxiety, assessment of goals of care, advance care planning  Patient reports overall doing well, today is a bad day with diffuse pain, attributes to change in weather [rainy outside today]  Pain primarily localizes to diffuse joints, constant, worse with prolonged use  Use of oxy-IR x 6 tabs/day  Persistent neuropathic pain in hands/feet  Denies nausea, vomiting  Appetite intact  Weight stable  BM daily, regular, no requirement for bowel regimen  Adequate sleep, trying to minimize daytime naps to optimize overnight sleep  Past medical, surgical, social, and family histories are reviewed and pertinent updates are made  Review of Systems   Constitutional: Negative for chills, decreased appetite, fever, malaise/fatigue and weight loss  HENT: Negative for congestion  Eyes: Negative for visual disturbance  Cardiovascular: Negative for chest pain  Respiratory: Negative for shortness of breath  Musculoskeletal: Positive for arthritis and joint pain  Negative for falls and neck pain  Gastrointestinal: Negative for abdominal pain, constipation, nausea and vomiting  Genitourinary: Negative for frequency  Neurological: Negative for headaches  Psychiatric/Behavioral: The patient does not have insomnia  All other systems reviewed and are negative          Vital Signs    /72 (BP Location: Left arm, Patient Position: Sitting, Cuff Size: Adult)   Pulse 98   Temp 97 8 °F (36 6 °C) (Temporal)   Resp 16   Ht 5' 3" (1 6 m)   Wt 75 3 kg (166 lb)   SpO2 97%   BMI 29 41 kg/m²     Physical Exam and Objective Data  Physical Exam  Vitals and nursing note reviewed  Constitutional:       General: She is awake  Appearance: She is not diaphoretic  Comments: Sitting up in NAD  BMI 29 4  Non-toxic appearing   HENT:      Head: Normocephalic and atraumatic  Right Ear: External ear normal       Left Ear: External ear normal       Nose: No rhinorrhea  Eyes:      Comments: No gaze preference   Cardiovascular:      Rate and Rhythm: Normal rate  Pulmonary:      Effort: No tachypnea, accessory muscle usage or respiratory distress  Comments: Completes full sentences without difficulty  Musculoskeletal:      Cervical back: Normal range of motion  Neurological:      General: No focal deficit present  Mental Status: She is alert and oriented to person, place, and time     Psychiatric:         Attention and Perception: Attention normal          Mood and Affect: Mood and affect normal          Speech: Speech normal          Cognition and Memory: Cognition and memory normal            Radiology and Laboratory:  I personally reviewed and interpreted the following results:    Most Recent COVID-19 Results:  Lab Results   Component Value Date/Time    SARSCOV2 Negative 10/30/2021 12:10 AM       Most Recent Lab Work:  Lab Results   Component Value Date/Time    SODIUM 137 03/23/2022 12:04 PM    K 3 4 (L) 03/23/2022 12:04 PM    BUN 20 03/23/2022 12:04 PM    CREATININE 1 00 03/23/2022 12:04 PM    GLUC 87 03/23/2022 12:04 PM     Lab Results   Component Value Date/Time    AST 10 03/23/2022 12:04 PM    ALT 22 03/23/2022 12:04 PM    ALB 3 2 (L) 03/23/2022 12:04 PM     Lab Results   Component Value Date/Time    HGB 13 9 03/23/2022 12:04 PM    WBC 15 11 (H) 03/23/2022 12:04 PM     (H) 03/23/2022 12:04 PM    INR 1 15 10/29/2021 09:35 PM    PTT 37 10/29/2021 09:35 PM       Most Recent Imaging [last 30 days]:  No results found  30 minutes was spent face to face with Sen Clements with greater than 50% of the time spent in counseling or coordination of care including discussions of provided medical updates, discussed palliative care, determined goals of care, determined social/family support, discussed plans of care, discussed symptom management, provided psychosocial support  Opioid refills  Discussion of opioid taper plan  PDMP Reviewed  All of the patient's or agent's questions were answered during this discussion

## 2022-09-22 NOTE — TELEPHONE ENCOUNTER
Medication Refill Request     Name Methocarbamol  750 mg 1 tab every 6 hours PRN   Dose/Frequency every 6 hrs PRN pain   Quantity   Verified pharmacy   [x]  Verified ordering Provider   [x]Dr Mcarthur    Does patient have enough for the next 3 days?  Yes [x] No []    [facility] in [place]

## 2022-09-22 NOTE — TELEPHONE ENCOUNTER
Spoke with Marla, asked if she had a chance to change her insurance so Dr Lanette Mccoy is covered  Patient stated she has not  Reviewed the following with patient  INformed her that because of her disease process, Dr Fitz Sosa is recommending a BMT specialist  be part of her care team   Patient is aware Dr Fitz Sosa spent over an hour looking at the medicare website to find a BMT specialist under her current insurance and there was not one available  This was communicated to patient several times  Patient stated she has "been procrastinating and doesn't know why "  She said she will change the insurance in the next couple of days  Advised her that Dr Fitz Sosa does want to see her in the office, I'll discuss with him and give her a call back  Asked she call me once she has changed her insurance  She voiced understanding

## 2022-09-23 DIAGNOSIS — C90.01 MULTIPLE MYELOMA IN REMISSION (HCC): Primary | ICD-10-CM

## 2022-09-28 DIAGNOSIS — C90.00 MULTIPLE MYELOMA NOT HAVING ACHIEVED REMISSION (HCC): ICD-10-CM

## 2022-09-28 DIAGNOSIS — Z51.5 PALLIATIVE CARE PATIENT: ICD-10-CM

## 2022-09-28 DIAGNOSIS — G89.3 CANCER ASSOCIATED PAIN: ICD-10-CM

## 2022-09-28 RX ORDER — OXYCODONE 9 MG/1
9 CAPSULE, EXTENDED RELEASE ORAL
Qty: 7 CAPSULE | Refills: 0 | Status: SHIPPED | OUTPATIENT
Start: 2022-09-28 | End: 2022-10-05 | Stop reason: SDUPTHER

## 2022-09-28 RX ORDER — OXYCODONE HYDROCHLORIDE 10 MG/1
10-15 TABLET ORAL EVERY 4 HOURS PRN
Qty: 63 TABLET | Refills: 0 | Status: SHIPPED | OUTPATIENT
Start: 2022-09-28 | End: 2022-10-05 | Stop reason: SDUPTHER

## 2022-09-28 NOTE — TELEPHONE ENCOUNTER
Primary palliative medicine provider:   Michele Marcelo     Medication requested:  Oxycodone 10 mg   Xtampza 9 mg       If for pain, how has the patient been taking their pain medicine?      Last appointment:   09/22    Next scheduled appointment: 10/18/22    PDMP review:

## 2022-10-05 DIAGNOSIS — Z51.5 PALLIATIVE CARE PATIENT: ICD-10-CM

## 2022-10-05 DIAGNOSIS — G89.3 CANCER ASSOCIATED PAIN: ICD-10-CM

## 2022-10-05 DIAGNOSIS — C90.00 MULTIPLE MYELOMA NOT HAVING ACHIEVED REMISSION (HCC): ICD-10-CM

## 2022-10-05 RX ORDER — OXYCODONE 9 MG/1
9 CAPSULE, EXTENDED RELEASE ORAL
Qty: 7 CAPSULE | Refills: 0 | Status: SHIPPED | OUTPATIENT
Start: 2022-10-05 | End: 2022-10-12 | Stop reason: SDUPTHER

## 2022-10-05 RX ORDER — OXYCODONE HYDROCHLORIDE 10 MG/1
10-15 TABLET ORAL EVERY 4 HOURS PRN
Qty: 63 TABLET | Refills: 0 | Status: SHIPPED | OUTPATIENT
Start: 2022-10-05 | End: 2022-10-12 | Stop reason: SDUPTHER

## 2022-10-05 NOTE — TELEPHONE ENCOUNTER
Primary palliative medicine provider:   Kingsley Pena     Medication requested:  Xtampza 9 mg  Oxycodone 10 mg IR    If for pain, how has the patient been taking their pain medicine?      Last appointment: 9/22    Next scheduled appointment: 10/18/22    PDMP review:  09/28/2022 09/28/2022 oxyCODONE HCL (Tablet) 63 0 7 10  0 [address] 0 / 0 [place]    1 [id number] 09/28/2022 09/28/2022 Xtampza Er (Capsule, Extended Release) 7 0 7 9 MG NA [address]  Commercial Insurance 0 / 0

## 2022-10-10 ENCOUNTER — TELEPHONE (OUTPATIENT)
Dept: HEMATOLOGY ONCOLOGY | Facility: CLINIC | Age: 52
End: 2022-10-10

## 2022-10-10 NOTE — TELEPHONE ENCOUNTER
LM for patient to call back to R/S appt  W/ Dr Adilia Shen on 11/3/22 as he will be in surgery that day  There is availability at [facility] or [place] for appts  [facility]

## 2022-10-12 DIAGNOSIS — G89.3 CANCER ASSOCIATED PAIN: ICD-10-CM

## 2022-10-12 DIAGNOSIS — C90.00 MULTIPLE MYELOMA NOT HAVING ACHIEVED REMISSION (HCC): ICD-10-CM

## 2022-10-12 DIAGNOSIS — Z51.5 PALLIATIVE CARE PATIENT: ICD-10-CM

## 2022-10-12 NOTE — TELEPHONE ENCOUNTER
Primary palliative medicine provider:   Corey Bowman    Medication requested:  Oxycodone   xtampza     If for pain, how has the patient been taking their pain medicine?      Last appointment:    Next scheduled appointment: 10/18    PDMP review:    10/05/2022 10/05/2022 oxyCODONE HCL (Tablet) 63 0 7 10  0 [facility] 0 / 0 [place]    1 [id number] 10/05/2022 10/05/2022 Xtampza Er (Capsule, Extended Release) 7 0 7 9 MG NA [address]  Commercial Insurance 0 / 0

## 2022-10-13 RX ORDER — OXYCODONE HYDROCHLORIDE 10 MG/1
10-15 TABLET ORAL EVERY 4 HOURS PRN
Qty: 63 TABLET | Refills: 0 | Status: SHIPPED | OUTPATIENT
Start: 2022-10-13 | End: 2022-10-18 | Stop reason: SDUPTHER

## 2022-10-13 RX ORDER — OXYCODONE 9 MG/1
9 CAPSULE, EXTENDED RELEASE ORAL
Qty: 7 CAPSULE | Refills: 0 | Status: SHIPPED | OUTPATIENT
Start: 2022-10-13 | End: 2022-10-18 | Stop reason: SDUPTHER

## 2022-10-18 DIAGNOSIS — Z51.5 PALLIATIVE CARE PATIENT: ICD-10-CM

## 2022-10-18 DIAGNOSIS — C90.00 MULTIPLE MYELOMA NOT HAVING ACHIEVED REMISSION (HCC): ICD-10-CM

## 2022-10-18 DIAGNOSIS — G89.3 CANCER ASSOCIATED PAIN: ICD-10-CM

## 2022-10-18 RX ORDER — OXYCODONE 9 MG/1
9 CAPSULE, EXTENDED RELEASE ORAL
Qty: 7 CAPSULE | Refills: 0 | Status: SHIPPED | OUTPATIENT
Start: 2022-10-18 | End: 2022-10-26 | Stop reason: SDUPTHER

## 2022-10-18 RX ORDER — OXYCODONE HYDROCHLORIDE 10 MG/1
10-15 TABLET ORAL EVERY 4 HOURS PRN
Qty: 63 TABLET | Refills: 0 | Status: SHIPPED | OUTPATIENT
Start: 2022-10-18 | End: 2022-10-26 | Stop reason: SDUPTHER

## 2022-10-18 NOTE — TELEPHONE ENCOUNTER
Primary palliative medicine provider:   Juliette Alberto    Medication requested:  Oxycodone 10 mg IR  Xtampza ER 9 mg     If for pain, how has the patient been taking their pain medicine?      Last appointment: 9/22    Next scheduled appointment: 11/16    PDMP review:    10/13/2022 10/13/2022 oxyCODONE HCL (Tablet) 63 0 7 10  0 [employer] Commercial [employer] 0 / 0 [id number]    1 [id number] 10/13/2022 10/13/2022 Xtampza Er (Capsule, Extended Release) 7 0 7 9 MG NA MYAH GALVAN [facility]

## 2022-10-19 ENCOUNTER — APPOINTMENT (OUTPATIENT)
Dept: LAB | Facility: HOSPITAL | Age: 52
End: 2022-10-19
Payer: COMMERCIAL

## 2022-10-19 ENCOUNTER — TELEPHONE (OUTPATIENT)
Dept: PALLIATIVE MEDICINE | Facility: CLINIC | Age: 52
End: 2022-10-19

## 2022-10-19 DIAGNOSIS — E04.1 THYROID NODULE: ICD-10-CM

## 2022-10-19 LAB
T4 FREE SERPL-MCNC: 0.85 NG/DL (ref 0.76–1.46)
TSH SERPL DL<=0.05 MIU/L-ACNC: 2.58 UIU/ML (ref 0.45–4.5)

## 2022-10-19 PROCEDURE — 84439 ASSAY OF FREE THYROXINE: CPT

## 2022-10-19 PROCEDURE — 84443 ASSAY THYROID STIM HORMONE: CPT

## 2022-10-19 PROCEDURE — 36415 COLL VENOUS BLD VENIPUNCTURE: CPT

## 2022-10-20 ENCOUNTER — HOSPITAL ENCOUNTER (OUTPATIENT)
Dept: ULTRASOUND IMAGING | Facility: HOSPITAL | Age: 52
End: 2022-10-20
Attending: SURGERY
Payer: COMMERCIAL

## 2022-10-20 DIAGNOSIS — E04.1 THYROID NODULE: ICD-10-CM

## 2022-10-20 PROCEDURE — 88173 CYTOPATH EVAL FNA REPORT: CPT | Performed by: PATHOLOGY

## 2022-10-20 PROCEDURE — 10005 FNA BX W/US GDN 1ST LES: CPT

## 2022-10-20 PROCEDURE — 88172 CYTP DX EVAL FNA 1ST EA SITE: CPT | Performed by: PATHOLOGY

## 2022-10-20 RX ORDER — LIDOCAINE HYDROCHLORIDE 10 MG/ML
5 INJECTION, SOLUTION EPIDURAL; INFILTRATION; INTRACAUDAL; PERINEURAL ONCE
Status: DISCONTINUED | OUTPATIENT
Start: 2022-10-20 | End: 2022-10-24 | Stop reason: HOSPADM

## 2022-10-25 PROCEDURE — 88172 CYTP DX EVAL FNA 1ST EA SITE: CPT | Performed by: PATHOLOGY

## 2022-10-25 PROCEDURE — 88173 CYTOPATH EVAL FNA REPORT: CPT | Performed by: PATHOLOGY

## 2022-10-26 DIAGNOSIS — C90.00 MULTIPLE MYELOMA NOT HAVING ACHIEVED REMISSION (HCC): ICD-10-CM

## 2022-10-26 DIAGNOSIS — Z51.5 PALLIATIVE CARE PATIENT: ICD-10-CM

## 2022-10-26 DIAGNOSIS — G89.3 CANCER ASSOCIATED PAIN: ICD-10-CM

## 2022-10-26 RX ORDER — OXYCODONE 9 MG/1
9 CAPSULE, EXTENDED RELEASE ORAL
Qty: 7 CAPSULE | Refills: 0 | Status: SHIPPED | OUTPATIENT
Start: 2022-10-26 | End: 2022-10-31 | Stop reason: SDUPTHER

## 2022-10-26 RX ORDER — OXYCODONE HYDROCHLORIDE 10 MG/1
10-15 TABLET ORAL EVERY 4 HOURS PRN
Qty: 63 TABLET | Refills: 0 | Status: SHIPPED | OUTPATIENT
Start: 2022-10-26 | End: 2022-10-31 | Stop reason: SDUPTHER

## 2022-10-26 NOTE — TELEPHONE ENCOUNTER
Primary palliative medicine provider: Dr Gilson Vinson     Medication requested: oxycodone 10 mg and Xtampza ER 9 mg     If for pain, how has the patient been taking their pain medicine?        Last appointment: 9/22     Next scheduled appointment: 11/16     PDMP review:    [facility]   PATIENT ID PRESCRIPTION # FILLED WRITTEN DRUG LABEL QTY DAYS STRENGTH MME** PRESCRIBER PHARMACY PAYMENT REFILL #/AUTH STATE DETAIL   1 [id number] 10/19/2022 10/18/2022 Xtampza Er (Capsule, Extended Release) 7 0 7 9 MG NA [address] 0 / 0 [place]    1 [id number] 10/19/2022 10/18/2022 oxyCODONE HCL (Tablet) 63 0 7 10  0 [employer] 0 / 0 [place]    1 [id number] 10/13/2022 10/13/2022 oxyCODONE HCL (Tablet) 63 0 7 10  0 [employer] 0 / 0 [place]    1 [id number] 10/13/2022 10/13/2022 Xtampza Er (Capsule, Extended Release) 7 0 7 9 MG NA [employer] [employer] 0 / 0 [place]

## 2022-10-31 DIAGNOSIS — C90.00 MULTIPLE MYELOMA NOT HAVING ACHIEVED REMISSION (HCC): ICD-10-CM

## 2022-10-31 DIAGNOSIS — Z51.5 PALLIATIVE CARE PATIENT: ICD-10-CM

## 2022-10-31 DIAGNOSIS — G89.3 CANCER ASSOCIATED PAIN: ICD-10-CM

## 2022-10-31 NOTE — TELEPHONE ENCOUNTER
Primary palliative medicine provider:   Felicita Leyva    Medication requested:  Oxycodone 10 mg IR  Xtampza 9 mg ER     If for pain, how has the patient been taking their pain medicine? Last appointment:    Next scheduled appointment:11/16     PDMP review:    10/26/2022 10/26/2022 Xtampza Er (Capsule, Extended Release) 7 0 7 9 MG NA [employer] [facility]'  0 / 0 [place]    1 [id number] 10/26/2022 10/26/2022 oxyCODONE HCL (Tablet) 63 0 7 10  0 [address] 0 / 0       Pharmacy:    PT TO BEGIN NEW INSURANCE 11/01/22  COULD THESE BE SENT BY TONIGHT WITH POSSIBILITY TO BE FILLED TONIGHT  This nurse will address prior auth tomorrow       Thank you

## 2022-11-01 RX ORDER — OXYCODONE 9 MG/1
9 CAPSULE, EXTENDED RELEASE ORAL
Qty: 7 CAPSULE | Refills: 0 | Status: SHIPPED | OUTPATIENT
Start: 2022-11-01 | End: 2022-11-08 | Stop reason: SDUPTHER

## 2022-11-01 RX ORDER — OXYCODONE HYDROCHLORIDE 10 MG/1
10-15 TABLET ORAL EVERY 4 HOURS PRN
Qty: 63 TABLET | Refills: 0 | Status: SHIPPED | OUTPATIENT
Start: 2022-11-01 | End: 2022-11-08 | Stop reason: SDUPTHER

## 2022-11-01 NOTE — TELEPHONE ENCOUNTER
Verified Prior Auth status with pharmacy  NO PA required for either medication  [employer] current insurnace  Pt notified of same

## 2022-11-04 ENCOUNTER — TELEPHONE (OUTPATIENT)
Dept: HEMATOLOGY ONCOLOGY | Facility: CLINIC | Age: 52
End: 2022-11-04

## 2022-11-04 DIAGNOSIS — Z94.84 H/O AUTOLOGOUS STEM CELL TRANSPLANT (HCC): Primary | ICD-10-CM

## 2022-11-04 NOTE — TELEPHONE ENCOUNTER
Labs not completed for appointment  Patient aware to have labwork completed, appointment rescheduled

## 2022-11-07 RX ORDER — ACYCLOVIR 800 MG/1
800 TABLET ORAL 2 TIMES DAILY
Qty: 180 TABLET | Refills: 3 | Status: SHIPPED | OUTPATIENT
Start: 2022-11-07 | End: 2023-11-02

## 2022-11-08 DIAGNOSIS — G89.3 CANCER ASSOCIATED PAIN: ICD-10-CM

## 2022-11-08 DIAGNOSIS — C90.00 MULTIPLE MYELOMA NOT HAVING ACHIEVED REMISSION (HCC): ICD-10-CM

## 2022-11-08 DIAGNOSIS — Z51.5 PALLIATIVE CARE PATIENT: ICD-10-CM

## 2022-11-08 RX ORDER — OXYCODONE HYDROCHLORIDE 10 MG/1
10-15 TABLET ORAL EVERY 4 HOURS PRN
Qty: 63 TABLET | Refills: 0 | Status: SHIPPED | OUTPATIENT
Start: 2022-11-08 | End: 2022-11-14 | Stop reason: SDUPTHER

## 2022-11-08 RX ORDER — OXYCODONE 9 MG/1
9 CAPSULE, EXTENDED RELEASE ORAL
Qty: 7 CAPSULE | Refills: 0 | Status: SHIPPED | OUTPATIENT
Start: 2022-11-08 | End: 2022-11-14 | Stop reason: SDUPTHER

## 2022-11-14 DIAGNOSIS — G89.3 CANCER ASSOCIATED PAIN: ICD-10-CM

## 2022-11-14 DIAGNOSIS — C90.00 MULTIPLE MYELOMA NOT HAVING ACHIEVED REMISSION (HCC): ICD-10-CM

## 2022-11-14 DIAGNOSIS — Z51.5 PALLIATIVE CARE PATIENT: ICD-10-CM

## 2022-11-14 RX ORDER — OXYCODONE HYDROCHLORIDE 10 MG/1
10-15 TABLET ORAL EVERY 4 HOURS PRN
Qty: 63 TABLET | Refills: 0 | Status: SHIPPED | OUTPATIENT
Start: 2022-11-14 | End: 2022-11-21 | Stop reason: SDUPTHER

## 2022-11-14 RX ORDER — OXYCODONE 9 MG/1
9 CAPSULE, EXTENDED RELEASE ORAL
Qty: 7 CAPSULE | Refills: 0 | Status: SHIPPED | OUTPATIENT
Start: 2022-11-14 | End: 2022-11-21 | Stop reason: SDUPTHER

## 2022-11-14 NOTE — TELEPHONE ENCOUNTER
Primary palliative medicine provider: Enzo Lombard    Medication requested:OxyCodone ER [facility] ER) 9 MG  OxyCodone 10 mg    If for pain, how has the patient been taking their pain medicine?      Last appointment:09/22/22    Next scheduled appointment:11/16/22    PDMP review:  [id number] 11/08/2022 11/08/2022 Xtampza Er (Capsule, Extended Release) 7 0 7 9 MG NA [employer] 0 / 0 [place]     1 [id number] 11/08/2022 11/08/2022 oxyCODONE HCL (Tablet) 63 0 7 10  0 [facility] [employer] 0 / 0 [place]    1 [id number] 11/04/2022 09/30/2022 Pregabalin (Capsule) 60 0 30 100 MG NA KRISTINA [employer] 1 / 1 [place]    1 [id number] 11/01/2022 11/01/2022 Xtampza Er (Capsule, Extended Release) 7 0 7 9 MG NA [employer] 0 / 0 [place]    1 [id number] 11/01/2022 11/01/2022 oxyCODONE HCL (Tablet) 63 0 7 10  0 [employer] 0 / 0 [place]    1 [id number] 10/26/2022 10/26/2022 Xtampza Er (Capsule, Extended Release) 7 0 7 9 MG NA [employer] 0 / 0 [place]    1 [id number] 10/26/2022 10/26/2022 oxyCODONE HCL (Tablet) 63 0 7 10  0 [employer] 0 / 0 [place]    1 [id number] 10/19/2022 10/18/2022 Xtampza Er (Capsule, Extended Release) 7 0 7 9 MG NA [employer] 0 / 0 [place]    1 [id number] 10/19/2022 10/18/2022 oxyCODONE HCL (Tablet) 63 0 7 10  0 [employer] 0 / 0 [place]    1 [id number] 10/13/2022 10/13/2022 oxyCODONE HCL (Tablet) 63 0 7 10  0 [address] 0 / 0 [place]          Pharmacy: [facility]

## 2022-11-15 ENCOUNTER — TELEPHONE (OUTPATIENT)
Dept: PALLIATIVE MEDICINE | Facility: CLINIC | Age: 52
End: 2022-11-15

## 2022-11-15 NOTE — TELEPHONE ENCOUNTER
This MA reached out to patient health insurance on file, over an hour was transferred to multiple representatives  Patient updated health insurance [facility] for you ID # [id number]   This MA was able to get an approval over the phone with [facility], for both medication Xtampza capsule 9 mg and oxycodone 10  Mg   Call reference # [id number]  Marlen CARIAS  Reached out to pharmacy not able to run script with out all health insurance information  This MA reached out to patient to informed of approval but additional information needed at the pharmacy to be able to fill script  Patient stated she will reach out to patient's health insurance to get additional information and will call to give to pharmacy

## 2022-11-15 NOTE — TELEPHONE ENCOUNTER
Prior Authorization needed for [facility] ER [address]    IS#[id number]    ZPL:[id number]  YVL:[id number]    Pharmacy:[facility] [address]  LNZQK#[id number]  [id number]    Prior Authorization needed for OXYCODONE HCL IR 10MG TAB    XW#[id number]      UAB:[id number]  GRP:[id number]    [facility]   [facility]#[id number]  [id number]    PLAN LIMITS EXCEEDS REDUCE QUANTITY TO ALLOWED QUANTITY AND DAY SUPPLY   CALL THE AGENCY DIRECTLY FOR RESOLUTION Silvana Barger

## 2022-11-15 NOTE — TELEPHONE ENCOUNTER
Nichelle Rose called again wants to know whats going on with her 2 prior auths for her pain medications, she would like a call for update

## 2022-11-16 ENCOUNTER — OFFICE VISIT (OUTPATIENT)
Dept: PALLIATIVE MEDICINE | Facility: CLINIC | Age: 52
End: 2022-11-16

## 2022-11-16 VITALS
SYSTOLIC BLOOD PRESSURE: 102 MMHG | TEMPERATURE: 97.4 F | BODY MASS INDEX: 31.92 KG/M2 | WEIGHT: 180.2 LBS | OXYGEN SATURATION: 92 % | HEART RATE: 86 BPM | DIASTOLIC BLOOD PRESSURE: 62 MMHG

## 2022-11-16 DIAGNOSIS — Z51.5 PALLIATIVE CARE PATIENT: ICD-10-CM

## 2022-11-16 DIAGNOSIS — G89.29 CHRONIC BILATERAL LOW BACK PAIN WITH BILATERAL SCIATICA: Chronic | ICD-10-CM

## 2022-11-16 DIAGNOSIS — Z94.84 H/O AUTOLOGOUS STEM CELL TRANSPLANT (HCC): ICD-10-CM

## 2022-11-16 DIAGNOSIS — F11.20 CONTINUOUS OPIOID DEPENDENCE (HCC): Chronic | ICD-10-CM

## 2022-11-16 DIAGNOSIS — M54.41 CHRONIC BILATERAL LOW BACK PAIN WITH BILATERAL SCIATICA: Chronic | ICD-10-CM

## 2022-11-16 DIAGNOSIS — C90.01 MULTIPLE MYELOMA IN REMISSION (HCC): Primary | Chronic | ICD-10-CM

## 2022-11-16 DIAGNOSIS — M54.42 CHRONIC BILATERAL LOW BACK PAIN WITH BILATERAL SCIATICA: Chronic | ICD-10-CM

## 2022-11-16 NOTE — PROGRESS NOTES
Outpatient Follow-Up - Palliative and Supportive Care   Brayan Campbell 46 y o  female [id number]    Assessment & Plan  Problem List Items Addressed This Visit        Nervous and Auditory    Chronic bilateral low back pain with bilateral sciatica (Chronic)       Other    Multiple myeloma in remission (HCC) - Primary (Chronic)    Continuous opioid dependence (HCC) (Chronic)    Palliative care patient    H/O autologous stem cell transplant ([facility] [address] )     #symptom management  #cancer-related pain   -continue APAP 1000 mg PO Q8H PRN              - max daily 4000 mg   - continue oxy-IR 10-15 mg PO Q4H PRN   - continue oxy-ER 9 mg PO QHS     Patient disclosed concern of her daughter's substance abuse and strong suspicion that some of the patient's medications have been stolen  Recommendation for lock box for safe storage and ensure proper/safe dispensing to patient only  Also recommend one-week prescriptions to ensure low pill count to deter theft and minimize amount of opioids in household       Patient use of locked safe  Daughter with no access  No reported concern of misuse  Patient's daughter recently moved out to own apartment  Plan to initiate opioid taper in upcoming appointment, will d/c oxy-ER QHS formulation  Will consider expanding to two week prescriptions since daughter [risk factor] out of house   Still will recommend use of lockbox      #neuropathic pain   - continue pregabalin 100 mg PO BID   - continue duloxetine 30 mg PO QDaily   - continue lidocaine cream QID PRN     #muscle spasms   - continue methocarbamol 500 mg PO Q6H PRN     #chemotherapy induced nausea   - continue ondansetron 8 mg PO Q8H PRN   - continue prochlorperazine 10 mg PO Q6H PRN     #OIC   - continue senna-docusate 2 tabs PO QHS   - continue miralax 17 g PO QDaily   - continue bisacodyl 10 mg DC QDaily PRN     #anxiety/depression   - continue duloxetine 30 mg PO QDaily    #psychosocial support   - emotional support provided   - not currently    - two adult children              - Simone Thomas [daughter]              - Richmond Rodriguez [mother] [phone]   - one sibling              - Mayi [sister]   - two grandchildren [aged 5 and 3]      Next [facility] Follow up in 4-6 weeks  Cont[place] Substance [place]    [place] or [place]  reviewed: No red flags were identified; safe to proceed with prescription  [place]uel [place] PDMP Review       Value Time User    PDMP Reviewed  Yes (P)  11/16/2022  7:49 AM Enedelia Russo MD          Medications adjusted this encounter:  Requested Prescriptions      No prescriptions requested or ordered in this encounter     No orders of the defined types were placed in this encounter  There are no discontinued medications  Lev Batter was seen today for symptoms and planning cares related to above illnesses  I have reviewed the patient's controlled substance dispensing history in the Prescription Drug Monitoring Program in compliance with the [facility] regulations before prescribing any controlled substances  They are invited to continue to follow with us  If there are questions or concerns, please contact us through our clinic/answering service 24 hours a day, seven days a week  Enedelia Russo MD  [facility] Palliative and Supportive Care  [phone]      Visit Information    Accompanied By: No one    Source of History: Self, Medical record    History Limitations: None      History of Present Illness    Marla Tony is a 46 y o  female who presents in follow up of symptoms related to multiple myeloma s/p PBSCT [03/30/2022]  Pertinent issues include: symptom management, pain, neoplasm related, depression or anxiety, assessment of goals of care, advance care planning  Patient reports recent illness, consisting of fever, cough, rhinorrhea, sore throat, myalgia; reports home COVID test negative, onset 1 week ago, reports improvement in overall symptoms   Continued use of oxy-IR x 6 tabs/day, still reports diffuse bony pain and myalgia [prior to onset of recent illness]  Denies nausea, vomiting  Appetite intact with recent weight gain  BM daily, regular  Adequate sleep  Past medical, surgical, social, and family histories are reviewed and pertinent updates are made  Review of Systems   Constitutional: Positive for malaise/fatigue and weight gain  Negative for chills, decreased appetite, fever and weight loss  HENT: Positive for congestion and sore throat  Eyes: Negative for visual disturbance  Cardiovascular: Negative for chest pain  Respiratory: Negative for shortness of breath  Musculoskeletal: Positive for myalgias and stiffness  Negative for falls and neck pain  Gastrointestinal: Negative for abdominal pain, constipation, nausea and vomiting  Genitourinary: Negative for frequency  Neurological: Negative for headaches  Psychiatric/Behavioral: The patient does not have insomnia  All other systems reviewed and are negative  Vital Signs    /62 (BP Location: Left arm, Cuff Size: Standard)   Pulse 86   Temp (!) 97 4 °F (36 3 °C) (Temporal)   Wt 81 7 kg (180 lb 3 2 oz)   SpO2 92%   BMI 31 92 kg/m²     Physical Exam and Objective Data  Physical Exam  Vitals and nursing note reviewed  Constitutional:       General: She is awake  Appearance: She is not diaphoretic  Comments: Sitting up comfortably in NAD  BMI 31 9  Non-toxic appearing   HENT:      Head: Normocephalic and atraumatic  Right Ear: External ear normal       Left Ear: External ear normal       Nose: No rhinorrhea  Eyes:      Comments: No gaze preference   Cardiovascular:      Rate and Rhythm: Normal rate  Pulmonary:      Effort: No tachypnea, accessory muscle usage or respiratory distress  Comments: Completes full sentences without difficulty  Musculoskeletal:      Cervical back: Normal range of motion  Neurological:      General: No focal deficit present  Mental Status: She is alert and oriented to person, place, and time  Psychiatric:         Attention and Perception: Attention normal          Mood and Affect: Mood and affect normal          Speech: Speech normal          Cognition and Memory: Cognition and memory normal            Radiology and Laboratory:  I personally reviewed and interpreted the following results:    Most Recent COVID-19 Results:  Lab Results   Component Value Date/Time    SARSCOV2 Negative 10/30/2021 12:10 AM       Most Recent Lab Work:  Lab Results   Component Value Date/Time    SODIUM 137 03/23/2022 12:04 PM    K 3 4 (L) 03/23/2022 12:04 PM    BUN 20 03/23/2022 12:04 PM    CREATININE 1 00 03/23/2022 12:04 PM    GLUC 87 03/23/2022 12:04 PM     Lab Results   Component Value Date/Time    AST 10 03/23/2022 12:04 PM    ALT 22 03/23/2022 12:04 PM    ALB 3 2 (L) 03/23/2022 12:04 PM     Lab Results   Component Value Date/Time    HGB 13 9 03/23/2022 12:04 PM    WBC 15 11 (H) 03/23/2022 12:04 PM     (H) 03/23/2022 12:04 PM    INR 1 15 10/29/2021 09:35 PM    PTT 37 10/29/2021 09:35 PM       Most Recent Imaging [last 30 days]:  Procedure: US guided thyroid biopsy with AFIRMA    Result Date: 10/24/2022  Narrative: ULTRASOUND-GUIDED THYROID BIOPSY HISTORY:  46year-old with a history of thyroid nodule  The patient presents with a prescription for ultrasound-guided fine-needle aspiration biopsy with Afirma sampling of the left lower pole thyroid nodule  COMPARISON:  Thyroid ultrasound dated 8/12/2022 was reviewed  A left lower pole thyroid nodule measuring 2 2 x 2 0 x 1 6 cm was identified and recommended for biopsy  FINDINGS:  On ultrasound imaging performed today, the corresponding left lower pole thyroid nodule measures 2 3 x 2 0 x 1 9 cm  PROCEDURE:  The procedure was explained to the patient including risks of hemorrhage, infection, allergic reaction, and local injury   The possibility of a nondiagnostic biopsy result and the need for repeat biopsy or sonographic follow-up was explained to the patient  Informed consent was freely obtained  The patient verbalized expressed understanding of the above risks and wished to proceed with the procedure  Final standard "time-out" procedure was performed  The neck was prepped and draped in normal sterile fashion  Under real-time ultrasound guidance and local anesthesia five passes with 25 gauge needles were made through the left lower pole thyroid nodule  Cytopathology was present and deemed the specimens inadequate for initial evaluation  Therefore, an additional, four passes with 25-gauge needles were made through the left lower pole thyroid nodule  Three of the nine samples obtained were reserved for potential Afirma testing  The patient tolerated the procedure well  There were no complications  Post-procedure instructions were provided for the patient  I asked the patient to call us with any questions, concerns, or acute problems  The patient expressed understanding of the above  Impression: Status post successful ultrasound-guided thyroid biopsy  Final pathology results are pending  Additional FNA samples were obtained and reserved for Afirma testing, if required  The above findings and procedure were reviewed with Dr Jannet Agarwal  Procedure was performed by Jordan Johnson PA-C under the direct supervision of Dr Jannet Agarwal  Workstation performed: [id number]       30 minutes was spent face to face with [place] with greater than 50% of the time spent in counseling or coordination of care including discussions of provided medical updates, discussed palliative care, determined goals of care, determined social/family support, discussed plans of care, discussed symptom management, provided psychosocial support  PDMP Reviewed  All of the patient's or agent's questions were answered during this discussion

## 2022-11-21 DIAGNOSIS — Z51.5 PALLIATIVE CARE PATIENT: ICD-10-CM

## 2022-11-21 DIAGNOSIS — G89.3 CANCER ASSOCIATED PAIN: ICD-10-CM

## 2022-11-21 DIAGNOSIS — C90.00 MULTIPLE MYELOMA NOT HAVING ACHIEVED REMISSION (HCC): ICD-10-CM

## 2022-11-21 RX ORDER — OXYCODONE HYDROCHLORIDE 10 MG/1
10-15 TABLET ORAL EVERY 4 HOURS PRN
Qty: 63 TABLET | Refills: 0 | Status: SHIPPED | OUTPATIENT
Start: 2022-11-21 | End: 2022-11-28 | Stop reason: SDUPTHER

## 2022-11-21 RX ORDER — OXYCODONE 9 MG/1
9 CAPSULE, EXTENDED RELEASE ORAL
Qty: 7 CAPSULE | Refills: 0 | Status: SHIPPED | OUTPATIENT
Start: 2022-11-21 | End: 2022-11-28 | Stop reason: SDUPTHER

## 2022-11-22 ENCOUNTER — APPOINTMENT (OUTPATIENT)
Dept: LAB | Facility: HOSPITAL | Age: 52
End: 2022-11-22

## 2022-11-22 DIAGNOSIS — C90.01 MULTIPLE MYELOMA IN REMISSION (HCC): ICD-10-CM

## 2022-11-22 LAB
ALBUMIN SERPL BCP-MCNC: 3.2 G/DL (ref 3.5–5)
ALP SERPL-CCNC: 81 U/L (ref 46–116)
ALT SERPL W P-5'-P-CCNC: 14 U/L (ref 12–78)
ANION GAP SERPL CALCULATED.3IONS-SCNC: 7 MMOL/L (ref 4–13)
AST SERPL W P-5'-P-CCNC: 13 U/L (ref 5–45)
BASOPHILS # BLD AUTO: 0.02 THOUSANDS/ÂΜL (ref 0–0.1)
BASOPHILS NFR BLD AUTO: 0 % (ref 0–1)
BILIRUB SERPL-MCNC: 0.36 MG/DL (ref 0.2–1)
BUN SERPL-MCNC: 13 MG/DL (ref 5–25)
CALCIUM ALBUM COR SERPL-MCNC: 9.9 MG/DL (ref 8.3–10.1)
CALCIUM SERPL-MCNC: 9.3 MG/DL (ref 8.3–10.1)
CHLORIDE SERPL-SCNC: 106 MMOL/L (ref 96–108)
CO2 SERPL-SCNC: 23 MMOL/L (ref 21–32)
CREAT SERPL-MCNC: 1.26 MG/DL (ref 0.6–1.3)
EOSINOPHIL # BLD AUTO: 0.15 THOUSAND/ÂΜL (ref 0–0.61)
EOSINOPHIL NFR BLD AUTO: 2 % (ref 0–6)
ERYTHROCYTE [DISTWIDTH] IN BLOOD BY AUTOMATED COUNT: 16.7 % (ref 11.6–15.1)
GFR SERPL CREATININE-BSD FRML MDRD: 48 ML/MIN/1.73SQ M
GLUCOSE P FAST SERPL-MCNC: 110 MG/DL (ref 65–99)
HCT VFR BLD AUTO: 46.3 % (ref 34.8–46.1)
HGB BLD-MCNC: 14.9 G/DL (ref 11.5–15.4)
IGA SERPL-MCNC: 177 MG/DL (ref 70–400)
IGG SERPL-MCNC: 1600 MG/DL (ref 700–1600)
IGM SERPL-MCNC: 44 MG/DL (ref 40–230)
IMM GRANULOCYTES # BLD AUTO: 0.04 THOUSAND/UL (ref 0–0.2)
IMM GRANULOCYTES NFR BLD AUTO: 1 % (ref 0–2)
LDH SERPL-CCNC: 174 U/L (ref 81–234)
LYMPHOCYTES # BLD AUTO: 2.72 THOUSANDS/ÂΜL (ref 0.6–4.47)
LYMPHOCYTES NFR BLD AUTO: 31 % (ref 14–44)
MCH RBC QN AUTO: 29.2 PG (ref 26.8–34.3)
MCHC RBC AUTO-ENTMCNC: 32.2 G/DL (ref 31.4–37.4)
MCV RBC AUTO: 91 FL (ref 82–98)
MONOCYTES # BLD AUTO: 0.66 THOUSAND/ÂΜL (ref 0.17–1.22)
MONOCYTES NFR BLD AUTO: 8 % (ref 4–12)
NEUTROPHILS # BLD AUTO: 5.26 THOUSANDS/ÂΜL (ref 1.85–7.62)
NEUTS SEG NFR BLD AUTO: 58 % (ref 43–75)
NRBC BLD AUTO-RTO: 0 /100 WBCS
PLATELET # BLD AUTO: 234 THOUSANDS/UL (ref 149–390)
PMV BLD AUTO: 9.4 FL (ref 8.9–12.7)
POTASSIUM SERPL-SCNC: 4 MMOL/L (ref 3.5–5.3)
PROT SERPL-MCNC: 7.9 G/DL (ref 6.4–8.4)
RBC # BLD AUTO: 5.11 MILLION/UL (ref 3.81–5.12)
SODIUM SERPL-SCNC: 136 MMOL/L (ref 135–147)
WBC # BLD AUTO: 8.85 THOUSAND/UL (ref 4.31–10.16)

## 2022-11-23 LAB
KAPPA LC FREE SER-MCNC: 55.4 MG/L (ref 3.3–19.4)
KAPPA LC FREE/LAMBDA FREE SER: 2.39 {RATIO} (ref 0.26–1.65)
LAMBDA LC FREE SERPL-MCNC: 23.2 MG/L (ref 5.7–26.3)

## 2022-11-28 ENCOUNTER — TELEMEDICINE (OUTPATIENT)
Dept: HEMATOLOGY ONCOLOGY | Facility: CLINIC | Age: 52
End: 2022-11-28

## 2022-11-28 DIAGNOSIS — C90.01 MULTIPLE MYELOMA IN REMISSION (HCC): Primary | ICD-10-CM

## 2022-11-28 DIAGNOSIS — E85.9 AMYLOIDOSIS, UNSPECIFIED TYPE (HCC): ICD-10-CM

## 2022-11-28 DIAGNOSIS — G89.3 CANCER ASSOCIATED PAIN: ICD-10-CM

## 2022-11-28 DIAGNOSIS — Z94.84 H/O AUTOLOGOUS STEM CELL TRANSPLANT (HCC): ICD-10-CM

## 2022-11-28 DIAGNOSIS — C90.00 MULTIPLE MYELOMA NOT HAVING ACHIEVED REMISSION (HCC): ICD-10-CM

## 2022-11-28 DIAGNOSIS — Z51.5 PALLIATIVE CARE PATIENT: ICD-10-CM

## 2022-11-28 RX ORDER — OXYCODONE HYDROCHLORIDE 10 MG/1
10-15 TABLET ORAL EVERY 4 HOURS PRN
Qty: 63 TABLET | Refills: 0 | Status: SHIPPED | OUTPATIENT
Start: 2022-11-28 | End: 2022-12-05 | Stop reason: SDUPTHER

## 2022-11-28 RX ORDER — OXYCODONE 9 MG/1
9 CAPSULE, EXTENDED RELEASE ORAL
Qty: 7 CAPSULE | Refills: 0 | Status: SHIPPED | OUTPATIENT
Start: 2022-11-28 | End: 2022-12-05 | Stop reason: SDUPTHER

## 2022-11-28 NOTE — TELEPHONE ENCOUNTER
Primary palliative medicine provider:   Tao Perez    Medication requested:  Santi Tracey  Oxycodone     If for pain, how has the patient been taking their pain medicine?      Last appointment: 11/16    Next scheduled appointment: 12/15    PDMP review:    11/21/2022 11/21/2022 Xtampza Er (Capsule, Extended Release) 7 0 7 9 MG NA [employer] 0 / 0 [place]    1 [id number] 11/21/2022 11/21/2022 oxyCODONE HCL (Tablet) 63 0 7 10  0 [facility] [employer] 0 / 0       Pharmacy: [facility]

## 2022-11-28 NOTE — PROGRESS NOTES
[facility] - Hematology & Medical Oncology  Outpatient Visit Encounter Note    It was my intent to perform this visit via video technology but the patient was not able to do a video connection so the visit was completed via audio telephone only  Telemedicine consent    Patient: Kiki Apodaca  Provider: Janice Eduardo MD  Provider located at [address] [place] [address]  [phone]    The patient was identified by name and date of birth  Kiki Apodaca was informed that this is a telemedicine visit and that the visit is being conducted through Telephone  My office door was closed  No one else was in the room  She acknowledged consent and understanding of privacy and security of the video platform  The patient has agreed to participate and understands they can discontinue the visit at any time  Patient is aware this is a billable service  I spent 10 minutes with the patient during this visit  Tai Balbuena is a 46 y o  Is here for post hospital follow-up  She has a diagnosis of IgA kappa multiple myeloma  In review of the chart and talking with the patient,   The following information regarding her  Myeloma is listed    8/20/21: Bone marrow right posterior iliac crest, delcalcified trephine biopsy, touch imprint, direct aspirate smear, and peripheral blood smear:    - Plasma cell neoplasm characterized by:  Mandi Morrison- Normocellular marrow (estimated cellularity 40%) showing trilineage hematopoiesis with 20% plasma cells,       kappa predominant by immunophenotypic evaluation     - Peripheral blood showing slight normochromic microcytic anemia and slight leukocytosis    - Negative for amyloid deposition confirmed by Congo Red stain  9/9/21: bone survey negative for lytic lesions     10/8/21: Free Ratio: 5 79              Immunofixation: IgA kappa monoclonal band present              IgA: 462              Ig              Started Vdr Days 1 and 14 (21 day Cycle)   10/15/21: Multiple myeloma panel in care everywhere              B2 Microglobulin 3 20              Calcium: 8 3              Hgb: 10 5              Free Kappa lambda ratio: 3 70              M Martinez: 0 27               IgA: <10              Ig              IgM: 77    As detailed in the chart, she sees a physician at [address] [place] [address] and also follows at [facility] for possibility of a stem cell transplant  After her diagnosis, she was started on first-line treatment with VRD and thereafter she was started on daratumumab and CyBorD on  and had a negative amyloid staining from an abdominal fat pad biopsy on   She was hospitalized in mid November with RSV infection  Her most recent oncology visit was on  at [address] [place] [address] as per documentation she completed cycle 2D8 of María CyBorD on  and got a toxicity assessment done at that office visit  She has no acute complaints  Denies any fevers chills nausea vomiting abdominal pain shortness of breath cough  Is taking acyclovir for prophylaxis  THIS VISIT    Overall doing the same  She continues to have joint aches in her small joints are worsen upon climbing stairs as well as neuropathy that she had before her cancer diagnosis in during her cancer diagnosis and transplant process  Well she changed her insurance 2 months ago, she has not established care with her BMT physician since then  Our office did inform them back on  of the insurance change  She is currently getting pain medicine management through palliative care  Denies any fevers chills nausea vomiting chest pain belly pain constipation diarrhea dyspnea leg swelling neurological issues or ambulatory dysfunction  I have reviewed the relevant past medical, surgical, social and family history   I have also reviewed allergies and medications for this patient  Review of Systems  Review of Systems   All other systems reviewed and are negative  OBJECTIVE     Physical Exam    1  no sign of distress  2  no signs of respiratory distress  3  speaking full sentences  4  alert and oriented  5  organized thought pattern  6  no coughing on the phone  7  no wheezes audible  8  voice is clear, speech not slurred        Imaging  Relevant imaging reviewed in chart    Labs  Relevant labs reviewed in chart   ASSESSMENT & PLAN      Diagnosis ICD-10-CM Associated Orders   1  Multiple myeloma in remission (HCC)  C90 01       2  H/O autologous stem cell transplant ([facility] [address] )  Z94 84       3  Amyloidosis, unspecified type ([facility] [address] )  E85 9       4  Cancer associated pain  G89 3             46 y o  Female diagnosed with IgA kappa multiple myeloma in August 2021  This was R-ISS Stage 2 with induction therapy with RVD and then María-CyBorD followed by autoSCT on 3/30/22 at [facility]  Per her BMT doctor, clinically, signs/symptoms were highly suspicious for clinical amyloidosis as well  After autoSCT, she is deemed to be in CR1 with MRD negative state with negative SPEP, normal KLR with repeat PETCT showing no lytic lesions  · Discussion  · Please see prior documentation for oncological management history  · I told the patient that she needs to establish care with her BMT physician for posttransplant management as this level of complexity is best handled by a specialist who knows more about this  She has made insurance change 2 months ago but has not followed up with [facility] to have her care established with them  Our office help her out by reaching out to them on 09/22 as documented  · I personally sent a text message and inform the physician of the insurance change as I told the patient I would med he has acknowledged a message  The office nurses in the same thing from her and as well  The patient plans on calling her nurse navigator at [facility] as well    · I told the patient that given the complexity of her care, unless she requires intravenous systemic therapy, her care will be handled managed by her BMT physician  In the event she requires intravenous antineoplastic therapy, I can help supervised with local administration so she does not have to drive so far away  · Continue care with palliative Medicine  · Reviewed blood work with her  · Disease Details  · Clinical stage from 9/4/2021: RISS Stage II (Beta-2-microglobulin (mg/L): 4 1, Albumin (g/dL): 3 5, ISS: Stage II, High-risk cytogenetics: Absent, LDH: Normal) - Per Signed by Jay Leslie MD on 9/4/2021  · Plan/Labs  · TBD based on pending insurance    Follow Up  • TBD with likely no follow up required as re-establishes care with Dr Tim Gregorio      All questions were answered to the patient's satisfaction during this encounter  They appreciated and thanked me for spending time with them  The patient knows the contact information for our office and know to reach out for any relevant concerns related to this encounter  For all other listed problems and medical diagnosis in his chart - they are managed by PCP and/or other specialists, which patient acknowledges  Dr Ildefonso Bruce MD  Hematology & Medical Oncology

## 2022-11-30 NOTE — TELEPHONE ENCOUNTER
Pt called office yesterday to state PA required  Called pharmacy today to check if pt able to  medications  Per pharmacy pt picked up yesterday 11/29  Scripts covered by insurance  Reviewed encounter note dated 11/15  Prior Auth was completed  [facility]   Per representative  Xtampza 9 mg authorized until 01/05/23  Oxycodone IR 10 mg  does not require PA  Awaiting formal letter via fax  Enter into media       Done task

## 2022-12-05 DIAGNOSIS — G89.3 CANCER ASSOCIATED PAIN: ICD-10-CM

## 2022-12-05 DIAGNOSIS — C90.00 MULTIPLE MYELOMA NOT HAVING ACHIEVED REMISSION (HCC): ICD-10-CM

## 2022-12-05 DIAGNOSIS — Z51.5 PALLIATIVE CARE PATIENT: ICD-10-CM

## 2022-12-05 NOTE — TELEPHONE ENCOUNTER
Primary palliative medicine provider:   Michele Marcelo    Medication requested:  Weekly order for   Xtampza 9 mg   Oxycodone 20 mg IR     If for pain, how has the patient been taking their pain medicine?      Last appointment:11/16  Next scheduled appointment: 12/15    PDMP review:    Pharmacy:    11/08/2022 11/08/2022 HYDROmorphone HCL (Tablet) 150 0 30 8  0 [facility] 0 / 0 [place]    1 [id number] 11/08/2022 11/08/2022 Methadone Hcl (Tablet) 90 0 30 10  0 [facility]

## 2022-12-06 RX ORDER — OXYCODONE 9 MG/1
9 CAPSULE, EXTENDED RELEASE ORAL
Qty: 7 CAPSULE | Refills: 0 | Status: SHIPPED | OUTPATIENT
Start: 2022-12-06 | End: 2022-12-12 | Stop reason: SDUPTHER

## 2022-12-06 RX ORDER — OXYCODONE HYDROCHLORIDE 10 MG/1
10-15 TABLET ORAL EVERY 4 HOURS PRN
Qty: 63 TABLET | Refills: 0 | Status: SHIPPED | OUTPATIENT
Start: 2022-12-06 | End: 2022-12-12 | Stop reason: SDUPTHER

## 2022-12-06 NOTE — TELEPHONE ENCOUNTER
Pharmacy called today to inform office pt's rx's require a Prior auth  Pharmacy was asked to process via covermymeds  They agreed and will be sending a key shortly

## 2022-12-06 NOTE — TELEPHONE ENCOUNTER
Prior Authorization initiated for Oxycodone 10mg tablet via [facility]    ID#  Phone#  [id number]: [id number] [facility]      Additional Clinical information needed

## 2022-12-06 NOTE — TELEPHONE ENCOUNTER
Attempt to complete PA via M  Insurance unable to provide questions needed  Must call insurance     Will complete in am

## 2022-12-07 ENCOUNTER — APPOINTMENT (OUTPATIENT)
Dept: LAB | Facility: HOSPITAL | Age: 52
End: 2022-12-07

## 2022-12-07 DIAGNOSIS — C90.00 MULTIPLE MYELOMA, REMISSION STATUS UNSPECIFIED (HCC): Primary | ICD-10-CM

## 2022-12-07 DIAGNOSIS — E85.9 MYELOMA ASSOCIATED AMYLOIDOSIS (HCC): ICD-10-CM

## 2022-12-07 DIAGNOSIS — C90.00 MYELOMA ASSOCIATED AMYLOIDOSIS (HCC): ICD-10-CM

## 2022-12-07 LAB
ALBUMIN SERPL BCP-MCNC: 4.2 G/DL (ref 3.5–5)
ALP SERPL-CCNC: 102 U/L (ref 46–116)
ALT SERPL W P-5'-P-CCNC: 19 U/L (ref 12–78)
ANION GAP SERPL CALCULATED.3IONS-SCNC: 3 MMOL/L (ref 4–13)
AST SERPL W P-5'-P-CCNC: 14 U/L (ref 5–45)
BASOPHILS # BLD AUTO: 0.02 THOUSANDS/ÂΜL (ref 0–0.1)
BASOPHILS NFR BLD AUTO: 0 % (ref 0–1)
BILIRUB SERPL-MCNC: 0.48 MG/DL (ref 0.2–1)
BUN SERPL-MCNC: 12 MG/DL (ref 5–25)
CALCIUM SERPL-MCNC: 9.7 MG/DL (ref 8.3–10.1)
CHLORIDE SERPL-SCNC: 106 MMOL/L (ref 96–108)
CO2 SERPL-SCNC: 27 MMOL/L (ref 21–32)
CREAT SERPL-MCNC: 1 MG/DL (ref 0.6–1.3)
EOSINOPHIL # BLD AUTO: 0.07 THOUSAND/ÂΜL (ref 0–0.61)
EOSINOPHIL NFR BLD AUTO: 1 % (ref 0–6)
ERYTHROCYTE [DISTWIDTH] IN BLOOD BY AUTOMATED COUNT: 16.5 % (ref 11.6–15.1)
GFR SERPL CREATININE-BSD FRML MDRD: 64 ML/MIN/1.73SQ M
GLUCOSE P FAST SERPL-MCNC: 96 MG/DL (ref 65–99)
HCT VFR BLD AUTO: 49.4 % (ref 34.8–46.1)
HGB BLD-MCNC: 16 G/DL (ref 11.5–15.4)
IGA SERPL-MCNC: 186 MG/DL (ref 70–400)
IGG SERPL-MCNC: 1780 MG/DL (ref 700–1600)
IGM SERPL-MCNC: 52 MG/DL (ref 40–230)
IMM GRANULOCYTES # BLD AUTO: 0.03 THOUSAND/UL (ref 0–0.2)
IMM GRANULOCYTES NFR BLD AUTO: 0 % (ref 0–2)
LDH SERPL-CCNC: 180 U/L (ref 81–234)
LYMPHOCYTES # BLD AUTO: 1.94 THOUSANDS/ÂΜL (ref 0.6–4.47)
LYMPHOCYTES NFR BLD AUTO: 24 % (ref 14–44)
MAGNESIUM SERPL-MCNC: 2.8 MG/DL (ref 1.6–2.6)
MCH RBC QN AUTO: 28.8 PG (ref 26.8–34.3)
MCHC RBC AUTO-ENTMCNC: 32.4 G/DL (ref 31.4–37.4)
MCV RBC AUTO: 89 FL (ref 82–98)
MONOCYTES # BLD AUTO: 0.58 THOUSAND/ÂΜL (ref 0.17–1.22)
MONOCYTES NFR BLD AUTO: 7 % (ref 4–12)
NEUTROPHILS # BLD AUTO: 5.36 THOUSANDS/ÂΜL (ref 1.85–7.62)
NEUTS SEG NFR BLD AUTO: 68 % (ref 43–75)
NRBC BLD AUTO-RTO: 0 /100 WBCS
PLATELET # BLD AUTO: 248 THOUSANDS/UL (ref 149–390)
PMV BLD AUTO: 9.5 FL (ref 8.9–12.7)
POTASSIUM SERPL-SCNC: 4.3 MMOL/L (ref 3.5–5.3)
PROT SERPL-MCNC: 8.7 G/DL (ref 6.4–8.4)
RBC # BLD AUTO: 5.56 MILLION/UL (ref 3.81–5.12)
SODIUM SERPL-SCNC: 136 MMOL/L (ref 135–147)
WBC # BLD AUTO: 8 THOUSAND/UL (ref 4.31–10.16)

## 2022-12-07 NOTE — TELEPHONE ENCOUNTER
Call to [facility] at   [phone] with representative status on Prior auths for   Oxycodone 10 mg IR an  Xtampza 9 mg ER    Both meds approed until 1/25/23      Call to pharmacy   Unable to leave message as pharmacy closed due to unforseen incident  Call to pt  Pt states she ws able to receive her scripts yesterday after She called insurance and got override  Also told pharmacy meds covered until 1/25/23  Karey Molina     Done task

## 2022-12-08 LAB
KAPPA LC FREE SER-MCNC: 41.4 MG/L (ref 3.3–19.4)
KAPPA LC FREE/LAMBDA FREE SER: 2.51 {RATIO} (ref 0.26–1.65)
LAMBDA LC FREE SERPL-MCNC: 16.5 MG/L (ref 5.7–26.3)

## 2022-12-09 LAB
ALBUMIN SERPL ELPH-MCNC: 5.21 G/DL (ref 3.5–5)
ALBUMIN SERPL ELPH-MCNC: 65.1 % (ref 52–65)
ALPHA1 GLOB SERPL ELPH-MCNC: 0.34 G/DL (ref 0.1–0.4)
ALPHA1 GLOB SERPL ELPH-MCNC: 4.2 % (ref 2.5–5)
ALPHA2 GLOB SERPL ELPH-MCNC: 0.66 G/DL (ref 0.4–1.2)
ALPHA2 GLOB SERPL ELPH-MCNC: 8.3 % (ref 7–13)
B2 MICROGLOB SERPL-MCNC: 2.4 MG/L (ref 0.6–2.4)
BETA GLOB ABNORMAL SERPL ELPH-MCNC: 0.31 G/DL (ref 0.4–0.8)
BETA1 GLOB SERPL ELPH-MCNC: 3.9 % (ref 5–13)
BETA2 GLOB SERPL ELPH-MCNC: 2.6 % (ref 2–8)
BETA2+GAMMA GLOB SERPL ELPH-MCNC: 0.21 G/DL (ref 0.2–0.5)
GAMMA GLOB ABNORMAL SERPL ELPH-MCNC: 1.27 G/DL (ref 0.5–1.6)
GAMMA GLOB SERPL ELPH-MCNC: 15.9 % (ref 12–22)
IGG/ALB SER: 1.87 {RATIO} (ref 1.1–1.8)
INTERPRETATION UR IFE-IMP: NORMAL
PROT PATTERN SERPL ELPH-IMP: ABNORMAL
PROT SERPL-MCNC: 8 G/DL (ref 6.4–8.2)

## 2022-12-12 DIAGNOSIS — Z51.5 PALLIATIVE CARE PATIENT: ICD-10-CM

## 2022-12-12 DIAGNOSIS — C90.00 MULTIPLE MYELOMA NOT HAVING ACHIEVED REMISSION (HCC): ICD-10-CM

## 2022-12-12 DIAGNOSIS — G89.3 CANCER ASSOCIATED PAIN: ICD-10-CM

## 2022-12-12 RX ORDER — OXYCODONE HYDROCHLORIDE 10 MG/1
10-15 TABLET ORAL EVERY 4 HOURS PRN
Qty: 63 TABLET | Refills: 0 | Status: SHIPPED | OUTPATIENT
Start: 2022-12-12 | End: 2022-12-19 | Stop reason: SDUPTHER

## 2022-12-12 RX ORDER — OXYCODONE 9 MG/1
9 CAPSULE, EXTENDED RELEASE ORAL
Qty: 7 CAPSULE | Refills: 0 | Status: SHIPPED | OUTPATIENT
Start: 2022-12-12 | End: 2022-12-19 | Stop reason: SDUPTHER

## 2022-12-15 ENCOUNTER — TELEPHONE (OUTPATIENT)
Dept: HEMATOLOGY ONCOLOGY | Facility: CLINIC | Age: 52
End: 2022-12-15

## 2022-12-15 NOTE — TELEPHONE ENCOUNTER
Appointment Cancellation Or Reschedule     Person calling in Patient    If other than patient calling, are they listed on the communication consent form? Provider Dr Clemente Mata   Office Visit Date and Time 12/15 at 1:15pm   Office Visit Location [place]   Did patient want to reschedule their office appointment? If so, when was it scheduled to? Yes, 01/10 at 3:00pm   Did you have STAR scheduled for this appointment? no   Do you need STAR set up for your new appointment? If yes, please send to "PATIENT RIDESHARE" pool for STAR rescheduling no   If you are cancelling appointment, can we notify STAR to cancel ride? If yes, please send to "PATIENT RIDESHARE" pool for STAR to cancel service no   Is this patient calling to reschedule an infusion appointment? no   When is their next infusion appointment? n/a   Is this patient a Chemo patient? no   Reason for Cancellation or Reschedule Weather      If the patient is a treatment patient, please route this to the office nurse  If the patient is not on treatment, please route to the office MA  If the patient is a surgical oncology patient, please route to surg/onc clinical pool

## 2022-12-19 DIAGNOSIS — G89.3 CANCER ASSOCIATED PAIN: ICD-10-CM

## 2022-12-19 DIAGNOSIS — C90.00 MULTIPLE MYELOMA NOT HAVING ACHIEVED REMISSION (HCC): ICD-10-CM

## 2022-12-19 DIAGNOSIS — Z51.5 PALLIATIVE CARE PATIENT: ICD-10-CM

## 2022-12-19 RX ORDER — OXYCODONE 9 MG/1
9 CAPSULE, EXTENDED RELEASE ORAL
Qty: 7 CAPSULE | Refills: 0 | Status: SHIPPED | OUTPATIENT
Start: 2022-12-19 | End: 2022-12-27 | Stop reason: SDUPTHER

## 2022-12-19 RX ORDER — OXYCODONE HYDROCHLORIDE 10 MG/1
10-15 TABLET ORAL EVERY 4 HOURS PRN
Qty: 63 TABLET | Refills: 0 | Status: SHIPPED | OUTPATIENT
Start: 2022-12-19 | End: 2022-12-27 | Stop reason: SDUPTHER

## 2022-12-19 NOTE — TELEPHONE ENCOUNTER
Primary palliative medicine provider:   Rosenda Delarosa    Medication requested:  Oxycodone  Sindhu Back     If for pain, how has the patient been taking their pain medicine?      Last appointment:11/16    Next scheduled appointment: 1/10/23    PDMP review:    12/13/2022 12/12/2022 Xtampza Er (Capsule, Extended Release) 7 0 7 9 MG NA [employer] Commercial [employer] 0 / 0 [place]    1 [id number] 12/13/2022 12/12/2022 oxyCODONE HCL (Tablet) 63 0 7 10  0 [address] 0 / 0       Pharmacy: [facility]        May need [place]

## 2022-12-20 DIAGNOSIS — G62.9 NEUROPATHY: ICD-10-CM

## 2022-12-20 DIAGNOSIS — R12 HEARTBURN: ICD-10-CM

## 2022-12-20 RX ORDER — DULOXETIN HYDROCHLORIDE 30 MG/1
CAPSULE, DELAYED RELEASE ORAL
Qty: 90 CAPSULE | Refills: 1 | Status: SHIPPED | OUTPATIENT
Start: 2022-12-20

## 2022-12-20 RX ORDER — FAMOTIDINE 40 MG/1
TABLET, FILM COATED ORAL
Qty: 90 TABLET | Refills: 1 | Status: SHIPPED | OUTPATIENT
Start: 2022-12-20

## 2022-12-27 DIAGNOSIS — C90.00 MULTIPLE MYELOMA NOT HAVING ACHIEVED REMISSION (HCC): ICD-10-CM

## 2022-12-27 DIAGNOSIS — Z51.5 PALLIATIVE CARE PATIENT: ICD-10-CM

## 2022-12-27 DIAGNOSIS — G89.3 CANCER ASSOCIATED PAIN: ICD-10-CM

## 2022-12-27 RX ORDER — OXYCODONE HYDROCHLORIDE 10 MG/1
10-15 TABLET ORAL EVERY 4 HOURS PRN
Qty: 63 TABLET | Refills: 0 | Status: SHIPPED | OUTPATIENT
Start: 2022-12-27 | End: 2023-01-03 | Stop reason: SDUPTHER

## 2022-12-27 RX ORDER — OXYCODONE 9 MG/1
9 CAPSULE, EXTENDED RELEASE ORAL
Qty: 7 CAPSULE | Refills: 0 | Status: SHIPPED | OUTPATIENT
Start: 2022-12-27 | End: 2023-01-03 | Stop reason: SDUPTHER

## 2023-01-03 ENCOUNTER — TELEPHONE (OUTPATIENT)
Dept: PALLIATIVE MEDICINE | Facility: CLINIC | Age: 53
End: 2023-01-03

## 2023-01-03 DIAGNOSIS — G89.3 CANCER ASSOCIATED PAIN: ICD-10-CM

## 2023-01-03 DIAGNOSIS — C90.00 MULTIPLE MYELOMA NOT HAVING ACHIEVED REMISSION (HCC): ICD-10-CM

## 2023-01-03 DIAGNOSIS — Z51.5 PALLIATIVE CARE PATIENT: ICD-10-CM

## 2023-01-03 RX ORDER — OXYCODONE 9 MG/1
9 CAPSULE, EXTENDED RELEASE ORAL
Qty: 7 CAPSULE | Refills: 0 | Status: SHIPPED | OUTPATIENT
Start: 2023-01-03 | End: 2023-01-09 | Stop reason: SDUPTHER

## 2023-01-03 RX ORDER — OXYCODONE HYDROCHLORIDE 10 MG/1
10-15 TABLET ORAL EVERY 4 HOURS PRN
Qty: 63 TABLET | Refills: 0 | Status: SHIPPED | OUTPATIENT
Start: 2023-01-03 | End: 2023-01-09 | Stop reason: SDUPTHER

## 2023-01-03 NOTE — TELEPHONE ENCOUNTER
Primary palliative medicine provider: Dr Timmy Gonzalez     Medication requested: oxycodone 10 mg and xtampza ER 9 mg     If for pain, how has the patient been taking their pain medicine?      Last appointment: 11/16     Next scheduled appointment: 1/10     PDMP review:    PATIENT ID PRESCRIPTION # FILLED WRITTEN DRUG LABEL QTY DAYS STRENGTH MME** PRESCRIBER PHARMACY PAYMENT REFILL #/AUTH STATE DETAIL   1 [id number] 12/27/2022 12/27/2022 oxyCODONE HCL (Tablet) 63 0 7 10  0 [email or web address] Commercial Insurance 0 / 0 [place]    1 [id number] 12/27/2022 12/27/2022 Xtampza Er (Capsule, Extended Release) 7 0 7 9 MG NA [employer] 0 / 0 [place]    1 [id number] 12/19/2022 12/19/2022 Xtampza Er (Capsule, Extended Release) 7 0 7 9 MG NA [employer] 0 / 0 [place]    1 [id number] 12/19/2022 12/19/2022 oxyCODONE HCL (Tablet) 63 0 7 10  0 [address] 0 / 0 [place]          Pharmacy: [facility]

## 2023-01-09 DIAGNOSIS — Z51.5 PALLIATIVE CARE PATIENT: ICD-10-CM

## 2023-01-09 DIAGNOSIS — G89.3 CANCER ASSOCIATED PAIN: ICD-10-CM

## 2023-01-09 DIAGNOSIS — C90.00 MULTIPLE MYELOMA NOT HAVING ACHIEVED REMISSION (HCC): ICD-10-CM

## 2023-01-09 NOTE — TELEPHONE ENCOUNTER
Primary palliative medicine provider:   Lisa Hankins    Medication requested:  Xtampza 9 mg  Oxycodone 10 mg IR    If for pain, how has the patient been taking their pain medicine? Last appointment: 11/16    Next scheduled appointment: 1/23/23    PDMP review:     01/03/2023 01/03/2023 Xtampza Er (Capsule, Extended Release) 7 0 7 9 MG NA [facility] [employer] 0 / 0 [place]    1 [id number] 01/03/2023 01/03/2023 oxyCODONE HCL (Tablet) 63 0 7 10  0 [address] [place] [employer]        Pharmacy: [facility]    Ordered weekly     Nurse will call pt once order sent so she may call insurance for override to fill  If any other issues pt will inform us

## 2023-01-10 ENCOUNTER — OFFICE VISIT (OUTPATIENT)
Dept: SURGICAL ONCOLOGY | Facility: CLINIC | Age: 53
End: 2023-01-10

## 2023-01-10 ENCOUNTER — TELEPHONE (OUTPATIENT)
Dept: HEMATOLOGY ONCOLOGY | Facility: CLINIC | Age: 53
End: 2023-01-10

## 2023-01-10 VITALS
BODY MASS INDEX: 26.05 KG/M2 | WEIGHT: 147 LBS | OXYGEN SATURATION: 98 % | TEMPERATURE: 97.4 F | RESPIRATION RATE: 18 BRPM | HEART RATE: 85 BPM | DIASTOLIC BLOOD PRESSURE: 72 MMHG | HEIGHT: 63 IN | SYSTOLIC BLOOD PRESSURE: 118 MMHG

## 2023-01-10 DIAGNOSIS — E04.1 THYROID NODULE: Primary | ICD-10-CM

## 2023-01-10 RX ORDER — OXYCODONE HYDROCHLORIDE 10 MG/1
10-15 TABLET ORAL EVERY 4 HOURS PRN
Qty: 63 TABLET | Refills: 0 | Status: SHIPPED | OUTPATIENT
Start: 2023-01-10 | End: 2023-01-16 | Stop reason: SDUPTHER

## 2023-01-10 NOTE — LETTER
January 10, 2023     Live Granados DO  [address] [address]    Patient: Patrick Renteria   YOB: 1970   Date of Visit: 1/10/2023       Dear Dr Stacey Nobles:    Thank you for referring Cristy Abel to me for evaluation  Below are my notes for this consultation  If you have questions, please do not hesitate to call me  I look forward to following your patient along with you  Sincerely,        Yojana Wu MD        CC: MD Yojana Rajan MD  1/10/2023  3:05 PM  Incomplete               Surgical Oncology Follow Up       [address] [facility]  CANCER [facility] [place]  [address] [facility] [address]    Marla Melchor  1970  [id number]  [address] [facility] [place]  [address] [facility] [address]    Diagnoses and all orders for this visit:    Thyroid nodule  -     Case request operating room: LOBECTOMY THYROID; LEFT HEMITHYROIDECTOMY; Standing  -     EKG 12 lead; Future  -     XR chest pa & lateral; Future  -     US head neck lymph node mapping; Future  -     Case request operating room: LOBECTOMY THYROID; LEFT HEMITHYROIDECTOMY    Other orders  -     Incentive spirometry; Standing  -     Insert and maintain IV line; Standing  -     Void On-Call to O R ; Standing  -     Place sequential compression device; Standing        Chief Complaint   Patient presents with   • Follow-up       No follow-ups on file        Oncology History   Multiple myeloma in remission ([facility] [address] )   12/15/2021 Initial Diagnosis    Multiple myeloma not having achieved remission ([facility] [address] )     1/11/2022 - 2/24/2022 Chemotherapy    daratumumab-hyaluronidase (DARZALEX FASPRO), 1,800 mg, Subcutaneous daratumumab-hyaluronidase, Once, 2 of 4 cycles  Administration: 1,800 mg (1/18/2022), 1,800 mg (2/1/2022), 1,800 mg (2/15/2022)  bortezomib (VELCADE), 1 3 mg/m2 = 2 3 mg (86 7 % of original dose 1 5 mg/m2), Subcutaneous, Once, 2 of 4 cycles  Dose modification: 1 3 mg/m2 (original dose 1 5 mg/m2, Cycle 1, Reason: Other (Must fill in a comment), Comment: as per previous treating oncologist)  Administration: 2 3 mg (1/18/2022), 2 3 mg (1/11/2022), 2 3 mg (2/1/2022), 2 3 mg (2/8/2022), 2 3 mg (2/15/2022)  Cyclophosphamide (CYTOXAN), 300 mg/m2 = 500 mg (100 % of original dose 300 mg/m2), Oral, Once, 2 of 4 cycles  Dose modification: 300 mg/m2 (original dose 300 mg/m2, Cycle 1)  Administration: 500 mg (1/18/2022), 500 mg (1/25/2022), 500 mg (2/1/2022), 500 mg (2/8/2022), 500 mg (2/15/2022), 550 mg (2/24/2022)         Staging:    Treatment history: Left lower pole thyroid biopsy: AUS, Afirma was suspicious  Current treatment: Hemithyroidectomy pending  Disease status:      History of Present Illness: Patient returns in follow-up  Her thyroid biopsy revealed atypia of undetermined significance, [place] 3  Afirma was suspicious  She comes in now to discuss further therapy  She continues to deny any dysphagia or hoarseness  No history of radiation to her head or neck  There is no family history of thyroid cancer  Her thyroid function is normal     Review of Systems  Complete ROS Surg Onc:   Complete ROS Surg Onc:   Constitutional: The patient denies new or recent history of general fatigue, no recent weight loss, no change in appetite  Eyes: No complaints of visual problems, no scleral icterus  ENT: no complaints of ear pain, no hoarseness, no difficulty swallowing,  no tinnitus and no new masses in head, oral cavity, or neck  Cardiovascular: No complaints of chest pain, no palpitations, no ankle edema  Respiratory: No complaints of shortness of breath, no cough  Gastrointestinal: No complaints of jaundice, no bloody stools, no pale stools  Genitourinary: No complaints of dysuria, no hematuria, no nocturia, no frequent urination, no urethral discharge     Musculoskeletal: No complaints of weakness, paralysis, joint stiffness or arthralgias  Integumentary: No complaints of rash, no new lesions  Neurological: No complaints of convulsions, no seizures, no dizziness  Hematologic/Lymphatic: No complaints of easy bruising  Endocrine:  No hot or cold intolerance  No polydipsia, polyphagia, or polyuria  Allergy/immunology:  No environmental allergies  No food allergies  Not immunocompromised  Skin:  No pallor or rash  No wound          Patient Active Problem List   Diagnosis   • Hypertension   • Chronic bilateral low back pain with bilateral sciatica   • Cigarette nicotine dependence without complication   • Multiple myeloma in remission (HCC)   • Continuous opioid dependence ([address] [address] )   • Palliative care patient   • Encounter for central line care   • H/O autologous stem cell transplant ([address] [place] [address] )   • Amyloidosis ([address] [place] [address] )   • Thyroid nodule     Past Medical History:   Diagnosis Date   • Cardiomyopathy ([place] [address] )    • Chronic back pain    • GERD (gastroesophageal reflux disease)    • H/O autologous stem cell transplant ([place] [address] ) 2022   • Hypertension    • Multiple myeloma ([place] [address] )    • Neuropathy      Past Surgical History:   Procedure Laterality Date   •  SECTION     • US GUIDED THYROID BIOPSY  10/20/2022     Family History   Problem Relation Age of Onset   • No Known Problems Mother    • No Known Problems Father    • Colon cancer Neg Hx    • Diabetes Neg Hx      Social History     Socioeconomic History   • Marital status: Single     Spouse name: Not on file   • Number of children: Not on file   • Years of education: Not on file   • Highest education level: Not on file   Occupational History   • Not on file   Tobacco Use   • Smoking status: Heavy Smoker     Packs/day: 1 00     Years: 26 00     Pack years: 26 00     Types: Cigarettes     Start date: 1992   • Smokeless tobacco: Current   Vaping Use   • Vaping Use: Never used   Substance and Sexual Activity   • Alcohol use: Not Currently • Drug use: Never   • Sexual activity: Not Currently     Partners: Male   Other Topics Concern   • Not on file   Social History Narrative   • Not on file     Social Determinants of Health     Financial Resource Strain: Not on file   Food Insecurity: Not on file   Transportation Needs: Not on file   Physical Activity: Not on file   Stress: Not on file   Social Connections: Not on file   Intimate Partner Violence: Not on file   Housing Stability: Not on file       Current Outpatient Medications:   •  acetaminophen (TYLENOL) 500 mg tablet, Take 2 tablets (1,000 mg total) by mouth every 8 (eight) hours as needed for mild pain, Disp: 90 tablet, Rfl: 0  •  acyclovir (ZOVIRAX) 400 MG tablet, Take 400 mg by mouth, Disp: , Rfl:   •  acyclovir (ZOVIRAX) 800 mg tablet, Take 1 tablet (800 mg total) by mouth 2 (two) times a day, Disp: 180 tablet, Rfl: 3  •  albuterol (PROVENTIL HFA,VENTOLIN HFA) 90 mcg/act inhaler, Inhale 2 puffs every 6 (six) hours as needed for wheezing or shortness of breath, Disp: 18 g, Rfl: 5  •  amLODIPine (NORVASC) 10 mg tablet, take 1 tablet by mouth every morning, Disp: 30 tablet, Rfl: 3  •  atorvastatin (LIPITOR) 40 mg tablet, Take 40 mg by mouth, Disp: , Rfl:   •  bisacodyl (DULCOLAX) 10 mg suppository, Insert 1 suppository (10 mg total) into the rectum daily as needed for constipation, Disp: 12 suppository, Rfl: 0  •  DULoxetine (CYMBALTA) 30 mg delayed release capsule, take 1 capsule by mouth once daily, Disp: 90 capsule, Rfl: 1  •  famotidine (PEPCID) 40 MG tablet, TAKE 1 TABLET BY MOUTH ONCE DAILY, Disp: 90 tablet, Rfl: 1  •  fluticasone (FLONASE) 50 mcg/act nasal spray, instill 2 sprays into each nostril once daily, Disp: 16 g, Rfl: 5  •  Lidocaine HCl 4 % CREA, Apply topically 4 (four) times a day as needed (neuropathic pain), Disp: 133 g, Rfl: 0  •  methocarbamol (ROBAXIN) 750 mg tablet, Take 1 tablet (750 mg total) by mouth every 6 (six) hours as needed for muscle spasms, Disp: 120 tablet, Rfl: 3  •  naloxone (NARCAN) 4 mg/0 1 mL nasal spray, Administer 1 spray into a nostril  If no response after 2-3 minutes, give another dose in the other nostril using a new spray , Disp: 1 each, Rfl: 1  •  oxyCODONE (ROXICODONE) 10 MG TABS, Take 1-1 5 tablets (10-15 mg total) by mouth every 4 (four) hours as needed (cancer-related pain) Max Daily Amount: 90 mg, Disp: 63 tablet, Rfl: 0  •  oxyCODONE ER (Xtampza ER) 9 mg extended release capsule, Take 1 tablet (9 mg total) by mouth daily at bedtime Max Daily Amount: 9 mg, Disp: 7 capsule, Rfl: 0  •  Polyethyl Glycol-Propyl Glycol (Systane) 0 4-0 3 % GEL, 1 drop, Disp: , Rfl:   •  pregabalin (LYRICA) 100 mg capsule, take 1 capsule by mouth twice a day, Disp: 180 capsule, Rfl: 1  •  cyclophosphamide (CYTOXAN) 50 mg capsule, Take 550 mg by mouth (Patient not taking: Reported on 1/10/2023), Disp: , Rfl:   •  dexamethasone (DECADRON) 4 mg tablet, , Disp: , Rfl:   •  loratadine (CLARITIN) 10 mg tablet, Take 1 tablet by mouth for 1 dose the morning of each Daratumumab Infusion  (Patient not taking: Reported on 1/10/2023), Disp: , Rfl:   •  metoprolol succinate (TOPROL-XL) 25 mg 24 hr tablet, , Disp: , Rfl:   •  montelukast (SINGULAIR) 10 mg tablet, Take 1 tablet by mouth for 1 dose the morning of each Daratumumab Infusion   (Patient not taking: Reported on 1/10/2023), Disp: , Rfl:   •  ondansetron (ZOFRAN) 8 mg tablet, take 1 tablet by mouth every 8 hours if needed for nausea or vomiting (Patient not taking: Reported on 1/10/2023), Disp: , Rfl:   •  polyethylene glycol (MIRALAX) 17 g packet, Take 17 g by mouth daily (Patient not taking: Reported on 1/10/2023), Disp: 30 each, Rfl: 0  •  prochlorperazine (COMPAZINE) 10 mg tablet, take 1 tablet by mouth every 6 hours if needed for nausea or vomiting (Patient not taking: Reported on 1/10/2023), Disp: , Rfl:   •  senna-docusate sodium (SENOKOT S) 8 6-50 mg per tablet, Take 2 tablets by mouth daily at bedtime (Patient not taking: Reported on 1/10/2023), Disp: 60 tablet, Rfl: 0  No Known Allergies  Vitals:    01/10/23 1443   BP: 118/72   Pulse: 85   Resp: 18   Temp: (!) 97 4 °F (36 3 °C)   SpO2: 98%       Physical Exam  Constitutional: General appearance: The Patient is well-developed and well-nourished who appears the stated age in no acute distress  Patient is pleasant and talkative  HEENT:  Normocephalic  Sclerae are anicteric  Mucous membranes are moist  Neck is supple without adenopathy  No JVD  Left thyroid nodule  Chest: The lungs are clear to auscultation  Cardiac: Heart is regular rate  Abdomen: Abdomen is soft, non-tender, non-distended and without masses  Extremities: There is no clubbing or cyanosis  There is no edema  Symmetric  Neuro: Grossly nonfocal  Gait is normal      Lymphatic: No evidence of cervical adenopathy bilaterally  No evidence of axillary adenopathy bilaterally  No evidence of inguinal adenopathy bilaterally  Skin: Warm, anicteric  Psych:  Patient is pleasant and talkative  Breasts:        Pathology:  [unfilled]    Labs:      Imaging  No results found  I reviewed the above laboratory and imaging data  Discussion/Summary: 78-year-old female with an incidentally discovered left thyroid nodule  This is suspicious by Afirma  We had a long discussion regarding treatment options  We will obtain a neck ultrasound with lymph node mapping  Assuming this is normal, I would proceed with surgery  We discussed the pros and cons of total thyroidectomy versus hemithyroidectomy  If she undergoes hemithyroidectomy and there are high risk lesions, she would require completion thyroidectomy followed by radioactive iodine  If an incidental tumor is discovered, she may also need thyroid hormone for TSH suppression  There is also 25 to 30% risk of requiring thyroid hormone in her lifetime even if the pathology is benign    With total thyroidectomy, there is only a 40 to 50% risk this nodule is malignant based on Afirma  After some discussion, we have elected on left thyroid lobectomy  The risks were explained including bleeding, infection, recurrence, need for further surgery, wound complications, hypocalcemia and recurrent laryngeal nerve injury  Informed consent was obtained  We will schedule this at her earliest convenience  She is agreeable to this plan  All her questions were answered

## 2023-01-10 NOTE — PROGRESS NOTES
Surgical Oncology Follow Up       [facility]  [facility] [place]  [address]  [place] [address]    Marla Negron  1970  [id number]  [address]  [facility] [place]  [address]  [place] [address]    Diagnoses and all orders for this visit:    Thyroid nodule  -     Case request operating room: LOBECTOMY THYROID; LEFT HEMITHYROIDECTOMY; Standing  -     EKG 12 lead; Future  -     XR chest pa & lateral; Future  -     US head neck lymph node mapping; Future  -     Case request operating room: LOBECTOMY THYROID; LEFT HEMITHYROIDECTOMY    Other orders  -     Incentive spirometry; Standing  -     Insert and maintain IV line; Standing  -     Void On-Call to O R ; Standing  -     Place sequential compression device; Standing        Chief Complaint   Patient presents with   • Follow-up       No follow-ups on file        Oncology History   Multiple myeloma in remission ([facility] [address] )   12/15/2021 Initial Diagnosis    Multiple myeloma not having achieved remission ([facility] [address] )     1/11/2022 - 2/24/2022 Chemotherapy    daratumumab-hyaluronidase (DARZALEX FASPRO), 1,800 mg, Subcutaneous daratumumab-hyaluronidase, Once, 2 of 4 cycles  Administration: 1,800 mg (1/18/2022), 1,800 mg (2/1/2022), 1,800 mg (2/15/2022)  bortezomib (VELCADE), 1 3 mg/m2 = 2 3 mg (86 7 % of original dose 1 5 mg/m2), Subcutaneous, Once, 2 of 4 cycles  Dose modification: 1 3 mg/m2 (original dose 1 5 mg/m2, Cycle 1, Reason: Other (Must fill in a comment), Comment: as per previous treating oncologist)  Administration: 2 3 mg (1/18/2022), 2 3 mg (1/11/2022), 2 3 mg (2/1/2022), 2 3 mg (2/8/2022), 2 3 mg (2/15/2022)  Cyclophosphamide (CYTOXAN), 300 mg/m2 = 500 mg (100 % of original dose 300 mg/m2), Oral, Once, 2 of 4 cycles  Dose modification: 300 mg/m2 (original dose 300 mg/m2, Cycle 1)  Administration: 500 mg (1/18/2022), 500 mg (1/25/2022), 500 mg (2/1/2022), 500 mg (2/8/2022), 500 mg (2/15/2022), 550 mg (2/24/2022)         Staging:    Treatment history: Left lower pole thyroid biopsy: AUS, Afirma was suspicious  Current treatment: Hemithyroidectomy pending  Disease status:      History of Present Illness: Patient returns in follow-up  Her thyroid biopsy revealed atypia of undetermined significance, [employer] 3  Afirma was suspicious  She comes in now to discuss further therapy  She continues to deny any dysphagia or hoarseness  No history of radiation to her head or neck  There is no family history of thyroid cancer  Her thyroid function is normal     Review of Systems  Complete ROS Surg Onc:   Complete ROS Surg Onc:   Constitutional: The patient denies new or recent history of general fatigue, no recent weight loss, no change in appetite  Eyes: No complaints of visual problems, no scleral icterus  ENT: no complaints of ear pain, no hoarseness, no difficulty swallowing,  no tinnitus and no new masses in head, oral cavity, or neck  Cardiovascular: No complaints of chest pain, no palpitations, no ankle edema  Respiratory: No complaints of shortness of breath, no cough  Gastrointestinal: No complaints of jaundice, no bloody stools, no pale stools  Genitourinary: No complaints of dysuria, no hematuria, no nocturia, no frequent urination, no urethral discharge  Musculoskeletal: No complaints of weakness, paralysis, joint stiffness or arthralgias  Integumentary: No complaints of rash, no new lesions  Neurological: No complaints of convulsions, no seizures, no dizziness  Hematologic/Lymphatic: No complaints of easy bruising  Endocrine:  No hot or cold intolerance  No polydipsia, polyphagia, or polyuria  Allergy/immunology:  No environmental allergies  No food allergies  Not immunocompromised  Skin:  No pallor or rash  No wound          Patient Active Problem List   Diagnosis   • Hypertension   • Chronic bilateral low back pain with bilateral sciatica   • Cigarette nicotine dependence without complication   • Multiple myeloma in remission ([facility] )   • Continuous opioid dependence ([place] [address] )   • Palliative care patient   • Encounter for central line care   • H/O autologous stem cell transplant ([place] [address] )   • Amyloidosis ([place] [address] )   • Thyroid nodule     Past Medical History:   Diagnosis Date   • Cardiomyopathy ([place] [address] )    • Chronic back pain    • GERD (gastroesophageal reflux disease)    • H/O autologous stem cell transplant ([place] [address] ) 2022   • Hypertension    • Multiple myeloma (HCC)    • Neuropathy      Past Surgical History:   Procedure Laterality Date   •  SECTION     • US GUIDED THYROID BIOPSY  10/20/2022     Family History   Problem Relation Age of Onset   • No Known Problems Mother    • No Known Problems Father    • Colon cancer Neg Hx    • Diabetes Neg Hx      Social History     Socioeconomic History   • Marital status: Single     Spouse name: Not on file   • Number of children: Not on file   • Years of education: Not on file   • Highest education level: Not on file   Occupational History   • Not on file   Tobacco Use   • Smoking status: Heavy Smoker     Packs/day: 1      Years:  00     Pack years:  00     Types: Cigarettes     Start date: 1992   • Smokeless tobacco: Current   Vaping Use   • Vaping Use: Never used   Substance and Sexual Activity   • Alcohol use: Not Currently   • Drug use: Never   • Sexual activity: Not Currently     Partners: Male   Other Topics Concern   • Not on file   Social History Narrative   • Not on file     Social Determinants of Health     Financial Resource Strain: Not on file   Food Insecurity: Not on file   Transportation Needs: Not on file   Physical Activity: Not on file   Stress: Not on file   Social Connections: Not on file   Intimate Partner Violence: Not on file   Housing Stability: Not on file       Current Outpatient Medications:   •  acetaminophen (TYLENOL) 500 mg tablet, Take 2 tablets (1,000 mg total) by mouth every 8 (eight) hours as needed for mild pain, Disp: 90 tablet, Rfl: 0  •  acyclovir (ZOVIRAX) 400 MG tablet, Take 400 mg by mouth, Disp: , Rfl:   •  acyclovir (ZOVIRAX) 800 mg tablet, Take 1 tablet (800 mg total) by mouth 2 (two) times a day, Disp: 180 tablet, Rfl: 3  •  albuterol (PROVENTIL HFA,VENTOLIN HFA) 90 mcg/act inhaler, Inhale 2 puffs every 6 (six) hours as needed for wheezing or shortness of breath, Disp: 18 g, Rfl: 5  •  amLODIPine (NORVASC) 10 mg tablet, take 1 tablet by mouth every morning, Disp: 30 tablet, Rfl: 3  •  atorvastatin (LIPITOR) 40 mg tablet, Take 40 mg by mouth, Disp: , Rfl:   •  bisacodyl (DULCOLAX) 10 mg suppository, Insert 1 suppository (10 mg total) into the rectum daily as needed for constipation, Disp: 12 suppository, Rfl: 0  •  DULoxetine (CYMBALTA) 30 mg delayed release capsule, take 1 capsule by mouth once daily, Disp: 90 capsule, Rfl: 1  •  famotidine (PEPCID) 40 MG tablet, TAKE 1 TABLET BY MOUTH ONCE DAILY, Disp: 90 tablet, Rfl: 1  •  fluticasone (FLONASE) 50 mcg/act nasal spray, instill 2 sprays into each nostril once daily, Disp: 16 g, Rfl: 5  •  Lidocaine HCl 4 % CREA, Apply topically 4 (four) times a day as needed (neuropathic pain), Disp: 133 g, Rfl: 0  •  methocarbamol (ROBAXIN) 750 mg tablet, Take 1 tablet (750 mg total) by mouth every 6 (six) hours as needed for muscle spasms, Disp: 120 tablet, Rfl: 3  •  naloxone (NARCAN) 4 mg/0 1 mL nasal spray, Administer 1 spray into a nostril   If no response after 2-3 minutes, give another dose in the other nostril using a new spray , Disp: 1 each, Rfl: 1  •  oxyCODONE (ROXICODONE) 10 MG TABS, Take 1-1 5 tablets (10-15 mg total) by mouth every 4 (four) hours as needed (cancer-related pain) Max Daily Amount: 90 mg, Disp: 63 tablet, Rfl: 0  •  oxyCODONE ER (Xtampza ER) 9 mg extended release capsule, Take 1 tablet (9 mg total) by mouth daily at bedtime Max Daily Amount: 9 mg, Disp: 7 capsule, Rfl: 0  •  Polyethyl Glycol-Propyl Glycol (Systane) 0 4-0 3 % GEL, 1 drop, Disp: , Rfl:   •  pregabalin (LYRICA) 100 mg capsule, take 1 capsule by mouth twice a day, Disp: 180 capsule, Rfl: 1  •  cyclophosphamide (CYTOXAN) 50 mg capsule, Take 550 mg by mouth (Patient not taking: Reported on 1/10/2023), Disp: , Rfl:   •  dexamethasone (DECADRON) 4 mg tablet, , Disp: , Rfl:   •  loratadine (CLARITIN) 10 mg tablet, Take 1 tablet by mouth for 1 dose the morning of each Daratumumab Infusion  (Patient not taking: Reported on 1/10/2023), Disp: , Rfl:   •  metoprolol succinate (TOPROL-XL) 25 mg 24 hr tablet, , Disp: , Rfl:   •  montelukast (SINGULAIR) 10 mg tablet, Take 1 tablet by mouth for 1 dose the morning of each Daratumumab Infusion  (Patient not taking: Reported on 1/10/2023), Disp: , Rfl:   •  ondansetron (ZOFRAN) 8 mg tablet, take 1 tablet by mouth every 8 hours if needed for nausea or vomiting (Patient not taking: Reported on 1/10/2023), Disp: , Rfl:   •  polyethylene glycol (MIRALAX) 17 g packet, Take 17 g by mouth daily (Patient not taking: Reported on 1/10/2023), Disp: 30 each, Rfl: 0  •  prochlorperazine (COMPAZINE) 10 mg tablet, take 1 tablet by mouth every 6 hours if needed for nausea or vomiting (Patient not taking: Reported on 1/10/2023), Disp: , Rfl:   •  senna-docusate sodium (SENOKOT S) 8 6-50 mg per tablet, Take 2 tablets by mouth daily at bedtime (Patient not taking: Reported on 1/10/2023), Disp: 60 tablet, Rfl: 0  No Known Allergies  Vitals:    01/10/23 1443   BP: 118/72   Pulse: 85   Resp: 18   Temp: (!) 97 4 °F (36 3 °C)   SpO2: 98%       Physical Exam  Constitutional: General appearance: The Patient is well-developed and well-nourished who appears the stated age in no acute distress  Patient is pleasant and talkative  HEENT:  Normocephalic  Sclerae are anicteric  Mucous membranes are moist  Neck is supple without adenopathy  No JVD  Left thyroid nodule  Chest: The lungs are clear to auscultation  Cardiac: Heart is regular rate  Abdomen: Abdomen is soft, non-tender, non-distended and without masses  Extremities: There is no clubbing or cyanosis  There is no edema  Symmetric  Neuro: Grossly nonfocal  Gait is normal      Lymphatic: No evidence of cervical adenopathy bilaterally  No evidence of axillary adenopathy bilaterally  No evidence of inguinal adenopathy bilaterally  Skin: Warm, anicteric  Psych:  Patient is pleasant and talkative  Breasts:        Pathology:  [unfilled]    Labs:      Imaging  No results found  I reviewed the above laboratory and imaging data  Discussion/Summary: 53-year-old female with an incidentally discovered left thyroid nodule  This is suspicious by Afirma  We had a long discussion regarding treatment options  We will obtain a neck ultrasound with lymph node mapping  Assuming this is normal, I would proceed with surgery  We discussed the pros and cons of total thyroidectomy versus hemithyroidectomy  If she undergoes hemithyroidectomy and there are high risk lesions, she would require completion thyroidectomy followed by radioactive iodine  If an incidental tumor is discovered, she may also need thyroid hormone for TSH suppression  There is also 25 to 30% risk of requiring thyroid hormone in her lifetime even if the pathology is benign  With total thyroidectomy, there is only a 40 to 50% risk this nodule is malignant based on Afirma  After some discussion, we have elected on left thyroid lobectomy  The risks were explained including bleeding, infection, recurrence, need for further surgery, wound complications, hypocalcemia and recurrent laryngeal nerve injury  Informed consent was obtained  We will schedule this at her earliest convenience  She is agreeable to this plan  All her questions were answered

## 2023-01-10 NOTE — H&P (VIEW-ONLY)
Surgical Oncology Follow Up       [facility]  [facility] [place]  [address]  [place] [address]    Marla Negron  1970  [id number]  [address] [facility]  [facility] [place]  [address]  [place] [address]    Diagnoses and all orders for this visit:    Thyroid nodule  -     Case request operating room: LOBECTOMY THYROID; LEFT HEMITHYROIDECTOMY; Standing  -     EKG 12 lead; Future  -     XR chest pa & lateral; Future  -     US head neck lymph node mapping; Future  -     Case request operating room: LOBECTOMY THYROID; LEFT HEMITHYROIDECTOMY    Other orders  -     Incentive spirometry; Standing  -     Insert and maintain IV line; Standing  -     Void On-Call to O R ; Standing  -     Place sequential compression device; Standing        Chief Complaint   Patient presents with   • Follow-up       No follow-ups on file        Oncology History   Multiple myeloma in remission ([facility] [address] )   12/15/2021 Initial Diagnosis    Multiple myeloma not having achieved remission ([facility] [address] )     1/11/2022 - 2/24/2022 Chemotherapy    daratumumab-hyaluronidase (DARZALEX FASPRO), 1,800 mg, Subcutaneous daratumumab-hyaluronidase, Once, 2 of 4 cycles  Administration: 1,800 mg (1/18/2022), 1,800 mg (2/1/2022), 1,800 mg (2/15/2022)  bortezomib (VELCADE), 1 3 mg/m2 = 2 3 mg (86 7 % of original dose 1 5 mg/m2), Subcutaneous, Once, 2 of 4 cycles  Dose modification: 1 3 mg/m2 (original dose 1 5 mg/m2, Cycle 1, Reason: Other (Must fill in a comment), Comment: as per previous treating oncologist)  Administration: 2 3 mg (1/18/2022), 2 3 mg (1/11/2022), 2 3 mg (2/1/2022), 2 3 mg (2/8/2022), 2 3 mg (2/15/2022)  Cyclophosphamide (CYTOXAN), 300 mg/m2 = 500 mg (100 % of original dose 300 mg/m2), Oral, Once, 2 of 4 cycles  Dose modification: 300 mg/m2 (original dose 300 mg/m2, Cycle 1)  Administration: 500 mg (1/18/2022), 500 mg (1/25/2022), 500 mg (2/1/2022), 500 mg (2/8/2022), 500 mg (2/15/2022), 550 mg (2/24/2022)         Staging:    Treatment history: Left lower pole thyroid biopsy: AUS, Afirma was suspicious  Current treatment: Hemithyroidectomy pending  Disease status:      History of Present Illness: Patient returns in follow-up  Her thyroid biopsy revealed atypia of undetermined significance, [place] 3  Afirma was suspicious  She comes in now to discuss further therapy  She continues to deny any dysphagia or hoarseness  No history of radiation to her head or neck  There is no family history of thyroid cancer  Her thyroid function is normal     Review of Systems  Complete ROS Surg Onc:   Complete ROS Surg Onc:   Constitutional: The patient denies new or recent history of general fatigue, no recent weight loss, no change in appetite  Eyes: No complaints of visual problems, no scleral icterus  ENT: no complaints of ear pain, no hoarseness, no difficulty swallowing,  no tinnitus and no new masses in head, oral cavity, or neck  Cardiovascular: No complaints of chest pain, no palpitations, no ankle edema  Respiratory: No complaints of shortness of breath, no cough  Gastrointestinal: No complaints of jaundice, no bloody stools, no pale stools  Genitourinary: No complaints of dysuria, no hematuria, no nocturia, no frequent urination, no urethral discharge  Musculoskeletal: No complaints of weakness, paralysis, joint stiffness or arthralgias  Integumentary: No complaints of rash, no new lesions  Neurological: No complaints of convulsions, no seizures, no dizziness  Hematologic/Lymphatic: No complaints of easy bruising  Endocrine:  No hot or cold intolerance  No polydipsia, polyphagia, or polyuria  Allergy/immunology:  No environmental allergies  No food allergies  Not immunocompromised  Skin:  No pallor or rash  No wound          Patient Active Problem List   Diagnosis   • Hypertension   • Chronic bilateral low back pain with bilateral sciatica   • Cigarette nicotine dependence without complication   • Multiple myeloma in remission ([facility] )   • Continuous opioid dependence ([place] [address] )   • Palliative care patient   • Encounter for central line care   • H/O autologous stem cell transplant ([place] [address] )   • Amyloidosis ([place] [address] )   • Thyroid nodule     Past Medical History:   Diagnosis Date   • Cardiomyopathy ([place] [address] )    • Chronic back pain    • GERD (gastroesophageal reflux disease)    • H/O autologous stem cell transplant ([place] [address] ) 2022   • Hypertension    • Multiple myeloma (HCC)    • Neuropathy      Past Surgical History:   Procedure Laterality Date   •  SECTION     • US GUIDED THYROID BIOPSY  10/20/2022     Family History   Problem Relation Age of Onset   • No Known Problems Mother    • No Known Problems Father    • Colon cancer Neg Hx    • Diabetes Neg Hx      Social History     Socioeconomic History   • Marital status: Single     Spouse name: Not on file   • Number of children: Not on file   • Years of education: Not on file   • Highest education level: Not on file   Occupational History   • Not on file   Tobacco Use   • Smoking status: Heavy Smoker     Packs/day: 1      Years:  00     Pack years:  00     Types: Cigarettes     Start date: 1992   • Smokeless tobacco: Current   Vaping Use   • Vaping Use: Never used   Substance and Sexual Activity   • Alcohol use: Not Currently   • Drug use: Never   • Sexual activity: Not Currently     Partners: Male   Other Topics Concern   • Not on file   Social History Narrative   • Not on file     Social Determinants of Health     Financial Resource Strain: Not on file   Food Insecurity: Not on file   Transportation Needs: Not on file   Physical Activity: Not on file   Stress: Not on file   Social Connections: Not on file   Intimate Partner Violence: Not on file   Housing Stability: Not on file       Current Outpatient Medications:   •  acetaminophen (TYLENOL) 500 mg tablet, Take 2 tablets (1,000 mg total) by mouth every 8 (eight) hours as needed for mild pain, Disp: 90 tablet, Rfl: 0  •  acyclovir (ZOVIRAX) 400 MG tablet, Take 400 mg by mouth, Disp: , Rfl:   •  acyclovir (ZOVIRAX) 800 mg tablet, Take 1 tablet (800 mg total) by mouth 2 (two) times a day, Disp: 180 tablet, Rfl: 3  •  albuterol (PROVENTIL HFA,VENTOLIN HFA) 90 mcg/act inhaler, Inhale 2 puffs every 6 (six) hours as needed for wheezing or shortness of breath, Disp: 18 g, Rfl: 5  •  amLODIPine (NORVASC) 10 mg tablet, take 1 tablet by mouth every morning, Disp: 30 tablet, Rfl: 3  •  atorvastatin (LIPITOR) 40 mg tablet, Take 40 mg by mouth, Disp: , Rfl:   •  bisacodyl (DULCOLAX) 10 mg suppository, Insert 1 suppository (10 mg total) into the rectum daily as needed for constipation, Disp: 12 suppository, Rfl: 0  •  DULoxetine (CYMBALTA) 30 mg delayed release capsule, take 1 capsule by mouth once daily, Disp: 90 capsule, Rfl: 1  •  famotidine (PEPCID) 40 MG tablet, TAKE 1 TABLET BY MOUTH ONCE DAILY, Disp: 90 tablet, Rfl: 1  •  fluticasone (FLONASE) 50 mcg/act nasal spray, instill 2 sprays into each nostril once daily, Disp: 16 g, Rfl: 5  •  Lidocaine HCl 4 % CREA, Apply topically 4 (four) times a day as needed (neuropathic pain), Disp: 133 g, Rfl: 0  •  methocarbamol (ROBAXIN) 750 mg tablet, Take 1 tablet (750 mg total) by mouth every 6 (six) hours as needed for muscle spasms, Disp: 120 tablet, Rfl: 3  •  naloxone (NARCAN) 4 mg/0 1 mL nasal spray, Administer 1 spray into a nostril   If no response after 2-3 minutes, give another dose in the other nostril using a new spray , Disp: 1 each, Rfl: 1  •  oxyCODONE (ROXICODONE) 10 MG TABS, Take 1-1 5 tablets (10-15 mg total) by mouth every 4 (four) hours as needed (cancer-related pain) Max Daily Amount: 90 mg, Disp: 63 tablet, Rfl: 0  •  oxyCODONE ER (Xtampza ER) 9 mg extended release capsule, Take 1 tablet (9 mg total) by mouth daily at bedtime Max Daily Amount: 9 mg, Disp: 7 capsule, Rfl: 0  •  Polyethyl Glycol-Propyl Glycol (Systane) 0 4-0 3 % GEL, 1 drop, Disp: , Rfl:   •  pregabalin (LYRICA) 100 mg capsule, take 1 capsule by mouth twice a day, Disp: 180 capsule, Rfl: 1  •  cyclophosphamide (CYTOXAN) 50 mg capsule, Take 550 mg by mouth (Patient not taking: Reported on 1/10/2023), Disp: , Rfl:   •  dexamethasone (DECADRON) 4 mg tablet, , Disp: , Rfl:   •  loratadine (CLARITIN) 10 mg tablet, Take 1 tablet by mouth for 1 dose the morning of each Daratumumab Infusion  (Patient not taking: Reported on 1/10/2023), Disp: , Rfl:   •  metoprolol succinate (TOPROL-XL) 25 mg 24 hr tablet, , Disp: , Rfl:   •  montelukast (SINGULAIR) 10 mg tablet, Take 1 tablet by mouth for 1 dose the morning of each Daratumumab Infusion  (Patient not taking: Reported on 1/10/2023), Disp: , Rfl:   •  ondansetron (ZOFRAN) 8 mg tablet, take 1 tablet by mouth every 8 hours if needed for nausea or vomiting (Patient not taking: Reported on 1/10/2023), Disp: , Rfl:   •  polyethylene glycol (MIRALAX) 17 g packet, Take 17 g by mouth daily (Patient not taking: Reported on 1/10/2023), Disp: 30 each, Rfl: 0  •  prochlorperazine (COMPAZINE) 10 mg tablet, take 1 tablet by mouth every 6 hours if needed for nausea or vomiting (Patient not taking: Reported on 1/10/2023), Disp: , Rfl:   •  senna-docusate sodium (SENOKOT S) 8 6-50 mg per tablet, Take 2 tablets by mouth daily at bedtime (Patient not taking: Reported on 1/10/2023), Disp: 60 tablet, Rfl: 0  No Known Allergies  Vitals:    01/10/23 1443   BP: 118/72   Pulse: 85   Resp: 18   Temp: (!) 97 4 °F (36 3 °C)   SpO2: 98%       Physical Exam  Constitutional: General appearance: The Patient is well-developed and well-nourished who appears the stated age in no acute distress  Patient is pleasant and talkative  HEENT:  Normocephalic  Sclerae are anicteric  Mucous membranes are moist  Neck is supple without adenopathy  No JVD  Left thyroid nodule  Chest: The lungs are clear to auscultation  Cardiac: Heart is regular rate  Abdomen: Abdomen is soft, non-tender, non-distended and without masses  Extremities: There is no clubbing or cyanosis  There is no edema  Symmetric  Neuro: Grossly nonfocal  Gait is normal      Lymphatic: No evidence of cervical adenopathy bilaterally  No evidence of axillary adenopathy bilaterally  No evidence of inguinal adenopathy bilaterally  Skin: Warm, anicteric  Psych:  Patient is pleasant and talkative  Breasts:        Pathology:  [unfilled]    Labs:      Imaging  No results found  I reviewed the above laboratory and imaging data  Discussion/Summary: 49-year-old female with an incidentally discovered left thyroid nodule  This is suspicious by Afirma  We had a long discussion regarding treatment options  We will obtain a neck ultrasound with lymph node mapping  Assuming this is normal, I would proceed with surgery  We discussed the pros and cons of total thyroidectomy versus hemithyroidectomy  If she undergoes hemithyroidectomy and there are high risk lesions, she would require completion thyroidectomy followed by radioactive iodine  If an incidental tumor is discovered, she may also need thyroid hormone for TSH suppression  There is also 25 to 30% risk of requiring thyroid hormone in her lifetime even if the pathology is benign  With total thyroidectomy, there is only a 40 to 50% risk this nodule is malignant based on Afirma  After some discussion, we have elected on left thyroid lobectomy  The risks were explained including bleeding, infection, recurrence, need for further surgery, wound complications, hypocalcemia and recurrent laryngeal nerve injury  Informed consent was obtained  We will schedule this at her earliest convenience  She is agreeable to this plan  All her questions were answered

## 2023-01-10 NOTE — TELEPHONE ENCOUNTER
Tried reaching patient to schedule US; however, she did not answer phone and her mailbox was full so could not leave a message

## 2023-01-11 ENCOUNTER — TELEPHONE (OUTPATIENT)
Dept: HEMATOLOGY ONCOLOGY | Facility: CLINIC | Age: 53
End: 2023-01-11

## 2023-01-11 NOTE — TELEPHONE ENCOUNTER
LM for patient that Dr Tiffanie Noe ordered a US that needs to be scheduled prior to surgery  Asked patient to call the [facility]

## 2023-01-16 ENCOUNTER — HOSPITAL ENCOUNTER (OUTPATIENT)
Dept: RADIOLOGY | Facility: HOSPITAL | Age: 53
Discharge: HOME | End: 2023-01-16

## 2023-01-16 ENCOUNTER — OFFICE VISIT (OUTPATIENT)
Dept: LAB | Facility: HOSPITAL | Age: 53
End: 2023-01-16

## 2023-01-16 ENCOUNTER — APPOINTMENT (OUTPATIENT)
Dept: LAB | Facility: HOSPITAL | Age: 53
End: 2023-01-16

## 2023-01-16 DIAGNOSIS — E04.1 THYROID NODULE: ICD-10-CM

## 2023-01-16 DIAGNOSIS — Z51.5 PALLIATIVE CARE PATIENT: ICD-10-CM

## 2023-01-16 DIAGNOSIS — E85.9 MYELOMA ASSOCIATED AMYLOIDOSIS (HCC): ICD-10-CM

## 2023-01-16 DIAGNOSIS — C90.00 MULTIPLE MYELOMA, REMISSION STATUS UNSPECIFIED (HCC): Primary | ICD-10-CM

## 2023-01-16 DIAGNOSIS — C90.00 MYELOMA ASSOCIATED AMYLOIDOSIS (HCC): ICD-10-CM

## 2023-01-16 DIAGNOSIS — G89.3 CANCER ASSOCIATED PAIN: ICD-10-CM

## 2023-01-16 DIAGNOSIS — C90.00 MULTIPLE MYELOMA NOT HAVING ACHIEVED REMISSION (HCC): ICD-10-CM

## 2023-01-16 LAB
ALBUMIN SERPL BCP-MCNC: 3.6 G/DL (ref 3.5–5)
ALP SERPL-CCNC: 98 U/L (ref 46–116)
ALT SERPL W P-5'-P-CCNC: 14 U/L (ref 12–78)
ANION GAP SERPL CALCULATED.3IONS-SCNC: 10 MMOL/L (ref 4–13)
AST SERPL W P-5'-P-CCNC: 14 U/L (ref 5–45)
ATRIAL RATE: 69 BPM
BASOPHILS # BLD AUTO: 0.03 THOUSANDS/ÂΜL (ref 0–0.1)
BASOPHILS NFR BLD AUTO: 0 % (ref 0–1)
BILIRUB SERPL-MCNC: 0.33 MG/DL (ref 0.2–1)
BUN SERPL-MCNC: 20 MG/DL (ref 5–25)
CALCIUM SERPL-MCNC: 8.6 MG/DL (ref 8.3–10.1)
CHLORIDE SERPL-SCNC: 103 MMOL/L (ref 96–108)
CO2 SERPL-SCNC: 26 MMOL/L (ref 21–32)
CREAT SERPL-MCNC: 1.11 MG/DL (ref 0.6–1.3)
EOSINOPHIL # BLD AUTO: 0.13 THOUSAND/ÂΜL (ref 0–0.61)
EOSINOPHIL NFR BLD AUTO: 2 % (ref 0–6)
ERYTHROCYTE [DISTWIDTH] IN BLOOD BY AUTOMATED COUNT: 16.1 % (ref 11.6–15.1)
GFR SERPL CREATININE-BSD FRML MDRD: 56 ML/MIN/1.73SQ M
GLUCOSE SERPL-MCNC: 95 MG/DL (ref 65–140)
HCT VFR BLD AUTO: 48.1 % (ref 34.8–46.1)
HGB BLD-MCNC: 15.7 G/DL (ref 11.5–15.4)
IGA SERPL-MCNC: 126 MG/DL (ref 70–400)
IGG SERPL-MCNC: 1230 MG/DL (ref 700–1600)
IGM SERPL-MCNC: 45 MG/DL (ref 40–230)
IMM GRANULOCYTES # BLD AUTO: 0.03 THOUSAND/UL (ref 0–0.2)
IMM GRANULOCYTES NFR BLD AUTO: 0 % (ref 0–2)
LDH SERPL-CCNC: 126 U/L (ref 81–234)
LYMPHOCYTES # BLD AUTO: 2.33 THOUSANDS/ÂΜL (ref 0.6–4.47)
LYMPHOCYTES NFR BLD AUTO: 27 % (ref 14–44)
MAGNESIUM SERPL-MCNC: 2 MG/DL (ref 1.6–2.6)
MCH RBC QN AUTO: 28.4 PG (ref 26.8–34.3)
MCHC RBC AUTO-ENTMCNC: 32.6 G/DL (ref 31.4–37.4)
MCV RBC AUTO: 87 FL (ref 82–98)
MONOCYTES # BLD AUTO: 0.84 THOUSAND/ÂΜL (ref 0.17–1.22)
MONOCYTES NFR BLD AUTO: 10 % (ref 4–12)
NEUTROPHILS # BLD AUTO: 5.23 THOUSANDS/ÂΜL (ref 1.85–7.62)
NEUTS SEG NFR BLD AUTO: 61 % (ref 43–75)
NRBC BLD AUTO-RTO: 0 /100 WBCS
P AXIS: 66 DEGREES
PLATELET # BLD AUTO: 237 THOUSANDS/UL (ref 149–390)
PMV BLD AUTO: 9.7 FL (ref 8.9–12.7)
POTASSIUM SERPL-SCNC: 3.8 MMOL/L (ref 3.5–5.3)
PR INTERVAL: 168 MS
PROT SERPL-MCNC: 7.1 G/DL (ref 6.4–8.4)
QRS AXIS: -45 DEGREES
QRSD INTERVAL: 114 MS
QT INTERVAL: 434 MS
QTC INTERVAL: 465 MS
RBC # BLD AUTO: 5.52 MILLION/UL (ref 3.81–5.12)
SODIUM SERPL-SCNC: 139 MMOL/L (ref 135–147)
T WAVE AXIS: 44 DEGREES
VENTRICULAR RATE: 69 BPM
WBC # BLD AUTO: 8.59 THOUSAND/UL (ref 4.31–10.16)

## 2023-01-16 RX ORDER — OXYCODONE HYDROCHLORIDE 10 MG/1
10-15 TABLET ORAL EVERY 4 HOURS PRN
Qty: 63 TABLET | Refills: 0 | Status: SHIPPED | OUTPATIENT
Start: 2023-01-16 | End: 2023-01-23 | Stop reason: SDUPTHER

## 2023-01-16 NOTE — TELEPHONE ENCOUNTER
Primary palliative medicine provider: Dr Glenn Horvath     Medication requested: oxycodone 10 mg tablet and xtamza ER 9 mg tablet     If for pain, how has the patient been taking their pain medicine?      Last appointment: 11/16    Next scheduled appointment: 1/23     PDMP review:   PATIENT ID PRESCRIPTION # FILLED WRITTEN DRUG LABEL QTY DAYS STRENGTH MME** PRESCRIBER PHARMACY PAYMENT REFILL #/AUTH STATE DETAIL   1 [id number] 01/10/2023 01/10/2023 Xtampza Er (Capsule, Extended Release) 7 0 7 9 MG NA [employer] 0 / 0 [place]    1 [id number] 01/10/2023 01/10/2023 oxyCODONE HCL (Tablet) 63 0 7 10  0 [employer] 0 / 0 [place]    1 [id number] 01/03/2023 01/03/2023 Xtampza Er (Capsule, Extended Release) 7 0 7 9 MG NA [employer] 0 / 0 [place]    1 [id number] 01/03/2023 01/03/2023 oxyCODONE HCL (Tablet) 63 0 7 10  0 [employer] 0 / 0 [place]    1 [id number] 12/27/2022 12/27/2022 oxyCODONE HCL (Tablet) 63 0 7 10  0 [id number] [employer] 0 / 0 [place]            Pharmacy:

## 2023-01-17 ENCOUNTER — TELEPHONE (OUTPATIENT)
Dept: HEMATOLOGY ONCOLOGY | Facility: CLINIC | Age: 53
End: 2023-01-17

## 2023-01-17 LAB
KAPPA LC FREE SER-MCNC: 33 MG/L (ref 3.3–19.4)
KAPPA LC FREE/LAMBDA FREE SER: 2.29 {RATIO} (ref 0.26–1.65)
LAMBDA LC FREE SERPL-MCNC: 14.4 MG/L (ref 5.7–26.3)

## 2023-01-17 NOTE — TELEPHONE ENCOUNTER
CALL TRANSFER   Reason for patient call? Patient calling to schedule her [address]   Patient's primary physician? Dr Dominic Youngblood   RN call was transferred to and time it was transferred? Central Scheduling @ 11:48AM   Informed patient that the message will be forwarded to the team and someone will get back to them as soon as possible    Did you relay this information to the patient?  N/A

## 2023-01-18 ENCOUNTER — APPOINTMENT (OUTPATIENT)
Dept: LAB | Facility: HOSPITAL | Age: 53
End: 2023-01-18

## 2023-01-18 DIAGNOSIS — C90.00 MULTIPLE MYELOMA NOT HAVING ACHIEVED REMISSION (HCC): Primary | ICD-10-CM

## 2023-01-18 DIAGNOSIS — C90.00 MULTIPLE MYELOMA, REMISSION STATUS UNSPECIFIED (HCC): ICD-10-CM

## 2023-01-18 DIAGNOSIS — Z51.11 ENCOUNTER FOR ANTINEOPLASTIC CHEMOTHERAPY: ICD-10-CM

## 2023-01-18 DIAGNOSIS — E85.9 MYELOMA ASSOCIATED AMYLOIDOSIS (HCC): ICD-10-CM

## 2023-01-18 DIAGNOSIS — C90.00 MYELOMA ASSOCIATED AMYLOIDOSIS (HCC): ICD-10-CM

## 2023-01-18 LAB
ALBUMIN SERPL ELPH-MCNC: 4.97 G/DL (ref 3.5–5)
ALBUMIN SERPL ELPH-MCNC: 72 % (ref 52–65)
ALPHA1 GLOB SERPL ELPH-MCNC: 0.3 G/DL (ref 0.1–0.4)
ALPHA1 GLOB SERPL ELPH-MCNC: 4.3 % (ref 2.5–5)
ALPHA2 GLOB SERPL ELPH-MCNC: 0.51 G/DL (ref 0.4–1.2)
ALPHA2 GLOB SERPL ELPH-MCNC: 7.4 % (ref 7–13)
B2 MICROGLOB SERPL-MCNC: 2.4 MG/L (ref 0.6–2.4)
BETA GLOB ABNORMAL SERPL ELPH-MCNC: 0.25 G/DL (ref 0.4–0.8)
BETA1 GLOB SERPL ELPH-MCNC: 3.6 % (ref 5–13)
BETA2 GLOB SERPL ELPH-MCNC: 1.9 % (ref 2–8)
BETA2+GAMMA GLOB SERPL ELPH-MCNC: 0.13 G/DL (ref 0.2–0.5)
GAMMA GLOB ABNORMAL SERPL ELPH-MCNC: 0.75 G/DL (ref 0.5–1.6)
GAMMA GLOB SERPL ELPH-MCNC: 10.8 % (ref 12–22)
IGG/ALB SER: 2.57 {RATIO} (ref 1.1–1.8)
PROT PATTERN SERPL ELPH-IMP: ABNORMAL
PROT SERPL-MCNC: 6.9 G/DL (ref 6.4–8.2)

## 2023-01-19 LAB
PROT 24H UR-MCNC: 126 MG/24 HRS (ref 40–150)
SPECIMEN VOL UR: 1800 ML

## 2023-01-20 LAB
KAPPA LC UR-MCNC: 11.98 MG/L (ref 1.17–86.46)
KAPPA LC/LAMBDA UR: 10.33 {RATIO} (ref 1.83–14.26)
LAMBDA LC UR-MCNC: 1.16 MG/L (ref 0.27–15.21)

## 2023-01-23 ENCOUNTER — APPOINTMENT (EMERGENCY)
Dept: RADIOLOGY | Facility: HOSPITAL | Age: 53
End: 2023-01-23

## 2023-01-23 ENCOUNTER — APPOINTMENT (EMERGENCY)
Dept: CT IMAGING | Facility: HOSPITAL | Age: 53
End: 2023-01-23

## 2023-01-23 ENCOUNTER — HOSPITAL ENCOUNTER (OUTPATIENT)
Facility: HOSPITAL | Age: 53
Setting detail: OBSERVATION
Discharge: HOME | End: 2023-01-25
Attending: EMERGENCY MEDICINE | Admitting: INTERNAL MEDICINE

## 2023-01-23 DIAGNOSIS — M54.41 CHRONIC BILATERAL LOW BACK PAIN WITH BILATERAL SCIATICA: Chronic | ICD-10-CM

## 2023-01-23 DIAGNOSIS — R07.89 ATYPICAL CHEST PAIN: Primary | ICD-10-CM

## 2023-01-23 DIAGNOSIS — G89.29 CHRONIC BILATERAL LOW BACK PAIN WITH BILATERAL SCIATICA: Chronic | ICD-10-CM

## 2023-01-23 DIAGNOSIS — I10 PRIMARY HYPERTENSION: Chronic | ICD-10-CM

## 2023-01-23 DIAGNOSIS — G89.3 CANCER ASSOCIATED PAIN: ICD-10-CM

## 2023-01-23 DIAGNOSIS — M54.42 CHRONIC BILATERAL LOW BACK PAIN WITH BILATERAL SCIATICA: Chronic | ICD-10-CM

## 2023-01-23 DIAGNOSIS — Z51.5 PALLIATIVE CARE PATIENT: ICD-10-CM

## 2023-01-23 DIAGNOSIS — C90.00 MULTIPLE MYELOMA NOT HAVING ACHIEVED REMISSION (HCC): ICD-10-CM

## 2023-01-23 LAB
2HR DELTA HS TROPONIN: 5 NG/L
4HR DELTA HS TROPONIN: 15 NG/L
ALBUMIN SERPL BCP-MCNC: 4.5 G/DL (ref 3.5–5)
ALP SERPL-CCNC: 102 U/L (ref 46–116)
ALT SERPL W P-5'-P-CCNC: 20 U/L (ref 12–78)
ANION GAP SERPL CALCULATED.3IONS-SCNC: 14 MMOL/L (ref 4–13)
AST SERPL W P-5'-P-CCNC: 33 U/L (ref 5–45)
BASOPHILS # BLD AUTO: 0.02 THOUSANDS/ÂΜL (ref 0–0.1)
BASOPHILS NFR BLD AUTO: 0 % (ref 0–1)
BILIRUB SERPL-MCNC: 0.83 MG/DL (ref 0.2–1)
BUN SERPL-MCNC: 10 MG/DL (ref 5–25)
CALCIUM SERPL-MCNC: 9.6 MG/DL (ref 8.3–10.1)
CARDIAC TROPONIN I PNL SERPL HS: 10 NG/L
CARDIAC TROPONIN I PNL SERPL HS: 15 NG/L
CARDIAC TROPONIN I PNL SERPL HS: 25 NG/L
CHLORIDE SERPL-SCNC: 99 MMOL/L (ref 96–108)
CO2 SERPL-SCNC: 23 MMOL/L (ref 21–32)
CREAT SERPL-MCNC: 0.9 MG/DL (ref 0.6–1.3)
D DIMER PPP FEU-MCNC: 0.46 UG/ML FEU
EOSINOPHIL # BLD AUTO: 0 THOUSAND/ÂΜL (ref 0–0.61)
EOSINOPHIL NFR BLD AUTO: 0 % (ref 0–6)
ERYTHROCYTE [DISTWIDTH] IN BLOOD BY AUTOMATED COUNT: 16.1 % (ref 11.6–15.1)
FLUAV RNA RESP QL NAA+PROBE: NEGATIVE
FLUBV RNA RESP QL NAA+PROBE: NEGATIVE
GFR SERPL CREATININE-BSD FRML MDRD: 72 ML/MIN/1.73SQ M
GLUCOSE SERPL-MCNC: 125 MG/DL (ref 65–140)
HCT VFR BLD AUTO: 54 % (ref 34.8–46.1)
HGB BLD-MCNC: 18.2 G/DL (ref 11.5–15.4)
IMM GRANULOCYTES # BLD AUTO: 0.1 THOUSAND/UL (ref 0–0.2)
IMM GRANULOCYTES NFR BLD AUTO: 1 % (ref 0–2)
LYMPHOCYTES # BLD AUTO: 1.7 THOUSANDS/ÂΜL (ref 0.6–4.47)
LYMPHOCYTES NFR BLD AUTO: 12 % (ref 14–44)
MCH RBC QN AUTO: 28.8 PG (ref 26.8–34.3)
MCHC RBC AUTO-ENTMCNC: 33.7 G/DL (ref 31.4–37.4)
MCV RBC AUTO: 85 FL (ref 82–98)
MISCELLANEOUS LAB TEST RESULT: NORMAL
MONOCYTES # BLD AUTO: 0.58 THOUSAND/ÂΜL (ref 0.17–1.22)
MONOCYTES NFR BLD AUTO: 4 % (ref 4–12)
NEUTROPHILS # BLD AUTO: 11.37 THOUSANDS/ÂΜL (ref 1.85–7.62)
NEUTS SEG NFR BLD AUTO: 83 % (ref 43–75)
NRBC BLD AUTO-RTO: 0 /100 WBCS
PLATELET # BLD AUTO: 243 THOUSANDS/UL (ref 149–390)
PMV BLD AUTO: 8.6 FL (ref 8.9–12.7)
POTASSIUM SERPL-SCNC: 4.5 MMOL/L (ref 3.5–5.3)
PROT SERPL-MCNC: 8.9 G/DL (ref 6.4–8.4)
RBC # BLD AUTO: 6.32 MILLION/UL (ref 3.81–5.12)
RSV RNA RESP QL NAA+PROBE: NEGATIVE
SARS-COV-2 RNA RESP QL NAA+PROBE: NEGATIVE
SODIUM SERPL-SCNC: 136 MMOL/L (ref 135–147)
TSH SERPL DL<=0.05 MIU/L-ACNC: 2.21 UIU/ML (ref 0.45–4.5)
WBC # BLD AUTO: 13.77 THOUSAND/UL (ref 4.31–10.16)

## 2023-01-23 RX ORDER — DIPHENHYDRAMINE HYDROCHLORIDE 50 MG/ML
25 INJECTION INTRAMUSCULAR; INTRAVENOUS ONCE
Status: COMPLETED | OUTPATIENT
Start: 2023-01-23 | End: 2023-01-23

## 2023-01-23 RX ORDER — FLUTICASONE PROPIONATE 50 MCG
2 SPRAY, SUSPENSION (ML) NASAL DAILY
Status: DISCONTINUED | OUTPATIENT
Start: 2023-01-24 | End: 2023-01-25 | Stop reason: HOSPADM

## 2023-01-23 RX ORDER — SODIUM CHLORIDE, SODIUM LACTATE, POTASSIUM CHLORIDE, CALCIUM CHLORIDE 600; 310; 30; 20 MG/100ML; MG/100ML; MG/100ML; MG/100ML
75 INJECTION, SOLUTION INTRAVENOUS CONTINUOUS
Status: DISCONTINUED | OUTPATIENT
Start: 2023-01-23 | End: 2023-01-24

## 2023-01-23 RX ORDER — PREGABALIN 100 MG/1
100 CAPSULE ORAL 2 TIMES DAILY
Status: DISCONTINUED | OUTPATIENT
Start: 2023-01-23 | End: 2023-01-25 | Stop reason: HOSPADM

## 2023-01-23 RX ORDER — OXYCODONE HYDROCHLORIDE 10 MG/1
10-15 TABLET ORAL EVERY 4 HOURS PRN
Qty: 63 TABLET | Refills: 0 | Status: SHIPPED | OUTPATIENT
Start: 2023-01-23 | End: 2023-01-30 | Stop reason: SDUPTHER

## 2023-01-23 RX ORDER — PANTOPRAZOLE SODIUM 40 MG/1
40 TABLET, DELAYED RELEASE ORAL
Status: DISCONTINUED | OUTPATIENT
Start: 2023-01-24 | End: 2023-01-23

## 2023-01-23 RX ORDER — FENTANYL CITRATE 50 UG/ML
25 INJECTION, SOLUTION INTRAMUSCULAR; INTRAVENOUS ONCE
Status: COMPLETED | OUTPATIENT
Start: 2023-01-23 | End: 2023-01-23

## 2023-01-23 RX ORDER — OXYCODONE HCL 10 MG/1
10 TABLET, FILM COATED, EXTENDED RELEASE ORAL
Status: CANCELLED | OUTPATIENT
Start: 2023-01-23

## 2023-01-23 RX ORDER — LABETALOL HYDROCHLORIDE 5 MG/ML
10 INJECTION, SOLUTION INTRAVENOUS ONCE
Status: COMPLETED | OUTPATIENT
Start: 2023-01-23 | End: 2023-01-23

## 2023-01-23 RX ORDER — OXYCODONE HCL 10 MG/1
10 TABLET, FILM COATED, EXTENDED RELEASE ORAL
Status: DISCONTINUED | OUTPATIENT
Start: 2023-01-23 | End: 2023-01-25 | Stop reason: HOSPADM

## 2023-01-23 RX ORDER — LABETALOL HYDROCHLORIDE 5 MG/ML
10 INJECTION, SOLUTION INTRAVENOUS EVERY 4 HOURS PRN
Status: DISCONTINUED | OUTPATIENT
Start: 2023-01-23 | End: 2023-01-23

## 2023-01-23 RX ORDER — FAMOTIDINE 20 MG/1
20 TABLET, FILM COATED ORAL
Status: DISCONTINUED | OUTPATIENT
Start: 2023-01-23 | End: 2023-01-25 | Stop reason: HOSPADM

## 2023-01-23 RX ORDER — METOCLOPRAMIDE HYDROCHLORIDE 5 MG/ML
10 INJECTION INTRAMUSCULAR; INTRAVENOUS ONCE
Status: COMPLETED | OUTPATIENT
Start: 2023-01-23 | End: 2023-01-23

## 2023-01-23 RX ORDER — ACYCLOVIR 800 MG/1
800 TABLET ORAL 2 TIMES DAILY
Status: DISCONTINUED | OUTPATIENT
Start: 2023-01-23 | End: 2023-01-25 | Stop reason: HOSPADM

## 2023-01-23 RX ORDER — DULOXETIN HYDROCHLORIDE 30 MG/1
30 CAPSULE, DELAYED RELEASE ORAL DAILY
Status: DISCONTINUED | OUTPATIENT
Start: 2023-01-24 | End: 2023-01-25 | Stop reason: HOSPADM

## 2023-01-23 RX ORDER — ATORVASTATIN CALCIUM 40 MG/1
40 TABLET, FILM COATED ORAL
Status: DISCONTINUED | OUTPATIENT
Start: 2023-01-23 | End: 2023-01-25 | Stop reason: HOSPADM

## 2023-01-23 RX ORDER — AMOXICILLIN 250 MG
2 CAPSULE ORAL
Status: DISCONTINUED | OUTPATIENT
Start: 2023-01-23 | End: 2023-01-25 | Stop reason: HOSPADM

## 2023-01-23 RX ORDER — ENOXAPARIN SODIUM 100 MG/ML
40 INJECTION SUBCUTANEOUS DAILY
Status: DISCONTINUED | OUTPATIENT
Start: 2023-01-24 | End: 2023-01-25 | Stop reason: HOSPADM

## 2023-01-23 RX ORDER — OXYCODONE HYDROCHLORIDE 10 MG/1
10 TABLET ORAL EVERY 4 HOURS PRN
Status: DISCONTINUED | OUTPATIENT
Start: 2023-01-23 | End: 2023-01-23

## 2023-01-23 RX ORDER — ONDANSETRON 2 MG/ML
4 INJECTION INTRAMUSCULAR; INTRAVENOUS ONCE
Status: COMPLETED | OUTPATIENT
Start: 2023-01-23 | End: 2023-01-23

## 2023-01-23 RX ORDER — LIDOCAINE 50 MG/G
1 PATCH TOPICAL DAILY
Status: DISCONTINUED | OUTPATIENT
Start: 2023-01-24 | End: 2023-01-25 | Stop reason: HOSPADM

## 2023-01-23 RX ORDER — LABETALOL HYDROCHLORIDE 5 MG/ML
10 INJECTION, SOLUTION INTRAVENOUS EVERY 4 HOURS PRN
Status: DISCONTINUED | OUTPATIENT
Start: 2023-01-23 | End: 2023-01-25 | Stop reason: HOSPADM

## 2023-01-23 RX ORDER — FAMOTIDINE 20 MG/1
40 TABLET, FILM COATED ORAL DAILY
Status: DISCONTINUED | OUTPATIENT
Start: 2023-01-24 | End: 2023-01-23

## 2023-01-23 RX ORDER — NITROGLYCERIN 0.4 MG/1
0.4 TABLET SUBLINGUAL
Status: DISCONTINUED | OUTPATIENT
Start: 2023-01-23 | End: 2023-01-25 | Stop reason: HOSPADM

## 2023-01-23 RX ORDER — ONDANSETRON 2 MG/ML
4 INJECTION INTRAMUSCULAR; INTRAVENOUS EVERY 8 HOURS PRN
Status: DISCONTINUED | OUTPATIENT
Start: 2023-01-23 | End: 2023-01-25 | Stop reason: HOSPADM

## 2023-01-23 RX ORDER — POLYETHYLENE GLYCOL 3350 17 G/17G
17 POWDER, FOR SOLUTION ORAL DAILY
Status: DISCONTINUED | OUTPATIENT
Start: 2023-01-24 | End: 2023-01-25 | Stop reason: HOSPADM

## 2023-01-23 RX ORDER — ACETAMINOPHEN 325 MG/1
1000 TABLET ORAL EVERY 8 HOURS PRN
Status: DISCONTINUED | OUTPATIENT
Start: 2023-01-23 | End: 2023-01-25 | Stop reason: HOSPADM

## 2023-01-23 RX ORDER — METHOCARBAMOL 750 MG/1
750 TABLET, FILM COATED ORAL EVERY 6 HOURS PRN
Status: DISCONTINUED | OUTPATIENT
Start: 2023-01-23 | End: 2023-01-25 | Stop reason: HOSPADM

## 2023-01-23 RX ORDER — AMLODIPINE BESYLATE 10 MG/1
10 TABLET ORAL EVERY MORNING
Status: DISCONTINUED | OUTPATIENT
Start: 2023-01-24 | End: 2023-01-25 | Stop reason: HOSPADM

## 2023-01-23 RX ORDER — PANTOPRAZOLE SODIUM 40 MG/1
40 TABLET, DELAYED RELEASE ORAL
Status: DISCONTINUED | OUTPATIENT
Start: 2023-01-23 | End: 2023-01-25 | Stop reason: HOSPADM

## 2023-01-23 RX ADMIN — LABETALOL HYDROCHLORIDE 10 MG: 5 INJECTION, SOLUTION INTRAVENOUS at 19:26

## 2023-01-23 RX ADMIN — FAMOTIDINE 20 MG: 20 TABLET, FILM COATED ORAL at 21:24

## 2023-01-23 RX ADMIN — ATORVASTATIN CALCIUM 40 MG: 40 TABLET, FILM COATED ORAL at 18:02

## 2023-01-23 RX ADMIN — FENTANYL CITRATE 25 MCG: 50 INJECTION INTRAMUSCULAR; INTRAVENOUS at 13:43

## 2023-01-23 RX ADMIN — ACYCLOVIR 800 MG: 800 TABLET ORAL at 18:37

## 2023-01-23 RX ADMIN — PREGABALIN 100 MG: 100 CAPSULE ORAL at 18:02

## 2023-01-23 RX ADMIN — LABETALOL HYDROCHLORIDE 10 MG: 5 INJECTION, SOLUTION INTRAVENOUS at 22:18

## 2023-01-23 RX ADMIN — OXYCODONE HYDROCHLORIDE 15 MG: 10 TABLET ORAL at 22:22

## 2023-01-23 RX ADMIN — DIPHENHYDRAMINE HYDROCHLORIDE 25 MG: 50 INJECTION, SOLUTION INTRAMUSCULAR; INTRAVENOUS at 15:11

## 2023-01-23 RX ADMIN — METHOCARBAMOL 750 MG: 750 TABLET ORAL at 18:02

## 2023-01-23 RX ADMIN — OXYCODONE HYDROCHLORIDE 10 MG: 10 TABLET ORAL at 18:02

## 2023-01-23 RX ADMIN — SODIUM CHLORIDE, SODIUM LACTATE, POTASSIUM CHLORIDE, AND CALCIUM CHLORIDE 75 ML/HR: .6; .31; .03; .02 INJECTION, SOLUTION INTRAVENOUS at 18:38

## 2023-01-23 RX ADMIN — METOCLOPRAMIDE 10 MG: 5 INJECTION, SOLUTION INTRAMUSCULAR; INTRAVENOUS at 15:11

## 2023-01-23 RX ADMIN — METOPROLOL TARTRATE 25 MG: 25 TABLET, FILM COATED ORAL at 21:24

## 2023-01-23 RX ADMIN — FENTANYL CITRATE 25 MCG: 50 INJECTION, SOLUTION INTRAMUSCULAR; INTRAVENOUS at 15:11

## 2023-01-23 RX ADMIN — OXYCODONE HYDROCHLORIDE 10 MG: 10 TABLET, FILM COATED, EXTENDED RELEASE ORAL at 21:24

## 2023-01-23 RX ADMIN — SODIUM CHLORIDE, SODIUM LACTATE, POTASSIUM CHLORIDE, AND CALCIUM CHLORIDE 75 ML/HR: .6; .31; .03; .02 INJECTION, SOLUTION INTRAVENOUS at 22:22

## 2023-01-23 RX ADMIN — ACETAMINOPHEN 975 MG: 325 TABLET, FILM COATED ORAL at 21:27

## 2023-01-23 RX ADMIN — PANTOPRAZOLE SODIUM 40 MG: 40 TABLET, DELAYED RELEASE ORAL at 18:15

## 2023-01-23 RX ADMIN — ONDANSETRON 4 MG: 2 INJECTION INTRAMUSCULAR; INTRAVENOUS at 13:44

## 2023-01-23 RX ADMIN — IOHEXOL 100 ML: 350 INJECTION, SOLUTION INTRAVENOUS at 14:50

## 2023-01-23 NOTE — ASSESSMENT & PLAN NOTE
Noted to have elevated blood pressure on presentation with systolic 858U  Some of them might be contributed to patient in acute distress and pain  CTA chest abdomen pelvis was unremarkable  Normal troponins  Looks little bit dehydrated which can be contributing to tachycardia, leukocytosis, hemoconcentration        Labetalol as needed for systolic blood pressure more than 180  Continue home amlodipine  Continue metoprolol  Blood pressures improved today in the 120s to 130s

## 2023-01-23 NOTE — ED PROVIDER NOTES
History  Chief Complaint   Patient presents with   • Chest Pain     Pt BIBA d/t c/o chest pain since 11am  Pt reports it was a sudden onset of pressure and tightening in the center of her chest that wraps down under both breasts and around to her back  Pt given 324mg of aspirin prehospital      Patient is a 29-year-old female with a past medical history of cardiomyopathy, chronic back pain, GERD, multiple [place], hypertension and neuropathy presenting to the emergency department for evaluation of chest pain  Reports pain began around 11 AM   Reports pressure substernally with associated tightening underneath both breasts wrapping around to her back  Reports she was given 324 mg aspirin prior to arrival   Patient reports she had this exact pain previously, was worked up for it and found to have cardiomyopathy  Patient denied needing any previous cardiac intervention  Reports having belching this morning with the associated chest pain  Denies fevers, chills, rash, headache, weakness, dizziness, visual changes, abdominal pain, nausea, vomiting, diarrhea, constipation, shortness of breath or difficulty breathing  Does not offer any other concerns or complaints  History provided by:  Patient   used: No    Chest Pain  Pain location:  Substernal area  Radiates to: Under bilateral breasts and back    Pain radiates to the back: yes    Duration:  1 hour  Timing:  Constant  Progression:  Unchanged  Relieved by:  None tried  Worsened by:  Nothing tried  Ineffective treatments:  None tried  Associated symptoms: back pain    Associated symptoms: no abdominal pain, no altered mental status, no anorexia, no anxiety, no claudication, no cough, no diaphoresis, no dizziness, no dysphagia, no fatigue, no fever, no headache, no heartburn, no lower extremity edema, no nausea, no near-syncope, no numbness, no orthopnea, no palpitations, no shortness of breath, no syncope, not vomiting and no weakness Prior to Admission Medications   Prescriptions Last Dose Informant Patient Reported? Taking? DULoxetine (CYMBALTA) 30 mg delayed release capsule   No No   Sig: take 1 capsule by mouth once daily   Lidocaine HCl 4 % CREA   No No   Sig: Apply topically 4 (four) times a day as needed (neuropathic pain)   Polyethyl Glycol-Propyl Glycol (Systane) 0 4-0 3 % GEL   Yes No   Si drop   acetaminophen (TYLENOL) 500 mg tablet   No No   Sig: Take 2 tablets (1,000 mg total) by mouth every 8 (eight) hours as needed for mild pain   acyclovir (ZOVIRAX) 400 MG tablet   Yes No   Sig: Take 400 mg by mouth   acyclovir (ZOVIRAX) 800 mg tablet   No No   Sig: Take 1 tablet (800 mg total) by mouth 2 (two) times a day   albuterol (PROVENTIL HFA,VENTOLIN HFA) 90 mcg/act inhaler   No No   Sig: Inhale 2 puffs every 6 (six) hours as needed for wheezing or shortness of breath   amLODIPine (NORVASC) 10 mg tablet   No No   Sig: take 1 tablet by mouth every morning   atorvastatin (LIPITOR) 40 mg tablet   Yes No   Sig: Take 40 mg by mouth   bisacodyl (DULCOLAX) 10 mg suppository   No No   Sig: Insert 1 suppository (10 mg total) into the rectum daily as needed for constipation   cyclophosphamide (CYTOXAN) 50 mg capsule   Yes No   Sig: Take 550 mg by mouth   Patient not taking: Reported on 1/10/2023   dexamethasone (DECADRON) 4 mg tablet   Yes No   Patient not taking: Reported on 1/10/2023   famotidine (PEPCID) 40 MG tablet   No No   Sig: TAKE 1 TABLET BY MOUTH ONCE DAILY   fluticasone (FLONASE) 50 mcg/act nasal spray   No No   Sig: instill 2 sprays into each nostril once daily   loratadine (CLARITIN) 10 mg tablet   Yes No   Sig: Take 1 tablet by mouth for 1 dose the morning of each Daratumumab Infusion     Patient not taking: Reported on 1/10/2023   methocarbamol (ROBAXIN) 750 mg tablet   No No   Sig: Take 1 tablet (750 mg total) by mouth every 6 (six) hours as needed for muscle spasms   metoprolol succinate (TOPROL-XL) 25 mg 24 hr tablet Yes No   Patient not taking: Reported on 1/10/2023   montelukast (SINGULAIR) 10 mg tablet   Yes No   Sig: Take 1 tablet by mouth for 1 dose the morning of each Daratumumab Infusion  Patient not taking: Reported on 1/10/2023   naloxone (NARCAN) 4 mg/0 1 mL nasal spray   No No   Sig: Administer 1 spray into a nostril  If no response after 2-3 minutes, give another dose in the other nostril using a new spray     ondansetron (ZOFRAN) 8 mg tablet   Yes No   Sig: take 1 tablet by mouth every 8 hours if needed for nausea or vomiting   Patient not taking: Reported on 1/10/2023   oxyCODONE (ROXICODONE) 10 MG TABS   No No   Sig: Take 1-1 5 tablets (10-15 mg total) by mouth every 4 (four) hours as needed (cancer-related pain) Max Daily Amount: 90 mg   oxyCODONE ER (Xtampza ER) 9 mg extended release capsule   No No   Sig: Take 1 tablet (9 mg total) by mouth daily at bedtime Max Daily Amount: 9 mg   polyethylene glycol (MIRALAX) 17 g packet   No No   Sig: Take 17 g by mouth daily   Patient not taking: Reported on 1/10/2023   pregabalin (LYRICA) 100 mg capsule   No No   Sig: take 1 capsule by mouth twice a day   prochlorperazine (COMPAZINE) 10 mg tablet   Yes No   Sig: take 1 tablet by mouth every 6 hours if needed for nausea or vomiting   Patient not taking: Reported on 1/10/2023   senna-docusate sodium (SENOKOT S) 8 6-50 mg per tablet   No No   Sig: Take 2 tablets by mouth daily at bedtime   Patient not taking: Reported on 1/10/2023      Facility-Administered Medications: None       Past Medical History:   Diagnosis Date   • Cardiomyopathy [facility])    • Chronic back pain    • GERD (gastroesophageal reflux disease)    • H/O autologous stem cell transplant ([facility] [address] ) 2022   • Hypertension    • Multiple myeloma ([address] )    • Neuropathy        Past Surgical History:   Procedure Laterality Date   •  SECTION     • US GUIDED THYROID BIOPSY  10/20/2022       Family History   Problem Relation Age of Onset   • No Known Problems Mother    • No Known Problems Father    • Colon cancer Neg Hx    • Diabetes Neg Hx      I have reviewed and agree with the history as documented  E-Cigarette/Vaping   • E-Cigarette Use Never User      E-Cigarette/Vaping Substances   • Nicotine No    • THC No    • CBD No    • Flavoring No    • Other No    • Unknown No      Social History     Tobacco Use   • Smoking status: Heavy Smoker     Packs/day: 1 00     Years: 26 00     Pack years: 26 00     Types: Cigarettes     Start date: 12/18/1992   • Smokeless tobacco: Current   Vaping Use   • Vaping Use: Never used   Substance Use Topics   • Alcohol use: Not Currently   • Drug use: Never       Review of Systems   Constitutional: Negative for chills, diaphoresis, fatigue and fever  HENT: Negative for ear pain, sore throat and trouble swallowing  Eyes: Negative for pain and visual disturbance  Respiratory: Positive for chest tightness  Negative for cough, shortness of breath and wheezing  Cardiovascular: Positive for chest pain  Negative for palpitations, orthopnea, claudication, leg swelling, syncope and near-syncope  Gastrointestinal: Negative for abdominal pain, anorexia, constipation, diarrhea, heartburn, nausea and vomiting  Genitourinary: Negative for dysuria and hematuria  Musculoskeletal: Positive for back pain  Negative for arthralgias  Skin: Negative for color change and rash  Neurological: Negative for dizziness, seizures, syncope, weakness, numbness and headaches  All other systems reviewed and are negative  Physical Exam  Physical Exam  Vitals and nursing note reviewed  Constitutional:       General: She is in acute distress  Appearance: Normal appearance  She is well-developed  She is not ill-appearing, toxic-appearing or diaphoretic  HENT:      Head: Normocephalic and atraumatic        Right Ear: External ear normal       Left Ear: External ear normal       Nose: Nose normal       Mouth/Throat:      Mouth: Mucous membranes are moist    Eyes:      General: No scleral icterus  Right eye: No discharge  Left eye: No discharge  Conjunctiva/sclera: Conjunctivae normal    Cardiovascular:      Rate and Rhythm: Normal rate and regular rhythm  Heart sounds: No murmur heard  Pulmonary:      Effort: Pulmonary effort is normal  No respiratory distress  Breath sounds: Normal breath sounds  No decreased breath sounds, wheezing, rhonchi or rales  Chest:      Chest wall: Tenderness present  Abdominal:      Palpations: Abdomen is soft  Tenderness: There is no abdominal tenderness  Musculoskeletal:         General: No swelling, deformity or signs of injury  Normal range of motion  Cervical back: Normal range of motion and neck supple  No rigidity  Skin:     General: Skin is warm and dry  Capillary Refill: Capillary refill takes less than 2 seconds  Coloration: Skin is not jaundiced  Findings: No erythema or rash  Neurological:      General: No focal deficit present  Mental Status: She is alert and oriented to person, place, and time  Mental status is at baseline  Cranial Nerves: No cranial nerve deficit  Gait: Gait normal    Psychiatric:         Mood and Affect: Mood normal          Behavior: Behavior normal          Thought Content:  Thought content normal          Judgment: Judgment normal          Vital Signs  ED Triage Vitals   Temperature Pulse Respirations Blood Pressure SpO2   01/23/23 1217 01/23/23 1217 01/23/23 1217 01/23/23 1217 01/23/23 1217   98 7 °F (37 1 °C) 90 22 (!) 178/102 99 %      Temp Source Heart Rate Source Patient Position - Orthostatic VS BP Location FiO2 (%)   01/23/23 1217 01/23/23 1217 01/23/23 1217 01/23/23 1217 --   Oral Monitor Sitting Right arm       Pain Score       01/23/23 1343       10 - Worst Possible Pain           Vitals:    01/23/23 1345 01/23/23 1430 01/23/23 1530 01/23/23 1600   BP: (!) 172/84 (!) 187/112 (!) 175/113 (!) 186/105   Pulse: 91 85 96 94   Patient Position - Orthostatic VS: Lying Lying Lying Lying         Visual Acuity      ED Medications  Medications   fentanyl citrate (PF) 100 MCG/2ML 25 mcg (25 mcg Intravenous Given 1/23/23 1343)   ondansetron (ZOFRAN) injection 4 mg (4 mg Intravenous Given 1/23/23 1344)   iohexol (OMNIPAQUE) 350 MG/ML injection (SINGLE-DOSE) 100 mL (100 mL Intravenous Given 1/23/23 1450)   fentanyl citrate (PF) 100 MCG/2ML 25 mcg (25 mcg Intravenous Given 1/23/23 1511)   metoclopramide (REGLAN) injection 10 mg (10 mg Intravenous Given 1/23/23 1511)   diphenhydrAMINE (BENADRYL) injection 25 mg (25 mg Intravenous Given 1/23/23 1511)       Diagnostic Studies  Results Reviewed     Procedure Component Value Units Date/Time    HS Troponin I 2hr [[id number]]  (Normal) Collected: 01/23/23 1539    Lab Status: Final result Specimen: Blood from Arm, Left Updated: 01/23/23 1622     hs TnI 2hr 15 ng/L      Delta 2hr hsTnI 5 ng/L     HS Troponin I 4hr [[id number]]     Lab Status: No result Specimen: Blood     HS Troponin 0hr (reflex protocol) [[id number]]  (Normal) Collected: 01/23/23 1337    Lab Status: Final result Specimen: Blood from Arm, Right Updated: 01/23/23 1411     hs TnI 0hr 10 ng/L     Comprehensive metabolic panel [[id number]]  (Abnormal) Collected: 01/23/23 1337    Lab Status: Final result Specimen: Blood from Arm, Right Updated: 01/23/23 1411     Sodium 136 mmol/L      Potassium 4 5 mmol/L      Chloride 99 mmol/L      CO2 23 mmol/L      ANION GAP 14 mmol/L      BUN 10 mg/dL      Creatinine 0 90 mg/dL      Glucose 125 mg/dL      Calcium 9 6 mg/dL      AST 33 U/L      ALT 20 U/L      Alkaline Phosphatase 102 U/L      Total Protein 8 9 g/dL      Albumin 4 5 g/dL      Total Bilirubin 0 83 mg/dL      eGFR 72 ml/min/1 73sq m     Narrative:      [place] guidelines for Chronic Kidney Disease (CKD):   •  Stage 1 with normal or high GFR (GFR > 90 mL/min/1 73 square meters)  •  Stage 2 Mild CKD (GFR = 60-89 mL/min/1 73 square meters)  •  Stage 3A Moderate CKD (GFR = 45-59 mL/min/1 73 square meters)  •  Stage 3B Moderate CKD (GFR = 30-44 mL/min/1 73 square meters)  •  Stage 4 Severe CKD (GFR = 15-29 mL/min/1 73 square meters)  •  Stage 5 End Stage CKD (GFR <15 mL/min/1 73 square meters)  Note: GFR calculation is accurate only with a steady state creatinine    D-Dimer [[id number]]  (Normal) Collected: 01/23/23 1337    Lab Status: Final result Specimen: Blood from Arm, Right Updated: 01/23/23 1403     D-Dimer, Quant 0 46 ug/ml FEU     Narrative: In the evaluation for possible pulmonary embolism, in the appropriate (Well's Score of 4 or less) patient, the age adjusted d-dimer cutoff for this patient can be calculated as:    Age x 0 01 (in ug/mL) for Age-adjusted D-dimer exclusion threshold for a patient over 50 years  CBC and differential [[id number]]  (Abnormal) Collected: 01/23/23 1337    Lab Status: Final result Specimen: Blood from Arm, Right Updated: 01/23/23 1346     WBC 13 77 Thousand/uL      RBC 6 32 Million/uL      Hemoglobin 18 2 g/dL      Hematocrit 54 0 %      MCV 85 fL      MCH 28 8 pg      MCHC 33 7 g/dL      RDW 16 1 %      MPV 8 6 fL      Platelets 614 Thousands/uL      nRBC 0 /100 WBCs      Neutrophils Relative 83 %      Immat GRANS % 1 %      Lymphocytes Relative 12 %      Monocytes Relative 4 %      Eosinophils Relative 0 %      Basophils Relative 0 %      Neutrophils Absolute 11 37 Thousands/µL      Immature Grans Absolute 0 10 Thousand/uL      Lymphocytes Absolute 1 70 Thousands/µL      Monocytes Absolute 0 58 Thousand/µL      Eosinophils Absolute 0 00 Thousand/µL      Basophils Absolute 0 02 Thousands/µL                  CTA dissection protocol chest/abdomen/pelvis   Final Result by Damian Rodriguez MD (01/23 9427)      No aortic aneurysm, intramural hematoma/dissection or penetrating ulcer  Advanced atherosclerotic changes are not present        No acute findings in the chest, abdomen or the pelvis  Workstation performed: [id number]         XR chest 2 views   Final Result by Tabitha Morgan MD (01/23 1621)      Mild subsegmental atelectasis left lung base  Otherwise no acute pulmonary disease                  Workstation performed: [id number]                    Procedures  ECG 12 Lead Documentation Only    Date/Time: 1/23/2023 1:14 PM  Performed by: Cheryl Chung PA-C  Authorized by: Cheryl Chung PA-C     Indications / Diagnosis:  Chest Pain  ECG reviewed by me, the ED Provider: yes    Patient location:  ED  Previous ECG:     Previous ECG:  Compared to current    Comparison ECG info:  01/16/23    Similarity:  No change  Interpretation:     Interpretation: abnormal    Rate:     ECG rate:  92    ECG rate assessment: normal    Rhythm:     Rhythm: sinus rhythm    Ectopy:     Ectopy: none    QRS:     QRS axis:  Normal    QRS intervals:  Normal  Conduction:     Conduction: normal    ST segments:     ST segments:  Normal  T waves:     T waves: normal    Comments:      Left anterior fascicular block, septal infarct cited on or before October 29, 2021  ED Course  ED Course as of 01/23/23 1628   Mon Jan 23, 2023   1554 Patient is comfortably sleeping on reexamination           SBIRT 22yo+    Flowsheet Row Most Recent Value   SBIRT (23 yo +)    In order to provide better care to our patients, we are screening all of our patients for alcohol and drug use  Would it be okay to ask you these screening questions? Yes Filed at: 01/23/2023 1221   Initial Alcohol Screen: US AUDIT-C     1  How often do you have a drink containing alcohol? 0 Filed at: 01/23/2023 1221   2  How many drinks containing alcohol do you have on a typical day you are drinking? 0 Filed at: 01/23/2023 1221   3b  FEMALE Any Age, or MALE 65+: How often do you have 4 or more drinks on one occassion?  0 Filed at: 01/23/2023 1221   Audit-C Score 0 Filed at: 01/23/2023 1221 VIRGIL: How many times in the past year have you    Used an illegal drug or used a prescription medication for non-medical reasons? Never Filed at: 01/23/2023 1221                    Medical Decision Making    This is a 69-year-old female presenting to the emergency department for evaluation of chest pain  Reports at 11 AM she began having sudden onset of chest pressure and tightness  Reports the pain wraps under both breasts and radiates to the back  Reports she has had similar pain in the past and was diagnosed with cardiomyopathy  Patient is in acute pain on initial presentation  Differential diagnosis to include but is not limited to: ACS, STEMI, Pneumothorax, Pneumonia, Pleural effusion, Pericarditis, Pericardial effusion, Chest wall pain/costochondritis, Esophageal spasm, Pulmonary embolism, Bronchitis/bronchospasm, aortic dissection    Initial ED Plan: CBC, CMP, troponin, EKG, chest x-ray, CT dissection study    ED results: Xray images chest independently visualized and interpreted by me - no acute cardiopulmonary disease  No aortic aneurysm, intramural hematoma/dissection or penetrating ulcer  Advanced atherosclerotic changes are not present  0 hour troponin: 10  Delta: 5  Heart score: 4    Final ED assessment: Patient is stable and well appearing  Discussed radiologic studies and laboratory results  Patient verbalized understanding and is agreeable with the plan for admission  Discussed with Dr Naomi Austin, observation, bridging orders placed  Amount and/or Complexity of Data Reviewed  Labs: ordered  Radiology: ordered  Risk  Prescription drug management            Disposition  Final diagnoses:   Atypical chest pain     Time reflects when diagnosis was documented in both MDM as applicable and the Disposition within this note     Time User Action Codes Description Comment    1/23/2023  4:27 PM Alysia Hanley Add [R07 [id number]] Atypical chest pain       ED Disposition     ED Disposition Admit    Condition   Stable    Date/Time   Mon Jan 23, 2023  4:27 PM    Comment   Case was discussed with Dr Jeri Brandon and the patient's admission status was agreed to be Admission Status: observation status to the service of Dr Jeri Brandon   Follow-up Information    None         Patient's Medications   Discharge Prescriptions    No medications on file       No discharge procedures on file      PDMP Review       Value Time User    PDMP Reviewed  Yes 1/23/2023 12:39 PM Doug Lindsey MD          ED Provider  Electronically Signed by           Glen Groves PA-C  01/23/23 1094

## 2023-01-23 NOTE — ASSESSMENT & PLAN NOTE
Following up with palliative care outpatient  Continue Lyrica 100 mg twice daily, duloxetine 30 mg p o  daily  Can add lidocaine cream if continues to have pain overnight  Robaxin 5 mg every 6 hours as needed  Zofran every 8 hours as needed for nausea  Bowel regimen in place  Oxycodone 15 mg every 4 hours as needed  OxyContin 10 mg at bedtime

## 2023-01-23 NOTE — ASSESSMENT & PLAN NOTE
History of multiple myeloma following up with pulmonary specialist as well as heme-onc s/p bone marrow transplant and chemotherapy  Doing well  No pancytopenia on presentation  Has leukocytosis on presentation but low suspicion of infection given no fever  CT chest abdomen pelvis unremarkable      Patient with persistent leukocytosis, will continue to monitor overall does not appear to be infectious  Continue acyclovir prophylaxis 800 mg twice daily  Follow-up with heme-onc outpatient  Blood cultures pending

## 2023-01-23 NOTE — H&P
* Atypical chest pain  Assessment & Plan  Presented with sudden onset severe chest pain located in substernal region radiating to back  Tenderness on palpation noted  Worse with deep inspiration  Nothing making it better  Troponin x2 WNL  EKG showing normal sinus rhythm  CTA chest abdomen pelvis without any acute finding  Noted to have leukocytosis, hemoconcentration component of dehydration as patient is nauseous and with poor oral intake since this morning  TSH WNL  Denies a family history of hypertrophic cardiomyopathy, sudden cardiac death getting routine echocardiogram outpatient  Does have history of multiple myeloma with suspected amyloidosis as per heme-onc's chart  No prior MI, CHF  Chest pain not related to any exertion  Looks more likely atypical   Patient hemodynamically well saturating well on room air with high blood pressure  Monitor under observation  Lidocaine patch  Protonix 40 mg daily along with Pepcid at bedtime  IV fluid 75 cc/h  Sublingual nitroglycerin  Pain management as per home dosage  Telemetry monitoring overnight  Will check TSH, UA, COVID/Flu  Check echocardiogram for HOCM monitoring    Hypertension  Assessment & Plan  Noted to have elevated blood pressure on presentation with systolic 032C  Some of them might be contributed to patient in acute distress and pain  CTA chest abdomen pelvis was unremarkable  Normal troponins  Looks little bit dehydrated which can be contributing to tachycardia, leukocytosis, hemoconcentration      Will start gentle hydration 75 cc/h overnight  Labetalol as needed for systolic blood pressure more than 180  Continue home amlodipine  Patient does not take metoprolol, will restart due to family history of HOCM and sudden cardiac death    Continuous opioid dependence ([facility] [address] )  Assessment & Plan  Following up with palliative care outpatient  Continue Lyrica 100 mg twice daily, duloxetine 30 mg p o  daily  Can add lidocaine cream if continues to have pain overnight  Robaxin 5 mg every 6 hours as needed  Zofran every 8 hours as needed for nausea  Bowel regimen in place  Oxycodone 15 mg every 4 hours as needed  OxyContin 10 mg at bedtime    Multiple myeloma in remission [facility])  Assessment & Plan  History of multiple myeloma following up with pulmonary specialist as well as heme-onc s/p bone marrow transplant and chemotherapy  Doing well  No pancytopenia on presentation  Has leukocytosis on presentation but low suspicion of infection given no fever  CT chest abdomen pelvis unremarkable  Continue acyclovir prophylaxis 800 mg twice daily  Follow-up with heme-onc outpatient  Will check UA    Cigarette nicotine dependence without complication  Assessment & Plan  Encourage smoking cessation  Refuses nicotine patch while inpatient  Chronic bilateral low back pain with bilateral sciatica  Assessment & Plan  Takes oxycodone 15 mg every 4 hours as needed, OxyContin ER 10 mg at bedtime, Robaxin, duloxetine  Bowel regimen in place      VTE Prophylaxis: Enoxaparin (Lovenox)  / sequential compression device and foot pump applied   Code Status: Level 1 full code  POLST: There is no POLST form on file for this patient (pre-hospital)  Discussion with family: None    Anticipated Length of Stay:  Patient will be admitted on an Observation basis with an anticipated length of stay of less than 2 midnights  Justification for Hospital Stay: Apical chest pain    Total Time for Visit, including Counseling / Coordination of Care: 45 minutes  Greater than 50% of this total time spent on direct patient counseling and coordination of care  Chief Complaint:   Persistent chest pain since today    History of Present Illness:    Xiomara Menchaca is a 45 y o  female with past medical history of GERD, multiple myeloma s/p bone marrow transplant, hypertension, neuropathy who presents with other noted chest pain started in today morning    States chest pain is substernal radiating to back  Is worse on deep breathing and touch  Nothing makes it better or worse  No prior history of MI, CHF  Also complaining of epigastric left upper quadrant abdominal pain  Having nausea but no vomiting  No diarrhea, constipation, hematochezia, melena, hematemesis  No hemoptysis  Afebrile  No arthralgia or myalgia  EKG showing normal sinus rhythm  Troponins x2 WNL  Electrolytes stable  Chest x-ray without any acute finding  Has a family history of sudden cardiac death, HOCM  Review of Systems:    Review of Systems   Constitutional: Positive for activity change and appetite change  Negative for chills, diaphoresis, fatigue, fever and unexpected weight change  HENT: Negative for rhinorrhea and sore throat  Eyes: Negative for visual disturbance  Respiratory: Positive for chest tightness and shortness of breath  Negative for cough  Cardiovascular: Positive for chest pain and palpitations  Negative for leg swelling  Gastrointestinal: Negative for abdominal distention, abdominal pain, blood in stool, constipation, diarrhea, nausea and vomiting  Genitourinary: Negative for difficulty urinating, dysuria, flank pain and hematuria  Musculoskeletal: Positive for back pain  Negative for arthralgias, gait problem, myalgias and neck pain  Neurological: Negative for dizziness, speech difficulty, light-headedness and headaches  Psychiatric/Behavioral: Negative for behavioral problems and sleep disturbance         Past Medical and Surgical History:     Past Medical History:   Diagnosis Date   • Cardiomyopathy [facility])    • Chronic back pain    • GERD (gastroesophageal reflux disease)    • H/O autologous stem cell transplant ([facility] ) 2022   • Hypertension    • Multiple myeloma ([facility] )    • Neuropathy        Past Surgical History:   Procedure Laterality Date   •  SECTION     • US GUIDED THYROID BIOPSY  10/20/2022       Meds/Allergies:    Prior to Admission medications    Medication Sig Start Date End Date Taking? Authorizing Provider   acetaminophen (TYLENOL) 500 mg tablet Take 2 tablets (1,000 mg total) by mouth every 8 (eight) hours as needed for mild pain 1/13/22   Melany Grande MD   acyclovir (ZOVIRAX) 400 MG tablet Take 400 mg by mouth 11/1/21   Historical Provider, MD   acyclovir (ZOVIRAX) 800 mg tablet Take 1 tablet (800 mg total) by mouth 2 (two) times a day 11/7/22 11/2/23  James Mcarthur, DO   albuterol (PROVENTIL HFA,VENTOLIN HFA) 90 mcg/act inhaler Inhale 2 puffs every 6 (six) hours as needed for wheezing or shortness of breath 9/2/22   James Mcarthur, DO   amLODIPine (NORVASC) 10 mg tablet take 1 tablet by mouth every morning 9/22/22   James Mcarthur, DO   atorvastatin (LIPITOR) 40 mg tablet Take 40 mg by mouth    Historical Provider, MD   bisacodyl (DULCOLAX) 10 mg suppository Insert 1 suppository (10 mg total) into the rectum daily as needed for constipation 11/3/21   Misti Whyte, DO   cyclophosphamide (CYTOXAN) 50 mg capsule Take 550 mg by mouth  Patient not taking: Reported on 1/10/2023 11/1/21   Historical Provider, MD   dexamethasone (DECADRON) 4 mg tablet  10/23/21   Historical Provider, MD   DULoxetine (CYMBALTA) 30 mg delayed release capsule take 1 capsule by mouth once daily 12/20/22   James Mcarthur, DO   famotidine (PEPCID) 40 MG tablet TAKE 1 TABLET BY MOUTH ONCE DAILY 12/20/22   James Mcarthur, DO   fluticasone (FLONASE) 50 mcg/act nasal spray instill 2 sprays into each nostril once daily 7/6/22   James Mcarthur, DO   Lidocaine HCl 4 % CREA Apply topically 4 (four) times a day as needed (neuropathic pain) 5/2/22   Melany Grande MD   loratadine (CLARITIN) 10 mg tablet Take 1 tablet by mouth for 1 dose the morning of each Daratumumab Infusion    Patient not taking: Reported on 1/10/2023 11/1/21   Historical Provider, MD   methocarbamol (ROBAXIN) 750 mg tablet Take 1 tablet (750 mg total) by mouth every 6 (six) hours as needed for muscle spasms 9/22/22   [facility]   metoprolol succinate (TOPROL-XL) 25 mg 24 hr tablet  11/1/21   Historical Provider, MD   montelukast (SINGULAIR) 10 mg tablet Take 1 tablet by mouth for 1 dose the morning of each Daratumumab Infusion  Patient not taking: Reported on 1/10/2023 11/1/21   Historical Provider, MD   naloxone (NARCAN) 4 mg/0 1 mL nasal spray Administer 1 spray into a nostril  If no response after 2-3 minutes, give another dose in the other nostril using a new spray   1/13/22   Doug Lindsey MD   ondansetron [facility]) 8 mg tablet take 1 tablet by mouth every 8 hours if needed for nausea or vomiting  Patient not taking: Reported on 1/10/2023 10/5/21   Historical Provider, MD   oxyCODONE (ROXICODONE) 10 MG TABS Take 1-1 5 tablets (10-15 mg total) by mouth every 4 (four) hours as needed (cancer-related pain) Max Daily Amount: 90 mg 1/23/23   Doug Lindsey MD   oxyCODONE ER Olga Nanny ER) 9 mg extended release capsule Take 1 tablet (9 mg total) by mouth daily at bedtime Max Daily Amount: 9 mg 1/23/23   Doug Lindsey MD   Polyethyl Glycol-Propyl Glycol (Systane) 0 4-0 3 % GEL 1 drop    Historical Provider, MD   polyethylene glycol (MIRALAX) 17 g packet Take 17 g by mouth daily  Patient not taking: Reported on 1/10/2023 11/4/21   Jen Roberts DO   pregabalin (LYRICA) 100 mg capsule take 1 capsule by mouth twice a day 9/30/22   Maribell Marcum DO   prochlorperazine (COMPAZINE) 10 mg tablet take 1 tablet by mouth every 6 hours if needed for nausea or vomiting  Patient not taking: Reported on 1/10/2023 10/5/21   Historical Provider, MD   senna-docusate sodium (SENOKOT S) 8 6-50 mg per tablet Take 2 tablets by mouth daily at bedtime  Patient not taking: Reported on 1/10/2023 11/3/21   Jen Roberts DO   oxyCODONE (ROXICODONE) 10 MG TABS Take 1-1 5 tablets (10-15 mg total) by mouth every 4 (four) hours as needed (cancer-related pain) Max Daily Amount: 90 mg 1/16/23 1/23/23  Doug Lindsey, MD   oxyCODONE ER Simone Divine ER) 9 mg extended release capsule Take 1 tablet (9 mg total) by mouth daily at bedtime Max Daily Amount: 9 mg 1/16/23 1/23/23  Maine Calhoun MD     I have reviewed home medications with patient personally  Allergies: No Known Allergies    Social History:     Marital Status: Single   Substance Use History:   Social History     Substance and Sexual Activity   Alcohol Use Not Currently     Social History     Tobacco Use   Smoking Status Heavy Smoker   • Packs/day: 1 00   • Years: 26 00   • Pack years: 26 00   • Types: Cigarettes   • Start date: 12/18/1992   Smokeless Tobacco Current     Social History     Substance and Sexual Activity   Drug Use Never       Family History:    Family History   Problem Relation Age of Onset   • No Known Problems Mother    • No Known Problems Father    • Colon cancer Neg Hx    • Diabetes Neg Hx        Physical Exam:     Vitals:   Blood Pressure: (!) 169/110 (01/23/23 1800)  Pulse: (!) 106 (01/23/23 1800)  Temperature: 98 7 °F (37 1 °C) (01/23/23 1217)  Temp Source: Oral (01/23/23 1217)  Respirations: 21 (01/23/23 1800)  SpO2: 95 % (01/23/23 1800)    Physical Exam  Vitals reviewed  Constitutional:       General: She is not in acute distress  Appearance: Normal appearance  HENT:      Head: Normocephalic and atraumatic  Eyes:      General: No scleral icterus  Right eye: No discharge  Left eye: No discharge  Cardiovascular:      Rate and Rhythm: Normal rate and regular rhythm  Pulses: Normal pulses  Heart sounds: Normal heart sounds  Pulmonary:      Effort: Pulmonary effort is normal  No respiratory distress  Breath sounds: Normal breath sounds  No wheezing or rales  Comments: Tenderness to palpation of entire anterior chest, epigastric, midline spinal, left upper quadrant, right upper quadrant  Chest:      Chest wall: Tenderness present  Abdominal:      General: Abdomen is flat   Bowel sounds are normal  There is no distension  Palpations: Abdomen is soft  Tenderness: There is no abdominal tenderness  Musculoskeletal:         General: No deformity  Normal range of motion  Cervical back: Normal range of motion and neck supple  Right lower leg: No edema  Left lower leg: No edema  Skin:     General: Skin is warm  Coloration: Skin is not jaundiced or pale  Findings: No rash  Neurological:      General: No focal deficit present  Mental Status: She is alert and oriented to person, place, and time  Mental status is at baseline  Cranial Nerves: No cranial nerve deficit  Motor: No weakness  Psychiatric:         Mood and Affect: Mood normal          Behavior: Behavior normal            Additional Data:     Lab Results: I have personally reviewed pertinent reports  Results from last 7 days   Lab Units 01/23/23  1337   WBC Thousand/uL 13 77*   HEMOGLOBIN g/dL 18 2*   HEMATOCRIT % 54 0*   PLATELETS Thousands/uL 243   NEUTROS PCT % 83*   LYMPHS PCT % 12*   MONOS PCT % 4   EOS PCT % 0     Results from last 7 days   Lab Units 01/23/23  1337   SODIUM mmol/L 136   POTASSIUM mmol/L 4 5   CHLORIDE mmol/L 99   CO2 mmol/L 23   BUN mg/dL 10   CREATININE mg/dL 0 90   ANION GAP mmol/L 14*   CALCIUM mg/dL 9 6   ALBUMIN g/dL 4 5   TOTAL BILIRUBIN mg/dL 0 83   ALK PHOS U/L 102   ALT U/L 20   AST U/L 33   GLUCOSE RANDOM mg/dL 125                       Imaging: I have personally reviewed pertinent reports  and I have personally reviewed pertinent films in PACS    CTA dissection protocol chest/abdomen/pelvis   Final Result by Norbert Gonzalez MD (01/23 6456)      No aortic aneurysm, intramural hematoma/dissection or penetrating ulcer  Advanced atherosclerotic changes are not present  No acute findings in the chest, abdomen or the pelvis                 Workstation performed: [id number]         XR chest 2 views   Final Result by Sarah Francis MD (01/23 3155)      Mild subsegmental atelectasis left lung base  Otherwise no acute pulmonary disease                  Workstation performed: [id number]             EKG, Pathology, and Other Studies Reviewed on Admission:   · EKG: NSR    Allscripts / Epic Records Reviewed: Yes     ** Please Note: This note has been constructed using a voice recognition system   **

## 2023-01-23 NOTE — ASSESSMENT & PLAN NOTE
Takes oxycodone 15 mg every 4 hours as needed, OxyContin ER 10 mg at bedtime, Robaxin, duloxetine  Bowel regimen in place

## 2023-01-24 ENCOUNTER — APPOINTMENT (OUTPATIENT)
Dept: NON INVASIVE DIAGNOSTICS | Facility: HOSPITAL | Age: 53
End: 2023-01-24

## 2023-01-24 LAB
ANION GAP SERPL CALCULATED.3IONS-SCNC: 13 MMOL/L (ref 4–13)
AORTIC ROOT: 3.2 CM
APICAL FOUR CHAMBER EJECTION FRACTION: 63 %
ASCENDING AORTA: 3.4 CM
BASOPHILS # BLD AUTO: 0.04 THOUSANDS/ÂΜL (ref 0–0.1)
BASOPHILS NFR BLD AUTO: 0 % (ref 0–1)
BUN SERPL-MCNC: 11 MG/DL (ref 5–25)
CALCIUM SERPL-MCNC: 9.4 MG/DL (ref 8.3–10.1)
CHLORIDE SERPL-SCNC: 100 MMOL/L (ref 96–108)
CO2 SERPL-SCNC: 25 MMOL/L (ref 21–32)
CREAT SERPL-MCNC: 0.98 MG/DL (ref 0.6–1.3)
E WAVE DECELERATION TIME: 510 MS
EOSINOPHIL # BLD AUTO: 0.01 THOUSAND/ÂΜL (ref 0–0.61)
EOSINOPHIL NFR BLD AUTO: 0 % (ref 0–6)
ERYTHROCYTE [DISTWIDTH] IN BLOOD BY AUTOMATED COUNT: 15.8 % (ref 11.6–15.1)
FRACTIONAL SHORTENING: 33 (ref 28–44)
GFR SERPL CREATININE-BSD FRML MDRD: 65 ML/MIN/1.73SQ M
GLOBAL LONGITUIDAL STRAIN: -17 %
GLUCOSE SERPL-MCNC: 110 MG/DL (ref 65–140)
HCT VFR BLD AUTO: 50.8 % (ref 34.8–46.1)
HGB BLD-MCNC: 16.8 G/DL (ref 11.5–15.4)
IMM GRANULOCYTES # BLD AUTO: 0.07 THOUSAND/UL (ref 0–0.2)
IMM GRANULOCYTES NFR BLD AUTO: 1 % (ref 0–2)
INTERVENTRICULAR SEPTUM IN DIASTOLE (PARASTERNAL SHORT AXIS VIEW): 0.9 CM
INTERVENTRICULAR SEPTUM: 0.9 CM (ref 0.6–1.1)
LAAS-AP2: 17.6 CM2
LAAS-AP4: 15.1 CM2
LEFT ATRIUM AREA SYSTOLE SINGLE PLANE A4C: 16 CM2
LEFT ATRIUM SIZE: 3.2 CM
LEFT INTERNAL DIMENSION IN SYSTOLE: 3.2 CM (ref 2.1–4)
LEFT VENTRICULAR INTERNAL DIMENSION IN DIASTOLE: 4.8 CM (ref 3.5–6)
LEFT VENTRICULAR POSTERIOR WALL IN END DIASTOLE: 0.9 CM
LEFT VENTRICULAR STROKE VOLUME: 65 ML
LIPASE SERPL-CCNC: 66 U/L (ref 73–393)
LVSV (TEICH): 65 ML
LYMPHOCYTES # BLD AUTO: 3.78 THOUSANDS/ÂΜL (ref 0.6–4.47)
LYMPHOCYTES NFR BLD AUTO: 25 % (ref 14–44)
MCH RBC QN AUTO: 27.8 PG (ref 26.8–34.3)
MCHC RBC AUTO-ENTMCNC: 33.1 G/DL (ref 31.4–37.4)
MCV RBC AUTO: 84 FL (ref 82–98)
MONOCYTES # BLD AUTO: 1.14 THOUSAND/ÂΜL (ref 0.17–1.22)
MONOCYTES NFR BLD AUTO: 7 % (ref 4–12)
MV E'TISSUE VEL-SEP: 5 CM/S
MV PEAK A VEL: 0.76 M/S
MV PEAK E VEL: 35 CM/S
MV STENOSIS PRESSURE HALF TIME: 148 MS
MV VALVE AREA P 1/2 METHOD: 1.49
NEUTROPHILS # BLD AUTO: 10.36 THOUSANDS/ÂΜL (ref 1.85–7.62)
NEUTS SEG NFR BLD AUTO: 67 % (ref 43–75)
NRBC BLD AUTO-RTO: 0 /100 WBCS
PA SYSTOLIC PRESSURE: 36 MMHG
PLATELET # BLD AUTO: 241 THOUSANDS/UL (ref 149–390)
PMV BLD AUTO: 8.9 FL (ref 8.9–12.7)
POTASSIUM SERPL-SCNC: 3.1 MMOL/L (ref 3.5–5.3)
RBC # BLD AUTO: 6.05 MILLION/UL (ref 3.81–5.12)
RIGHT ATRIUM AREA SYSTOLE A4C: 15.3 CM2
RIGHT VENTRICLE ID DIMENSION: 3.9 CM
SL CV LEFT ATRIUM LENGTH A2C: 5.6 CM
SL CV LV EF: 60
SL CV PED ECHO LEFT VENTRICLE DIASTOLIC VOLUME (MOD BIPLANE) 2D: 106 ML
SL CV PED ECHO LEFT VENTRICLE SYSTOLIC VOLUME (MOD BIPLANE) 2D: 41 ML
SODIUM SERPL-SCNC: 138 MMOL/L (ref 135–147)
TR MAX PG: 47 MMHG
TR PEAK VELOCITY: 3.4 M/S
TRICUSPID ANNULAR PLANE SYSTOLIC EXCURSION: 2.5 CM
TRICUSPID VALVE PEAK REGURGITATION VELOCITY: 3.43 M/S
WBC # BLD AUTO: 15.4 THOUSAND/UL (ref 4.31–10.16)

## 2023-01-24 RX ORDER — POTASSIUM CHLORIDE 20 MEQ/1
40 TABLET, EXTENDED RELEASE ORAL ONCE
Status: COMPLETED | OUTPATIENT
Start: 2023-01-24 | End: 2023-01-24

## 2023-01-24 RX ADMIN — OXYCODONE HYDROCHLORIDE 10 MG: 10 TABLET, FILM COATED, EXTENDED RELEASE ORAL at 21:03

## 2023-01-24 RX ADMIN — OXYCODONE HYDROCHLORIDE 15 MG: 10 TABLET ORAL at 10:47

## 2023-01-24 RX ADMIN — ATORVASTATIN CALCIUM 40 MG: 40 TABLET, FILM COATED ORAL at 17:09

## 2023-01-24 RX ADMIN — OXYCODONE HYDROCHLORIDE 15 MG: 10 TABLET ORAL at 01:53

## 2023-01-24 RX ADMIN — METOPROLOL TARTRATE 25 MG: 25 TABLET, FILM COATED ORAL at 21:02

## 2023-01-24 RX ADMIN — OXYCODONE HYDROCHLORIDE 15 MG: 10 TABLET ORAL at 19:12

## 2023-01-24 RX ADMIN — SODIUM CHLORIDE, SODIUM LACTATE, POTASSIUM CHLORIDE, AND CALCIUM CHLORIDE 1000 ML: .6; .31; .03; .02 INJECTION, SOLUTION INTRAVENOUS at 14:55

## 2023-01-24 RX ADMIN — PANTOPRAZOLE SODIUM 40 MG: 40 TABLET, DELAYED RELEASE ORAL at 05:43

## 2023-01-24 RX ADMIN — DULOXETINE HYDROCHLORIDE 30 MG: 30 CAPSULE, DELAYED RELEASE ORAL at 08:53

## 2023-01-24 RX ADMIN — FAMOTIDINE 20 MG: 20 TABLET, FILM COATED ORAL at 21:02

## 2023-01-24 RX ADMIN — OXYCODONE HYDROCHLORIDE 15 MG: 10 TABLET ORAL at 06:34

## 2023-01-24 RX ADMIN — POTASSIUM CHLORIDE 40 MEQ: 1500 TABLET, EXTENDED RELEASE ORAL at 10:29

## 2023-01-24 RX ADMIN — PREGABALIN 100 MG: 100 CAPSULE ORAL at 08:53

## 2023-01-24 RX ADMIN — FLUTICASONE PROPIONATE 2 SPRAY: 50 SPRAY, METERED NASAL at 08:54

## 2023-01-24 RX ADMIN — ACYCLOVIR 800 MG: 800 TABLET ORAL at 17:09

## 2023-01-24 RX ADMIN — ENOXAPARIN SODIUM 40 MG: 40 INJECTION SUBCUTANEOUS at 08:53

## 2023-01-24 RX ADMIN — METOPROLOL TARTRATE 25 MG: 25 TABLET, FILM COATED ORAL at 08:53

## 2023-01-24 RX ADMIN — OXYCODONE HYDROCHLORIDE 15 MG: 10 TABLET ORAL at 23:20

## 2023-01-24 RX ADMIN — OXYCODONE HYDROCHLORIDE 15 MG: 10 TABLET ORAL at 15:01

## 2023-01-24 RX ADMIN — ACYCLOVIR 800 MG: 800 TABLET ORAL at 08:54

## 2023-01-24 RX ADMIN — METHOCARBAMOL 750 MG: 750 TABLET ORAL at 01:53

## 2023-01-24 RX ADMIN — AMLODIPINE BESYLATE 10 MG: 10 TABLET ORAL at 08:53

## 2023-01-24 RX ADMIN — SODIUM CHLORIDE, SODIUM LACTATE, POTASSIUM CHLORIDE, AND CALCIUM CHLORIDE 75 ML/HR: .6; .31; .03; .02 INJECTION, SOLUTION INTRAVENOUS at 12:48

## 2023-01-24 RX ADMIN — PREGABALIN 100 MG: 100 CAPSULE ORAL at 17:09

## 2023-01-24 NOTE — OCCUPATIONAL THERAPY NOTE
Occupational Therapy Evaluation      Patient Name: Elisa Walker  FHYUN'U Date: 1/24/2023  Problem List  Principal Problem:    Atypical chest pain  Active Problems:    Hypertension    Chronic bilateral low back pain with bilateral sciatica    Cigarette nicotine dependence without complication    Multiple myeloma in remission [facility])    Continuous opioid dependence ([facility] [address] )        01/24/23 1002   OT Last Visit   OT Visit Date 01/24/23   Note Type   Note type Evaluation   Additional Comments Pt agreeable to OT eval  Upon arrival pt seated at EOB  Pain Assessment   Pain Assessment Tool 0-10   Pain Score 9   Pain Location/Orientation Location: Generalized   Pain Onset/Description Onset: Ongoing;Frequency: Constant/Continuous   Hospital Pain Intervention(s) Ambulation/increased activity;Repositioned;Medication (See MAR)   Restrictions/Precautions   Weight Bearing Precautions Per Order No   Braces or Orthoses   (none per pt)   Other Precautions Multiple lines; Fall Risk;Pain   Home Living   Type of [address] [place] [address] Two level;Bed/bath upstairs;Stairs to enter with rails  (4 CHARLES; 12 steps to 2nd floor)   Bathroom Shower/Tub Tub/shower unit   Bathroom Toilet Standard   Bathroom Equipment   (no DME reported)   [address]   (no AD used at baseline)   Prior Function   Level of [place] Independent with ADLs; Independent with functional mobility   Lives With Son;Family  (& Mother)   Frandy Farnsworth Help From Family  (shares cooking c mother)   IADLs Independent with meal prep; Independent with driving; Independent with medication management   Falls in the last 6 months 0   Vocational On disability   Lifestyle   Autonomy Patient reporting being independent with ADLs/IADLs, ambulatory with no AD, and lives with family in a two story house   Reciprocal Relationships Supportive family   Service to Others On disability   ADL   Eating Assistance 7  Independent   Grooming Assistance 7 Independent   UB Bathing Assistance 6  Modified Independent   LB Bathing Assistance 6  Modified Independent   UB Dressing Assistance 6  Modified independent   LB Dressing Assistance 6  Modified independent   Toileting Assistance  6  Modified independent   Bed Mobility   Additional Comments DNT bed mobility: pt seated at EOB upon arrival and at end of session   Transfers   Sit to Stand 5  Supervision   Additional items Assist x 1;Bedrails   Stand to Sit 5  Supervision   Additional items Assist x 1;Bedrails   Functional Mobility   Functional Mobility 5  Supervision   Additional Comments Pt ambulated household distance in hallway with no overt LOB or SOB  Pt limited by pain  Additional items   (pt ambulated with no AD)   Balance   Static Sitting Good   Dynamic Sitting Fair +   Static Standing Fair +   Dynamic Standing Fair   Activity Tolerance   Activity Tolerance Patient limited by pain   Medical Staff Made Aware Co-evaluation with PT Criss Ivan due to clinical presentation   RUE Assessment   RUE Assessment WFL  (full AROM, 4/5 MMT)   LUE Assessment   LUE Assessment WFL  (full AROM, 4/5 MMT)   Hand Function   Gross Motor Coordination Functional   Fine Motor Coordination Impaired   Hand Function Comments Decreased hand strength noted; pt reports diffiulty opening containers & picking up small objects   Sensation   Light Touch Severe deficits in the RUE;Severe deficits in the LUE;Severe deficits in the RLE; Severe deficits in the LLE  (neuropathy in B hands & feet)   Vision-Basic Assessment   Current Vision Wears glasses only for reading   Cognition   Overall Cognitive Status [facility]   Arousal/Participation Alert; Responsive; Cooperative   Attention Within functional limits   Orientation Level Oriented X4   Memory Within functional limits   Following Commands Follows all commands and directions without difficulty   Assessment   Prognosis Good   Assessment Pt is a 45 y o  female who was admitted to [address] [place] [address] on 1/23/2023 with Atypical chest pain  At this time, patient is also affected by the comorbidities of: HTN, chronic bilateral low back pain, cigarette nicotine dependence, multiple myeloma, and continuous opioid dependence  Additionally, patient is affected by the following personal factors:steps to enter environment  Orders placed for OT evaluation/treatment with up and OOB as tolerated orders  Prior to admission, Patient reporting being independent with ADLs/IADLs, ambulatory with no AD, and lives with family in a two story house  Upon OT evaluation, patient requires modified independent  for UB ADLs and modified independent  for LB ADLs  Patient presents with functional use of BUEs, with intact prehension and fine motor coordination, and symmetrical muscle strength  Patient appears to be functioning at baseline, no further Acute OT needs identified at this time to warrant OT services  D/C OT and from OT standpoint, recommendation at time of d/c would be Home with outpatient rehabilitation for hand therapy to increase hand function and strength  Goals   Patient Goals to go home soon   Plan   OT Frequency Eval only   Recommendation   OT Discharge Recommendation Home with outpatient rehabilitation  (for hand therapy to increase function and strength)   Additional Comments  The patient's raw score on the AM-PAC Daily Activity inpatient short form is 24, standardized score is 57 54, greater than 39 4  Patients at this level are likely to benefit from discharge to home  Please refer to the recommendation of the Occupational Therapist for safe discharge planning     AM-PAC Daily Activity Inpatient   Lower Body Dressing 4   Bathing 4   Toileting 4   Upper Body Dressing 4   Grooming 4   Eating 4   Daily Activity Raw Score 24   Daily Activity Standardized Score (Calc for Raw Score >=11) 57 54   AM-[facility] Applied Cognition Inpatient   Following a Speech/Presentation 4   Understanding Ordinary Conversation 4   Taking Medications 4   Remembering Where Things Are Placed or Put Away 4   Remembering List of 4-5 Errands 4   Taking Care of Complicated Tasks 4   Applied Cognition Raw Score 24   Applied Cognition Standardized Score 62 21   Modified [place] Scale   Modified [place] Scale 3   End of Consult   Patient Position at End of Consult Seated edge of bed; All needs within reach   Nurse Communication Nurse aware of consult     Jeimy Rolle OTD

## 2023-01-24 NOTE — PLAN OF CARE
Problem: PHYSICAL THERAPY ADULT  Goal: Performs mobility at highest level of function for planned discharge setting  See evaluation for individualized goals  Description: Treatment/Interventions: Functional transfer training, LE strengthening/ROM, ADL retraining, Therapeutic exercise, Endurance training, Bed mobility, Gait training, Compensatory technique education, Elevations, Spoke to nursing, OT          See flowsheet documentation for full assessment, interventions and recommendations  Outcome: Progressing  Note: Prognosis: Good  Problem List: Decreased strength, Impaired balance, Decreased mobility, Pain, Impaired sensation  Assessment: Pt is 45 y o  female seen for PT evaluation s/p admit to [facility] on 1/23/2023 w/ Atypical chest pain  PT consulted to assess pt's functional mobility and d/c needs  Order placed for PT eval and tx, w/ up w/ A order  Comorbidities affecting pt's physical performance at time of assessment include: atypical chest pain, HTN, opioid dependence, multiple myeloma, chronic LBP  PTA, pt was independent w/ all functional mobility w/ no device, ambulates community distances and elevations, ambulates household distances, has 4 CHARLES, lives w/ son and mother in two level house and on disability  Personal factors affecting pt at time of IE include: stairs to enter home, inability to navigate community distances, unable to perform dynamic tasks in community and inability to perform IADLs  Please find objective findings from PT assessment regarding body systems outlined above with impairments and limitations including weakness, impaired balance, gait deviations, pain, decreased activity tolerance, decreased functional mobility tolerance and fall risk  The following objective measures performed on IE also reveal limitations: Barthel Index: 75/100, Modified [place]: 3 (moderate disability) and AM-PAC 6-Clicks: 20/21   Pt's clinical presentation is currently evolving seen in pt's presentation of continued need for medical management and monitoring, decreased strength and balance resulting in an increased risk for falls, decreased endurance and activity tolerance limiting mobility, increased pain  Pt to benefit from continued PT tx to address deficits as defined above and maximize level of functional independent mobility and consistency  From PT/mobility standpoint, recommendation at time of d/c would be home with outpatient rehabilitation pending progress in order to facilitate return to PLOF  Barriers to Discharge: Other (Comment) (decline in mobility status)     PT Discharge Recommendation: Home with outpatient rehabilitation    See flowsheet documentation for full assessment

## 2023-01-24 NOTE — PLAN OF CARE
Problem: PAIN - ADULT  Goal: Verbalizes/displays adequate comfort level or baseline comfort level  Description: Interventions:  - Encourage patient to monitor pain and request assistance  - Assess pain using appropriate pain scale  - Administer analgesics based on type and severity of pain and evaluate response  - Implement non-pharmacological measures as appropriate and evaluate response  - Consider cultural and social influences on pain and pain management  - Notify physician/advanced practitioner if interventions unsuccessful or patient reports new pain  Outcome: Progressing     Problem: SAFETY ADULT  Goal: Patient will remain free of falls  Description: INTERVENTIONS:  - Educate patient/family on patient safety including physical limitations  - Instruct patient to call for assistance with activity   - Consult OT/PT to assist with strengthening/mobility   - Keep Call bell within reach  - Keep bed low and locked with side rails adjusted as appropriate  - Keep care items and personal belongings within reach  - Initiate and maintain comfort rounds  - Make Fall Risk Sign visible to staff  - Offer Toileting every 2 Hours, in advance of need  - Initiate/Maintain bed alarm  - Obtain necessary fall risk management equipment: call bell within reach  - Apply yellow socks and bracelet for high fall risk patients  - Consider moving patient to room near nurses station  Outcome: Progressing  Goal: Maintain or return to baseline ADL function  Description: INTERVENTIONS:  -  Assess patient's ability to carry out ADLs; assess patient's baseline for ADL function and identify physical deficits which impact ability to perform ADLs (bathing, care of mouth/teeth, toileting, grooming, dressing, etc )  - Assess/evaluate cause of self-care deficits   - Assess range of motion  - Assess patient's mobility; develop plan if impaired  - Assess patient's need for assistive devices and provide as appropriate  - Encourage maximum independence but intervene and supervise when necessary  - Involve family in performance of ADLs  - Assess for home care needs following discharge   - Consider OT consult to assist with ADL evaluation and planning for discharge  - Provide patient education as appropriate  Outcome: Progressing  Goal: Maintains/Returns to pre admission functional level  Description: INTERVENTIONS:  - Perform BMAT or MOVE assessment daily    - Set and communicate daily mobility goal to care team and patient/family/caregiver  - Collaborate with rehabilitation services on mobility goals if consulted  - Perform Range of Motion 3 times a day  - Reposition patient every 2 hours  - Dangle patient 3 times a day  - Stand patient 3 times a day  - Ambulate patient 3 times a day  - Out of bed to chair 3 times a day   - Out of bed for meals 3 times a day  - Out of bed for toileting  - Record patient progress and toleration of activity level   Outcome: Progressing     Problem: Knowledge Deficit  Goal: Patient/family/caregiver demonstrates understanding of disease process, treatment plan, medications, and discharge instructions  Description: Complete learning assessment and assess knowledge base    Interventions:  - Provide teaching at level of understanding  - Provide teaching via preferred learning methods  Outcome: Progressing     Problem: CARDIOVASCULAR - ADULT  Goal: Maintains optimal cardiac output and hemodynamic stability  Description: INTERVENTIONS:  - Monitor I/O, vital signs and rhythm  - Monitor for S/S and trends of decreased cardiac output  - Administer and titrate ordered vasoactive medications to optimize hemodynamic stability  - Assess quality of pulses, skin color and temperature  - Assess for signs of decreased coronary artery perfusion  - Instruct patient to report change in severity of symptoms  Outcome: Progressing  Goal: Absence of cardiac dysrhythmias or at baseline rhythm  Description: INTERVENTIONS:  - Continuous cardiac monitoring, vital signs, obtain 12 lead EKG if ordered  - Administer antiarrhythmic and heart rate control medications as ordered  - Monitor electrolytes and administer replacement therapy as ordered  Outcome: Progressing     Problem: GASTROINTESTINAL - ADULT  Goal: Minimal or absence of nausea and/or vomiting  Description: INTERVENTIONS:  - Administer IV fluids if ordered to ensure adequate hydration  - Maintain NPO status until nausea and vomiting are resolved  - Nasogastric tube if ordered  - Administer ordered antiemetic medications as needed  - Provide nonpharmacologic comfort measures as appropriate  - Advance diet as tolerated, if ordered  - Consider nutrition services referral to assist patient with adequate nutrition and appropriate food choices  Outcome: Progressing  Goal: Maintains or returns to baseline bowel function  Description: INTERVENTIONS:  - Assess bowel function  - Encourage oral fluids to ensure adequate hydration  - Administer IV fluids if ordered to ensure adequate hydration  - Administer ordered medications as needed  - Encourage mobilization and activity  - Consider nutritional services referral to assist patient with adequate nutrition and appropriate food choices  Outcome: Progressing  Goal: Maintains adequate nutritional intake  Description: INTERVENTIONS:  - Monitor percentage of each meal consumed  - Identify factors contributing to decreased intake, treat as appropriate  - Assist with meals as needed  - Monitor I&O, weight, and lab values if indicated  - Obtain nutrition services referral as needed  Outcome: Progressing  Goal: Establish and maintain optimal ostomy function  Description: INTERVENTIONS:  - Assess bowel function  - Encourage oral fluids to ensure adequate hydration  - Administer IV fluids if ordered to ensure adequate hydration   - Administer ordered medications as needed  - Encourage mobilization and activity  - Nutrition services referral to assist patient with appropriate food choices  - Assess stoma site  - Consider wound care consult   Outcome: Progressing  Goal: Oral mucous membranes remain intact  Description: INTERVENTIONS  - Assess oral mucosa and hygiene practices  - Implement preventative oral hygiene regimen  - Implement oral medicated treatments as ordered  - Initiate Nutrition services referral as needed  Outcome: Progressing

## 2023-01-24 NOTE — UTILIZATION REVIEW
Initial Clinical Review    Admission: Date/Time/Statement:   Admission Orders (From admission, onward)     Ordered        01/23/23 1627  Place in Observation  Once                      Orders Placed This Encounter   Procedures   • Place in Observation     Standing Status:   Standing     Number of Occurrences:   1     Order Specific Question:   Level of Care     Answer:   Med Surg [16]     ED Arrival Information     Expected   -    Arrival   1/23/2023 12:02    Acuity   Emergent            Means of arrival   Ambulance    Escorted by   [facility]    Service   Hospitalist    Admission type   Emergency            Arrival complaint   Chest Pain            Chief Complaint   Patient presents with   • Chest Pain     Pt BIBA d/t c/o chest pain since 11am  Pt reports it was a sudden onset of pressure and tightening in the center of her chest that wraps down under both breasts and around to her back  Pt given 324mg of aspirin prehospital        Initial Presentation: 45 y o  female to the ED from home via EMS with complaints of chest pain wrapping under both breasts  EMS gave him asa  Admitted under observation for atypical chest pain, hypertension  H/O multiple myeloma in remission, continuous opioid dependence, GERD, htn, neuropathy  EKG shows  NSR, neg troponins x 2  Give protonix, pepcid  IV fluids started  Check ECHO       Date:     Day 2:      ED Triage Vitals   Temperature Pulse Respirations Blood Pressure SpO2   01/23/23 1217 01/23/23 1217 01/23/23 1217 01/23/23 1217 01/23/23 1217   98 7 °F (37 1 °C) 90 22 (!) 178/102 99 %      Temp Source Heart Rate Source Patient Position - Orthostatic VS BP Location FiO2 (%)   01/23/23 1217 01/23/23 1217 01/23/23 1217 01/23/23 1217 --   Oral Monitor Sitting Right arm       Pain Score       01/23/23 1343       10 - Worst Possible Pain          Wt Readings from Last 1 Encounters:   01/24/23 75 8 kg (167 lb)     Additional Vital Signs:   Date/Time Temp Pulse Resp BP MAP (mmHg) SpO2 O2 Device Patient Position - Orthostatic VS   01/24/23 0800 -- 67 18 122/80 -- 91 % None (Room air) --   01/24/23 07:34:46 99 4 °F (37 4 °C) 67 18 122/80 94 93 % -- --   01/24/23 01:52:47 -- 81 -- 121/92 102 94 % -- --   01/23/23 2213 99 2 °F (37 3 °C) 86 20 166/113 Abnormal  131 95 % None (Room air) Lying   01/23/23 22:08:23 99 2 °F (37 3 °C) 90 20 166/113 Abnormal  131 92 % -- --   01/23/23 2200 -- 78 -- -- -- -- -- --   01/23/23 2100 -- 89 23 Abnormal  173/111 Abnormal  140 93 % None (Room air) Lying   01/23/23 2030 -- 86 18 180/109 Abnormal  139 94 % None (Room air) Lying   01/23/23 1930 -- 93 20 136/84 105 94 % None (Room air) Lying   01/23/23 1900 -- 91 26 Abnormal  177/104 Abnormal  134 95 % None (Room air) Lying   01/23/23 1800 -- 106 Abnormal  21 169/110 Abnormal  134 95 % None (Room air) Lying   01/23/23 1730 -- 111 Abnormal  22 168/109 Abnormal  131 97 % None (Room air) Lying   01/23/23 1700 -- 92 20 167/92 121 96 % None (Room air) Lying   01/23/23 1600 -- 94 18 186/105 Abnormal  138 94 % None (Room air) Lying   01/23/23 1530 -- 96 22 175/113 Abnormal  139 93 % None (Room air) Lying   01/23/23 1430 -- 85 25 Abnormal  187/112 Abnormal  142 98 % None (Room air) Lying   01/23/23 1345 -- 91 24 Abnormal  172/84 Abnormal  119 96 % None (Room air) Lying   01/23/23 1220 -- -- -- -- -- -- None (Room air) --   01/23/23 1217 98 7 °F (37 1 °C) 90 22 178/102 Abnormal  134 99 % None (Room air) Sitting       Pertinent Labs/Diagnostic Test Results:   1/23 :Interpretation:     Interpretation: abnormal     Rate:     ECG rate:  92     ECG rate assessment: normal     Rhythm:     Rhythm: sinus rhythm     Ectopy:     Ectopy: none     QRS:     QRS axis:  Normal     QRS intervals:  Normal   Conduction:     Conduction: normal     ST segments:     ST segments:  Normal   T waves:     T waves: normal     Comments:      Left anterior fascicular block, septal infarct   CTA dissection protocol chest/abdomen/pelvis   Final Result by David Collado MD (01/23 1852)      No aortic aneurysm, intramural hematoma/dissection or penetrating ulcer  Advanced atherosclerotic changes are not present  No acute findings in the chest, abdomen or the pelvis  Workstation performed: [id number]         XR chest 2 views   Final Result by Wesley Brooks MD (01/23 1621)      Mild subsegmental atelectasis left lung base    Otherwise no acute pulmonary disease                  Workstation performed: [id number]           Results from last 7 days   Lab Units 01/23/23 2128   SARS-COV-2  Negative     Results from last 7 days   Lab Units 01/24/23  0544 01/23/23  1337   WBC Thousand/uL 15 40* 13 77*   HEMOGLOBIN g/dL 16 8* 18 2*   HEMATOCRIT % 50 8* 54 0*   PLATELETS Thousands/uL 241 243   NEUTROS ABS Thousands/µL 10 36* 11 37*         Results from last 7 days   Lab Units 01/24/23  0544 01/23/23  1337   SODIUM mmol/L 138 136   POTASSIUM mmol/L 3 1* 4 5   CHLORIDE mmol/L 100 99   CO2 mmol/L 25 23   ANION GAP mmol/L 13 14*   BUN mg/dL 11 10   CREATININE mg/dL 0 98 0 90   EGFR ml/min/1 73sq m 65 72   CALCIUM mg/dL 9 4 9 6     Results from last 7 days   Lab Units 01/23/23  1337   AST U/L 33   ALT U/L 20   ALK PHOS U/L 102   TOTAL PROTEIN g/dL 8 9*   ALBUMIN g/dL 4 5   TOTAL BILIRUBIN mg/dL 0 83         Results from last 7 days   Lab Units 01/24/23  0544 01/23/23  1337   GLUCOSE RANDOM mg/dL 110 125             Results from last 7 days   Lab Units 01/23/23  1812 01/23/23  1539 01/23/23  1337   HS TNI 0HR ng/L  --   --  10   HS TNI 2HR ng/L  --  15  --    HSTNI D2 ng/L  --  5  --    HS TNI 4HR ng/L 25  --   --    HSTNI D4 ng/L 15  --   --      Results from last 7 days   Lab Units 01/23/23  1337   D-DIMER QUANTITATIVE ug/ml FEU 0 46         Results from last 7 days   Lab Units 01/23/23  1812   TSH 3RD GENERATON uIU/mL 2 212         Results from last 7 days   Lab Units 01/23/23  2128   INFLUENZA A PCR  Negative   INFLUENZA B PCR  Negative   RSV PCR Negative           ED Treatment:   Medication Administration from 01/23/2023 1202 to 01/23/2023 2202       Date/Time Order Dose Route Action Comments     01/23/2023 1343 EST fentanyl citrate (PF) 100 MCG/2ML 25 mcg 25 mcg Intravenous Given --     01/23/2023 1344 EST ondansetron (ZOFRAN) injection 4 mg 4 mg Intravenous Given --     01/23/2023 1511 EST fentanyl citrate (PF) 100 MCG/2ML 25 mcg 25 mcg Intravenous Given --     01/23/2023 1511 EST metoclopramide (REGLAN) injection 10 mg 10 mg Intravenous Given --     01/23/2023 1511 EST diphenhydrAMINE (BENADRYL) injection 25 mg 25 mg Intravenous Given --     01/23/2023 2127 EST acetaminophen (TYLENOL) tablet 975 mg 975 mg Oral Given --     01/23/2023 1802 EST atorvastatin (LIPITOR) tablet 40 mg 40 mg Oral Given --     01/23/2023 1802 EST methocarbamol (ROBAXIN) tablet 750 mg 750 mg Oral Given --     01/23/2023 1802 EST pregabalin (LYRICA) capsule 100 mg 100 mg Oral Given --     01/23/2023 1802 EST oxyCODONE (ROXICODONE) immediate release tablet 10 mg 10 mg Oral Given --     01/23/2023 1837 EST acyclovir (ZOVIRAX) tablet 800 mg 800 mg Oral Given --     01/23/2023 2124 EST metoprolol tartrate (LOPRESSOR) tablet 25 mg 25 mg Oral Given --     01/23/2023 2124 EST oxyCODONE (OxyCONTIN) 12 hr tablet 10 mg 10 mg Oral Given --     01/23/2023 1816 EST nitroglycerin (NITROSTAT) SL tablet 0 4 mg 0 mg Sublingual Hold --     01/23/2023 2124 EST famotidine (PEPCID) tablet 20 mg 20 mg Oral Given --     01/23/2023 1815 EST pantoprazole (PROTONIX) EC tablet 40 mg 40 mg Oral Given --     01/23/2023 1838 EST lactated ringers infusion 75 mL/hr Intravenous New Bag --     01/23/2023 1926 EST labetalol (NORMODYNE) injection 10 mg 10 mg Intravenous Given --        Past Medical History:   Diagnosis Date   • Cardiomyopathy ([place] [address] )    • Chronic back pain    • GERD (gastroesophageal reflux disease)    • H/O autologous stem cell transplant ([place] [address] ) 03/30/2022   • Hypertension    • Multiple myeloma ([place] [address] ) • Neuropathy          Admitting Diagnosis: Chest pain [R07 9]  Atypical chest pain [R07 89]  Age/Sex: 45 y o  female  Admission Orders:  Scheduled Medications:  acyclovir, 800 mg, Oral, BID  amLODIPine, 10 mg, Oral, QAM  atorvastatin, 40 mg, Oral, Daily With Dinner  DULoxetine, 30 mg, Oral, Daily  enoxaparin, 40 mg, Subcutaneous, Daily  famotidine, 20 mg, Oral, HS  fluticasone, 2 spray, Each Nare, Daily  lidocaine, 1 patch, Topical, Daily  metoprolol tartrate, 25 mg, Oral, Q12H YUDITH  oxyCODONE, 10 mg, Oral, HS  pantoprazole, 40 mg, Oral, Early Morning  polyethylene glycol, 17 g, Oral, Daily  potassium chloride, 40 mEq, Oral, Once  pregabalin, 100 mg, Oral, BID  senna-docusate sodium, 2 tablet, Oral, HS      Continuous IV Infusions:  lactated ringers, 75 mL/hr, Intravenous, Continuous      PRN Meds:  acetaminophen, 975 mg, Oral, Q8H PRN  labetalol, 10 mg, Intravenous, Q4H PRN  methocarbamol, 750 mg, Oral, Q6H PRN  nitroglycerin, 0 4 mg, Sublingual, Q5 Min PRN  ondansetron, 4 mg, Intravenous, Q8H PRN  oxyCODONE, 15 mg, Oral, Q4H PRN          Network Utilization Review Department  ATTENTION: Please call with any questions or concerns to [phone] and carefully listen to the prompts so that you are directed to the right person  All voicemails are confidential   Chelo Dienoemi all requests for admission clinical reviews, approved or denied determinations and any other requests to dedicated fax number below belonging to the campus where the patient is receiving treatment   List of dedicated fax numbers for the Facilities:  [address] [place] [address] DENIALS (Administrative/Medical Necessity) [phone]   [address] [place] [address] (Maternity/NICU/Pediatrics) [phone]   [address] [facility] [address] [place] [address] [address] [facility] [address] [facility] 134 995 [facility] [phone]

## 2023-01-24 NOTE — PROGRESS NOTES
Patient alert and oriented x 4  Patient slept during shift and tolerated medication  Patient oriented to unit room upon admission  PRN pain medication administered per patient request  Patient continues on fluid hydration  Patient denies shortness of breath during shift  Able to verbalize needs and obey commands with limitations  No deviation in assessment finding noted when compared to previous documentation  Will continue to monitor

## 2023-01-24 NOTE — ASSESSMENT & PLAN NOTE
Presented with sudden onset severe chest pain located in substernal region radiating to back  Tenderness on palpation noted  Worse with deep inspiration  Nothing making it better  Troponin x2 WNL  EKG showing normal sinus rhythm  CTA chest abdomen pelvis without any acute finding  Noted to have leukocytosis, hemoconcentration component of dehydration as patient is nauseous and with poor oral intake since this morning  TSH WNL  Denies a family history of hypertrophic cardiomyopathy, sudden cardiac death getting routine echocardiogram outpatient  Does have history of multiple myeloma with suspected amyloidosis as per heme-onc's chart  No prior MI, CHF  Chest pain not related to any exertion  Looks more likely atypical   Patient hemodynamically well saturating well on room air with high blood pressure  1/24/23: Today patient denies any chest pain, denies shortness of breath  Minimal tenderness to palpation    No changes with inspiration, expiration      Lidocaine patch  Protonix 40 mg daily along with Pepcid at bedtime  Sublingual nitroglycerin  Pain management as per home dosage  Telemetry monitoring overnight  Will check TSH, UA, COVID/Flu-unremarkable  Check echocardiogram for HOCM monitoring-pending

## 2023-01-24 NOTE — PHYSICAL THERAPY NOTE
Physical Therapy Evaluation     Patient's Name: Mallory Rodriguez    Admitting Diagnosis  Chest pain [R07 9]  Atypical chest pain [R07 89]    Problem List  Patient Active Problem List   Diagnosis    Hypertension    Chronic bilateral low back pain with bilateral sciatica    Cigarette nicotine dependence without complication    Multiple myeloma in remission ([facility] )    Continuous opioid dependence ([facility] )    Palliative care patient    Encounter for central line care    H/O autologous stem cell transplant ([facility] )    Amyloidosis ([facility] )    Thyroid nodule    Atypical chest pain       Past Medical History  Past Medical History:   Diagnosis Date    Cardiomyopathy ([facility] )     Chronic back pain     GERD (gastroesophageal reflux disease)     H/O autologous stem cell transplant ([place] [id number] ) 2022    Hypertension     Multiple myeloma ([facility] )     Neuropathy        Past Surgical History  Past Surgical History:   Procedure Laterality Date     SECTION      US GUIDED THYROID BIOPSY  10/20/2022        01/24/23 1016   PT Last Visit   PT Visit Date 23   Note Type   Note type Evaluation   Pain Assessment   Pain Assessment Tool 0-10   Pain Score 9   Pain Location/Orientation Location: Generalized   Pain Onset/Description Onset: Ongoing;Frequency: Constant/Continuous   Hospital Pain Intervention(s) Ambulation/increased activity;Repositioned;Medication (See MAR)   Restrictions/Precautions   Weight Bearing Precautions Per Order No   Braces or Orthoses Other (Comment)  (none per pt)   Other Precautions Multiple lines; Fall Risk;Pain   Home Living   Type of [address] [place] [address] Two level;Bed/bath upstairs;Stairs to enter with rails  (4 CHARLES; 12 steps to 2nd floor)   Bathroom Shower/Tub Tub/shower unit   Bathroom Toilet Standard   Bathroom Equipment Other (Comment)  (none reported)   [address] Other (Comment)  (none reported)   Additional Comments Ambulates without device at baseline   Prior Function Level of [place] Independent with ADLs; Independent with functional mobility   Lives With Son;Family  (& Mother)   Jonita Champ Help From Family  (shares cooking c mother)   IADLs Independent with meal prep; Independent with driving; Independent with medication management   Falls in the last 6 months 0   Vocational On disability   General   Family/Caregiver Present No   Cognition   Overall Cognitive Status WFL   Arousal/Participation Alert   Orientation Level Oriented X4   Memory Within functional limits   Following Commands Follows all commands and directions without difficulty   Comments Pt agreeable to PT   RLE Assessment   RLE Assessment X   Strength RLE   RLE Overall Strength 4/5   LLE Assessment   LLE Assessment X   Strength LLE   LLE Overall Strength 4/5   Vision-Basic Assessment   Current Vision Wears glasses only for reading   Light Touch   RLE Light Touch Impaired   LLE Light Touch Impaired   Bed Mobility   Sit to Supine 6  Modified independent   Additional items HOB elevated; Bedrails;Verbal cues   Transfers   Sit to Stand 5  Supervision   Additional items Assist x 1; Armrests; Increased time required;Verbal cues   Stand to Sit 5  Supervision   Additional items Assist x 1; Armrests; Increased time required;Verbal cues   Additional Comments without device   Ambulation/Elevation   Gait pattern Decreased foot clearance; Inconsistent lata; Short stride;Decreased heel strike;Narrow LAMONT   Gait Assistance 5  Supervision   Additional items Assist x 1;Verbal cues   Assistive Device None   Distance 100'   Stair Management Assistance Not tested   Balance   Static Sitting Good   Dynamic Sitting Fair +   Static Standing Fair +   Dynamic Standing Fair   Ambulatory Fair   Activity Tolerance   Activity Tolerance Patient limited by pain; Patient limited by fatigue   Medical Staff Made Aware Co-evaluation with ADRIAN Fortune due to clinical presentation   Nurse Made Aware RN Elle   Assessment   Prognosis Good   Problem List Decreased strength; Impaired balance;Decreased mobility;Pain; Impaired sensation   Assessment Pt is 45 y o  female seen for PT evaluation s/p admit to [facility] on 1/23/2023 w/ Atypical chest pain  PT consulted to assess pt's functional mobility and d/c needs  Order placed for PT eval and tx, w/ up w/ A order  Comorbidities affecting pt's physical performance at time of assessment include: atypical chest pain, HTN, opioid dependence, multiple myeloma, chronic LBP  PTA, pt was independent w/ all functional mobility w/ no device, ambulates community distances and elevations, ambulates household distances, has 4 CHARLES, lives w/ son and mother in two level house and on disability  Personal factors affecting pt at time of IE include: stairs to enter home, inability to navigate community distances, unable to perform dynamic tasks in community and inability to perform IADLs  Please find objective findings from PT assessment regarding body systems outlined above with impairments and limitations including weakness, impaired balance, gait deviations, pain, decreased activity tolerance, decreased functional mobility tolerance and fall risk  The following objective measures performed on IE also reveal limitations: Barthel Index: 75/100, Modified [place]: 3 (moderate disability) and AM-PAC 6-Clicks: 26/34  Pt's clinical presentation is currently evolving seen in pt's presentation of continued need for medical management and monitoring, decreased strength and balance resulting in an increased risk for falls, decreased endurance and activity tolerance limiting mobility, increased pain  Pt to benefit from continued PT tx to address deficits as defined above and maximize level of functional independent mobility and consistency  From PT/mobility standpoint, recommendation at time of d/c would be home with outpatient rehabilitation pending progress in order to facilitate return to PLOF     Barriers to Discharge Other (Comment)  (decline in mobility status)   Goals   STG Expiration Date 02/03/23   Short Term Goal #1 In 7-10 days: Perform all transfers modified independent to improve independence, Ambulate > 150 ft  with least restrictive assistive device modified independent w/o LOB and w/ normalized gait pattern 100% of the time, Navigate 12 stairs modified independent with unilateral handrail to facilitate return to previous living environment and PT provider will perform functional balance assessment to determine fall risk   Plan   Treatment/Interventions Functional transfer training;LE strengthening/ROM;ADL retraining; Therapeutic exercise; Endurance training;Bed mobility;Gait training; Compensatory technique education;Elevations; Spoke to nursing;OT   PT Frequency 2-3x/wk   Recommendation   PT Discharge Recommendation Home with outpatient rehabilitation   AM-PAC Basic Mobility Inpatient   Turning in Flat Bed Without Bedrails 4   Lying on Back to Sitting on Edge of Flat Bed Without Bedrails 4   Moving Bed to Chair 3   Standing Up From Chair Using Arms 3   Walk in Room 3   Climb 3-5 Stairs With Railing 3   Basic Mobility Inpatient Raw Score 20   Basic Mobility Standardized Score 43 99   Highest Level Of Mobility   -HL Goal 6: Walk 10 steps or more   JH-HLM Achieved 7: Walk 25 feet or more   Modified [place] Scale   Modified [place] Scale 3   Barthel Index   Feeding 10   Bathing 0   Grooming Score 5   Dressing Score 10   Bladder Score 10   Bowels Score 10   Toilet Use Score 5   Transfers (Bed/Chair) Score 10   Mobility (Level Surface) Score 10   Stairs Score 5   Barthel Index Score 75       Beth Kamara, PT

## 2023-01-24 NOTE — PROGRESS NOTES
[facility]  Progress Note - Guadlupe Osler 1970, 45 y o  female MRN: [id number]  Unit/Bed#: -01 Encounter: [id number]  Primary Care Provider: Denzel Lee,    Date and time admitted to hospital: 1/23/2023 12:03 PM    Continuous opioid dependence [facility])  Assessment & Plan  Following up with palliative care outpatient  Continue Lyrica 100 mg twice daily, duloxetine 30 mg p o  daily  Can add lidocaine cream if continues to have pain overnight  Robaxin 5 mg every 6 hours as needed  Zofran every 8 hours as needed for nausea  Bowel regimen in place  Oxycodone 15 mg every 4 hours as needed  OxyContin 10 mg at bedtime    Multiple myeloma in remission [facility])  Assessment & Plan  History of multiple myeloma following up with pulmonary specialist as well as heme-onc s/p bone marrow transplant and chemotherapy  Doing well  No pancytopenia on presentation  Has leukocytosis on presentation but low suspicion of infection given no fever  CT chest abdomen pelvis unremarkable  Patient with persistent leukocytosis, will continue to monitor overall does not appear to be infectious  Continue acyclovir prophylaxis 800 mg twice daily  Follow-up with heme-onc outpatient  Blood cultures pending      Cigarette nicotine dependence without complication  Assessment & Plan  Encourage smoking cessation  Refuses nicotine patch while inpatient  Chronic bilateral low back pain with bilateral sciatica  Assessment & Plan  Takes oxycodone 15 mg every 4 hours as needed, OxyContin ER 10 mg at bedtime, Robaxin, duloxetine  Bowel regimen in place    Hypertension  Assessment & Plan  Noted to have elevated blood pressure on presentation with systolic 329N  Some of them might be contributed to patient in acute distress and pain  CTA chest abdomen pelvis was unremarkable  Normal troponins    Looks little bit dehydrated which can be contributing to tachycardia, leukocytosis, hemoconcentration  Labetalol as needed for systolic blood pressure more than 180  Continue home amlodipine  Continue metoprolol  Blood pressures improved today in the 120s to 130s    * Atypical chest pain  Assessment & Plan  Presented with sudden onset severe chest pain located in substernal region radiating to back  Tenderness on palpation noted  Worse with deep inspiration  Nothing making it better  Troponin x2 WNL  EKG showing normal sinus rhythm  CTA chest abdomen pelvis without any acute finding  Noted to have leukocytosis, hemoconcentration component of dehydration as patient is nauseous and with poor oral intake since this morning  TSH WNL  Denies a family history of hypertrophic cardiomyopathy, sudden cardiac death getting routine echocardiogram outpatient  Does have history of multiple myeloma with suspected amyloidosis as per heme-onc's chart  No prior MI, CHF  Chest pain not related to any exertion  Looks more likely atypical   Patient hemodynamically well saturating well on room air with high blood pressure  1/24/23: Today patient denies any chest pain, denies shortness of breath  Minimal tenderness to palpation  No changes with inspiration, expiration      Lidocaine patch  Protonix 40 mg daily along with Pepcid at bedtime  Sublingual nitroglycerin  Pain management as per home dosage  Telemetry monitoring overnight  Will check TSH, UA, COVID/Flu-unremarkable  Check echocardiogram for HOCM monitoring-pending            VTE Pharmacologic Prophylaxis:   High Risk (Score >/= 5) - Pharmacological DVT Prophylaxis Ordered: enoxaparin (Lovenox)  Sequential Compression Devices Ordered  Patient Centered Rounds: I performed bedside rounds with nursing staff today  Discussions with Specialists or Other Care Team Provider: n/a    Education and Discussions with Family / Patient: Patient declined call to   Time Spent for Care: 30 minutes   More than 50% of total time spent on counseling and coordination of care as described above  Current Length of Stay: 0 day(s)  Current Patient Status: Observation   Certification Statement: The patient will continue to require additional inpatient hospital stay due to Monitoring leukocytosis, blood cultures pending, echocardiogram pending  Discharge Plan: Anticipate discharge tomorrow to home  Code Status: Level 1 - Full Code    Subjective:   Patient denies any further chest pain since admission, does still have some mild tenderness to palpation of the anterior chest wall, denies any fevers chills nausea vomiting  Pain currently adequately controlled    Objective:     Vitals:   Temp (24hrs), Av 3 °F (37 4 °C), Min:99 2 °F (37 3 °C), Max:99 4 °F (37 4 °C)    Temp:  [99 2 °F (37 3 °C)-99 4 °F (37 4 °C)] 99 4 °F (37 4 °C)  HR:  [] 74  Resp:  [18-26] 18  BP: (121-187)/() 121/82  SpO2:  [91 %-98 %] 94 %  Body mass index is 29 58 kg/m²  Input and Output Summary (last 24 hours):   No intake or output data in the 24 hours ending 23 1359    Physical Exam:   Physical Exam  Vitals and nursing note reviewed  Constitutional:       General: She is not in acute distress  Appearance: She is well-developed  She is not ill-appearing, toxic-appearing or diaphoretic  HENT:      Head: Normocephalic and atraumatic  Eyes:      General: No scleral icterus  Conjunctiva/sclera: Conjunctivae normal    Cardiovascular:      Rate and Rhythm: Normal rate and regular rhythm  Heart sounds: No murmur heard  No friction rub  No gallop  Comments: Tenderness to palpation of anterior chest  Pulmonary:      Effort: Pulmonary effort is normal  No respiratory distress  Breath sounds: Normal breath sounds  No stridor  No wheezing, rhonchi or rales  Chest:      Chest wall: No tenderness  Abdominal:      General: There is no distension  Palpations: Abdomen is soft  There is no mass  Tenderness:  There is no abdominal tenderness  There is no guarding or rebound  Hernia: No hernia is present  Musculoskeletal:         General: No swelling, tenderness, deformity or signs of injury  Cervical back: Neck supple  Skin:     General: Skin is warm and dry  Capillary Refill: Capillary refill takes less than 2 seconds  Coloration: Skin is not jaundiced or pale  Neurological:      Mental Status: She is alert and oriented to person, place, and time  Psychiatric:         Mood and Affect: Mood normal           Additional Data:     Labs:  Results from last 7 days   Lab Units 01/24/23  0544   WBC Thousand/uL 15 40*   HEMOGLOBIN g/dL 16 8*   HEMATOCRIT % 50 8*   PLATELETS Thousands/uL 241   NEUTROS PCT % 67   LYMPHS PCT % 25   MONOS PCT % 7   EOS PCT % 0     Results from last 7 days   Lab Units 01/24/23  0544 01/23/23  1337   SODIUM mmol/L 138 136   POTASSIUM mmol/L 3 1* 4 5   CHLORIDE mmol/L 100 99   CO2 mmol/L 25 23   BUN mg/dL 11 10   CREATININE mg/dL 0 98 0 90   ANION GAP mmol/L 13 14*   CALCIUM mg/dL 9 4 9 6   ALBUMIN g/dL  --  4 5   TOTAL BILIRUBIN mg/dL  --  0 83   ALK PHOS U/L  --  102   ALT U/L  --  20   AST U/L  --  33   GLUCOSE RANDOM mg/dL 110 125                       Lines/Drains:  Invasive Devices     Peripheral Intravenous Line  Duration           Peripheral IV Right;Ventral (anterior) Wrist -- days    Peripheral IV 01/23/23 Right;Ventral (anterior) Hand 1 day    Peripheral IV 01/23/23 Left;Upper;Ventral (anterior) Arm <1 day                      Imaging: No pertinent imaging reviewed      Recent Cultures (last 7 days):         Last 24 Hours Medication List:   Current Facility-Administered Medications   Medication Dose Route Frequency Provider Last Rate   • acetaminophen  975 mg Oral Q8H PRN Sebastian Valdez MD     • acyclovir  800 mg Oral BID Sebastian Valdez MD     • amLODIPine  10 mg Oral QAM Sebastian Valdez MD     • atorvastatin  40 mg Oral Daily With Nia Lopez MD     • DULoxetine  30 mg Oral Daily Aly Chavez MD     • enoxaparin  40 mg Subcutaneous Daily Aly Chavez MD     • famotidine  20 mg Oral HS Aly Chavez MD     • fluticasone  2 spray Each Nare Daily Aly Chavez MD     • labetalol  10 mg Intravenous Q4H PRN Aly Chavez MD     • lactated ringers  1,000 mL Intravenous Once Elvin Gold DO     • lidocaine  1 patch Topical Daily Aly Chavez MD     • methocarbamol  750 mg Oral Q6H PRN Aly Chavez MD     • metoprolol tartrate  25 mg Oral Q12H [address] Aly Chavez MD     • nitroglycerin  0 4 mg Sublingual Q5 Min PRN Aly Chavez MD     • ondansetron  4 mg Intravenous Q8H PRN Aly Chavez MD     • oxyCODONE  10 mg Oral HS Aly Chavez MD     • oxyCODONE  15 mg Oral Q4H PRN Aly Chavez MD     • pantoprazole  40 mg Oral Early Morning Aly Chavez MD     • polyethylene glycol  17 g Oral Daily Aly Chavez MD     • pregabalin  100 mg Oral BID Aly Chavez MD     • senna-docusate sodium  2 tablet Oral HS Aly Chavez MD          Today, Patient Was Seen By: Ramon Prado DO    **Please Note: This note may have been constructed using a voice recognition system  **

## 2023-01-24 NOTE — PLAN OF CARE
Problem: PAIN - ADULT  Goal: Verbalizes/displays adequate comfort level or baseline comfort level  Description: Interventions:  - Encourage patient to monitor pain and request assistance  - Assess pain using appropriate pain scale  - Administer analgesics based on type and severity of pain and evaluate response  - Implement non-pharmacological measures as appropriate and evaluate response  - Consider cultural and social influences on pain and pain management  - Notify physician/advanced practitioner if interventions unsuccessful or patient reports new pain  Outcome: Progressing     Problem: SAFETY ADULT  Goal: Patient will remain free of falls  Description: INTERVENTIONS:  - Educate patient/family on patient safety including physical limitations  - Instruct patient to call for assistance with activity   - Consult OT/PT to assist with strengthening/mobility   - Keep Call bell within reach  - Keep bed low and locked with side rails adjusted as appropriate  - Keep care items and personal belongings within reach  - Initiate and maintain comfort rounds  - Make Fall Risk Sign visible to staff  - Offer Toileting every 2 Hours, in advance of need  - Initiate/Maintain 2alarm  - Obtain necessary fall risk management equipment: 2  - Apply yellow socks and bracelet for high fall risk patients  - Consider moving patient to room near nurses station  Outcome: Progressing  Goal: Maintain or return to baseline ADL function  Description: INTERVENTIONS:  -  Assess patient's ability to carry out ADLs; assess patient's baseline for ADL function and identify physical deficits which impact ability to perform ADLs (bathing, care of mouth/teeth, toileting, grooming, dressing, etc )  - Assess/evaluate cause of self-care deficits   - Assess range of motion  - Assess patient's mobility; develop plan if impaired  - Assess patient's need for assistive devices and provide as appropriate  - Encourage maximum independence but intervene and supervise when necessary  - Involve family in performance of ADLs  - Assess for home care needs following discharge   - Consider OT consult to assist with ADL evaluation and planning for discharge  - Provide patient education as appropriate  Outcome: Progressing  Goal: Maintains/Returns to pre admission functional level  Description: INTERVENTIONS:  - Perform BMAT or MOVE assessment daily    - Set and communicate daily mobility goal to care team and patient/family/caregiver  - Collaborate with rehabilitation services on mobility goals if consulted  - Perform Range of Motion 2 times a day  - Reposition patient every 2 hours  - Dangle patient 2 times a day  - Stand patient 2 times a day  - Ambulate patient 2 times a day  - Out of bed to chair 2 times a day   - Out of bed for meals 2 times a day  - Out of bed for toileting  - Record patient progress and toleration of activity level   Outcome: Progressing     Problem: Knowledge Deficit  Goal: Patient/family/caregiver demonstrates understanding of disease process, treatment plan, medications, and discharge instructions  Description: Complete learning assessment and assess knowledge base    Interventions:  - Provide teaching at level of understanding  - Provide teaching via preferred learning methods  Outcome: Progressing     Problem: CARDIOVASCULAR - ADULT  Goal: Maintains optimal cardiac output and hemodynamic stability  Description: INTERVENTIONS:  - Monitor I/O, vital signs and rhythm  - Monitor for S/S and trends of decreased cardiac output  - Administer and titrate ordered vasoactive medications to optimize hemodynamic stability  - Assess quality of pulses, skin color and temperature  - Assess for signs of decreased coronary artery perfusion  - Instruct patient to report change in severity of symptoms  Outcome: Progressing  Goal: Absence of cardiac dysrhythmias or at baseline rhythm  Description: INTERVENTIONS:  - Continuous cardiac monitoring, vital signs, obtain 12 lead EKG if ordered  - Administer antiarrhythmic and heart rate control medications as ordered  - Monitor electrolytes and administer replacement therapy as ordered  Outcome: Progressing     Problem: GASTROINTESTINAL - ADULT  Goal: Minimal or absence of nausea and/or vomiting  Description: INTERVENTIONS:  - Administer IV fluids if ordered to ensure adequate hydration  - Maintain NPO status until nausea and vomiting are resolved  - Nasogastric tube if ordered  - Administer ordered antiemetic medications as needed  - Provide nonpharmacologic comfort measures as appropriate  - Advance diet as tolerated, if ordered  - Consider nutrition services referral to assist patient with adequate nutrition and appropriate food choices  Outcome: Progressing  Goal: Maintains or returns to baseline bowel function  Description: INTERVENTIONS:  - Assess bowel function  - Encourage oral fluids to ensure adequate hydration  - Administer IV fluids if ordered to ensure adequate hydration  - Administer ordered medications as needed  - Encourage mobilization and activity  - Consider nutritional services referral to assist patient with adequate nutrition and appropriate food choices  Outcome: Progressing  Goal: Maintains adequate nutritional intake  Description: INTERVENTIONS:  - Monitor percentage of each meal consumed  - Identify factors contributing to decreased intake, treat as appropriate  - Assist with meals as needed  - Monitor I&O, weight, and lab values if indicated  - Obtain nutrition services referral as needed  Outcome: Progressing  Goal: Establish and maintain optimal ostomy function  Description: INTERVENTIONS:  - Assess bowel function  - Encourage oral fluids to ensure adequate hydration  - Administer IV fluids if ordered to ensure adequate hydration   - Administer ordered medications as needed  - Encourage mobilization and activity  - Nutrition services referral to assist patient with appropriate food choices  - Assess stoma site  - Consider wound care consult   Outcome: Progressing  Goal: Oral mucous membranes remain intact  Description: INTERVENTIONS  - Assess oral mucosa and hygiene practices  - Implement preventative oral hygiene regimen  - Implement oral medicated treatments as ordered  - Initiate Nutrition services referral as needed  Outcome: Progressing

## 2023-01-25 VITALS
DIASTOLIC BLOOD PRESSURE: 81 MMHG | OXYGEN SATURATION: 93 % | HEIGHT: 63 IN | TEMPERATURE: 98.1 F | HEART RATE: 73 BPM | RESPIRATION RATE: 18 BRPM | BODY MASS INDEX: 30.51 KG/M2 | SYSTOLIC BLOOD PRESSURE: 119 MMHG | WEIGHT: 172.18 LBS

## 2023-01-25 LAB
ANION GAP SERPL CALCULATED.3IONS-SCNC: 8 MMOL/L (ref 4–13)
ATRIAL RATE: 89 BPM
ATRIAL RATE: 92 BPM
ATRIAL RATE: 99 BPM
BASOPHILS # BLD AUTO: 0.03 THOUSANDS/ÂΜL (ref 0–0.1)
BASOPHILS NFR BLD AUTO: 0 % (ref 0–1)
BUN SERPL-MCNC: 23 MG/DL (ref 5–25)
CALCIUM SERPL-MCNC: 8.5 MG/DL (ref 8.3–10.1)
CHLORIDE SERPL-SCNC: 104 MMOL/L (ref 96–108)
CO2 SERPL-SCNC: 28 MMOL/L (ref 21–32)
CREAT SERPL-MCNC: 1.24 MG/DL (ref 0.6–1.3)
EOSINOPHIL # BLD AUTO: 0.11 THOUSAND/ÂΜL (ref 0–0.61)
EOSINOPHIL NFR BLD AUTO: 1 % (ref 0–6)
ERYTHROCYTE [DISTWIDTH] IN BLOOD BY AUTOMATED COUNT: 15.9 % (ref 11.6–15.1)
GFR SERPL CREATININE-BSD FRML MDRD: 49 ML/MIN/1.73SQ M
GLUCOSE SERPL-MCNC: 91 MG/DL (ref 65–140)
HCT VFR BLD AUTO: 48.1 % (ref 34.8–46.1)
HGB BLD-MCNC: 15.9 G/DL (ref 11.5–15.4)
IMM GRANULOCYTES # BLD AUTO: 0.05 THOUSAND/UL (ref 0–0.2)
IMM GRANULOCYTES NFR BLD AUTO: 1 % (ref 0–2)
LYMPHOCYTES # BLD AUTO: 3.92 THOUSANDS/ÂΜL (ref 0.6–4.47)
LYMPHOCYTES NFR BLD AUTO: 38 % (ref 14–44)
MCH RBC QN AUTO: 28.6 PG (ref 26.8–34.3)
MCHC RBC AUTO-ENTMCNC: 33.1 G/DL (ref 31.4–37.4)
MCV RBC AUTO: 87 FL (ref 82–98)
MONOCYTES # BLD AUTO: 0.93 THOUSAND/ÂΜL (ref 0.17–1.22)
MONOCYTES NFR BLD AUTO: 9 % (ref 4–12)
NEUTROPHILS # BLD AUTO: 5.16 THOUSANDS/ÂΜL (ref 1.85–7.62)
NEUTS SEG NFR BLD AUTO: 51 % (ref 43–75)
NRBC BLD AUTO-RTO: 0 /100 WBCS
P AXIS: 64 DEGREES
P AXIS: 76 DEGREES
P AXIS: 82 DEGREES
PLATELET # BLD AUTO: 204 THOUSANDS/UL (ref 149–390)
PMV BLD AUTO: 9.1 FL (ref 8.9–12.7)
POTASSIUM SERPL-SCNC: 4.2 MMOL/L (ref 3.5–5.3)
PR INTERVAL: 158 MS
PR INTERVAL: 166 MS
PR INTERVAL: 168 MS
PROCALCITONIN SERPL-MCNC: 0.07 NG/ML
QRS AXIS: -56 DEGREES
QRS AXIS: -75 DEGREES
QRS AXIS: -75 DEGREES
QRSD INTERVAL: 116 MS
QRSD INTERVAL: 118 MS
QRSD INTERVAL: 118 MS
QT INTERVAL: 374 MS
QT INTERVAL: 374 MS
QT INTERVAL: 384 MS
QTC INTERVAL: 462 MS
QTC INTERVAL: 467 MS
QTC INTERVAL: 479 MS
RBC # BLD AUTO: 5.55 MILLION/UL (ref 3.81–5.12)
SODIUM SERPL-SCNC: 140 MMOL/L (ref 135–147)
T WAVE AXIS: 73 DEGREES
T WAVE AXIS: 78 DEGREES
T WAVE AXIS: 84 DEGREES
VENTRICULAR RATE: 89 BPM
VENTRICULAR RATE: 92 BPM
VENTRICULAR RATE: 99 BPM
WBC # BLD AUTO: 10.2 THOUSAND/UL (ref 4.31–10.16)

## 2023-01-25 RX ADMIN — DULOXETINE HYDROCHLORIDE 30 MG: 30 CAPSULE, DELAYED RELEASE ORAL at 08:14

## 2023-01-25 RX ADMIN — PANTOPRAZOLE SODIUM 40 MG: 40 TABLET, DELAYED RELEASE ORAL at 05:09

## 2023-01-25 RX ADMIN — PREGABALIN 100 MG: 100 CAPSULE ORAL at 08:14

## 2023-01-25 RX ADMIN — ACYCLOVIR 800 MG: 800 TABLET ORAL at 08:15

## 2023-01-25 RX ADMIN — METOPROLOL TARTRATE 25 MG: 25 TABLET, FILM COATED ORAL at 08:14

## 2023-01-25 RX ADMIN — FLUTICASONE PROPIONATE 2 SPRAY: 50 SPRAY, METERED NASAL at 08:15

## 2023-01-25 RX ADMIN — ENOXAPARIN SODIUM 40 MG: 40 INJECTION SUBCUTANEOUS at 08:14

## 2023-01-25 RX ADMIN — OXYCODONE HYDROCHLORIDE 15 MG: 10 TABLET ORAL at 07:57

## 2023-01-25 RX ADMIN — OXYCODONE HYDROCHLORIDE 15 MG: 10 TABLET ORAL at 03:18

## 2023-01-25 RX ADMIN — AMLODIPINE BESYLATE 10 MG: 10 TABLET ORAL at 08:14

## 2023-01-25 NOTE — ASSESSMENT & PLAN NOTE
History of multiple myeloma following up with pulmonary specialist as well as heme-onc s/p bone marrow transplant and chemotherapy  Doing well  No pancytopenia on presentation  Has leukocytosis on presentation but low suspicion of infection given no fever  CT chest abdomen pelvis unremarkable      Leukocytosis trending down to 10 without any intervention including antibiotics, and ongoing follow-up with her primary care provider  Continue acyclovir prophylaxis 800 mg twice daily  Follow-up with heme-onc outpatient  Blood cultures pending

## 2023-01-25 NOTE — PLAN OF CARE
Problem: PAIN - ADULT  Goal: Verbalizes/displays adequate comfort level or baseline comfort level  Description: Interventions:  - Encourage patient to monitor pain and request assistance  - Assess pain using appropriate pain scale  - Administer analgesics based on type and severity of pain and evaluate response  - Implement non-pharmacological measures as appropriate and evaluate response  - Consider cultural and social influences on pain and pain management  - Notify physician/advanced practitioner if interventions unsuccessful or patient reports new pain  Outcome: Progressing     Problem: SAFETY ADULT  Goal: Patient will remain free of falls  Description: INTERVENTIONS:  - Educate patient/family on patient safety including physical limitations  - Instruct patient to call for assistance with activity   - Consult OT/PT to assist with strengthening/mobility   - Keep Call bell within reach  - Keep bed low and locked with side rails adjusted as appropriate  - Keep care items and personal belongings within reach  - Initiate and maintain comfort rounds  - Make Fall Risk Sign visible to staff  - Offer Toileting every  Hours, in advance of need  - Initiate/Maintain alarm  - Obtain necessary fall risk management equipment:   - Apply yellow socks and bracelet for high fall risk patients  - Consider moving patient to room near nurses station  Outcome: Progressing  Goal: Maintain or return to baseline ADL function  Description: INTERVENTIONS:  -  Assess patient's ability to carry out ADLs; assess patient's baseline for ADL function and identify physical deficits which impact ability to perform ADLs (bathing, care of mouth/teeth, toileting, grooming, dressing, etc )  - Assess/evaluate cause of self-care deficits   - Assess range of motion  - Assess patient's mobility; develop plan if impaired  - Assess patient's need for assistive devices and provide as appropriate  - Encourage maximum independence but intervene and supervise when necessary  - Involve family in performance of ADLs  - Assess for home care needs following discharge   - Consider OT consult to assist with ADL evaluation and planning for discharge  - Provide patient education as appropriate  Outcome: Progressing  Goal: Maintains/Returns to pre admission functional level  Description: INTERVENTIONS:  - Perform BMAT or MOVE assessment daily    - Set and communicate daily mobility goal to care team and patient/family/caregiver  - Collaborate with rehabilitation services on mobility goals if consulted  - Perform Range of Motion  times a day  - Reposition patient every  hours  - Dangle patient  times a day  - Stand patient  times a day  - Ambulate patient  times a day  - Out of bed to chair  times a day   - Out of bed for meal times a day  - Out of bed for toileting  - Record patient progress and toleration of activity level   Outcome: Progressing     Problem: Knowledge Deficit  Goal: Patient/family/caregiver demonstrates understanding of disease process, treatment plan, medications, and discharge instructions  Description: Complete learning assessment and assess knowledge base    Interventions:  - Provide teaching at level of understanding  - Provide teaching via preferred learning methods  Outcome: Progressing     Problem: CARDIOVASCULAR - ADULT  Goal: Maintains optimal cardiac output and hemodynamic stability  Description: INTERVENTIONS:  - Monitor I/O, vital signs and rhythm  - Monitor for S/S and trends of decreased cardiac output  - Administer and titrate ordered vasoactive medications to optimize hemodynamic stability  - Assess quality of pulses, skin color and temperature  - Assess for signs of decreased coronary artery perfusion  - Instruct patient to report change in severity of symptoms  Outcome: Progressing  Goal: Absence of cardiac dysrhythmias or at baseline rhythm  Description: INTERVENTIONS:  - Continuous cardiac monitoring, vital signs, obtain 12 lead EKG if ordered  - Administer antiarrhythmic and heart rate control medications as ordered  - Monitor electrolytes and administer replacement therapy as ordered  Outcome: Progressing     Problem: GASTROINTESTINAL - ADULT  Goal: Minimal or absence of nausea and/or vomiting  Description: INTERVENTIONS:  - Administer IV fluids if ordered to ensure adequate hydration  - Maintain NPO status until nausea and vomiting are resolved  - Nasogastric tube if ordered  - Administer ordered antiemetic medications as needed  - Provide nonpharmacologic comfort measures as appropriate  - Advance diet as tolerated, if ordered  - Consider nutrition services referral to assist patient with adequate nutrition and appropriate food choices  Outcome: Progressing  Goal: Maintains or returns to baseline bowel function  Description: INTERVENTIONS:  - Assess bowel function  - Encourage oral fluids to ensure adequate hydration  - Administer IV fluids if ordered to ensure adequate hydration  - Administer ordered medications as needed  - Encourage mobilization and activity  - Consider nutritional services referral to assist patient with adequate nutrition and appropriate food choices  Outcome: Progressing  Goal: Maintains adequate nutritional intake  Description: INTERVENTIONS:  - Monitor percentage of each meal consumed  - Identify factors contributing to decreased intake, treat as appropriate  - Assist with meals as needed  - Monitor I&O, weight, and lab values if indicated  - Obtain nutrition services referral as needed  Outcome: Progressing  Goal: Establish and maintain optimal ostomy function  Description: INTERVENTIONS:  - Assess bowel function  - Encourage oral fluids to ensure adequate hydration  - Administer IV fluids if ordered to ensure adequate hydration   - Administer ordered medications as needed  - Encourage mobilization and activity  - Nutrition services referral to assist patient with appropriate food choices  - Assess stoma site  - Consider wound care consult   Outcome: Progressing  Goal: Oral mucous membranes remain intact  Description: INTERVENTIONS  - Assess oral mucosa and hygiene practices  - Implement preventative oral hygiene regimen  - Implement oral medicated treatments as ordered  - Initiate Nutrition services referral as needed  Outcome: Progressing

## 2023-01-25 NOTE — ASSESSMENT & PLAN NOTE
Presented with sudden onset severe chest pain located in substernal region radiating to back  Tenderness on palpation noted  Worse with deep inspiration  Nothing making it better  Troponin x2 WNL  EKG showing normal sinus rhythm  CTA chest abdomen pelvis without any acute finding  Noted to have leukocytosis, hemoconcentration component of dehydration as patient is nauseous and with poor oral intake since this morning  TSH WNL  Denies a family history of hypertrophic cardiomyopathy, sudden cardiac death getting routine echocardiogram outpatient  Does have history of multiple myeloma with suspected amyloidosis as per heme-onc's chart  No prior MI, CHF  Chest pain not related to any exertion  Looks more likely atypical   Patient hemodynamically well saturating well on room air with high blood pressure  1/25/23: Patient denies any episodes of chest pain since her admission, patient is not tender to palpation of her chest wall during my evaluation     Echocardiogram largely unremarkable

## 2023-01-25 NOTE — DISCHARGE SUMMARY
[facility]  Discharge- Cynthia James 1970, 45 y o  female MRN: [id number]  Unit/Bed#: -01 Encounter: [id number]  Primary Care Provider: Dashawn Sosa DO   Date and time admitted to hospital: 1/23/2023 12:03 PM    Continuous opioid dependence [facility])  Assessment & Plan  Following up with palliative care outpatient  Continue Lyrica 100 mg twice daily, duloxetine 30 mg p o  daily  Can add lidocaine cream if continues to have pain overnight  Robaxin 5 mg every 6 hours as needed  Zofran every 8 hours as needed for nausea  Bowel regimen in place  Oxycodone 15 mg every 4 hours as needed  OxyContin 10 mg at bedtime    Multiple myeloma in remission [facility])  Assessment & Plan  History of multiple myeloma following up with pulmonary specialist as well as heme-onc s/p bone marrow transplant and chemotherapy  Doing well  No pancytopenia on presentation  Has leukocytosis on presentation but low suspicion of infection given no fever  CT chest abdomen pelvis unremarkable  Leukocytosis trending down to 10 without any intervention including antibiotics, and ongoing follow-up with her primary care provider  Continue acyclovir prophylaxis 800 mg twice daily  Follow-up with heme-onc outpatient  Blood cultures pending      Cigarette nicotine dependence without complication  Assessment & Plan  Encourage smoking cessation  Refuses nicotine patch while inpatient  Chronic bilateral low back pain with bilateral sciatica  Assessment & Plan  Takes oxycodone 15 mg every 4 hours as needed, OxyContin ER 10 mg at bedtime, Robaxin, duloxetine  Bowel regimen in place    Hypertension  Assessment & Plan  Noted to have elevated blood pressure on presentation with systolic 201S  Some of them might be contributed to patient in acute distress and pain  CTA chest abdomen pelvis was unremarkable  Normal troponins    Looks little bit dehydrated which can be contributing to tachycardia, leukocytosis, hemoconcentration  Labetalol as needed for systolic blood pressure more than 180  Continue home amlodipine  Patient will discuss resuming metoprolol with her  Blood pressures improved today in the 120s to 130s    * Atypical chest pain  Assessment & Plan  Presented with sudden onset severe chest pain located in substernal region radiating to back  Tenderness on palpation noted  Worse with deep inspiration  Nothing making it better  Troponin x2 WNL  EKG showing normal sinus rhythm  CTA chest abdomen pelvis without any acute finding  Noted to have leukocytosis, hemoconcentration component of dehydration as patient is nauseous and with poor oral intake since this morning  TSH WNL  Denies a family history of hypertrophic cardiomyopathy, sudden cardiac death getting routine echocardiogram outpatient  Does have history of multiple myeloma with suspected amyloidosis as per heme-onc's chart  No prior MI, CHF  Chest pain not related to any exertion  Looks more likely atypical   Patient hemodynamically well saturating well on room air with high blood pressure  1/25/23: Patient denies any episodes of chest pain since her admission, patient is not tender to palpation of her chest wall during my evaluation  Echocardiogram largely unremarkable        Medical Problems     Resolved Problems  Date Reviewed: 1/25/2023   None       Discharging Physician / Practitioner: Rosario Milner DO  PCP: Willy Jeong DO  Admission Date:   Admission Orders (From admission, onward)     Ordered        01/23/23 1627  Place in Observation  Once                      Discharge Date: 01/25/23    Consultations During Hospital Stay:  · none    Procedures Performed:   · Echo  Left Ventricle: Left ventricular cavity size is normal  Wall thickness is normal  The left ventricular ejection fraction is 60%  Systolic function is normal  Global longitudinal strain is normal at -17%   The strain pattern was suggestive of relative apical sparing  If clinical concern for cardiac amyloid, consider cardiac MRI or Tc-PYP scan for further evaluation  Wall motion is normal  Diastolic function is mildly abnormal, consistent with grade I (abnormal) relaxation  ·     Significant Findings / Test Results:   CTA dissection protocol chest/abdomen/pelvis   Final Result by Jamie Sharpe MD (01/23 1529)      No aortic aneurysm, intramural hematoma/dissection or penetrating ulcer  Advanced atherosclerotic changes are not present  No acute findings in the chest, abdomen or the pelvis  Workstation performed: [id number]         XR chest 2 views   Final Result by Colby Wolfe MD (01/23 1621)      Mild subsegmental atelectasis left lung base  Otherwise no acute pulmonary disease                  Workstation performed: [id number]         ·     Incidental Findings:   ·    · I reviewed the above mentioned incidental findings with the patient and/or family and they expressed understanding  Test Results Pending at Discharge (will require follow up):   · none     Outpatient Tests Requested:  ·     Complications:      Reason for Admission: Chest pain    Hospital Course:   Sandi Hillman is a 45 y o  female patient who originally presented to the hospital on 1/23/2023 due to substernal chest pain radiating to her back worse on inspiration and palpation of the anterior chest wall  Troponins were negative, EKG nonischemic with normal sinus rhythm  Echocardiogram with diastolic dysfunction grade 1, otherwise unremarkable  Patient was discharged with outpatient follow-up, repeat BMP to monitor kidney function as well as repeat CBC  Please see above list of diagnoses and related plan for additional information       Condition at Discharge: stable    Discharge Day Visit / Exam:   Subjective: Denies any acute complaints during my evaluation  Vitals: Blood Pressure: 119/81 (01/25/23 0743)  Pulse: 73 (01/25/23 0743)  Temperature: 98 1 °F (36 7 °C) (01/25/23 0743)  Temp Source: Oral (01/25/23 0743)  Respirations: 18 (01/25/23 0743)  Height: 5' 3" (160 cm) (01/24/23 0800)  Weight - Scale: 78 1 kg (172 lb 2 9 oz) (01/25/23 0600)  SpO2: 93 % (01/25/23 0743)  Exam:   Physical Exam  Vitals and nursing note reviewed  Constitutional:       General: She is not in acute distress  Appearance: She is well-developed  She is not ill-appearing, toxic-appearing or diaphoretic  HENT:      Head: Normocephalic and atraumatic  Eyes:      General: No scleral icterus  Conjunctiva/sclera: Conjunctivae normal    Cardiovascular:      Rate and Rhythm: Normal rate and regular rhythm  Heart sounds: No murmur heard  No friction rub  No gallop  Pulmonary:      Effort: Pulmonary effort is normal  No respiratory distress  Breath sounds: Normal breath sounds  No stridor  No wheezing, rhonchi or rales  Chest:      Chest wall: No tenderness  Abdominal:      General: There is no distension  Palpations: Abdomen is soft  There is no mass  Tenderness: There is no abdominal tenderness  There is no guarding or rebound  Hernia: No hernia is present  Musculoskeletal:         General: No swelling, tenderness, deformity or signs of injury  Cervical back: Neck supple  Skin:     General: Skin is warm and dry  Capillary Refill: Capillary refill takes less than 2 seconds  Coloration: Skin is not jaundiced or pale  Neurological:      Mental Status: She is alert and oriented to person, place, and time  Psychiatric:         Mood and Affect: Mood normal           Discussion with Family: Updated  (mother) via phone  Discharge instructions/Information to patient and family:   See after visit summary for information provided to patient and family  Provisions for Follow-Up Care:  See after visit summary for information related to follow-up care and any pertinent home health orders         Disposition: Home    Planned Readmission: no     Discharge Statement:  I spent 35 minutes discharging the patient  This time was spent on the day of discharge  I had direct contact with the patient on the day of discharge  Greater than 50% of the total time was spent examining patient, answering all patient questions, arranging and discussing plan of care with patient as well as directly providing post-discharge instructions  Additional time then spent on discharge activities  Discharge Medications:  See after visit summary for reconciled discharge medications provided to patient and/or family        **Please Note: This note may have been constructed using a voice recognition system**

## 2023-01-25 NOTE — ASSESSMENT & PLAN NOTE
Noted to have elevated blood pressure on presentation with systolic 678J  Some of them might be contributed to patient in acute distress and pain  CTA chest abdomen pelvis was unremarkable  Normal troponins  Looks little bit dehydrated which can be contributing to tachycardia, leukocytosis, hemoconcentration        Labetalol as needed for systolic blood pressure more than 180  Continue home amlodipine  Patient will discuss resuming metoprolol with her  Blood pressures improved today in the 120s to 130s

## 2023-01-26 ENCOUNTER — TRANSITIONAL CARE MANAGEMENT (OUTPATIENT)
Dept: INTERNAL MEDICINE CLINIC | Facility: CLINIC | Age: 53
End: 2023-01-26

## 2023-01-26 DIAGNOSIS — G89.29 CHRONIC BILATERAL LOW BACK PAIN WITH BILATERAL SCIATICA: ICD-10-CM

## 2023-01-26 DIAGNOSIS — M54.41 CHRONIC BILATERAL LOW BACK PAIN WITH BILATERAL SCIATICA: ICD-10-CM

## 2023-01-26 DIAGNOSIS — M54.42 CHRONIC BILATERAL LOW BACK PAIN WITH BILATERAL SCIATICA: ICD-10-CM

## 2023-01-26 RX ORDER — PREGABALIN 100 MG/1
100 CAPSULE ORAL 2 TIMES DAILY
Qty: 180 CAPSULE | Refills: 0 | Status: SHIPPED | OUTPATIENT
Start: 2023-01-26

## 2023-01-29 LAB
BACTERIA BLD CULT: NORMAL
BACTERIA BLD CULT: NORMAL

## 2023-01-30 ENCOUNTER — TELEPHONE (OUTPATIENT)
Dept: PALLIATIVE MEDICINE | Facility: CLINIC | Age: 53
End: 2023-01-30

## 2023-01-30 DIAGNOSIS — G89.3 CANCER ASSOCIATED PAIN: ICD-10-CM

## 2023-01-30 DIAGNOSIS — C90.00 MULTIPLE MYELOMA NOT HAVING ACHIEVED REMISSION (HCC): ICD-10-CM

## 2023-01-30 DIAGNOSIS — Z51.5 PALLIATIVE CARE PATIENT: ICD-10-CM

## 2023-01-30 RX ORDER — OXYCODONE HYDROCHLORIDE 10 MG/1
10-15 TABLET ORAL EVERY 4 HOURS PRN
Qty: 63 TABLET | Refills: 0 | Status: SHIPPED | OUTPATIENT
Start: 2023-01-30 | End: 2023-02-06 | Stop reason: SDUPTHER

## 2023-01-30 NOTE — TELEPHONE ENCOUNTER
Primary palliative medicine provider:   Yesi Anguiano    Medication requested:  Xtampza 9 mg  Oxycodone 10 mg IR    If for pain, how has the patient been taking their pain medicine? Last appointment: 11/16/22    Next scheduled appointment:03/09/23     PDMP review:    01/24/2023 01/23/2023 oxyCODONE HCL (Tablet) 63 0 7 10  0 [place] [employer] 0 / 0 [place]    1 [id number] 01/24/2023 01/23/2023 Xtampza Er (Capsule, Extended Release) 7 0 7 9 MG NA [address] [employer]      Pharmacy: [facility]      Pt will need ne [place] for meds

## 2023-01-31 DIAGNOSIS — I10 PRIMARY HYPERTENSION: ICD-10-CM

## 2023-01-31 RX ORDER — AMLODIPINE BESYLATE 10 MG/1
TABLET ORAL
Qty: 30 TABLET | Refills: 3 | Status: SHIPPED | OUTPATIENT
Start: 2023-01-31 | End: 2023-02-01

## 2023-01-31 NOTE — TELEPHONE ENCOUNTER
Prior Authorization COMPLETED  for   XTAMPZA 9 MG ER  via [address]  ID# [id number]  Phone# [phone]  KAU# [id number]          HNIGP UXIJCLVEPJXRE [facility][NDCLWU  for   OXYCODONE 10 MG IR  via [facility]    KEY# [id number]          AWAIT RESPONSE OF BOTH MEDICATIONS   REQUESTED URGENT

## 2023-02-01 ENCOUNTER — HOSPITAL ENCOUNTER (OUTPATIENT)
Dept: ULTRASOUND IMAGING | Facility: HOSPITAL | Age: 53
Discharge: HOME | End: 2023-02-01
Attending: SURGERY

## 2023-02-01 ENCOUNTER — ANESTHESIA EVENT (OUTPATIENT)
Dept: PERIOP | Facility: HOSPITAL | Age: 53
End: 2023-02-01

## 2023-02-01 DIAGNOSIS — E04.1 THYROID NODULE: ICD-10-CM

## 2023-02-01 RX ORDER — IBUPROFEN, ACETAMINOPHEN 125; 250 MG/1; MG/1
TABLET, FILM COATED ORAL
COMMUNITY

## 2023-02-01 NOTE — TELEPHONE ENCOUNTER
Received Faxes The pharmacy filled xtamza er 9mg capsule on 1/30/23 and can have it refilled on 2/3/23  If you have any questions please contact [phone]  Received oxycodone hcl (ir) 10 mg tab is a covered medication for this member and no Duana Buddle is needed at this time  The pharmacy filled on 1/30/23 for 63/7 days  If any questions please contact [phone]  I will scan to chart  I will call patient to inform her

## 2023-02-01 NOTE — PRE-PROCEDURE INSTRUCTIONS
Pre-Surgery Instructions:   Medication Instructions   • acetaminophen (TYLENOL) 500 mg tablet Uses PRN- OK to take day of surgery   • acyclovir (ZOVIRAX) 800 mg tablet Take day of surgery  • albuterol (PROVENTIL HFA,VENTOLIN HFA) 90 mcg/act inhaler Uses PRN- OK to take day of surgery   • amLODIPine (NORVASC) 10 mg tablet Take day of surgery  • DULoxetine (CYMBALTA) 30 mg delayed release capsule Take day of surgery  • famotidine (PEPCID) 40 MG tablet Take day of surgery  • fluticasone (FLONASE) 50 mcg/act nasal spray Take day of surgery  • Ibuprofen-Acetaminophen (Advil Dual Action) 125-250 MG TABS Stop taking 3 days prior to surgery  • Lidocaine HCl 4 % CREA Hold day of surgery  • methocarbamol (ROBAXIN) 750 mg tablet Uses PRN- OK to take day of surgery   • naloxone (NARCAN) 4 mg/0 1 mL nasal spray Uses PRN- OK to take day of surgery   • NON FORMULARY Hold day of surgery  • oxyCODONE (ROXICODONE) 10 MG TABS Uses PRN- OK to take day of surgery   • oxyCODONE ER (Xtampza ER) 9 mg extended release capsule Take night before surgery   • pregabalin (LYRICA) 100 mg capsule Take day of surgery  Pt preoccupied during phone assessment  Covid screening negative  Reviewed pre op medicine and showering instructions with patient via phone call, verbalizes understanding  Advised patient to stop taking non prescribed vitamins and herbal meds 7 days pre op and to stop NSAID's 3 days pre op  Advised may take Tylenol products if needed  Advised to take DOS medicine with a small sip water  Advised NPO after MN prior to surgery and surgical services will call (2/7) with scheduled time of hospital arrival     Advised to abstain from smoking 24 hrs pre op  Not interested in smoking cessation education at this time-she will discuss with PCP  Advised to notify surgeon's office of any change in health status from now until surgery  Pt verbalized understanding of all instructions       Patient not interested in referral to sleep medicine

## 2023-02-02 ENCOUNTER — OFFICE VISIT (OUTPATIENT)
Dept: INTERNAL MEDICINE CLINIC | Facility: CLINIC | Age: 53
End: 2023-02-02

## 2023-02-02 VITALS
WEIGHT: 189 LBS | BODY MASS INDEX: 33.49 KG/M2 | DIASTOLIC BLOOD PRESSURE: 80 MMHG | OXYGEN SATURATION: 96 % | TEMPERATURE: 97 F | SYSTOLIC BLOOD PRESSURE: 118 MMHG | HEIGHT: 63 IN | RESPIRATION RATE: 20 BRPM | HEART RATE: 84 BPM

## 2023-02-02 DIAGNOSIS — C90.00 MULTIPLE MYELOMA, REMISSION STATUS UNSPECIFIED (HCC): ICD-10-CM

## 2023-02-02 DIAGNOSIS — Z94.84 H/O AUTOLOGOUS STEM CELL TRANSPLANT (HCC): ICD-10-CM

## 2023-02-02 DIAGNOSIS — M79.671 PAIN IN BOTH FEET: ICD-10-CM

## 2023-02-02 DIAGNOSIS — Z12.31 ENCOUNTER FOR SCREENING MAMMOGRAM FOR BREAST CANCER: ICD-10-CM

## 2023-02-02 DIAGNOSIS — R07.89 ATYPICAL CHEST PAIN: Primary | ICD-10-CM

## 2023-02-02 DIAGNOSIS — I10 PRIMARY HYPERTENSION: ICD-10-CM

## 2023-02-02 DIAGNOSIS — M79.672 PAIN IN BOTH FEET: ICD-10-CM

## 2023-02-02 RX ORDER — AMLODIPINE BESYLATE 10 MG/1
10 TABLET ORAL EVERY MORNING
Qty: 90 TABLET | Refills: 1 | Status: SHIPPED | OUTPATIENT
Start: 2023-02-02

## 2023-02-02 RX ORDER — ACYCLOVIR 800 MG/1
800 TABLET ORAL 2 TIMES DAILY
Qty: 180 TABLET | Refills: 3 | Status: SHIPPED | OUTPATIENT
Start: 2023-02-02 | End: 2024-01-28

## 2023-02-02 RX ORDER — METOPROLOL SUCCINATE 25 MG/1
25 TABLET, EXTENDED RELEASE ORAL DAILY
Qty: 90 TABLET | Refills: 1 | Status: SHIPPED | OUTPATIENT
Start: 2023-02-02

## 2023-02-02 NOTE — PROGRESS NOTES
Assessment & Plan     1  Atypical chest pain    Reviewed work-up in hospital  Chest pain episodes resolved  No concern for cardiac chest pain  2  Primary hypertension    Blood pressure well controlled  Continue anti-HTN medications  - metoprolol succinate (TOPROL-XL) 25 mg 24 hr tablet; Take 1 tablet (25 mg total) by mouth daily  Dispense: 90 tablet; Refill: 1  - amLODIPine (NORVASC) 10 mg tablet; Take 1 tablet (10 mg total) by mouth every morning  Dispense: 90 tablet; Refill: 1    3  Multiple myeloma, remission status unspecified ([facility] [address] )  4  H/O autologous stem cell transplant [facility])    Follow-up with [facility] Transplant specialists  Stable at this time  There has been some concern for possible amyloidosis but biopsies thus far have been negative  - acyclovir (ZOVIRAX) 800 mg tablet; Take 1 tablet (800 mg total) by mouth 2 (two) times a day  Dispense: 180 tablet; Refill: 3    5  Pain in both feet  - Ambulatory Referral to Podiatry; Future    6  Encounter for screening mammogram for breast cancer  - Mammo screening bilateral w 3d & cad; Future     BMI Counseling: Body mass index is 33 48 kg/m²  The BMI is above normal  Nutrition recommendations include encouraging healthy choices of fruits and vegetables, moderation in carbohydrate intake and increasing intake of lean protein  Rationale for BMI follow-up plan is due to patient being overweight or obese  Depression Screening and Follow-up Plan: Patient was screened for depression during today's encounter  They screened negative with a PHQ-2 score of 2  Subjective     Transitional Care Management Review:   Suzanne Thacker is a 45 y o  female here for TCM follow up       During the TCM phone call patient stated:  TCM Call     Date and time call was made  1/26/2023 10:46 AM    Hospital care reviewed  Records reviewed    Patient was hospitialized at  [facility]    Date of Admission  01/24/23    Date of discharge  01/25/23    Diagnosis  Chest pain Script sent to [facility].   Disposition  Home    Current Symptoms  None      TCM Call     Post hospital issues  None    Scheduled for follow up? Yes    Patients specialists  Other (comment)    Other specialists names  Toño Car MD, [phone]    Did you obtain your prescribed medications  Yes  metoprolol succinate    Do you need help managing your prescriptions or medications  No    Is transportation to your appointment needed  No    I have advised the patient to call PCP with any new or worsening symptoms  Dc De Santiago LPN    Are you recieving any outpatient services  Yes    What type of services  Physical Therapy, US HEAD NECK LYMPH NODE MAPPING    Interperter language line needed  No    Counseling  Patient    Counseling topics  patient and family education; Importance of RX compliance        Recently admitted x 1 day for atypical chest pain  Cardiac testing was negative  No concerns on ECHO  No recurrent episodes of chest pain  She is feeling good  Had stem cell transplant in the past  Goes to regular follow-ups with her multiple specialists  She is scheduled soon for eduar-thyroidectomy due to thyroid nodule  Review of Systems   Constitutional: Negative  Respiratory: Negative  Cardiovascular: Negative  Gastrointestinal: Negative  Musculoskeletal: Positive for arthralgias (foot pain, calluses)  Neurological: Positive for numbness (feet)  Objective     /80 (BP Location: Left arm, Patient Position: Sitting, Cuff Size: Standard)   Pulse 84   Temp (!) 97 °F (36 1 °C) (Temporal)   Resp 20   Ht 5' 3" (1 6 m)   Wt 85 7 kg (189 lb)   SpO2 96%   BMI 33 48 kg/m²      Physical Exam  Constitutional:       General: She is not in acute distress  Appearance: She is not ill-appearing  Cardiovascular:      Rate and Rhythm: Normal rate and regular rhythm  Heart sounds: No murmur heard  Pulmonary:      Effort: Pulmonary effort is normal  No respiratory distress  Breath sounds:  No wheezing  Abdominal:      General: Bowel sounds are normal  There is no distension  Tenderness: There is no abdominal tenderness  Musculoskeletal:         General: Deformity (calluses feet) present  Right lower leg: No edema  Left lower leg: No edema  Neurological:      Mental Status: She is alert         Medications have been reviewed by provider in current encounter    Haydee Shah DO

## 2023-02-03 NOTE — TELEPHONE ENCOUNTER
Received copies of forms submitted by this nurse from [address] [place] [address]  Stated script filled( paid claim) but dod not indicate Approved      Re Oxycodone stted no Prior auth required for this medication    Will call [facility] Monday at   [phone]       Per pdmp pt picked up medication covered by insurance

## 2023-02-06 DIAGNOSIS — C90.00 MULTIPLE MYELOMA NOT HAVING ACHIEVED REMISSION (HCC): ICD-10-CM

## 2023-02-06 DIAGNOSIS — Z51.5 PALLIATIVE CARE PATIENT: ICD-10-CM

## 2023-02-06 DIAGNOSIS — G89.3 CANCER ASSOCIATED PAIN: ICD-10-CM

## 2023-02-06 RX ORDER — OXYCODONE HYDROCHLORIDE 10 MG/1
10-15 TABLET ORAL EVERY 4 HOURS PRN
Qty: 63 TABLET | Refills: 0 | Status: SHIPPED | OUTPATIENT
Start: 2023-02-06 | End: 2023-02-13 | Stop reason: SDUPTHER

## 2023-02-07 NOTE — ANESTHESIA PREPROCEDURE EVALUATION
Procedure:  LEFT HEMITHYROIDECTOMY (Left: Neck)    HTN on metoprolol    Echo 2023  Ef 60%, DD1, PASP 36    Relevant Problems   CARDIO   (+) Hypertension      HEMATOLOGY   (+) Multiple myeloma in remission (HCC)      MUSCULOSKELETAL   (+) Chronic bilateral low back pain with bilateral sciatica      NEURO/PSYCH   (+) Chronic bilateral low back pain with bilateral sciatica   (+) Continuous opioid dependence (HCC)        Physical Exam    Airway    Mallampati score: II  TM Distance: >3 FB  Neck ROM: full     Dental   No notable dental hx     Cardiovascular  Rhythm: regular, Rate: normal, Cardiovascular exam normal    Pulmonary  Pulmonary exam normal Breath sounds clear to auscultation,     Other Findings        Anesthesia Plan  ASA Score- 2     Anesthesia Type- general with ASA Monitors  Additional Monitors:   Airway Plan: ETT  Comment: Risks/benefits and alternatives discussed including likely possibility of PONV and sore throat, as well as the rare possibilities of aspiration, dental/oropharyngeal/ocular injuries, or grave/life threatening anesthetic and surgical emergencies          Plan Factors-Exercise tolerance (METS): >4 METS  Patient summary reviewed  Patient instructed to abstain from smoking on day of procedure  Patient did not smoke on day of surgery  Induction- intravenous  Postoperative Plan- Plan for postoperative opioid use  Planned trial extubation    Informed Consent- Anesthetic plan and risks discussed with patient  I personally reviewed this patient with the CRNA  Discussed and agreed on the Anesthesia Plan with the CRNA  José Aguero

## 2023-02-08 ENCOUNTER — ANESTHESIA (OUTPATIENT)
Dept: PERIOP | Facility: HOSPITAL | Age: 53
End: 2023-02-08

## 2023-02-08 ENCOUNTER — HOSPITAL ENCOUNTER (OUTPATIENT)
Facility: HOSPITAL | Age: 53
Setting detail: OUTPATIENT SURGERY
Discharge: HOME | End: 2023-02-08
Attending: SURGERY | Admitting: SURGERY

## 2023-02-08 VITALS
HEART RATE: 77 BPM | TEMPERATURE: 97.4 F | SYSTOLIC BLOOD PRESSURE: 143 MMHG | RESPIRATION RATE: 14 BRPM | DIASTOLIC BLOOD PRESSURE: 82 MMHG | OXYGEN SATURATION: 93 %

## 2023-02-08 DIAGNOSIS — E04.1 THYROID NODULE: ICD-10-CM

## 2023-02-08 RX ORDER — SODIUM CHLORIDE, SODIUM LACTATE, POTASSIUM CHLORIDE, CALCIUM CHLORIDE 600; 310; 30; 20 MG/100ML; MG/100ML; MG/100ML; MG/100ML
125 INJECTION, SOLUTION INTRAVENOUS CONTINUOUS
Status: DISCONTINUED | OUTPATIENT
Start: 2023-02-08 | End: 2023-02-08 | Stop reason: HOSPADM

## 2023-02-08 RX ORDER — FENTANYL CITRATE 50 UG/ML
INJECTION, SOLUTION INTRAMUSCULAR; INTRAVENOUS AS NEEDED
Status: DISCONTINUED | OUTPATIENT
Start: 2023-02-08 | End: 2023-02-08

## 2023-02-08 RX ORDER — FENTANYL CITRATE/PF 50 MCG/ML
25 SYRINGE (ML) INJECTION
Status: COMPLETED | OUTPATIENT
Start: 2023-02-08 | End: 2023-02-08

## 2023-02-08 RX ORDER — GLYCOPYRROLATE 0.2 MG/ML
INJECTION INTRAMUSCULAR; INTRAVENOUS AS NEEDED
Status: DISCONTINUED | OUTPATIENT
Start: 2023-02-08 | End: 2023-02-08

## 2023-02-08 RX ORDER — MORPHINE SULFATE 10 MG/ML
2 INJECTION, SOLUTION INTRAMUSCULAR; INTRAVENOUS EVERY 2 HOUR PRN
Status: DISCONTINUED | OUTPATIENT
Start: 2023-02-08 | End: 2023-02-08 | Stop reason: HOSPADM

## 2023-02-08 RX ORDER — BUPIVACAINE HYDROCHLORIDE 2.5 MG/ML
INJECTION, SOLUTION EPIDURAL; INFILTRATION; INTRACAUDAL AS NEEDED
Status: DISCONTINUED | OUTPATIENT
Start: 2023-02-08 | End: 2023-02-08 | Stop reason: HOSPADM

## 2023-02-08 RX ORDER — SUCCINYLCHOLINE/SOD CL,ISO/PF 100 MG/5ML
SYRINGE (ML) INTRAVENOUS AS NEEDED
Status: DISCONTINUED | OUTPATIENT
Start: 2023-02-08 | End: 2023-02-08

## 2023-02-08 RX ORDER — LIDOCAINE HYDROCHLORIDE 20 MG/ML
INJECTION, SOLUTION EPIDURAL; INFILTRATION; INTRACAUDAL; PERINEURAL AS NEEDED
Status: DISCONTINUED | OUTPATIENT
Start: 2023-02-08 | End: 2023-02-08

## 2023-02-08 RX ORDER — OXYCODONE HYDROCHLORIDE 5 MG/1
5 TABLET ORAL EVERY 4 HOURS PRN
Status: DISCONTINUED | OUTPATIENT
Start: 2023-02-08 | End: 2023-02-08 | Stop reason: HOSPADM

## 2023-02-08 RX ORDER — MAGNESIUM HYDROXIDE 1200 MG/15ML
LIQUID ORAL AS NEEDED
Status: DISCONTINUED | OUTPATIENT
Start: 2023-02-08 | End: 2023-02-08 | Stop reason: HOSPADM

## 2023-02-08 RX ORDER — METOPROLOL SUCCINATE 25 MG/1
25 TABLET, EXTENDED RELEASE ORAL ONCE
Status: COMPLETED | OUTPATIENT
Start: 2023-02-08 | End: 2023-02-08

## 2023-02-08 RX ORDER — ACETAMINOPHEN 325 MG/1
650 TABLET ORAL EVERY 4 HOURS PRN
Status: DISCONTINUED | OUTPATIENT
Start: 2023-02-08 | End: 2023-02-08 | Stop reason: HOSPADM

## 2023-02-08 RX ORDER — LIDOCAINE HYDROCHLORIDE 10 MG/ML
0.5 INJECTION, SOLUTION EPIDURAL; INFILTRATION; INTRACAUDAL; PERINEURAL ONCE AS NEEDED
Status: DISCONTINUED | OUTPATIENT
Start: 2023-02-08 | End: 2023-02-08 | Stop reason: HOSPADM

## 2023-02-08 RX ORDER — MIDAZOLAM HYDROCHLORIDE 2 MG/2ML
INJECTION, SOLUTION INTRAMUSCULAR; INTRAVENOUS AS NEEDED
Status: DISCONTINUED | OUTPATIENT
Start: 2023-02-08 | End: 2023-02-08

## 2023-02-08 RX ORDER — DEXAMETHASONE SODIUM PHOSPHATE 10 MG/ML
INJECTION, SOLUTION INTRAMUSCULAR; INTRAVENOUS AS NEEDED
Status: DISCONTINUED | OUTPATIENT
Start: 2023-02-08 | End: 2023-02-08

## 2023-02-08 RX ORDER — ROCURONIUM BROMIDE 10 MG/ML
INJECTION, SOLUTION INTRAVENOUS AS NEEDED
Status: DISCONTINUED | OUTPATIENT
Start: 2023-02-08 | End: 2023-02-08

## 2023-02-08 RX ORDER — ONDANSETRON 2 MG/ML
4 INJECTION INTRAMUSCULAR; INTRAVENOUS ONCE AS NEEDED
Status: DISCONTINUED | OUTPATIENT
Start: 2023-02-08 | End: 2023-02-08 | Stop reason: HOSPADM

## 2023-02-08 RX ORDER — ONDANSETRON 2 MG/ML
INJECTION INTRAMUSCULAR; INTRAVENOUS AS NEEDED
Status: DISCONTINUED | OUTPATIENT
Start: 2023-02-08 | End: 2023-02-08

## 2023-02-08 RX ORDER — OXYCODONE HYDROCHLORIDE 5 MG/1
2.5 TABLET ORAL EVERY 4 HOURS PRN
Status: DISCONTINUED | OUTPATIENT
Start: 2023-02-08 | End: 2023-02-08 | Stop reason: HOSPADM

## 2023-02-08 RX ORDER — CEFAZOLIN SODIUM 1 G/3ML
INJECTION, POWDER, FOR SOLUTION INTRAMUSCULAR; INTRAVENOUS AS NEEDED
Status: DISCONTINUED | OUTPATIENT
Start: 2023-02-08 | End: 2023-02-08

## 2023-02-08 RX ORDER — PROPOFOL 10 MG/ML
INJECTION, EMULSION INTRAVENOUS AS NEEDED
Status: DISCONTINUED | OUTPATIENT
Start: 2023-02-08 | End: 2023-02-08

## 2023-02-08 RX ADMIN — FENTANYL CITRATE 25 MCG: 50 INJECTION INTRAMUSCULAR; INTRAVENOUS at 09:19

## 2023-02-08 RX ADMIN — METOPROLOL SUCCINATE 25 MG: 25 TABLET, EXTENDED RELEASE ORAL at 07:09

## 2023-02-08 RX ADMIN — PROPOFOL 200 MG: 10 INJECTION, EMULSION INTRAVENOUS at 07:34

## 2023-02-08 RX ADMIN — REMIFENTANIL HYDROCHLORIDE 0.2 MCG/KG/MIN: 1 INJECTION, POWDER, LYOPHILIZED, FOR SOLUTION INTRAVENOUS at 07:36

## 2023-02-08 RX ADMIN — PHENYLEPHRINE HYDROCHLORIDE 20 MCG/MIN: 10 INJECTION INTRAVENOUS at 07:36

## 2023-02-08 RX ADMIN — Medication 100 MG: at 07:34

## 2023-02-08 RX ADMIN — ROCURONIUM BROMIDE 5 MG: 10 INJECTION, SOLUTION INTRAVENOUS at 07:34

## 2023-02-08 RX ADMIN — ONDANSETRON 4 MG: 2 INJECTION INTRAMUSCULAR; INTRAVENOUS at 08:27

## 2023-02-08 RX ADMIN — DEXAMETHASONE SODIUM PHOSPHATE 10 MG: 10 INJECTION, SOLUTION INTRAMUSCULAR; INTRAVENOUS at 07:40

## 2023-02-08 RX ADMIN — CEFAZOLIN 2000 MG: 1 INJECTION, POWDER, FOR SOLUTION INTRAMUSCULAR; INTRAVENOUS at 07:30

## 2023-02-08 RX ADMIN — FENTANYL CITRATE 25 MCG: 50 INJECTION INTRAMUSCULAR; INTRAVENOUS at 09:08

## 2023-02-08 RX ADMIN — MIDAZOLAM 2 MG: 1 INJECTION INTRAMUSCULAR; INTRAVENOUS at 07:24

## 2023-02-08 RX ADMIN — FENTANYL CITRATE 50 MCG: 50 INJECTION INTRAMUSCULAR; INTRAVENOUS at 08:51

## 2023-02-08 RX ADMIN — SODIUM CHLORIDE, SODIUM LACTATE, POTASSIUM CHLORIDE, AND CALCIUM CHLORIDE: .6; .31; .03; .02 INJECTION, SOLUTION INTRAVENOUS at 07:24

## 2023-02-08 RX ADMIN — FENTANYL CITRATE 25 MCG: 50 INJECTION INTRAMUSCULAR; INTRAVENOUS at 09:03

## 2023-02-08 RX ADMIN — LIDOCAINE HYDROCHLORIDE 100 MG: 20 INJECTION, SOLUTION EPIDURAL; INFILTRATION; INTRACAUDAL; PERINEURAL at 07:34

## 2023-02-08 RX ADMIN — OXYCODONE HYDROCHLORIDE 5 MG: 5 TABLET ORAL at 10:14

## 2023-02-08 RX ADMIN — FENTANYL CITRATE 50 MCG: 50 INJECTION INTRAMUSCULAR; INTRAVENOUS at 07:34

## 2023-02-08 RX ADMIN — GLYCOPYRROLATE 0.1 MG: 0.2 INJECTION INTRAMUSCULAR; INTRAVENOUS at 07:55

## 2023-02-08 RX ADMIN — FENTANYL CITRATE 25 MCG: 50 INJECTION INTRAMUSCULAR; INTRAVENOUS at 09:24

## 2023-02-08 NOTE — DISCHARGE INSTR - AVS FIRST PAGE
POST-OPERATIVE WOUND CARE INSTRUCTIONS    Your wound is closed with:   Sterile strips-white pieces of tape that hold your incision together       Wound care: You may remove your dressing after 24 hours   You may shower using soap and water to clean your wound  Gently pat it dry  You may redress your wound for comfort as needed  Activity:   Did not perform any heavy lifting or strenuous physical activity for 7 days  Your activity restrictions will be reevaluated at your postoperative visit  Medications: You may resume all your preoperative medications and diet  Pain medication as directed on the prescription given in the office  Other:   May use ice on the wound for 24-48 hours as needed for comfort  May place warm compresses to the neck after 48 hours for comfort  Call the office at [phone] if you have any of the followin  Redness, swelling, heat, unusual drainage or heavy bleeding from the wound     2  Fever greater than 101°F    3  Pain not relieved by the prescribed pain medication

## 2023-02-08 NOTE — OP NOTE
OPERATIVE REPORT  PATIENT NAME: Sadie Maya    :  1970  MRN: [id number]  Pt Location: [facility] OR ROOM 04    SURGERY DATE: 2023    Surgeon(s) and Role:     * Catie Drake MD - Primary     * Minerva Jain PA-C - Assisting    Preop Diagnosis:  Thyroid nodule [E04 1]    Post-Op Diagnosis Codes: * Thyroid nodule [E04 1]    Procedure(s):  Left - LEFT HEMITHYROIDECTOMY    Specimen(s):  ID Type Source Tests Collected by Time Destination   1 : left lobe isthmus; stitch marks left upper pole Tissue Soft Tissue, Other TISSUE EXAM Catie Drake MD 2023  8:19 AM        Estimated Blood Loss:   20 mL    Drains:  * No LDAs found *    Anesthesia Type:   General    Operative Indications:  Thyroid nodule [E361]  49-year-old female with a left thyroid nodule that was suspicious by Afirma  It was recommended that she undergo resection  Risks and benefits were explained  Informed consent was obtained  Patient was brought to the operating room  Operative Findings:  Recurrent regional nerve was preserved along its course  Complications:   None    Procedure and Technique:  After identifying the patient, general anesthesia was achieved  Patient was prepped and draped in the usual sterile fashion  A time-out was performed  A Kocher incision was made 2 cm above the sternal notch  Using sharp dissection this was taken down through the skin, subcutaneous tissue and through the platysma  Subplatysmal flaps were then raised  Gelpi retractors were placed  Strap muscles were divided in the midline  We started with the left thyroid  The strap muscles were dissected away from the thyroid  Middle thyroid vein was divided with Harmonic scalpel  Superior pole vessels were isolated and divided with the Harmonic scalpel  Thyroid was rotated medially  The superior and inferior parathyroids were identified and dissected away from the thyroid    Recurrent laryngeal nerve was identified and preserved along its course  The thyroid continued to be rotated medially until we came to the ligament of Leo  This was isolated and clamped  The nerve was stimulated and found to be functioning  The tissue above the clamp was divided and the tissue below the clamp was suture ligated with 3-0 Vicryl suture  The isthmus was cauterized off the trachea  The thyroid was then divided at the junction of the right lobe and the isthmus  There was excellent hemostasis  Specimen was oriented and handed off the table  Wound was copiously irrigated  There was excellent hemostasis  The recurrent laryngeal nerve was preserved along it's course  Both parathyroids were identified and were viable  The wound was filled with saline  A Valsalva maneuver was performed and there was no evidence of bleeding or air bubbles  The irrigant was removed  Surgicel was placed in the resection bed  Strap muscles were approximated with a running 2-0 Vicryl suture  Gelpi retractors were removed  There was excellent hemostasis  Platysma was approximated with a running 3-0 Vicryl suture  Skin was approximated with a running 4-0 Monocryl suture in a subcuticular fashion  Steri-Strips and sterile dressings were applied  Patient was extubated having tolerated the procedure well  I was present and participated in all aspects of this procedure  I was present for the entire procedure, A qualified resident physician was not available and A physician assistant was required during the procedure for retraction tissue handling,dissection and suturing  PA assisted with exposure, tissue handling, suturing and helping to minimize bleeding  No qualified resident was available      Patient Disposition:  PACU         SIGNATURE: Alanis Jain MD  DATE: February 8, 2023  TIME: 8:41 AM

## 2023-02-08 NOTE — ANESTHESIA POSTPROCEDURE EVALUATION
Post-Op Assessment Note    CV Status:  Stable  Pain Score: 5    Pain management: adequate     Mental Status:  Alert and awake   Hydration Status:  Euvolemic   PONV Controlled:  Controlled   Airway Patency:  Patent   Two or more mitigation strategies used for obstructive sleep apnea   Post Op Vitals Reviewed: Yes      Staff: CRNA, Anesthesiologist         No notable events documented      BP   140/80   Temp  97 4   Pulse  72   Resp   15   SpO2   100

## 2023-02-08 NOTE — INTERVAL H&P NOTE
H&P reviewed  After examining the patient I find no changes in the patients condition since the H&P had been written      Vitals:    02/08/23 0650   BP:    Pulse:    Resp:    Temp: (!) 96 9 °F (36 1 °C)   SpO2:

## 2023-02-13 DIAGNOSIS — Z51.5 PALLIATIVE CARE PATIENT: ICD-10-CM

## 2023-02-13 DIAGNOSIS — G89.3 CANCER ASSOCIATED PAIN: ICD-10-CM

## 2023-02-13 DIAGNOSIS — C90.00 MULTIPLE MYELOMA NOT HAVING ACHIEVED REMISSION (HCC): ICD-10-CM

## 2023-02-13 RX ORDER — OXYCODONE HYDROCHLORIDE 10 MG/1
10-15 TABLET ORAL EVERY 4 HOURS PRN
Qty: 63 TABLET | Refills: 0 | Status: SHIPPED | OUTPATIENT
Start: 2023-02-13 | End: 2023-02-20 | Stop reason: SDUPTHER

## 2023-02-13 NOTE — TELEPHONE ENCOUNTER
Primary palliative medicine provider:   Dylan Orona     Medication requested:  Oxycodone 10 mg IR  Xtampza 9 mg     If for pain, how has the patient been taking their pain medicine?      Last appointment: 1/23/23    Next scheduled appointment: 03/09/23    PDMP review:     02/06/2023 02/06/2023 oxyCODONE HCL (Tablet) 63 0 7 10 MG 15 0 [facility] 0 / 0 [place]    1 [id number] 02/06/2023 02/06/2023 Xtampza Er (Capsule, Extended Release) 7 0 7 9 MG NA [address] [facility]        Pharmacy: [facility]

## 2023-02-20 DIAGNOSIS — C90.00 MULTIPLE MYELOMA NOT HAVING ACHIEVED REMISSION (HCC): ICD-10-CM

## 2023-02-20 DIAGNOSIS — Z51.5 PALLIATIVE CARE PATIENT: ICD-10-CM

## 2023-02-20 DIAGNOSIS — G89.3 CANCER ASSOCIATED PAIN: ICD-10-CM

## 2023-02-20 RX ORDER — OXYCODONE HYDROCHLORIDE 10 MG/1
10-15 TABLET ORAL EVERY 4 HOURS PRN
Qty: 63 TABLET | Refills: 0 | Status: SHIPPED | OUTPATIENT
Start: 2023-02-20 | End: 2023-02-27 | Stop reason: SDUPTHER

## 2023-02-20 NOTE — TELEPHONE ENCOUNTER
Primary palliative medicine provider: Dr Osiel Ontiveros     Medication requested:  Oxycodone 10 mg tablet  and Xtampza ER 9 mg capsule     If for pain, how has the patient been taking their pain medicine?      Last appointment:  11/16     Next scheduled appointment: 3/9     PDMP review:    Pharmacy:

## 2023-02-22 ENCOUNTER — OFFICE VISIT (OUTPATIENT)
Dept: SURGICAL ONCOLOGY | Facility: CLINIC | Age: 53
End: 2023-02-22

## 2023-02-22 VITALS
SYSTOLIC BLOOD PRESSURE: 120 MMHG | WEIGHT: 188 LBS | BODY MASS INDEX: 33.31 KG/M2 | OXYGEN SATURATION: 95 % | RESPIRATION RATE: 16 BRPM | DIASTOLIC BLOOD PRESSURE: 72 MMHG | HEIGHT: 63 IN | TEMPERATURE: 97.7 F | HEART RATE: 62 BPM

## 2023-02-22 DIAGNOSIS — E04.1 THYROID NODULE: Primary | ICD-10-CM

## 2023-02-22 NOTE — PROGRESS NOTES
Surgical Oncology Follow Up       [address] [facility]  [address]  [place] [address]    Marla Xiong [place]  1970  [id number]  [facility]  [address] [address]    Diagnoses and all orders for this visit:    Thyroid nodule        Chief Complaint   Patient presents with   • Post-op       No follow-ups on file  Oncology History   [facility] )   12/15/2021 Initial Diagnosis    Multiple myeloma not having achieved remission ([facility] )     1/11/2022 - 2/24/2022 Chemotherapy    daratumumab-hyaluronidase (DARZALEX FASPRO), 1,800 mg, Subcutaneous daratumumab-hyaluronidase, Once, 2 of 4 cycles  Administration: 1,800 mg (1/18/2022), 1,800 mg (2/1/2022), 1,800 mg (2/15/2022)  bortezomib (VELCADE), 1 3 mg/m2 = 2 3 mg (86 7 % of original dose 1 5 mg/m2), Subcutaneous, Once, 2 of 4 cycles  Dose modification: 1 3 mg/m2 (original dose 1 5 mg/m2, Cycle 1, Reason: Other (Must fill in a comment), Comment: as per previous treating oncologist)  Administration: 2 3 mg (1/18/2022), 2 3 mg (1/11/2022), 2 3 mg (2/1/2022), 2 3 mg (2/8/2022), 2 3 mg (2/15/2022)  Cyclophosphamide (CYTOXAN), 300 mg/m2 = 500 mg (100 % of original dose 300 mg/m2), Oral, Once, 2 of 4 cycles  Dose modification: 300 mg/m2 (original dose 300 mg/m2, Cycle 1)  Administration: 500 mg (1/18/2022), 500 mg (1/25/2022), 500 mg (2/1/2022), 500 mg (2/8/2022), 500 mg (2/15/2022), 550 mg (2/24/2022)         Staging: Benign thyroid nodule, February 2023  Treatment history: Left thyroid lobectomy, February 2023  Current treatment: Observation  Disease status: AMANDO    History of Present Illness: Patient returns after her left thyroid lobectomy  She is doing well  She did have hoarseness, immediately postop but she thinks her voice is markedly improved  No complaints at this time    No dysphagia  Review of Systems  Complete ROS Surg Onc:   Complete ROS Surg Onc:   Constitutional: The patient denies new or recent history of general fatigue, no recent weight loss, no change in appetite  Eyes: No complaints of visual problems, no scleral icterus  ENT: no complaints of ear pain, no hoarseness, no difficulty swallowing,  no tinnitus and no new masses in head, oral cavity, or neck  Cardiovascular: No complaints of chest pain, no palpitations, no ankle edema  Respiratory: No complaints of shortness of breath, no cough  Gastrointestinal: No complaints of jaundice, no bloody stools, no pale stools  Genitourinary: No complaints of dysuria, no hematuria, no nocturia, no frequent urination, no urethral discharge  Musculoskeletal: No complaints of weakness, paralysis, joint stiffness or arthralgias  Integumentary: No complaints of rash, no new lesions  Neurological: No complaints of convulsions, no seizures, no dizziness  Hematologic/Lymphatic: No complaints of easy bruising  Endocrine:  No hot or cold intolerance  No polydipsia, polyphagia, or polyuria  Allergy/immunology:  No environmental allergies  No food allergies  Not immunocompromised  Skin:  No pallor or rash  No wound          Patient Active Problem List   Diagnosis   • Hypertension   • Chronic bilateral low back pain with bilateral sciatica   • Cigarette nicotine dependence without complication   • Multiple myeloma in remission ([facility] )   • Continuous opioid dependence ([facility] )   • Palliative care patient   • Encounter for central line care   • H/O autologous stem cell transplant ([facility] )   • Amyloidosis ([facility] )   • Thyroid nodule     Past Medical History:   Diagnosis Date   • Anemia    • Cardiomyopathy [facility])    • Chronic back pain    • Chronic narcotic dependence ([facility] )    • Colon polyp    • Disease of thyroid gland    • GERD (gastroesophageal reflux disease)    • [facility] 03/30/2022   • Hypertension    • Multiple myeloma (HCC)     in remission   • Neuropathy      Past Surgical History:   Procedure Laterality Date   •  SECTION     • COLONOSCOPY     • EYE SURGERY Left     biopsy   • THYROID LOBECTOMY Left 2023    Procedure: LEFT HEMITHYROIDECTOMY;  Surgeon: Rowan Sumner MD;  Location: [facility];  Service: Surgical Oncology   • US GUIDED THYROID BIOPSY  10/20/2022     Family History   Problem Relation Age of Onset   • No Known Problems Mother    • No Known Problems Father    • Colon cancer Neg Hx    • Diabetes Neg Hx      Social History     Socioeconomic History   • Marital status: Single     Spouse name: Not on file   • Number of children: Not on file   • Years of education: Not on file   • Highest education level: Not on file   Occupational History   • Not on file   Tobacco Use   • Smoking status: Heavy Smoker     Packs/day:      Years:      Pack years:      Types: Cigarettes     Start date: 1992   • Smokeless tobacco: Current   Vaping Use   • Vaping Use: Never used   Substance and Sexual Activity   • Alcohol use: Not Currently   • Drug use: Yes     Types: Oxycodone     Comment: 1 year   • Sexual activity: Not Currently     Partners: Male   Other Topics Concern   • Not on file   Social History Narrative   • Not on file     Social Determinants of Health     Financial Resource Strain: Not on file   Food Insecurity: Not on file   Transportation Needs: Not on file   Physical Activity: Not on file   Stress: Not on file   Social Connections: Not on file   Intimate Partner Violence: Not on file   Housing Stability: Not on file       Current Outpatient Medications:   •  acyclovir (ZOVIRAX) 800 mg tablet, Take 1 tablet (800 mg total) by mouth 2 (two) times a day, Disp: 180 tablet, Rfl: 3  •  albuterol (PROVENTIL HFA,VENTOLIN HFA) 90 mcg/act inhaler, Inhale 2 puffs every 6 (six) hours as needed for wheezing or shortness of breath, Disp: 18 g, Rfl: 5  •  amLODIPine (NORVASC) 10 mg tablet, Take 1 tablet (10 mg total) by mouth every morning, Disp: 90 tablet, Rfl: 1  •  atorvastatin (LIPITOR) 40 mg tablet, Take 40 mg by mouth, Disp: , Rfl:   •  bisacodyl (DULCOLAX) 10 mg suppository, Insert 1 suppository (10 mg total) into the rectum daily as needed for constipation, Disp: 12 suppository, Rfl: 0  •  DULoxetine (CYMBALTA) 30 mg delayed release capsule, take 1 capsule by mouth once daily, Disp: 90 capsule, Rfl: 1  •  famotidine (PEPCID) 40 MG tablet, TAKE 1 TABLET BY MOUTH ONCE DAILY, Disp: 90 tablet, Rfl: 1  •  fluticasone (FLONASE) 50 mcg/act nasal spray, instill 2 sprays into each nostril once daily, Disp: 16 g, Rfl: 5  •  Ibuprofen-Acetaminophen (Advil Dual Action) 125-250 MG TABS, Take by mouth, Disp: , Rfl:   •  Lidocaine HCl 4 % CREA, Apply topically 4 (four) times a day as needed (neuropathic pain), Disp: 133 g, Rfl: 0  •  methocarbamol (ROBAXIN) 750 mg tablet, Take 1 tablet (750 mg total) by mouth every 6 (six) hours as needed for muscle spasms, Disp: 120 tablet, Rfl: 3  •  metoprolol succinate (TOPROL-XL) 25 mg 24 hr tablet, Take 1 tablet (25 mg total) by mouth daily, Disp: 90 tablet, Rfl: 1  •  naloxone (NARCAN) 4 mg/0 1 mL nasal spray, Administer 1 spray into a nostril   If no response after 2-3 minutes, give another dose in the other nostril using a new spray , Disp: 1 each, Rfl: 1  •  NON FORMULARY, -Balance of Nature vegetable and fruit supplements, Disp: , Rfl:   •  oxyCODONE (ROXICODONE) 10 MG TABS, Take 1-1 5 tablets (10-15 mg total) by mouth every 4 (four) hours as needed (cancer-related pain) Max Daily Amount: 90 mg, Disp: 63 tablet, Rfl: 0  •  oxyCODONE ER (Xtampza ER) 9 mg extended release capsule, Take 1 tablet (9 mg total) by mouth daily at bedtime Max Daily Amount: 9 mg, Disp: 7 capsule, Rfl: 0  •  Polyethyl Glycol-Propyl Glycol (Systane) 0 4-0 3 % GEL, 1 drop, Disp: , Rfl:   •  polyethylene glycol (MIRALAX) 17 g packet, Take 17 g by mouth daily, Disp: 30 each, Rfl: 0  •  pregabalin (LYRICA) 100 mg capsule, Take 1 capsule (100 mg total) by mouth 2 (two) times a day, Disp: 180 capsule, Rfl: 0  •  prochlorperazine (COMPAZINE) 10 mg tablet, , Disp: , Rfl:   •  senna-docusate sodium (SENOKOT S) 8 6-50 mg per tablet, Take 2 tablets by mouth daily at bedtime, Disp: 60 tablet, Rfl: 0  No Known Allergies  Vitals:    02/22/23 1121   BP: 120/72   Pulse: 62   Resp: 16   Temp: 97 7 °F (36 5 °C)   SpO2: 95%       Physical Exam  Constitutional: General appearance: The Patient is well-developed and well-nourished who appears the stated age in no acute distress  Patient is pleasant and talkative  HEENT:  Normocephalic  Sclerae are anicteric  Mucous membranes are moist  Neck is supple without adenopathy  No JVD  Vision is C/D/I        Extremities: There is no clubbing or cyanosis  There is no edema  Symmetric  Neuro: Grossly nonfocal  Gait is normal      Skin: Warm, anicteric  Psych:  Patient is pleasant and talkative  Breasts:        Pathology:  Final Diagnosis   A  Left thyroid; lobectomy:       - Benign thyroid with multiple adenomatoid nodules with focal oncocytic change       - Previous biopsy site changes      - Negative for malignancy    Electronically signed by Jose Lr MD on 2/15/2023 at  2:57 PM         Labs:      Imaging  XR chest 2 views    Result Date: 1/23/2023  Narrative: CHEST INDICATION:   chest pain  COMPARISON:  1/16/2023 EXAM PERFORMED/VIEWS:  XR CHEST PA & LATERAL FINDINGS: Cardiomediastinal silhouette appears unremarkable  There is mild subsegmental atelectasis at the left lung base adjacent to a chronically mildly elevated left hemidiaphragm  The lungs are otherwise clear  There is no pneumothorax, there is no pleural effusion Osseous structures appear within normal limits for patient age  Impression: Mild subsegmental atelectasis left lung base    Otherwise no acute pulmonary disease Workstation performed: [id number]     CTA dissection protocol chest/abdomen/pelvis    Result Date: 1/23/2023  Narrative: CTA - CHEST, ABDOMEN AND PELVIS - WITHOUT AND WITH IV CONTRAST INDICATION:   chest pain x 2 hours radiating to back  COMPARISON:  CT chest abdomen pelvis 8/5/2022  "Patient is a 57-year-old female with a past medical history of cardiomyopathy, chronic back pain, GERD, multiple Aloma, hypertension and neuropathy presenting to the emergency department for evaluation of chest pain  Reports pain began around 11 AM   Reports pressure substernally with associated tightening underneath both breasts wrapping around to her back  Reports she was given 324 mg aspirin prior to arrival   Patient reports she had this exact pain previously, was worked up for it and found to have cardiomyopathy " TECHNIQUE: CT examination of the chest, abdomen and pelvis was performed both prior to and after the administration of intravenous contrast   The noncontrast portion of this examination was performed utilizing low radiation dose technique  Thin section angiographic arterial phase post contrast technique was used in order to evaluate for aortic dissection  3D reformatted images and volume rendering were performed on an independent workstation  Additionally, axial, sagittal, and coronal 2D reformatted images were created from the source data and submitted for interpretation  Radiation dose length product (DLP) for this visit:  1133 mGy-cm   This examination, like all CT scans performed in the [facility], was performed utilizing techniques to minimize radiation dose exposure, including the use of iterative reconstruction and automated exposure control  IV Contrast:  100 mL of iohexol (OMNIPAQUE) Enteric Contrast:  Enteric contrast was not administered  FINDINGS: AORTA:  There is no aortic dissection or intramural hematoma  There is no aortic aneurysm  CHEST LUNGS:  Lungs are clear  There is no tracheal or endobronchial lesion  PLEURA:  Unremarkable  HEART/PULMONARY ARTERIAL TREE:  Borderline heart size  No pericardial effusion  No visualized pulmonary arterial embolism  MEDIASTINUM AND JENNIFER:  Small sliding-type hiatal hernia  CHEST WALL AND LOWER NECK:  Unchanged 18 mm left thyroid nodule  ABDOMEN LIVER/BILIARY TREE:  Unremarkable  GALLBLADDER:  No calcified gallstones  No pericholecystic inflammatory change  SPLEEN:  Unremarkable  PANCREAS:  Unremarkable  ADRENAL GLANDS:  Unremarkable  KIDNEYS/URETERS:  Unremarkable  No hydronephrosis  STOMACH AND BOWEL:  Unremarkable  APPENDIX:  No findings to suggest appendicitis  ABDOMINOPELVIC CAVITY:  No ascites or free intraperitoneal air  No lymphadenopathy  PELVIS REPRODUCTIVE ORGANS:  Unremarkable for patient's age  URINARY BLADDER:  Unremarkable  ABDOMINAL WALL/INGUINAL REGIONS:  Unremarkable  OSSEOUS STRUCTURES:  No acute fracture or destructive osseous lesion  Impression: No aortic aneurysm, intramural hematoma/dissection or penetrating ulcer  Advanced atherosclerotic changes are not present  No acute findings in the chest, abdomen or the pelvis  Workstation performed: [id number]     US head neck lymph node mapping    Result Date: 2/8/2023  Narrative: NECK ULTRASOUND INDICATION:     E04 1: Nontoxic single thyroid nodule  COMPARISON:   None FINDINGS: Ultrasound of the thyroidectomy bed and cervical lymph node chains was performed with a high frequency linear transducer  Lymph nodes maintain normal morphologic contour, echogenicity and short axis dimensions of less than 0 7 cm  No evidence for microcalcification or focal nodularity  Impression: No evidence of metastatic disease  Workstation performed: [id number]     Echo complete w/ contrast if indicated    Result Date: 1/24/2023  Narrative: •  Left Ventricle: Left ventricular cavity size is normal  Wall thickness is normal  The left ventricular ejection fraction is 60%  Systolic function is normal  Global longitudinal strain is normal at -17%   The strain pattern was suggestive of relative apical sparing  If clinical concern for cardiac amyloid, consider cardiac MRI or Tc-PYP scan for further evaluation  Wall motion is normal  Diastolic function is mildly abnormal, consistent with grade I (abnormal) relaxation  •  IVS: There is abnormal septal motion consistent with left bundle branch block  •  Mitral Valve: There is annular calcification  There is trace regurgitation  •  Tricuspid Valve: There is mild regurgitation  •  Pulmonary Artery: The estimated pulmonary artery systolic pressure is 09 4 mmHg  The pulmonary artery systolic pressure is mildly increased  Strain was performed to quantify interventricular dyssynchrony and evaluate components of myocardial function due to (positive family history of HCM, family history of sudden death, HCM, Chemotherapy, complex CHD, genetic abnormality, viral infection)   Results from the utilization of Strain Analysis are listed in the report below  I reviewed the above laboratory and imaging data  Discussion/Summary: 53-year-old female with a left thyroid nodule that was suspicious by Afirma  This was benign on final pathology  She does not require any further treatment  There were no suspicious nodule seen on the right side of her thyroid on her most recent ultrasound, consequently I would not recommend any further imaging  I did discuss there is a 25 to 30% risk of her requiring thyroid hormone in her lifetime  She should have her thyroid function studies monitored by her family physician  I did instruct her to start using vitamin E or scar cream as needed  I will see her again in the future should the need arise  She is agreeable to this  All her questions were answered

## 2023-02-27 ENCOUNTER — TELEPHONE (OUTPATIENT)
Dept: PALLIATIVE MEDICINE | Facility: CLINIC | Age: 53
End: 2023-02-27

## 2023-02-27 DIAGNOSIS — Z51.5 PALLIATIVE CARE PATIENT: ICD-10-CM

## 2023-02-27 DIAGNOSIS — G89.3 CANCER ASSOCIATED PAIN: ICD-10-CM

## 2023-02-27 DIAGNOSIS — C90.00 MULTIPLE MYELOMA NOT HAVING ACHIEVED REMISSION (HCC): ICD-10-CM

## 2023-02-27 RX ORDER — OXYCODONE HYDROCHLORIDE 10 MG/1
10-15 TABLET ORAL EVERY 4 HOURS PRN
Qty: 63 TABLET | Refills: 0 | Status: SHIPPED | OUTPATIENT
Start: 2023-02-27 | End: 2023-03-06 | Stop reason: SDUPTHER

## 2023-02-27 NOTE — TELEPHONE ENCOUNTER
Primary palliative medicine provider: Dr Kathleen Tellez     Medication requested:  Oxycodone 10 mg and Xtampza Er 9 mg capsuele     If for pain, how has the patient been taking their pain medicine?      Last appointment: 11/16    Next scheduled appointment: 3/9    PDMP review:    Pharmacy:

## 2023-03-06 DIAGNOSIS — Z51.5 PALLIATIVE CARE PATIENT: ICD-10-CM

## 2023-03-06 DIAGNOSIS — C90.00 MULTIPLE MYELOMA NOT HAVING ACHIEVED REMISSION (HCC): ICD-10-CM

## 2023-03-06 DIAGNOSIS — G89.3 CANCER ASSOCIATED PAIN: ICD-10-CM

## 2023-03-06 RX ORDER — OXYCODONE HYDROCHLORIDE 10 MG/1
10-15 TABLET ORAL EVERY 4 HOURS PRN
Qty: 120 TABLET | Refills: 0 | Status: SHIPPED | OUTPATIENT
Start: 2023-03-06

## 2023-03-06 NOTE — TELEPHONE ENCOUNTER
Primary palliative medicine provider:   Kasie Mejia    Medication requested:  Xtampza 9 mg ER  Oxycodone 10 mg IR    If for pain, how has the patient been taking their pain medicine? Last appointment:n 11/16    Next scheduled appointment: 3/09    PDMP review:    02/27/2023 02/27/2023 oxyCODONE HCL (Tablet) 51 0 6 10  [address] 0 / 0 [place]    1 [id number] 02/27/2023 02/27/2023 Xtampza Er (Capsule, Extended Release) 7 0 7 9 MG NA [facility] [employer] 0 / 0       Pharmacy:      Pt stted PA was again needed  As noted in 3/01 encounter and verified again today, No PA is needed  Pt and or Pharmacist will need to call weekly to override  This is due to the frequency of the refills  Info also left on sticky note for future reference  Pt will be notified by this nurse

## 2023-03-06 NOTE — TELEPHONE ENCOUNTER
PDMP reviewed  Rx sent to the pharmacy  Daughter moved out of house  Will increase fills to 2-week fills, which will also decreased number of fills/month

## 2023-03-09 ENCOUNTER — OFFICE VISIT (OUTPATIENT)
Dept: PALLIATIVE MEDICINE | Facility: CLINIC | Age: 53
End: 2023-03-09

## 2023-03-09 VITALS
TEMPERATURE: 97.7 F | HEART RATE: 73 BPM | BODY MASS INDEX: 33.69 KG/M2 | OXYGEN SATURATION: 91 % | DIASTOLIC BLOOD PRESSURE: 60 MMHG | SYSTOLIC BLOOD PRESSURE: 100 MMHG | WEIGHT: 190.2 LBS

## 2023-03-09 DIAGNOSIS — M54.41 CHRONIC BILATERAL LOW BACK PAIN WITH BILATERAL SCIATICA: Chronic | ICD-10-CM

## 2023-03-09 DIAGNOSIS — C90.01 MULTIPLE MYELOMA IN REMISSION (HCC): Primary | Chronic | ICD-10-CM

## 2023-03-09 DIAGNOSIS — Z51.5 PALLIATIVE CARE PATIENT: ICD-10-CM

## 2023-03-09 DIAGNOSIS — Z94.84 H/O AUTOLOGOUS STEM CELL TRANSPLANT (HCC): ICD-10-CM

## 2023-03-09 DIAGNOSIS — G89.3 CANCER RELATED PAIN: ICD-10-CM

## 2023-03-09 DIAGNOSIS — M54.42 CHRONIC BILATERAL LOW BACK PAIN WITH BILATERAL SCIATICA: Chronic | ICD-10-CM

## 2023-03-09 DIAGNOSIS — E85.9 AMYLOIDOSIS, UNSPECIFIED TYPE (HCC): ICD-10-CM

## 2023-03-09 DIAGNOSIS — G89.29 CHRONIC BILATERAL LOW BACK PAIN WITH BILATERAL SCIATICA: Chronic | ICD-10-CM

## 2023-03-09 NOTE — PROGRESS NOTES
Outpatient Follow-Up - Palliative and Supportive Care   Mallory Rodriguez 45 y o  female [id number]    Assessment & Plan  Problem List Items Addressed This Visit        Nervous and Auditory    Chronic bilateral low back pain with bilateral sciatica (Chronic)       Other    Multiple myeloma in remission (HCC) - Primary (Chronic)    Cancer related pain    Palliative care patient    H/O autologous stem cell transplant ([facility] [address] )    Amyloidosis ([facility] [address] )     #symptom management  #cancer-related pain   -continue APAP 1000 mg PO Q8H PRN              - max daily 4000 mg   - continue oxy-IR 10-15 mg PO Q4H PRN   - continue oxy-ER 9 mg PO QHS     Plan to initiate opioid taper in upcoming appointment, after BMBx results in 05/2023, will d/c oxy-ER QHS formulation first  Expanded to two week prescriptions since daughter [risk factor] out of house  Still will recommend use of lockbox      #neuropathic pain   - continue pregabalin 100 mg PO BID   - continue duloxetine 30 mg PO QDaily   - continue lidocaine cream QID PRN     #muscle spasms   - continue methocarbamol 500 mg PO Q6H PRN     #chemotherapy induced nausea   - continue ondansetron 8 mg PO Q8H PRN   - continue prochlorperazine 10 mg PO Q6H PRN     #OIC   - continue senna-docusate 2 tabs PO QHS   - continue miralax 17 g PO QDaily   - continue bisacodyl 10 mg LA QDaily PRN     #anxiety/depression   - continue duloxetine 30 mg PO QDaily    #goals of care   - BMBx scheduled for 05/2023    #psychosocial support   - emotional support provided   - not currently    - two adult children              - Darline Moss [daughter]              - Richard Sotelo [mother] [phone]   - one sibling              - Mayi [sister]   - two grandchildren [aged 5 and 3]      Next [facility] Follow up in 3 months  Cont[place] Sub[place] Review    [place]  reviewed: No red flags were identified; safe to proceed with prescription  Nikkie Malagon     PDMP Review       Value Time User    PDMP Reviewed Yes (P)  3/9/2023  8:01 AM Freeman Berrios MD          Medications adjusted this encounter:  Requested Prescriptions      No prescriptions requested or ordered in this encounter     No orders of the defined types were placed in this encounter  There are no discontinued medications  Minerva Bernardo was seen today for symptoms and planning cares related to above illnesses  I have reviewed the patient's controlled substance dispensing history in the Prescription Drug Monitoring Program in compliance with the [facility] regulations before prescribing any controlled substances  They are invited to continue to follow with us  If there are questions or concerns, please contact us through our clinic/answering service 24 hours a day, seven days a week  Freeman Berrios MD  [facility] Palliative and Supportive Care  [phone]      Visit Information    Accompanied By: No one    Source of History: Self, Medical record    History Limitations: None      History of Present Illness    Marla Ambriz is a 45 y o  female who presents in follow up of symptoms related to multiple myeloma s/p PBSCT [03/30/2022]  Pertinent issues include: symptom management, pain, neoplasm related, assessment of goals of care, advance care planning  Patient reports overall doing well  Pain remains constant, remains localized to back/hips, use of oxy-IR x 4 tabs/day + oxy-ER QHS  Denies nausea, vomiting  Appetite adequate  Recent weight gain  BM regular, daily  Adequate sleep throughout the night  Past medical, surgical, social, and family histories are reviewed and pertinent updates are made  Review of Systems   Constitutional: Negative for chills, decreased appetite, fever, malaise/fatigue and weight loss  HENT: Negative for congestion  Eyes: Negative for visual disturbance  Cardiovascular: Negative for chest pain  Respiratory: Negative for shortness of breath      Musculoskeletal: Positive for back pain and neck pain  Negative for falls  Gastrointestinal: Negative for abdominal pain, constipation, nausea and vomiting  Genitourinary: Negative for frequency  Neurological: Negative for headaches  Psychiatric/Behavioral: The patient does not have insomnia  All other systems reviewed and are negative  Vital Signs    /60 (BP Location: Left arm, Cuff Size: Standard)   Pulse 73   Temp 97 7 °F (36 5 °C) (Temporal)   Wt 86 3 kg (190 lb 3 2 oz)   SpO2 91%   BMI 33 69 kg/m²     Physical Exam and Objective Data  Physical Exam  Vitals and nursing note reviewed  Constitutional:       General: She is awake  Appearance: She is not diaphoretic  Comments: Sitting up comfortably in NAD  BMI 33 7  Non-toxic appearing   HENT:      Head: Normocephalic and atraumatic  Right Ear: External ear normal       Left Ear: External ear normal       Nose: No rhinorrhea  Eyes:      Comments: No gaze preference   Cardiovascular:      Rate and Rhythm: Normal rate  Pulmonary:      Effort: No tachypnea, accessory muscle usage or respiratory distress  Comments: Completes full sentences without difficulty  Musculoskeletal:      Cervical back: Normal range of motion  Neurological:      General: No focal deficit present  Mental Status: She is alert and oriented to person, place, and time     Psychiatric:         Attention and Perception: Attention normal          Mood and Affect: Mood and affect normal          Speech: Speech normal          Cognition and Memory: Cognition and memory normal            Radiology and Laboratory:  I personally reviewed and interpreted the following results:    Most Recent COVID-19 Results:  Lab Results   Component Value Date/Time    SARSCOV2 Negative 01/23/2023 09:28 PM    SARSCOV2 Negative 10/30/2021 12:10 AM       Most Recent Lab Work:  Lab Results   Component Value Date/Time    SODIUM 140 01/25/2023 05:08 AM    K 4 2 01/25/2023 05:08 AM    BUN 23 01/25/2023 05:08 AM    CREATININE 1 24 01/25/2023 05:08 AM    GLUC 91 01/25/2023 05:08 AM     Lab Results   Component Value Date/Time    AST 33 01/23/2023 01:37 PM    ALT 20 01/23/2023 01:37 PM    ALB 4 5 01/23/2023 01:37 PM     Lab Results   Component Value Date/Time    HGB 15 9 (H) 01/25/2023 05:08 AM    WBC 10 20 (H) 01/25/2023 05:08 AM     01/25/2023 05:08 AM    INR 1 15 10/29/2021 09:35 PM    PTT 37 10/29/2021 09:35 PM       Most Recent Imaging [last 30 days]:  No results found  30 minutes was spent face to face with Daria Bruce with greater than 50% of the time spent in counseling or coordination of care including discussions of provided medical updates, discussed palliative care, determined goals of care, determined social/family support, discussed plans of care, discussed symptom management, provided psychosocial support  PDMP Reviewed  All of the patient's or agent's questions were answered during this discussion

## 2023-03-10 NOTE — TELEPHONE ENCOUNTER
Upon communication with [address], pt DOES require Prior Auth  Forms completed for   Oxycodone 10 mg IR  Xtampza 9 mg     Faxed to [facility] at [phone]    Await response  meds are now ordered 2 week per fill

## 2023-03-20 DIAGNOSIS — C90.00 MULTIPLE MYELOMA NOT HAVING ACHIEVED REMISSION (HCC): ICD-10-CM

## 2023-03-20 DIAGNOSIS — Z51.5 PALLIATIVE CARE PATIENT: ICD-10-CM

## 2023-03-20 DIAGNOSIS — G89.3 CANCER ASSOCIATED PAIN: ICD-10-CM

## 2023-03-20 RX ORDER — OXYCODONE HYDROCHLORIDE 10 MG/1
10-15 TABLET ORAL EVERY 4 HOURS PRN
Qty: 120 TABLET | Refills: 0 | Status: SHIPPED | OUTPATIENT
Start: 2023-03-20

## 2023-03-20 NOTE — TELEPHONE ENCOUNTER
Pt calling re being told needs Prior Auth for Oxycodone 10 mg IR  This medication has been approved for quantity as written  Return audrey horta at her request  This nurse was conference called with Insurance customer service and  Then to pHarmacy  This nurse verbalized medication is approved  Per pharmacist , due to frequency of refill requests, pt or pharmacy needs to call insurance for override  Phamacist verbalized understanding that it does not need a prior Auth   Only an override as dictated by pts insurance  This  nurse inquired as to status of Prior Auth for Xtampza 9 mg sent 3/10/23  Received copy of original fax back to this office but no response  Customer srvice rep unable to assit but did not see an approval in system  This nurse RESUBMITTED same form to [facility] at fax [phone] marked urgent  Issues please call [phone]  Pt agreeable to make the call to insurance with each new refill  Will continue to fill bi weekly for safety concerns in pt home

## 2023-03-20 NOTE — TELEPHONE ENCOUNTER
Primary palliative medicine provider:   Dr Benny Skaggs  Medication requested:  Oxycodone 10mg   Xtampza ER 9mg    If for pain, how has the patient been taking their pain medicine?      Next scheduled appointment: 6/26/2023    PDMP review:  Last Fill   Oxycodone 10mg    3/6/23   Qty 120  Xtampza ER 9mg   3/6/23   Qty 14    Pharmacy:  [facility]

## 2023-03-21 NOTE — TELEPHONE ENCOUNTER
Received prior authorization approval for xtampza ER 9  Mg capsule  through 03/21/2024  Faxed results to Pharmacy  Pharmacy has been asked to inform pt of outcome

## 2023-03-31 DIAGNOSIS — G89.29 CHRONIC BILATERAL LOW BACK PAIN WITH BILATERAL SCIATICA: ICD-10-CM

## 2023-03-31 DIAGNOSIS — R09.81 NASAL CONGESTION: ICD-10-CM

## 2023-03-31 DIAGNOSIS — M54.42 CHRONIC BILATERAL LOW BACK PAIN WITH BILATERAL SCIATICA: ICD-10-CM

## 2023-03-31 DIAGNOSIS — M54.41 CHRONIC BILATERAL LOW BACK PAIN WITH BILATERAL SCIATICA: ICD-10-CM

## 2023-03-31 DIAGNOSIS — R06.02 SHORTNESS OF BREATH: ICD-10-CM

## 2023-03-31 RX ORDER — ALBUTEROL SULFATE 90 UG/1
2 AEROSOL, METERED RESPIRATORY (INHALATION) EVERY 6 HOURS PRN
Qty: 18 G | Refills: 11 | Status: SHIPPED | OUTPATIENT
Start: 2023-03-31

## 2023-03-31 RX ORDER — FLUTICASONE PROPIONATE 50 MCG
2 SPRAY, SUSPENSION (ML) NASAL DAILY
Qty: 48 G | Refills: 3 | Status: SHIPPED | OUTPATIENT
Start: 2023-03-31

## 2023-03-31 RX ORDER — PREGABALIN 100 MG/1
CAPSULE ORAL
Qty: 180 CAPSULE | Refills: 1 | Status: SHIPPED | OUTPATIENT
Start: 2023-03-31

## 2023-04-03 DIAGNOSIS — C90.00 MULTIPLE MYELOMA NOT HAVING ACHIEVED REMISSION (HCC): ICD-10-CM

## 2023-04-03 DIAGNOSIS — G89.3 CANCER ASSOCIATED PAIN: ICD-10-CM

## 2023-04-03 DIAGNOSIS — Z51.5 PALLIATIVE CARE PATIENT: ICD-10-CM

## 2023-04-03 RX ORDER — OXYCODONE HYDROCHLORIDE 10 MG/1
10-15 TABLET ORAL EVERY 4 HOURS PRN
Qty: 120 TABLET | Refills: 0 | Status: SHIPPED | OUTPATIENT
Start: 2023-04-03

## 2023-04-03 NOTE — TELEPHONE ENCOUNTER
"Reviewed most recent [facility] note, reviewed PDMP     3/9/23: \"Plan to initiate opioid taper in upcoming appointment, after BMBx results in 05/2023, will d/c oxy-ER QHS formulation first  Expanded to two week prescriptions since daughter [risk factor] out of house  Still will recommend use of lockbox  \"    Pharma info: \"Two-week prescription for safe opioid use  Use of lockbox  Family member with active ANTONIETTA  \"    Refilling medications        "

## 2023-04-03 NOTE — TELEPHONE ENCOUNTER
Primary palliative medicine provider:   Dr Melinda Caballero    Medication requested:  Oxycodone 10mg tablet  Xtampza ER 9mg    If for pain, how has the patient been taking their pain medicine?      Next scheduled appointment:  6/26/2023    PDMP review:  Patient Id Prescription #  Filled Written Drug Label Qty Days Strength MME** Prescriber Pharmacy Payment REFILL #/Auth State Detail   1  [id number] 03/20/2023 03/20/2023 oxyCODONE HCL (Tablet)  120 0 13 10  46 [employer]  [employer] 0 / 0 [place]    1  [id number] 03/20/2023 03/20/2023 Xtampza Er (Capsule, Extended Release)  14 0 14 9 MG NA [employer]  [employer] 0 / 0 [place]    1  [id number] 03/06/2023 03/06/2023 oxyCODONE HCL (Tablet)  120 0 13 10  46 [employer]  [employer] 0 / 0 [place]    1  [id number] 03/06/2023 03/06/2023 Xtampza Er (Capsule, Extended Release)  14 0 14 9 MG  [facility]            Pharmacy: [facility]

## 2023-04-27 ENCOUNTER — TELEPHONE (OUTPATIENT)
Age: 53
End: 2023-04-27

## 2023-04-27 DIAGNOSIS — G62.9 NEUROPATHY: Primary | ICD-10-CM

## 2023-05-01 DIAGNOSIS — Z51.5 PALLIATIVE CARE PATIENT: ICD-10-CM

## 2023-05-01 DIAGNOSIS — M54.42 CHRONIC BILATERAL LOW BACK PAIN WITH BILATERAL SCIATICA: ICD-10-CM

## 2023-05-01 DIAGNOSIS — C90.00 MULTIPLE MYELOMA NOT HAVING ACHIEVED REMISSION (HCC): ICD-10-CM

## 2023-05-01 DIAGNOSIS — G89.3 CANCER ASSOCIATED PAIN: ICD-10-CM

## 2023-05-01 DIAGNOSIS — G89.29 CHRONIC BILATERAL LOW BACK PAIN WITH BILATERAL SCIATICA: ICD-10-CM

## 2023-05-01 DIAGNOSIS — M54.41 CHRONIC BILATERAL LOW BACK PAIN WITH BILATERAL SCIATICA: ICD-10-CM

## 2023-05-01 RX ORDER — OXYCODONE HYDROCHLORIDE 10 MG/1
10-15 TABLET ORAL EVERY 4 HOURS PRN
Qty: 120 TABLET | Refills: 0 | Status: SHIPPED | OUTPATIENT
Start: 2023-05-01

## 2023-05-02 RX ORDER — PREGABALIN 100 MG/1
CAPSULE ORAL
Qty: 180 CAPSULE | Refills: 1 | Status: SHIPPED | OUTPATIENT
Start: 2023-05-02

## 2023-05-15 DIAGNOSIS — G89.3 CANCER ASSOCIATED PAIN: ICD-10-CM

## 2023-05-15 DIAGNOSIS — Z51.5 PALLIATIVE CARE PATIENT: ICD-10-CM

## 2023-05-15 DIAGNOSIS — C90.00 MULTIPLE MYELOMA NOT HAVING ACHIEVED REMISSION (HCC): ICD-10-CM

## 2023-05-15 RX ORDER — OXYCODONE HYDROCHLORIDE 10 MG/1
10-15 TABLET ORAL EVERY 4 HOURS PRN
Qty: 120 TABLET | Refills: 0 | Status: SHIPPED | OUTPATIENT
Start: 2023-05-15

## 2023-05-22 DIAGNOSIS — M54.41 CHRONIC BILATERAL LOW BACK PAIN WITH BILATERAL SCIATICA: ICD-10-CM

## 2023-05-22 DIAGNOSIS — M54.42 CHRONIC BILATERAL LOW BACK PAIN WITH BILATERAL SCIATICA: ICD-10-CM

## 2023-05-22 DIAGNOSIS — G89.29 CHRONIC BILATERAL LOW BACK PAIN WITH BILATERAL SCIATICA: ICD-10-CM

## 2023-05-23 RX ORDER — METHOCARBAMOL 750 MG/1
TABLET, FILM COATED ORAL
Qty: 120 TABLET | Refills: 3 | Status: SHIPPED | OUTPATIENT
Start: 2023-05-23

## 2023-05-30 DIAGNOSIS — G89.3 CANCER ASSOCIATED PAIN: ICD-10-CM

## 2023-05-30 DIAGNOSIS — Z51.5 PALLIATIVE CARE PATIENT: ICD-10-CM

## 2023-05-30 DIAGNOSIS — C90.00 MULTIPLE MYELOMA NOT HAVING ACHIEVED REMISSION (HCC): ICD-10-CM

## 2023-05-30 RX ORDER — OXYCODONE HYDROCHLORIDE 10 MG/1
10-15 TABLET ORAL EVERY 4 HOURS PRN
Qty: 120 TABLET | Refills: 0 | Status: SHIPPED | OUTPATIENT
Start: 2023-05-30

## 2023-05-30 NOTE — TELEPHONE ENCOUNTER
Cont[place] Sub[place]    [place] or [place]  reviewed: No red flags were identified; safe to proceed with prescription  Fermín Waldron 05/15/2023  05/15/2023   2  Xtampza Er 9 Mg Capsule 14 00  14  Ch Smi  [id number]   Hilario ([id number])   15 00 MME  [place]     05/15/2023  05/15/2023   2  Oxycodone Hcl (Ir) 10 Mg Tab 120 00  14  Ch Smi  [id number]   Rit ([id number])   128 57 M[place]  [place]     05/03/2023 05/02/2023   2  Pregabalin 100 Mg Capsule 180 00  90  Ja Not  [id number]   Hilario ([id number])   1 34 LME  [place]     05/01/2023 05/01/2023   2  Oxycodone Hcl (Ir) 10 Mg Tab 120 00  14  Ch Smi  [id number]   Hilario ([id number])   128 57 M[place]     05/01/2023 05/01/2023   2  Xtampza Er 9 Mg Capsule 14 00  14  Ch Smi  [id number]   Hilario ([id number])   15 00 [place]     04/17/2023 04/17/2023   2  Oxycodone Hcl (Ir) 10 Mg Tab 120 00  13  Ch Smi  [id number]   Hilario ([id number])   138 46 MME  [place]     04/17/2023 04/17/2023   2  Xtampza Er 9 Mg Capsule 14 00  14  Ch Smi  [id number]   Hilario ([id number])   15 00 MME  [place]      Reviewed last note to verify regimen  Sending refill      Joby Laws MD  Palliative Medicine & Supportive Care  Internal Medicine  Available via [facility] Text  Office: [phone]  Fax: [phone]

## 2023-05-30 NOTE — TELEPHONE ENCOUNTER
Primary palliative medicine provider:   Dr Lelia Sellers    Last appointment:  3/9/2023  Next scheduled appointment:  6/26/2023    Pharm[facility]:  [facility]

## 2023-06-12 DIAGNOSIS — G89.3 CANCER ASSOCIATED PAIN: ICD-10-CM

## 2023-06-12 DIAGNOSIS — Z51.5 PALLIATIVE CARE PATIENT: ICD-10-CM

## 2023-06-12 DIAGNOSIS — C90.00 MULTIPLE MYELOMA NOT HAVING ACHIEVED REMISSION (HCC): ICD-10-CM

## 2023-06-12 RX ORDER — OXYCODONE HYDROCHLORIDE 10 MG/1
10-15 TABLET ORAL EVERY 4 HOURS PRN
Qty: 120 TABLET | Refills: 0 | Status: SHIPPED | OUTPATIENT
Start: 2023-06-12

## 2023-06-12 NOTE — TELEPHONE ENCOUNTER
Reviewed most recent [facility] note, reviewed PDMP  Refilling medications for Tues 6/13/23 or later

## 2023-06-12 NOTE — TELEPHONE ENCOUNTER
Primary palliative medicine provider:   Dr Kp Jacobs    Last appointment:  3/9/2023  Next scheduled appointment:  6/26/2023    [facility]

## 2023-06-14 ENCOUNTER — OFFICE VISIT (OUTPATIENT)
Dept: CARDIOLOGY CLINIC | Facility: MEDICAL CENTER | Age: 53
End: 2023-06-14
Payer: COMMERCIAL

## 2023-06-14 VITALS
WEIGHT: 184 LBS | HEART RATE: 78 BPM | SYSTOLIC BLOOD PRESSURE: 116 MMHG | HEIGHT: 63 IN | OXYGEN SATURATION: 95 % | BODY MASS INDEX: 32.6 KG/M2 | DIASTOLIC BLOOD PRESSURE: 70 MMHG

## 2023-06-14 DIAGNOSIS — I10 PRIMARY HYPERTENSION: Primary | Chronic | ICD-10-CM

## 2023-06-14 DIAGNOSIS — R07.9 CHEST PAIN: ICD-10-CM

## 2023-06-14 DIAGNOSIS — E85.9 AMYLOIDOSIS, UNSPECIFIED TYPE (HCC): ICD-10-CM

## 2023-06-14 PROCEDURE — 99204 OFFICE O/P NEW MOD 45 MIN: CPT | Performed by: INTERNAL MEDICINE

## 2023-06-14 NOTE — PATIENT INSTRUCTIONS
Recommendations:  1  Continue current medications  2  No further testing indicated at this time  3  Follow up in one year

## 2023-06-14 NOTE — PROGRESS NOTES
Cardiology   Golden Canales 45 y o  female MRN: [id number]        Impression:  1  Multiple myeloma with amyloidosis - in remission  Possibly history of cardiomyopathy  2  ? History of cardiomyopathy - normal echo 1/23 - no indication for further testing at that time  Given that patient is in remission and no evidence of cardiomyopathy, no indication for further testing  3  Hypertension - controlled  4  ? Dyslipidemia  Recommendations:  1  Continue current medications  2  No further testing indicated at this time  3  Follow up in one year  HPI: Golden Canales is a 45y o  year old female with a history of hypertension, dyslipidemia, multiple myeloma and amyloidosis who presents for evaluation  Echo 1/23 demostrated EF 60%, GLS equivocal, and mild TR presents for evaluation  Was in ED 2 months ago with chest pain - intermittent  Pleuritic and pressure sensation  Improved with toradol - no further episodes  Currently no chest pain, shortness of breath, or palpitations  Smokes 1/2 ppd  Review of Systems   Constitutional: Negative  HENT: Negative  Eyes: Negative  Respiratory: Negative for chest tightness and shortness of breath  Cardiovascular: Negative for chest pain, palpitations and leg swelling  Gastrointestinal: Negative  Endocrine: Negative  Genitourinary: Negative  Musculoskeletal: Negative  Skin: Negative  Allergic/Immunologic: Negative  Neurological: Negative  Hematological: Negative  Psychiatric/Behavioral: Negative  All other systems reviewed and are negative          Past Medical History:   Diagnosis Date   • Anemia    • Cardiomyopathy [facility])    • Chronic back pain    • Chronic narcotic dependence (HCC)    • Colon polyp    • Disease of thyroid gland    • GERD (gastroesophageal reflux disease)    • H/O autologous stem cell transplant ([facility] [address] ) 03/30/2022   • Hypertension    • Multiple myeloma (HCC)     in remission   • Neuropathy Past Surgical History:   Procedure Laterality Date   •  SECTION     • COLONOSCOPY     • EYE SURGERY Left     biopsy   • THYROID LOBECTOMY Left 2023    Procedure: LEFT HEMITHYROIDECTOMY;  Surgeon: Yun Masters MD;  Location: [facility];  Service: Surgical Oncology   • US GUIDED THYROID BIOPSY  10/20/2022     Social History     Substance and Sexual Activity   Alcohol Use Not Currently     Social History     Substance and Sexual Activity   Drug Use Yes   • Types: Oxycodone    Comment: 1 year     Social History     Tobacco Use   Smoking Status Heavy Smoker   • Packs/day:    • Years:    • Total pack years:    • Types: Cigarettes   • Start date: 1992   Smokeless Tobacco Current     Family History   Problem Relation Age of Onset   • No Known Problems Mother    • No Known Problems Father    • Colon cancer Neg Hx    • Diabetes Neg Hx        Allergies:  No Known Allergies    Medications:     Current Outpatient Medications:   •  acyclovir (ZOVIRAX) 800 mg tablet, Take 1 tablet (800 mg total) by mouth 2 (two) times a day, Disp: 180 tablet, Rfl: 3  •  albuterol (PROVENTIL HFA,VENTOLIN HFA) 90 mcg/act inhaler, Inhale 2 puffs every 6 (six) hours as needed for wheezing or shortness of breath, Disp: 18 g, Rfl: 11  •  amLODIPine (NORVASC) 10 mg tablet, Take 1 tablet (10 mg total) by mouth every morning, Disp: 90 tablet, Rfl: 1  •  atorvastatin (LIPITOR) 40 mg tablet, Take 40 mg by mouth, Disp: , Rfl:   •  bisacodyl (DULCOLAX) 10 mg suppository, Insert 1 suppository (10 mg total) into the rectum daily as needed for constipation, Disp: 12 suppository, Rfl: 0  •  DULoxetine (CYMBALTA) 30 mg delayed release capsule, take 1 capsule by mouth once daily, Disp: 90 capsule, Rfl: 1  •  famotidine (PEPCID) 40 MG tablet, TAKE 1 TABLET BY MOUTH ONCE DAILY, Disp: 90 tablet, Rfl: 1  •  fluticasone (FLONASE) 50 mcg/act nasal spray, 2 sprays into each nostril daily, Disp: 48 g, Rfl: 3  •  Ibuprofen-Acetaminophen (Advil Dual Action) 125-250 MG TABS, Take by mouth, Disp: , Rfl:   •  Lidocaine HCl 4 % CREA, Apply topically 4 (four) times a day as needed (neuropathic pain), Disp: 133 g, Rfl: 0  •  methocarbamol (ROBAXIN) 750 mg tablet, take 1 tablet by mouth every 6 hours if needed for muscle spasm, Disp: 120 tablet, Rfl: 3  •  metoprolol succinate (TOPROL-XL) 25 mg 24 hr tablet, Take 1 tablet (25 mg total) by mouth daily, Disp: 90 tablet, Rfl: 1  •  naloxone (NARCAN) 4 mg/0 1 mL nasal spray, Administer 1 spray into a nostril   If no response after 2-3 minutes, give another dose in the other nostril using a new spray , Disp: 1 each, Rfl: 1  •  NON FORMULARY, -Balance of Nature vegetable and fruit supplements, Disp: , Rfl:   •  oxyCODONE (ROXICODONE) 10 MG TABS, Take 1-1 5 tablets (10-15 mg total) by mouth every 4 (four) hours as needed (cancer-related pain) Max Daily Amount: 90 mg, Disp: 120 tablet, Rfl: 0  •  oxyCODONE ER (Xtampza ER) 9 mg extended release capsule, Take 1 tablet (9 mg total) by mouth daily at bedtime Max Daily Amount: 9 mg Do not start before June 13, 2023 , Disp: 14 capsule, Rfl: 0  •  Polyethyl Glycol-Propyl Glycol (Systane) 0 4-0 3 % GEL, 1 drop, Disp: , Rfl:   •  polyethylene glycol (MIRALAX) 17 g packet, Take 17 g by mouth daily, Disp: 30 each, Rfl: 0  •  pregabalin (LYRICA) 100 mg capsule, take 1 capsule by mouth twice a day, Disp: 180 capsule, Rfl: 1  •  prochlorperazine (COMPAZINE) 10 mg tablet, , Disp: , Rfl:   •  senna-docusate sodium (SENOKOT S) 8 6-50 mg per tablet, Take 2 tablets by mouth daily at bedtime, Disp: 60 tablet, Rfl: 0      Wt Readings from Last 3 Encounters:   06/14/23 83 5 kg (184 lb)   03/09/23 86 3 kg (190 lb 3 2 oz)   02/22/23 85 3 kg (188 lb)     Temp Readings from Last 3 Encounters:   04/14/23 98 6 °F (37 °C) (Oral)   03/09/23 97 7 °F (36 5 °C) (Temporal)   02/22/23 97 7 °F (36 5 °C) (Temporal)     BP Readings from Last 3 Encounters:   06/14/23 116/70   04/14/23 103/78   03/09/23 100/60     Pulse Readings from Last 3 Encounters:   06/14/23 78   04/14/23 84   03/09/23 73         Physical Exam  HENT:      Head: Atraumatic  Mouth/Throat:      Mouth: Mucous membranes are moist    Eyes:      Extraocular Movements: Extraocular movements intact  Cardiovascular:      Rate and Rhythm: Normal rate and regular rhythm  Heart sounds: Normal heart sounds  Pulmonary:      Effort: Pulmonary effort is normal    Abdominal:      General: Abdomen is flat  Musculoskeletal:         General: Normal range of motion  Cervical back: Normal range of motion  Skin:     General: Skin is warm  Neurological:      General: No focal deficit present  Mental Status: She is alert and oriented to person, place, and time     Psychiatric:         Mood and Affect: Mood normal          Behavior: Behavior normal            Laboratory Studies:  CMP:  Lab Results   Component Value Date    ALT 6 (L) 04/14/2023    AST 15 04/14/2023    BUN 12 04/14/2023     04/14/2023    CO2 23 04/14/2023    CREATININE 1 02 04/14/2023    EGFR 62 04/14/2023    K 3 6 04/14/2023       Cardiac testing:   EKG reviewed personally: MILES Gibbs IRBBB

## 2023-06-23 DIAGNOSIS — R12 HEARTBURN: ICD-10-CM

## 2023-06-23 DIAGNOSIS — G62.9 NEUROPATHY: ICD-10-CM

## 2023-06-23 RX ORDER — DULOXETIN HYDROCHLORIDE 30 MG/1
CAPSULE, DELAYED RELEASE ORAL
Qty: 90 CAPSULE | Refills: 1 | Status: SHIPPED | OUTPATIENT
Start: 2023-06-23

## 2023-06-23 RX ORDER — FAMOTIDINE 40 MG/1
TABLET, FILM COATED ORAL
Qty: 90 TABLET | Refills: 1 | Status: SHIPPED | OUTPATIENT
Start: 2023-06-23

## 2023-06-26 ENCOUNTER — OFFICE VISIT (OUTPATIENT)
Dept: PALLIATIVE MEDICINE | Facility: CLINIC | Age: 53
End: 2023-06-26

## 2023-06-26 VITALS
HEIGHT: 63 IN | HEART RATE: 68 BPM | BODY MASS INDEX: 33.13 KG/M2 | WEIGHT: 187 LBS | TEMPERATURE: 97.9 F | DIASTOLIC BLOOD PRESSURE: 60 MMHG | SYSTOLIC BLOOD PRESSURE: 100 MMHG | OXYGEN SATURATION: 96 %

## 2023-06-26 DIAGNOSIS — Z51.5 PALLIATIVE CARE PATIENT: ICD-10-CM

## 2023-06-26 DIAGNOSIS — C90.00 MULTIPLE MYELOMA NOT HAVING ACHIEVED REMISSION (HCC): ICD-10-CM

## 2023-06-26 DIAGNOSIS — G89.3 CANCER RELATED PAIN: ICD-10-CM

## 2023-06-26 DIAGNOSIS — E85.9 AMYLOIDOSIS, UNSPECIFIED TYPE (HCC): ICD-10-CM

## 2023-06-26 DIAGNOSIS — C90.01 MULTIPLE MYELOMA IN REMISSION (HCC): Primary | Chronic | ICD-10-CM

## 2023-06-26 DIAGNOSIS — G89.3 CANCER ASSOCIATED PAIN: ICD-10-CM

## 2023-06-26 RX ORDER — OXYCODONE HYDROCHLORIDE 10 MG/1
10-15 TABLET ORAL EVERY 4 HOURS PRN
Qty: 120 TABLET | Refills: 0 | Status: SHIPPED | OUTPATIENT
Start: 2023-06-26

## 2023-06-26 RX ORDER — OXYCODONE HYDROCHLORIDE 10 MG/1
10-15 TABLET ORAL EVERY 4 HOURS PRN
Qty: 120 TABLET | Refills: 0 | Status: CANCELLED | OUTPATIENT
Start: 2023-06-26

## 2023-06-26 NOTE — TELEPHONE ENCOUNTER
Dr Nancy Badillo    Last appointment: 3/9/2023    Next scheduled appointment: 6/26/2023    Pharmacy: [facility]

## 2023-06-26 NOTE — PROGRESS NOTES
Outpatient Follow-Up - Palliative and Supportive Care   Qing William 45 y o  female [id number]    Assessment & Plan  Problem List Items Addressed This Visit        Other    [facility] - Primary (Chronic)    Relevant Medications    oxyCODONE (ROXICODONE) 10 MG TABS    Cancer related pain    Relevant Medications    oxyCODONE (ROXICODONE) 10 MG TABS    Palliative care patient    Relevant Medications    oxyCODONE (ROXICODONE) 10 MG TABS    Amyloidosis ([facility] [address] )   Other Visit Diagnoses     Multiple myeloma not having achieved remission (HCC)        Relevant Medications    oxyCODONE (ROXICODONE) 10 MG TABS    Cancer associated pain        Relevant Medications    oxyCODONE (ROXICODONE) 10 MG TABS        #symptom management  #cancer-related pain  #opioid taper   -continue APAP 1000 mg PO Q8H PRN              - max daily 4000 mg   - continue oxy-IR 10-15 mg PO Q4H PRN   - discontinue oxy-ER    Discussed opioid taper with plan to decrease 10-30% total daily opioids each month  Discussed s/sxs of opioid withdrawal, including nausea, vomiting, diarrhea, rhinorrhea, yawning, piloerection, insomnia, agitation  All questions/concerns addressed  Total Daily Opioid Use -> 99 mg oxy [10% reduction 10 mg, will discontinue oxy-ER 9 mg to start]      Patient with upcoming Rheum consultation to evaluate diffuse joint pain, which is primary pain that patient utilizes opioids for      #neuropathic pain   - continue pregabalin 100 mg PO BID   - continue duloxetine 30 mg PO QDaily   - continue lidocaine cream QID PRN     #muscle spasms   - continue methocarbamol 500 mg PO Q6H PRN     #chemotherapy induced nausea   - continue ondansetron 8 mg PO Q8H PRN   - continue prochlorperazine 10 mg PO Q6H PRN     #OIC   - continue senna-docusate 2 tabs PO QHS   - continue miralax 17 g PO QDaily   - continue bisacodyl 10 mg [place] QDaily PRN     #anxiety/depression   - continue duloxetine 30 mg PO QDaily    #goals of care   - BMBx with no e/o malignancy, continues on surveillance, upcoming PET CT in 3 months    #psychosocial support   - emotional support provided   - not currently    - two adult children              - Cheri Dave [daughter]              - Ramesh Hernandez [mother] [phone]   - one sibling              - Mayi [sister]   - two grandchildren [aged 5 and 3]      Next [facility] Follow up in 4 weeks  Cont[place] Sub[place]    [place]  reviewed: No red flags were identified; safe to proceed with prescription  Arianne Link PDMP Review       Value Time User    PDMP Reviewed  Yes (P)  6/26/2023 11:42 AM Brittany Ruby MD          Medications adjusted this encounter:  Requested Prescriptions     Signed Prescriptions Disp Refills   • oxyCODONE (ROXICODONE) 10 MG TABS 120 tablet 0     Sig: Take 1-1 5 tablets (10-15 mg total) by mouth every 4 (four) hours as needed (cancer-related pain) Max Daily Amount: 90 mg     No orders of the defined types were placed in this encounter  Medications Discontinued During This Encounter   Medication Reason   • oxyCODONE ER (Xtampza ER) 9 mg extended release capsule Dose adjustment   • oxyCODONE (ROXICODONE) 10 MG TABS Reorder         Marla MARITO Quiñoneztingham was seen today for symptoms and planning cares related to above illnesses  I have reviewed the patient's controlled substance dispensing history in the Prescription Drug Monitoring Program in compliance with the [facility] regulations before prescribing any controlled substances  They are invited to continue to follow with us  If there are questions or concerns, please contact us through our clinic/answering service 24 hours a day, seven days a week  Brittany Ruby MD  [facility]  [phone]      Visit Information    Accompanied By: [address] of History: Self, Medical record    History Limitations: None      History of Present Illness    Marla Bean Yossirosey is a 45 y  o  "female who presents in follow up of symptoms related to multiple myeloma s/p PBSCT [03/30/2022]  Pertinent issues include: symptom management, pain, neoplasm related, depression or anxiety, assessment of goals of care, disease process education and discussion of prognosis  Patient reports continued diffuse joint pain and intermittent diffuse myalgia  Use of oxy-IR 15 x 6 doses/day, unchanged  Denies nausea, vomiting  Appetite intact, weight fluctuates, however, overall stable  BM regular, daily  Variable sleep, generally attributes to stressors and anxiety, primarily regarding returning to work full time with struggle given joint pain  Past medical, surgical, social, and family histories are reviewed and pertinent updates are made  Review of Systems   Constitutional: Positive for malaise/fatigue  Negative for chills, decreased appetite, fever and weight loss  HENT: Negative for congestion  Eyes: Negative for visual disturbance  Cardiovascular: Negative for chest pain  Respiratory: Negative for shortness of breath  Musculoskeletal: Positive for joint pain and myalgias  Negative for falls  Gastrointestinal: Negative for abdominal pain, constipation, nausea and vomiting  Genitourinary: Negative for frequency  Neurological: Negative for headaches  Psychiatric/Behavioral: The patient has insomnia  All other systems reviewed and are negative  Vital Signs    /60 (BP Location: Left arm, Patient Position: Sitting, Cuff Size: Standard)   Pulse 68   Temp 97 9 °F (36 6 °C) (Temporal)   Ht 5' 3\" (1 6 m)   Wt 84 8 kg (187 lb)   SpO2 96%   BMI 33 13 kg/m²     Physical Exam and Objective Data  Physical Exam  Vitals and nursing note reviewed  Constitutional:       General: She is awake  Appearance: She is not diaphoretic  Comments: Sitting up in NAD, grandchild present (active)  BMI 33 1  Non-toxic appearing   HENT:      Head: Normocephalic and atraumatic        Right " Ear: External ear normal       Left Ear: External ear normal       Nose: No rhinorrhea  Eyes:      Comments: No gaze preference   Cardiovascular:      Rate and Rhythm: Normal rate  Pulmonary:      Effort: No tachypnea, accessory muscle usage or respiratory distress  Comments: Completes full sentences without difficulty  Musculoskeletal:      Cervical back: Normal range of motion  Neurological:      General: No focal deficit present  Mental Status: She is alert and oriented to person, place, and time  Psychiatric:         Attention and Perception: Attention normal          Mood and Affect: Mood and affect normal          Speech: Speech normal          Cognition and Memory: Cognition and memory normal            Radiology and Laboratory:  I personally reviewed and interpreted the following results:    Most Recent COVID-19 Results:  Lab Results   Component Value Date/Time    SARSCOV2 Negative 01/23/2023 09:28 PM    SARSCOV2 Negative 10/30/2021 12:10 AM       Most Recent Lab Work:  Lab Results   Component Value Date/Time    SODIUM 137 04/14/2023 12:22 PM    K 3 6 04/14/2023 12:22 PM    BUN 12 04/14/2023 12:22 PM    CREATININE 1 02 04/14/2023 12:22 PM    GLUC 150 (H) 04/14/2023 12:22 PM     Lab Results   Component Value Date/Time    AST 15 04/14/2023 12:22 PM    ALT 6 (L) 04/14/2023 12:22 PM    ALB 4 2 04/14/2023 12:22 PM     Lab Results   Component Value Date/Time    HGB 16 8 (H) 04/14/2023 12:22 PM    WBC 8 85 04/14/2023 12:22 PM     04/14/2023 12:22 PM    INR 1 03 04/14/2023 12:22 PM    PTT 21 (L) 04/14/2023 12:22 PM       Most Recent Imaging [last 30 days]:  No results found      35 minutes was spent face to face with Nica Nieto and her grandchild with greater than 50% of the time spent in counseling or coordination of care including discussions of provided medical updates, discussed palliative care, determined goals of care, determined social/family support, discussed plans of care, discussed symptom management, provided psychosocial support  Opioid taper  PDMP Reviewed  All of the patient's or agent's questions were answered during this discussion

## 2023-07-10 DIAGNOSIS — Z51.5 PALLIATIVE CARE PATIENT: ICD-10-CM

## 2023-07-10 DIAGNOSIS — C90.00 MULTIPLE MYELOMA NOT HAVING ACHIEVED REMISSION (HCC): ICD-10-CM

## 2023-07-10 DIAGNOSIS — G89.3 CANCER ASSOCIATED PAIN: ICD-10-CM

## 2023-07-10 RX ORDER — OXYCODONE HYDROCHLORIDE 10 MG/1
10-15 TABLET ORAL EVERY 4 HOURS PRN
Qty: 120 TABLET | Refills: 0 | Status: SHIPPED | OUTPATIENT
Start: 2023-07-10

## 2023-07-10 NOTE — TELEPHONE ENCOUNTER
Dr Kasia Pro    Last appointment: 6/26/2023    Next scheduled appointment: 8/7/2023    Pharmacy: [facility]

## 2023-07-24 ENCOUNTER — APPOINTMENT (OUTPATIENT)
Dept: LAB | Facility: HOSPITAL | Age: 53
End: 2023-07-24
Payer: COMMERCIAL

## 2023-07-24 DIAGNOSIS — C90.00 MULTIPLE MYELOMA NOT HAVING ACHIEVED REMISSION (HCC): ICD-10-CM

## 2023-07-24 DIAGNOSIS — C90.00 MULTIPLE MYELOMA, REMISSION STATUS UNSPECIFIED (HCC): ICD-10-CM

## 2023-07-24 DIAGNOSIS — Z51.5 PALLIATIVE CARE PATIENT: ICD-10-CM

## 2023-07-24 DIAGNOSIS — G89.3 CANCER ASSOCIATED PAIN: ICD-10-CM

## 2023-07-24 DIAGNOSIS — I10 PRIMARY HYPERTENSION: Primary | ICD-10-CM

## 2023-07-24 LAB
ALBUMIN SERPL BCP-MCNC: 4.2 G/DL (ref 3.5–5)
ALP SERPL-CCNC: 79 U/L (ref 34–104)
ALT SERPL W P-5'-P-CCNC: 7 U/L (ref 7–52)
ANION GAP SERPL CALCULATED.3IONS-SCNC: 6 MMOL/L
AST SERPL W P-5'-P-CCNC: 10 U/L (ref 13–39)
BASOPHILS # BLD AUTO: 0.05 THOUSANDS/ÂΜL (ref 0–0.1)
BASOPHILS NFR BLD AUTO: 1 % (ref 0–1)
BILIRUB SERPL-MCNC: 0.4 MG/DL (ref 0.2–1)
BUN SERPL-MCNC: 28 MG/DL (ref 5–25)
CALCIUM SERPL-MCNC: 9.6 MG/DL (ref 8.4–10.2)
CHLORIDE SERPL-SCNC: 107 MMOL/L (ref 96–108)
CO2 SERPL-SCNC: 27 MMOL/L (ref 21–32)
CREAT SERPL-MCNC: 1.28 MG/DL (ref 0.6–1.3)
CRP SERPL QL: 1.9 MG/L
EOSINOPHIL # BLD AUTO: 0.15 THOUSAND/ÂΜL (ref 0–0.61)
EOSINOPHIL NFR BLD AUTO: 2 % (ref 0–6)
ERYTHROCYTE [DISTWIDTH] IN BLOOD BY AUTOMATED COUNT: 14.7 % (ref 11.6–15.1)
ERYTHROCYTE [SEDIMENTATION RATE] IN BLOOD: 6 MM/HOUR (ref 0–29)
GFR SERPL CREATININE-BSD FRML MDRD: 47 ML/MIN/1.73SQ M
GLUCOSE SERPL-MCNC: 106 MG/DL (ref 65–140)
HCT VFR BLD AUTO: 51.2 % (ref 34.8–46.1)
HGB BLD-MCNC: 17.5 G/DL (ref 11.5–15.4)
IGA SERPL-MCNC: 158 MG/DL (ref 70–400)
IGG SERPL-MCNC: 1400 MG/DL (ref 700–1600)
IGM SERPL-MCNC: 66 MG/DL (ref 40–230)
IMM GRANULOCYTES # BLD AUTO: 0.03 THOUSAND/UL (ref 0–0.2)
IMM GRANULOCYTES NFR BLD AUTO: 0 % (ref 0–2)
LYMPHOCYTES # BLD AUTO: 3.04 THOUSANDS/ÂΜL (ref 0.6–4.47)
LYMPHOCYTES NFR BLD AUTO: 38 % (ref 14–44)
MAGNESIUM SERPL-MCNC: 2.4 MG/DL (ref 1.9–2.7)
MCH RBC QN AUTO: 30.5 PG (ref 26.8–34.3)
MCHC RBC AUTO-ENTMCNC: 34.2 G/DL (ref 31.4–37.4)
MCV RBC AUTO: 89 FL (ref 82–98)
MONOCYTES # BLD AUTO: 0.58 THOUSAND/ÂΜL (ref 0.17–1.22)
MONOCYTES NFR BLD AUTO: 7 % (ref 4–12)
NEUTROPHILS # BLD AUTO: 4.19 THOUSANDS/ÂΜL (ref 1.85–7.62)
NEUTS SEG NFR BLD AUTO: 52 % (ref 43–75)
NRBC BLD AUTO-RTO: 0 /100 WBCS
PLATELET # BLD AUTO: 208 THOUSANDS/UL (ref 149–390)
PMV BLD AUTO: 9.6 FL (ref 8.9–12.7)
POTASSIUM SERPL-SCNC: 3.8 MMOL/L (ref 3.5–5.3)
PROT SERPL-MCNC: 7.1 G/DL (ref 6.4–8.4)
RBC # BLD AUTO: 5.73 MILLION/UL (ref 3.81–5.12)
SODIUM SERPL-SCNC: 140 MMOL/L (ref 135–147)
WBC # BLD AUTO: 8.04 THOUSAND/UL (ref 4.31–10.16)

## 2023-07-24 PROCEDURE — 83521 IG LIGHT CHAINS FREE EACH: CPT

## 2023-07-24 PROCEDURE — 83615 LACTATE (LD) (LDH) ENZYME: CPT

## 2023-07-24 PROCEDURE — 85025 COMPLETE CBC W/AUTO DIFF WBC: CPT

## 2023-07-24 PROCEDURE — 84165 PROTEIN E-PHORESIS SERUM: CPT

## 2023-07-24 PROCEDURE — 82784 ASSAY IGA/IGD/IGG/IGM EACH: CPT

## 2023-07-24 PROCEDURE — 80053 COMPREHEN METABOLIC PANEL: CPT

## 2023-07-24 PROCEDURE — 83735 ASSAY OF MAGNESIUM: CPT

## 2023-07-24 PROCEDURE — 86140 C-REACTIVE PROTEIN: CPT

## 2023-07-24 PROCEDURE — 36415 COLL VENOUS BLD VENIPUNCTURE: CPT

## 2023-07-24 PROCEDURE — 82232 ASSAY OF BETA-2 PROTEIN: CPT

## 2023-07-24 PROCEDURE — 83625 ASSAY OF LDH ENZYMES: CPT

## 2023-07-24 PROCEDURE — 85652 RBC SED RATE AUTOMATED: CPT

## 2023-07-24 RX ORDER — OXYCODONE HYDROCHLORIDE 10 MG/1
10-15 TABLET ORAL EVERY 4 HOURS PRN
Qty: 120 TABLET | Refills: 0 | Status: SHIPPED | OUTPATIENT
Start: 2023-07-24 | End: 2023-08-07 | Stop reason: SDUPTHER

## 2023-07-24 NOTE — TELEPHONE ENCOUNTER
Cont[place] Sub[place]    [place]  reviewed: No red flags were identified; safe to proceed with prescription. Nada Barrett     07/10/2023  07/10/2023   2  Oxycodone Hcl (Ir) 10 Mg Tab 120.00  14  Ch Smi  [id number]   Hilario ([id number])   128.57 MME  [place]    06/26/2023 06/26/2023   2  Oxycodone Hcl (Ir) 10 Mg Tab 120.00  13  Ch Smi  [id number]   Hilario ([id number])   138.46 MME  [place]    06/19/2023 06/12/2023   2  Xtampza Er 9 Mg Capsule 14.00  14  Ma Ker  [id number]   Hilario ([phone])   15.00 MME  [place]    06/12/2023 06/12/2023   2  Oxycodone Hcl (Ir) 10 Mg Tab 120.00  14  Roberta Shirmohamud  [id number]   Hilario ([id number])   128.57 MME  Comm Ins  AMY Khan MD  Palliative Medicine & Supportive Care  Internal Medicine  Available via [facility] Text  Office: [phone]  Fax: [phone]

## 2023-07-25 LAB
ALBUMIN SERPL ELPH-MCNC: 4.3 G/DL (ref 3.2–5.1)
ALBUMIN SERPL ELPH-MCNC: 60.5 % (ref 48–70)
ALPHA1 GLOB SERPL ELPH-MCNC: 0.25 G/DL (ref 0.15–0.47)
ALPHA1 GLOB SERPL ELPH-MCNC: 3.5 % (ref 1.8–7)
ALPHA2 GLOB SERPL ELPH-MCNC: 0.58 G/DL (ref 0.42–1.04)
ALPHA2 GLOB SERPL ELPH-MCNC: 8.1 % (ref 5.9–14.9)
BETA GLOB ABNORMAL SERPL ELPH-MCNC: 0.4 G/DL (ref 0.31–0.57)
BETA1 GLOB SERPL ELPH-MCNC: 5.7 % (ref 4.7–7.7)
BETA2 GLOB SERPL ELPH-MCNC: 3.9 % (ref 3.1–7.9)
BETA2+GAMMA GLOB SERPL ELPH-MCNC: 0.28 G/DL (ref 0.2–0.58)
GAMMA GLOB ABNORMAL SERPL ELPH-MCNC: 1.3 G/DL (ref 0.4–1.66)
GAMMA GLOB SERPL ELPH-MCNC: 18.3 % (ref 6.9–22.3)
IGG/ALB SER: 1.53 {RATIO} (ref 1.1–1.8)
KAPPA LC FREE SER-MCNC: 32.8 MG/L (ref 3.3–19.4)
KAPPA LC FREE/LAMBDA FREE SER: 2.33 {RATIO} (ref 0.26–1.65)
LAMBDA LC FREE SERPL-MCNC: 14.1 MG/L (ref 5.7–26.3)
LDH SERPL-CCNC: 100 IU/L (ref 119–226)
LDH1 CFR SERPL ELPH: 28 % (ref 17–32)
LDH2 CFR SERPL ELPH: 31 % (ref 25–40)
LDH3 CFR SERPL ELPH: 20 % (ref 17–27)
LDH4 CFR SERPL ELPH: 8 % (ref 5–13)
LDH5 CFR SERPL ELPH: 13 % (ref 4–20)
PROT PATTERN SERPL ELPH-IMP: NORMAL
PROT SERPL-MCNC: 7.1 G/DL (ref 6.4–8.2)

## 2023-07-25 PROCEDURE — 84165 PROTEIN E-PHORESIS SERUM: CPT | Performed by: PATHOLOGY

## 2023-07-26 LAB — B2 MICROGLOB SERPL-MCNC: 1.9 MG/L (ref 0.6–2.4)

## 2023-07-29 DIAGNOSIS — I10 PRIMARY HYPERTENSION: ICD-10-CM

## 2023-07-29 RX ORDER — AMLODIPINE BESYLATE 10 MG/1
10 TABLET ORAL EVERY MORNING
Qty: 90 TABLET | Refills: 3 | Status: SHIPPED | OUTPATIENT
Start: 2023-07-29

## 2023-07-29 RX ORDER — METOPROLOL SUCCINATE 25 MG/1
25 TABLET, EXTENDED RELEASE ORAL DAILY
Qty: 90 TABLET | Refills: 3 | Status: SHIPPED | OUTPATIENT
Start: 2023-07-29

## 2023-08-04 ENCOUNTER — HOSPITAL ENCOUNTER (OUTPATIENT)
Dept: RADIOLOGY | Age: 53
Discharge: HOME | End: 2023-08-04
Payer: COMMERCIAL

## 2023-08-04 DIAGNOSIS — C90.00 MULTIPLE MYELOMA NOT HAVING ACHIEVED REMISSION (HCC): ICD-10-CM

## 2023-08-04 LAB — GLUCOSE SERPL-MCNC: 100 MG/DL (ref 65–140)

## 2023-08-04 PROCEDURE — A9552 F18 FDG: HCPCS

## 2023-08-04 PROCEDURE — 78816 PET IMAGE W/CT FULL BODY: CPT

## 2023-08-04 PROCEDURE — 82948 REAGENT STRIP/BLOOD GLUCOSE: CPT

## 2023-08-07 ENCOUNTER — OFFICE VISIT (OUTPATIENT)
Dept: PALLIATIVE MEDICINE | Facility: CLINIC | Age: 53
End: 2023-08-07
Payer: COMMERCIAL

## 2023-08-07 VITALS
HEIGHT: 63 IN | HEART RATE: 72 BPM | RESPIRATION RATE: 14 BRPM | SYSTOLIC BLOOD PRESSURE: 130 MMHG | DIASTOLIC BLOOD PRESSURE: 68 MMHG | WEIGHT: 169.8 LBS | TEMPERATURE: 97.1 F | BODY MASS INDEX: 30.09 KG/M2 | OXYGEN SATURATION: 96 %

## 2023-08-07 DIAGNOSIS — M54.42 CHRONIC BILATERAL LOW BACK PAIN WITH BILATERAL SCIATICA: Chronic | ICD-10-CM

## 2023-08-07 DIAGNOSIS — C90.01 MULTIPLE MYELOMA IN REMISSION (HCC): Primary | Chronic | ICD-10-CM

## 2023-08-07 DIAGNOSIS — C90.00 MULTIPLE MYELOMA NOT HAVING ACHIEVED REMISSION (HCC): ICD-10-CM

## 2023-08-07 DIAGNOSIS — E85.9 AMYLOIDOSIS, UNSPECIFIED TYPE (HCC): ICD-10-CM

## 2023-08-07 DIAGNOSIS — M54.41 CHRONIC BILATERAL LOW BACK PAIN WITH BILATERAL SCIATICA: Chronic | ICD-10-CM

## 2023-08-07 DIAGNOSIS — G89.3 CANCER RELATED PAIN: ICD-10-CM

## 2023-08-07 DIAGNOSIS — G89.29 CHRONIC BILATERAL LOW BACK PAIN WITH BILATERAL SCIATICA: Chronic | ICD-10-CM

## 2023-08-07 DIAGNOSIS — G89.3 CANCER ASSOCIATED PAIN: ICD-10-CM

## 2023-08-07 DIAGNOSIS — Z51.5 PALLIATIVE CARE PATIENT: ICD-10-CM

## 2023-08-07 PROCEDURE — 99214 OFFICE O/P EST MOD 30 MIN: CPT | Performed by: STUDENT IN AN ORGANIZED HEALTH CARE EDUCATION/TRAINING PROGRAM

## 2023-08-07 RX ORDER — OXYCODONE HYDROCHLORIDE 10 MG/1
15 TABLET ORAL EVERY 4 HOURS PRN
Qty: 105 TABLET | Refills: 0 | Status: SHIPPED | OUTPATIENT
Start: 2023-08-07

## 2023-08-07 NOTE — PROGRESS NOTES
Outpatient Follow-Up - Palliative and Supportive Care   Gary Palmer 45 y.o. female [id number]    Assessment & Plan  Problem List Items Addressed This Visit        Nervous and Auditory    Chronic bilateral low back pain with bilateral sciatica (Chronic)       Other    Multiple myeloma in remission (HCC) - Primary (Chronic)    Relevant Medications    oxyCODONE (ROXICODONE) 10 MG TABS    Cancer related pain    Relevant Medications    oxyCODONE (ROXICODONE) 10 MG TABS    Palliative care patient    Relevant Medications    oxyCODONE (ROXICODONE) 10 MG TABS    Amyloidosis (HCC)   Other Visit Diagnoses     Multiple myeloma not having achieved remission (HCC)        Relevant Medications    oxyCODONE (ROXICODONE) 10 MG TABS    Cancer associated pain        Relevant Medications    oxyCODONE (ROXICODONE) 10 MG TABS        #symptom management  #cancer-related pain  #opioid taper   -continue APAP 1000 mg PO Q8H PRN              - max daily 4000 mg   - decrease oxy-IR 15 mg PO Q4H PRN   - max 5 doses [75 mg]/day     Discussed opioid taper with plan to decrease 10-30% total daily opioids each month. Discussed s/sxs of opioid withdrawal, including nausea, vomiting, diarrhea, rhinorrhea, yawning, piloerection, insomnia, agitation. All questions/concerns addressed.     Total Daily Opioid Use -> 90 mg oxy [10-30% reduction 9-27 mg]. Discussed taper strategies, including 15 mg 5/day [max 75 mg] v 10 mg Q4H [max 60 mg].      #neuropathic pain   - continue pregabalin 100 mg PO BID   - continue duloxetine 30 mg PO QDaily   - continue lidocaine cream QID PRN     #muscle spasms   - continue methocarbamol 500 mg PO Q6H PRN     #chemotherapy induced nausea   - continue ondansetron 8 mg PO Q8H PRN   - continue prochlorperazine 10 mg PO Q6H PRN     #OIC   - continue senna-docusate 2 tabs PO QHS   - continue miralax 17 g PO QDaily   - continue bisacodyl 10 mg [place] QDaily PRN     #anxiety/depression   - continue duloxetine 30 mg PO Cy Smith    #goals of care   - reviewed recent PET scan 08/04, no soft tissue/osseous lesions   - plan to continue opioid taper    #psychosocial support   - emotional support provided   - not currently    - two adult children              - She Xavier [daughter]              - Maylene Osgood [mother] [phone]   - one sibling              - Mayi [sister]   - two grandchildren [aged 5 and 3]      Next [facility] Follow up in 4 weeks. Controlled Substance Review    [place]: No red flags were identified; safe to proceed with prescription. Willa Downs PDMP Review       Value Time User    PDMP Reviewed  Yes (P)  8/7/2023  9:08 AM Sunitha Hodge MD          Medications adjusted this encounter:  Requested Prescriptions     Signed Prescriptions Disp Refills   • oxyCODONE (ROXICODONE) 10 MG TABS 105 tablet 0     Sig: Take 1.5 tablets (15 mg total) by mouth every 4 (four) hours as needed (cancer-related pain) Max Daily Amount: 75 mg     No orders of the defined types were placed in this encounter. Medications Discontinued During This Encounter   Medication Reason   • oxyCODONE (ROXICODONE) 10 MG TABS Reorder         Andres Ortiz was seen today for symptoms and planning cares related to above illnesses. I have reviewed the patient's controlled substance dispensing history in the Prescription Drug Monitoring Program in compliance with the [facility] regulations before prescribing any controlled substances. They are invited to continue to follow with us. If there are questions or concerns, please contact us through our clinic/answering service 24 hours a day, seven days a week.     Sunitha Hodge MD  [facility] Palliative and Supportive Care  [phone]      Visit Information    Accompanied By: No one    Source of History: Self, Medical record    History Limitations: None      History of Present Illness    Marla Gonzalez is a 45 y.o. female who presents in follow up of symptoms related to multiple myeloma s/p PBSCT [03/30/2022]. Pertinent issues include: symptom management, pain, neoplasm related, assessment of goals of care, disease process education and discussion of prognosis, advance care planning. Patient reports overall from symptoms standpoint, relatively stable, will continue opioid taper. Pain persists, diffuse, non-localizing, polyarthralgia, use of oxy-IR 6-8 tabs/day [max Rx 9]. Tolerated d/c of oxy-ER. No reported opioid-induced withdrawal symptoms. Denies nausea, vomiting. Appetite intact. Some weight loss, however, reports being more active. BM regular, daily. Sleep fragmented given multiple life stressors. Past medical, surgical, social, and family histories are reviewed and pertinent updates are made. Review of Systems   Constitutional: Positive for malaise/fatigue. Negative for chills, decreased appetite, fever and weight loss. HENT: Negative for congestion. Eyes: Negative for visual disturbance. Cardiovascular: Negative for chest pain. Respiratory: Negative for shortness of breath. Musculoskeletal: Positive for joint pain and myalgias. Negative for falls and neck pain. Gastrointestinal: Negative for abdominal pain, constipation, nausea and vomiting. Genitourinary: Negative for frequency. Neurological: Negative for headaches. Psychiatric/Behavioral: The patient does not have insomnia. All other systems reviewed and are negative. Vital Signs    /68 (BP Location: Left arm, Patient Position: Sitting, Cuff Size: Standard)   Pulse 72   Temp (!) 97.1 °F (36.2 °C) (Tympanic)   Resp 14   Ht 5' 3" (1.6 m)   Wt 77 kg (169 lb 12.8 oz)   SpO2 96%   BMI 30.08 kg/m²     Physical Exam and Objective Data  Physical Exam  Vitals and nursing note reviewed. Constitutional:       General: She is awake. Appearance: She is not diaphoretic.       Comments: Sitting up comfortably in NAD  BMI 30.1  Non-toxic appearing   HENT:      Head: Normocephalic and atraumatic. Right Ear: External ear normal.      Left Ear: External ear normal.      Nose: No rhinorrhea. Eyes:      Comments: No gaze preference   Pulmonary:      Effort: No tachypnea, accessory muscle usage or respiratory distress. Comments: Completes full sentences without difficulty  Musculoskeletal:      Cervical back: Normal range of motion. Neurological:      General: No focal deficit present. Mental Status: She is alert and oriented to person, place, and time. Psychiatric:         Attention and Perception: Attention normal.         Mood and Affect: Mood and affect normal.         Speech: Speech normal.         Cognition and Memory: Cognition and memory normal.           Radiology and Laboratory:  I personally reviewed and interpreted the following results:    Most Recent COVID-19 Results:  Lab Results   Component Value Date/Time    SARSCOV2 Negative 01/23/2023 09:28 PM    SARSCOV2 Negative 10/30/2021 12:10 AM       Most Recent Lab Work:  Lab Results   Component Value Date/Time    SODIUM 140 07/24/2023 06:32 AM    K 3.8 07/24/2023 06:32 AM    BUN 28 (H) 07/24/2023 06:32 AM    CREATININE 1.28 07/24/2023 06:32 AM    GLUC 106 07/24/2023 06:32 AM     Lab Results   Component Value Date/Time    AST 10 (L) 07/24/2023 06:32 AM    ALT 7 07/24/2023 06:32 AM    ALB 4.2 07/24/2023 06:32 AM     Lab Results   Component Value Date/Time    HGB 17.5 (H) 07/24/2023 06:32 AM    WBC 8.04 07/24/2023 06:32 AM     07/24/2023 06:32 AM    INR 1.03 04/14/2023 12:22 PM    PTT 21 (L) 04/14/2023 12:22 PM       Most Recent Imaging [last 30 days]:  No results found.     35 minutes was spent face to face with Johnny Jerome with greater than 50% of the time spent in counseling or coordination of care including discussions of provided medical updates, discussed palliative care, determined goals of care, determined social/family support, discussed plans of care, discussed symptom management, provided psychosocial support. Opioid taper. Discussed opioid-induced withdrawal. PDMP Reviewed. All of the patient's or agent's questions were answered during this discussion.

## 2023-08-15 ENCOUNTER — APPOINTMENT (EMERGENCY)
Dept: RADIOLOGY | Facility: HOSPITAL | Age: 53
End: 2023-08-15
Payer: COMMERCIAL

## 2023-08-15 ENCOUNTER — HOSPITAL ENCOUNTER (INPATIENT)
Facility: HOSPITAL | Age: 53
LOS: 1 days | Discharge: HOME | End: 2023-08-17
Attending: EMERGENCY MEDICINE | Admitting: STUDENT IN AN ORGANIZED HEALTH CARE EDUCATION/TRAINING PROGRAM
Payer: COMMERCIAL

## 2023-08-15 ENCOUNTER — APPOINTMENT (EMERGENCY)
Dept: CT IMAGING | Facility: HOSPITAL | Age: 53
End: 2023-08-15
Payer: COMMERCIAL

## 2023-08-15 DIAGNOSIS — R11.2 INTRACTABLE NAUSEA AND VOMITING: ICD-10-CM

## 2023-08-15 DIAGNOSIS — R07.9 CHEST PAIN, UNSPECIFIED: Primary | ICD-10-CM

## 2023-08-15 DIAGNOSIS — M54.9 ACUTE BACK PAIN: ICD-10-CM

## 2023-08-15 DIAGNOSIS — F41.8 ANXIETY ABOUT HEALTH: ICD-10-CM

## 2023-08-15 PROBLEM — D75.1 POLYCYTHEMIA: Status: ACTIVE | Noted: 2023-08-15

## 2023-08-15 LAB
2HR DELTA HS TROPONIN: -4 NG/L
4HR DELTA HS TROPONIN: -2 NG/L
ALBUMIN SERPL BCP-MCNC: 5.6 G/DL (ref 3.5–5)
ALP SERPL-CCNC: 94 U/L (ref 34–104)
ALT SERPL W P-5'-P-CCNC: 12 U/L (ref 7–52)
ANION GAP SERPL CALCULATED.3IONS-SCNC: 12 MMOL/L
APTT PPP: 30 SECONDS (ref 23–37)
AST SERPL W P-5'-P-CCNC: 17 U/L (ref 13–39)
BASOPHILS # BLD AUTO: 0.05 THOUSANDS/ÂΜL (ref 0–0.1)
BASOPHILS NFR BLD AUTO: 0 % (ref 0–1)
BILIRUB SERPL-MCNC: 0.9 MG/DL (ref 0.2–1)
BUN SERPL-MCNC: 18 MG/DL (ref 5–25)
CALCIUM SERPL-MCNC: 11.1 MG/DL (ref 8.4–10.2)
CARDIAC TROPONIN I PNL SERPL HS: 11 NG/L
CARDIAC TROPONIN I PNL SERPL HS: 7 NG/L
CARDIAC TROPONIN I PNL SERPL HS: 9 NG/L
CHLORIDE SERPL-SCNC: 100 MMOL/L (ref 96–108)
CO2 SERPL-SCNC: 22 MMOL/L (ref 21–32)
CREAT SERPL-MCNC: 1.14 MG/DL (ref 0.6–1.3)
EOSINOPHIL # BLD AUTO: 0 THOUSAND/ÂΜL (ref 0–0.61)
EOSINOPHIL NFR BLD AUTO: 0 % (ref 0–6)
ERYTHROCYTE [DISTWIDTH] IN BLOOD BY AUTOMATED COUNT: 16.9 % (ref 11.6–15.1)
GFR SERPL CREATININE-BSD FRML MDRD: 54 ML/MIN/1.73SQ M
GLUCOSE SERPL-MCNC: 130 MG/DL (ref 65–140)
HCT VFR BLD AUTO: 60 % (ref 34.8–46.1)
HGB BLD-MCNC: 20.3 G/DL (ref 11.5–15.4)
IMM GRANULOCYTES # BLD AUTO: 0.07 THOUSAND/UL (ref 0–0.2)
IMM GRANULOCYTES NFR BLD AUTO: 1 % (ref 0–2)
INR PPP: 1 (ref 0.84–1.19)
LYMPHOCYTES # BLD AUTO: 1.97 THOUSANDS/ÂΜL (ref 0.6–4.47)
LYMPHOCYTES NFR BLD AUTO: 15 % (ref 14–44)
MCH RBC QN AUTO: 30.3 PG (ref 26.8–34.3)
MCHC RBC AUTO-ENTMCNC: 33.8 G/DL (ref 31.4–37.4)
MCV RBC AUTO: 89 FL (ref 82–98)
MONOCYTES # BLD AUTO: 0.49 THOUSAND/ÂΜL (ref 0.17–1.22)
MONOCYTES NFR BLD AUTO: 4 % (ref 4–12)
NEUTROPHILS # BLD AUTO: 10.94 THOUSANDS/ÂΜL (ref 1.85–7.62)
NEUTS SEG NFR BLD AUTO: 80 % (ref 43–75)
NRBC BLD AUTO-RTO: 0 /100 WBCS
PLATELET # BLD AUTO: 237 THOUSANDS/UL (ref 149–390)
PLATELET # BLD AUTO: 263 THOUSANDS/UL (ref 149–390)
PMV BLD AUTO: 9 FL (ref 8.9–12.7)
PMV BLD AUTO: 9.2 FL (ref 8.9–12.7)
POTASSIUM SERPL-SCNC: 3.7 MMOL/L (ref 3.5–5.3)
PROT SERPL-MCNC: 10.1 G/DL (ref 6.4–8.4)
PROTHROMBIN TIME: 13 SECONDS (ref 11.6–14.5)
RBC # BLD AUTO: 6.71 MILLION/UL (ref 3.81–5.12)
SODIUM SERPL-SCNC: 134 MMOL/L (ref 135–147)
WBC # BLD AUTO: 13.52 THOUSAND/UL (ref 4.31–10.16)

## 2023-08-15 PROCEDURE — 85730 THROMBOPLASTIN TIME PARTIAL: CPT | Performed by: PHYSICIAN ASSISTANT

## 2023-08-15 PROCEDURE — 93005 ELECTROCARDIOGRAM TRACING: CPT

## 2023-08-15 PROCEDURE — 99223 1ST HOSP IP/OBS HIGH 75: CPT | Performed by: INTERNAL MEDICINE

## 2023-08-15 PROCEDURE — 36415 COLL VENOUS BLD VENIPUNCTURE: CPT | Performed by: PHYSICIAN ASSISTANT

## 2023-08-15 PROCEDURE — 71046 X-RAY EXAM CHEST 2 VIEWS: CPT

## 2023-08-15 PROCEDURE — 96374 THER/PROPH/DIAG INJ IV PUSH: CPT

## 2023-08-15 PROCEDURE — 96375 TX/PRO/DX INJ NEW DRUG ADDON: CPT

## 2023-08-15 PROCEDURE — 85025 COMPLETE CBC W/AUTO DIFF WBC: CPT | Performed by: PHYSICIAN ASSISTANT

## 2023-08-15 PROCEDURE — 84484 ASSAY OF TROPONIN QUANT: CPT | Performed by: PHYSICIAN ASSISTANT

## 2023-08-15 PROCEDURE — 80053 COMPREHEN METABOLIC PANEL: CPT | Performed by: PHYSICIAN ASSISTANT

## 2023-08-15 PROCEDURE — 99285 EMERGENCY DEPT VISIT HI MDM: CPT

## 2023-08-15 PROCEDURE — 71275 CT ANGIOGRAPHY CHEST: CPT

## 2023-08-15 PROCEDURE — G1004 CDSM NDSC: HCPCS

## 2023-08-15 PROCEDURE — 85049 AUTOMATED PLATELET COUNT: CPT | Performed by: INTERNAL MEDICINE

## 2023-08-15 PROCEDURE — 99285 EMERGENCY DEPT VISIT HI MDM: CPT | Performed by: PHYSICIAN ASSISTANT

## 2023-08-15 PROCEDURE — 85610 PROTHROMBIN TIME: CPT | Performed by: PHYSICIAN ASSISTANT

## 2023-08-15 PROCEDURE — 74174 CTA ABD&PLVS W/CONTRAST: CPT

## 2023-08-15 RX ORDER — PROMETHAZINE HYDROCHLORIDE 25 MG/ML
25 INJECTION, SOLUTION INTRAMUSCULAR; INTRAVENOUS ONCE
Status: DISCONTINUED | OUTPATIENT
Start: 2023-08-15 | End: 2023-08-15

## 2023-08-15 RX ORDER — PREGABALIN 100 MG/1
100 CAPSULE ORAL 2 TIMES DAILY
Status: DISCONTINUED | OUTPATIENT
Start: 2023-08-15 | End: 2023-08-17 | Stop reason: HOSPADM

## 2023-08-15 RX ORDER — MORPHINE SULFATE 4 MG/ML
4 INJECTION, SOLUTION INTRAMUSCULAR; INTRAVENOUS ONCE
Status: COMPLETED | OUTPATIENT
Start: 2023-08-15 | End: 2023-08-15

## 2023-08-15 RX ORDER — ACYCLOVIR 800 MG/1
800 TABLET ORAL 2 TIMES DAILY
Status: DISCONTINUED | OUTPATIENT
Start: 2023-08-15 | End: 2023-08-17 | Stop reason: HOSPADM

## 2023-08-15 RX ORDER — ASPIRIN 81 MG/1
324 TABLET, CHEWABLE ORAL ONCE
Status: COMPLETED | OUTPATIENT
Start: 2023-08-15 | End: 2023-08-15

## 2023-08-15 RX ORDER — NICOTINE 21 MG/24HR
1 PATCH, TRANSDERMAL 24 HOURS TRANSDERMAL DAILY
Status: DISCONTINUED | OUTPATIENT
Start: 2023-08-16 | End: 2023-08-17 | Stop reason: HOSPADM

## 2023-08-15 RX ORDER — SODIUM CHLORIDE, SODIUM GLUCONATE, SODIUM ACETATE, POTASSIUM CHLORIDE, MAGNESIUM CHLORIDE, SODIUM PHOSPHATE, DIBASIC, AND POTASSIUM PHOSPHATE .53; .5; .37; .037; .03; .012; .00082 G/100ML; G/100ML; G/100ML; G/100ML; G/100ML; G/100ML; G/100ML
100 INJECTION, SOLUTION INTRAVENOUS CONTINUOUS
Status: DISCONTINUED | OUTPATIENT
Start: 2023-08-15 | End: 2023-08-16

## 2023-08-15 RX ORDER — ALBUTEROL SULFATE 90 UG/1
2 AEROSOL, METERED RESPIRATORY (INHALATION) EVERY 6 HOURS PRN
Status: DISCONTINUED | OUTPATIENT
Start: 2023-08-15 | End: 2023-08-17 | Stop reason: HOSPADM

## 2023-08-15 RX ORDER — ATORVASTATIN CALCIUM 40 MG/1
40 TABLET, FILM COATED ORAL
Status: DISCONTINUED | OUTPATIENT
Start: 2023-08-15 | End: 2023-08-17 | Stop reason: HOSPADM

## 2023-08-15 RX ORDER — METOPROLOL SUCCINATE 25 MG/1
25 TABLET, EXTENDED RELEASE ORAL DAILY
Status: DISCONTINUED | OUTPATIENT
Start: 2023-08-16 | End: 2023-08-17 | Stop reason: HOSPADM

## 2023-08-15 RX ORDER — HEPARIN SODIUM 5000 [USP'U]/ML
5000 INJECTION, SOLUTION INTRAVENOUS; SUBCUTANEOUS EVERY 8 HOURS SCHEDULED
Status: DISCONTINUED | OUTPATIENT
Start: 2023-08-15 | End: 2023-08-17 | Stop reason: HOSPADM

## 2023-08-15 RX ORDER — FAMOTIDINE 20 MG/1
40 TABLET, FILM COATED ORAL DAILY
Status: DISCONTINUED | OUTPATIENT
Start: 2023-08-16 | End: 2023-08-17 | Stop reason: HOSPADM

## 2023-08-15 RX ORDER — METHOCARBAMOL 750 MG/1
750 TABLET, FILM COATED ORAL EVERY 6 HOURS PRN
Status: DISCONTINUED | OUTPATIENT
Start: 2023-08-15 | End: 2023-08-16

## 2023-08-15 RX ORDER — DULOXETIN HYDROCHLORIDE 30 MG/1
30 CAPSULE, DELAYED RELEASE ORAL DAILY
Status: DISCONTINUED | OUTPATIENT
Start: 2023-08-16 | End: 2023-08-17 | Stop reason: HOSPADM

## 2023-08-15 RX ORDER — ACETAMINOPHEN 325 MG/1
650 TABLET ORAL ONCE
Status: COMPLETED | OUTPATIENT
Start: 2023-08-15 | End: 2023-08-15

## 2023-08-15 RX ORDER — DROPERIDOL 2.5 MG/ML
0.62 INJECTION, SOLUTION INTRAMUSCULAR; INTRAVENOUS ONCE
Status: COMPLETED | OUTPATIENT
Start: 2023-08-15 | End: 2023-08-15

## 2023-08-15 RX ORDER — MAGNESIUM SULFATE 1 G/100ML
1 INJECTION INTRAVENOUS ONCE
Status: COMPLETED | OUTPATIENT
Start: 2023-08-15 | End: 2023-08-15

## 2023-08-15 RX ORDER — ACETAMINOPHEN 325 MG/1
650 TABLET ORAL EVERY 6 HOURS PRN
Status: DISCONTINUED | OUTPATIENT
Start: 2023-08-15 | End: 2023-08-17 | Stop reason: HOSPADM

## 2023-08-15 RX ORDER — AMLODIPINE BESYLATE 10 MG/1
10 TABLET ORAL EVERY MORNING
Status: DISCONTINUED | OUTPATIENT
Start: 2023-08-16 | End: 2023-08-17 | Stop reason: HOSPADM

## 2023-08-15 RX ORDER — FLUTICASONE PROPIONATE 50 MCG
2 SPRAY, SUSPENSION (ML) NASAL DAILY
Status: DISCONTINUED | OUTPATIENT
Start: 2023-08-16 | End: 2023-08-17 | Stop reason: HOSPADM

## 2023-08-15 RX ORDER — ONDANSETRON 2 MG/ML
4 INJECTION INTRAMUSCULAR; INTRAVENOUS ONCE
Status: COMPLETED | OUTPATIENT
Start: 2023-08-15 | End: 2023-08-15

## 2023-08-15 RX ORDER — LABETALOL HYDROCHLORIDE 5 MG/ML
10 INJECTION, SOLUTION INTRAVENOUS EVERY 6 HOURS PRN
Status: DISCONTINUED | OUTPATIENT
Start: 2023-08-15 | End: 2023-08-17 | Stop reason: HOSPADM

## 2023-08-15 RX ADMIN — SODIUM CHLORIDE, SODIUM GLUCONATE, SODIUM ACETATE, POTASSIUM CHLORIDE, MAGNESIUM CHLORIDE, SODIUM PHOSPHATE, DIBASIC, AND POTASSIUM PHOSPHATE 100 ML/HR: .53; .5; .37; .037; .03; .012; .00082 INJECTION, SOLUTION INTRAVENOUS at 22:12

## 2023-08-15 RX ADMIN — METHOCARBAMOL 750 MG: 750 TABLET ORAL at 22:39

## 2023-08-15 RX ADMIN — PREGABALIN 100 MG: 100 CAPSULE ORAL at 18:09

## 2023-08-15 RX ADMIN — ONDANSETRON 4 MG: 2 INJECTION INTRAMUSCULAR; INTRAVENOUS at 13:05

## 2023-08-15 RX ADMIN — HEPARIN SODIUM 5000 UNITS: 5000 INJECTION INTRAVENOUS; SUBCUTANEOUS at 22:08

## 2023-08-15 RX ADMIN — ACETAMINOPHEN 650 MG: 325 TABLET, FILM COATED ORAL at 14:23

## 2023-08-15 RX ADMIN — MORPHINE SULFATE 4 MG: 4 INJECTION INTRAVENOUS at 13:04

## 2023-08-15 RX ADMIN — IOHEXOL 100 ML: 350 INJECTION, SOLUTION INTRAVENOUS at 13:34

## 2023-08-15 RX ADMIN — ASPIRIN 324 MG: 81 TABLET, CHEWABLE ORAL at 14:22

## 2023-08-15 RX ADMIN — TRIMETHOBENZAMIDE HYDROCHLORIDE 200 MG: 100 INJECTION INTRAMUSCULAR at 16:23

## 2023-08-15 RX ADMIN — ATORVASTATIN CALCIUM 40 MG: 40 TABLET, FILM COATED ORAL at 18:09

## 2023-08-15 RX ADMIN — SODIUM CHLORIDE 1000 ML: 0.9 INJECTION, SOLUTION INTRAVENOUS at 16:21

## 2023-08-15 RX ADMIN — OXYCODONE HYDROCHLORIDE 15 MG: 5 TABLET ORAL at 22:39

## 2023-08-15 RX ADMIN — MAGNESIUM SULFATE HEPTAHYDRATE 1 G: 1 INJECTION, SOLUTION INTRAVENOUS at 16:17

## 2023-08-15 RX ADMIN — DICLOFENAC SODIUM 2 G: 10 GEL TOPICAL at 22:14

## 2023-08-15 RX ADMIN — OXYCODONE HYDROCHLORIDE 15 MG: 5 TABLET ORAL at 18:09

## 2023-08-15 RX ADMIN — ACYCLOVIR 800 MG: 800 TABLET ORAL at 18:37

## 2023-08-15 RX ADMIN — DROPERIDOL 0.62 MG: 2.5 INJECTION, SOLUTION INTRAMUSCULAR; INTRAVENOUS at 14:25

## 2023-08-15 NOTE — ASSESSMENT & PLAN NOTE
· Blood pressure slightly elevated on presentation  · Continue home blood pressure medication  · Continue to monitor

## 2023-08-15 NOTE — H&P
[address]  H&P  Name: Lo Ramos 45 y.o. female I MRN: [id number]  Unit/Bed#: ED 16 I Date of Admission: 8/15/2023   Date of Service: 8/15/2023 I Hospital Day: 0      Assessment/Plan   * Chest pain  Assessment & Plan  · Patient presenting secondary to persistent chest pain he describes as sharp in sensation  · This chest pain is reproducible on examination although patient did states she had sleep chest pain and shortness of breath with ambulation  · CTA dissection study-no aortic aneurysm, intramural hematoma/dissection or penetrating ulcer.   Radiopaque substance in stomach, small bowel and colon  · DAVID score of 2  · EKG-normal sinus rhythm and no ST or T wave changes noted  · Troponin 11 and then 7  · Recent echocardiogram January of this year was noted to be normal and patient has since followed with cardiology as outpatient as well  · If patient continues to have persistent chest pain would consider cardiology consultation  · Continue to monitor at this time and trend troponin    Polycythemia  Assessment & Plan  · Patient with elevated hemoglobin at baseline ranging around 17  · Hemoglobin 20.3 currently  · will give IV fluids  · Continue to monitor    Continuous opioid dependence ([address])  Assessment & Plan  · Continue home oxycodone dose    Multiple myeloma in remission ([address])  Assessment & Plan  · Continue outpatient follow-up with oncology    Cigarette nicotine dependence without complication  Assessment & Plan  · Nicotine patch ordered  · Discussed importance of smoking cessation    Chronic bilateral low back pain with bilateral sciatica  Assessment & Plan  · Continue home medication  · We will order aqua K    Hypertension  Assessment & Plan  · Blood pressure slightly elevated on presentation  · Continue home blood pressure medication  · Continue to monitor    VTE Pharmacologic Prophylaxis: VTE Score: 4 Moderate Risk (Score 3-4) - Pharmacological DVT Prophylaxis Ordered: heparin. Code Status: Prior   Discussion with family: Patient declined call to . Anticipated Length of Stay: Patient will be admitted on an observation basis with an anticipated length of stay of less than 2 midnights secondary to Chest pain. Total Time Spent on Date of Encounter in care of patient: 65 minutes This time was spent on one or more of the following: performing physical exam; counseling and coordination of care; obtaining or reviewing history; documenting in the medical record; reviewing/ordering tests, medications or procedures; communicating with other healthcare professionals and discussing with patient's family/caregivers. Chief Complaint: Chest pain    History of Present Illness:  Bishop Pritchard is a 45 y.o. female with a PMH of hypertension, tobacco abuse, multiple myeloma in remission who presents with chest pain. Patient states that she has had chest pain starting this morning. Chest pain is worse to palpation and she describes it as a sharp pain on exam.  Patient also did states she had some shortness of breath with exertion last week but denies anything currently. No rash noted on chest on exam.  Patient still in slight distress given ongoing chest pain worse to palpation. Patient denies any recent fever, chill, nausea or vomiting. Last treatment for multiple myeloma was in March after bone marrow transplant. Review of Systems:  Review of Systems   Constitutional: Negative for chills, fatigue, fever and unexpected weight change. HENT: Negative for congestion, ear pain, sore throat and tinnitus. Eyes: Negative for visual disturbance. Respiratory: Negative for cough, chest tightness, shortness of breath and wheezing. Cardiovascular: Positive for chest pain. Negative for palpitations and leg swelling. Gastrointestinal: Negative for abdominal pain, constipation, diarrhea, nausea and vomiting. Genitourinary: Negative for dysuria and frequency. Skin: Negative for rash. Neurological: Negative for dizziness, weakness, light-headedness, numbness and headaches. All other systems reviewed and are negative. Past Medical and Surgical History:   Past Medical History:   Diagnosis Date   • Anemia    • Cardiomyopathy [facility])    • Chronic back pain    • Chronic narcotic dependence ([address])    • Colon polyp    • Disease of thyroid gland    • GERD (gastroesophageal reflux disease)    • H/O autologous stem cell transplant ([address]) 2022   • Hypertension    • Multiple myeloma (HCC)     in remission   • Neuropathy        Past Surgical History:   Procedure Laterality Date   •  SECTION     • COLONOSCOPY     • EYE SURGERY Left     biopsy   • THYROID LOBECTOMY Left 2023    Procedure: LEFT HEMITHYROIDECTOMY;  Surgeon: Latrice Price MD;  Location: [facility];  Service: Surgical Oncology   • US GUIDED THYROID BIOPSY  10/20/2022       Meds/Allergies:  Prior to Admission medications    Medication Sig Start Date End Date Taking?  Authorizing Provider   acyclovir (ZOVIRAX) 800 mg tablet Take 1 tablet (800 mg total) by mouth 2 (two) times a day 23  James Mcarthur,    albuterol (PROVENTIL HFA,VENTOLIN HFA) 90 mcg/act inhaler Inhale 2 puffs every 6 (six) hours as needed for wheezing or shortness of breath 3/31/23   James Mcarthur DO   amLODIPine (NORVASC) 10 mg tablet take 1 tablet by mouth every morning 23   James Mcarthur DO   atorvastatin (LIPITOR) 40 mg tablet Take 40 mg by mouth    Historical Provider, MD   bisacodyl (DULCOLAX) 10 mg suppository Insert 1 suppository (10 mg total) into the rectum daily as needed for constipation  Patient not taking: Reported on 2023 11/3/21   Rupesh Jimenez, DO   DULoxetine (CYMBALTA) 30 mg delayed release capsule take 1 capsule by mouth once daily 23   James Mcarthur DO   famotidine (PEPCID) 40 MG tablet TAKE 1 TABLET BY MOUTH ONCE DAILY 23   James Mcarthur,    fluticasone (FLONASE) 50 mcg/act nasal spray 2 sprays into each nostril daily 3/31/23   James Mcarthur DO   Ibuprofen-Acetaminophen (Advil Dual Action) 125-250 MG TABS Take by mouth    Historical Provider, MD   Lidocaine HCl 4 % CREA Apply topically 4 (four) times a day as needed (neuropathic pain) 5/2/22   José Luis Mcginnis MD   methocarbamol (ROBAXIN) 750 mg tablet take 1 tablet by mouth every 6 hours if needed for muscle spasm 5/23/23   James Mcarthur DO   metoprolol succinate (TOPROL-XL) 25 mg 24 hr tablet take 1 tablet by mouth once daily 7/29/23   James Mcarthur DO   naloxone (NARCAN) 4 mg/0.1 mL nasal spray Administer 1 spray into a nostril. If no response after 2-3 minutes, give another dose in the other nostril using a new spray. 1/13/22   José Luis Mcginnis MD   NON FORMULARY -Balance of Nature vegetable and fruit supplements    Historical Provider, MD   oxyCODONE (ROXICODONE) 10 MG TABS Take 1.5 tablets (15 mg total) by mouth every 4 (four) hours as needed (cancer-related pain) Max Daily Amount: 75 mg 8/7/23   José Luis Mcginnis MD   Polyethyl Glycol-Propyl Glycol (Systane) 0.4-0.3 % GEL 1 drop  Patient not taking: Reported on 8/7/2023    Historical Provider, MD   polyethylene glycol (MIRALAX) 17 g packet Take 17 g by mouth daily  Patient not taking: Reported on 6/26/2023 11/4/21   Hazel Rothman DO   pregabalin (LYRICA) 100 mg capsule take 1 capsule by mouth twice a day 5/2/23   Danielle Ovalle DO   prochlorperazine (COMPAZINE) 10 mg tablet  10/5/21   Historical Provider, MD   senna-docusate sodium (SENOKOT S) 8.6-50 mg per tablet Take 2 tablets by mouth daily at bedtime  Patient not taking: Reported on 6/26/2023 11/3/21   Hazel Rothman DO     I have reviewed home medications with patient personally.     Allergies: No Known Allergies    Social History:  Marital Status: Single   Patient Pre-hospital Living Situation: Home  Patient Pre-hospital Level of Mobility: walks  Substance Use History:   Social History Substance and Sexual Activity   Alcohol Use Not Currently     Social History     Tobacco Use   Smoking Status Heavy Smoker   • Packs/day: 0.50   • Years: 26.00   • Total pack years: 13.00   • Types: Cigarettes   • Start date: 12/18/1992   Smokeless Tobacco Current     Social History     Substance and Sexual Activity   Drug Use Yes   • Types: Oxycodone    Comment: 1 year       Family History:  Family History   Problem Relation Age of Onset   • No Known Problems Mother    • No Known Problems Father    • Colon cancer Neg Hx    • Diabetes Neg Hx        Physical Exam:     Vitals:   Blood Pressure: (!) 159/108 (08/15/23 1600)  Pulse: 93 (08/15/23 1600)  Temperature: 98 °F (36.7 °C) (08/15/23 1208)  Temp Source: Oral (08/15/23 1208)  Respirations: 19 (08/15/23 1600)  SpO2: 100 % (08/15/23 1600)    Physical Exam  Vitals and nursing note reviewed. Constitutional:       General: She is not in acute distress. Appearance: She is well-developed. HENT:      Head: Normocephalic and atraumatic. Eyes:      General: No scleral icterus. Conjunctiva/sclera: Conjunctivae normal.   Cardiovascular:      Rate and Rhythm: Normal rate and regular rhythm. Heart sounds: Normal heart sounds. No murmur heard. No friction rub. No gallop. Pulmonary:      Effort: Pulmonary effort is normal. No respiratory distress. Breath sounds: Normal breath sounds. No wheezing or rales. Abdominal:      General: Bowel sounds are normal. There is no distension. Palpations: Abdomen is soft. Tenderness: There is no abdominal tenderness. Musculoskeletal:         General: Normal range of motion. Comments: Tenderness to palpation of chest wall at anterior aspect in left side of chest   Skin:     General: Skin is warm. Findings: No rash. Neurological:      Mental Status: She is alert and oriented to person, place, and time.           Additional Data:     Lab Results:  Results from last 7 days   Lab Units 08/15/23  1237   WBC Thousand/uL 13.52*   HEMOGLOBIN g/dL 20.3*   HEMATOCRIT % 60.0*   PLATELETS Thousands/uL 263   NEUTROS PCT % 80*   LYMPHS PCT % 15   MONOS PCT % 4   EOS PCT % 0     Results from last 7 days   Lab Units 08/15/23  1237   SODIUM mmol/L 134*   POTASSIUM mmol/L 3.7   CHLORIDE mmol/L 100   CO2 mmol/L 22   BUN mg/dL 18   CREATININE mg/dL 1.14   ANION GAP mmol/L 12   CALCIUM mg/dL 11.1*   ALBUMIN g/dL 5.6*   TOTAL BILIRUBIN mg/dL 0.90   ALK PHOS U/L 94   ALT U/L 12   AST U/L 17   GLUCOSE RANDOM mg/dL 130     Results from last 7 days   Lab Units 08/15/23  1237   INR  1.00                   Lines/Drains:  Invasive Devices     Peripheral Intravenous Line  Duration           Peripheral IV 08/15/23 Left Antecubital <1 day                    Imaging: Reviewed radiology reports from this admission including: chest xray, chest CT scan and abdominal/pelvic CT  CTA dissection protocol chest/abdomen/pelvis   Final Result by Divina Warner MD (08/15 1427)      1. No aortic aneurysm, intramural hematoma/dissection or penetrating ulcer. 2.  Radiopaque substance in the stomach, small bowel and colon correlate with ingestion of crushed pills, oral contrast or other radiopaque substance including Pepto-Bismol. Workstation performed: [id number]         XR chest pa & lateral   Final Result by Heather Hernandez MD (08/15 1307)      No acute cardiopulmonary disease. Workstation performed: [id number]             EKG and Other Studies Reviewed on Admission:   · EKG: NSR. HR 71.    ** Please Note: This note has been constructed using a voice recognition system.  **

## 2023-08-15 NOTE — ASSESSMENT & PLAN NOTE
· Patient presenting secondary to persistent chest pain he describes as sharp in sensation  · This chest pain is reproducible on examination although patient did states she had sleep chest pain and shortness of breath with ambulation  · CTA dissection study-no aortic aneurysm, intramural hematoma/dissection or penetrating ulcer.   Radiopaque substance in stomach, small bowel and colon  · DAVID score of 2  · EKG-normal sinus rhythm and no ST or T wave changes noted  · Troponin 11 and then 7  · Recent echocardiogram January of this year was noted to be normal and patient has since followed with cardiology as outpatient as well  · If patient continues to have persistent chest pain would consider cardiology consultation  · Continue to monitor at this time and trend troponin

## 2023-08-15 NOTE — ED PROVIDER NOTES
History  Chief Complaint   Patient presents with   • Chest Pain     Chest pain that started this morning at 1000. PT describes pain as burning, stabbing, and pressure that radiates under left breast and to back. Marla Knowles 45year-old female with pmhx including MS in remission, tobacco use, presenting by EMS with complaint of severe chest and back pain, accompanied by nausea and vomiting. Patient reports that her symptoms were acute in onset this morning at 10am with no inciting event to her knowledge. She denies any injury or trauma. She states that her pain has essentially been constant from time of onset. She reports 1 episode of non-bloody non-bilious emesis. She describes central chest heaviness. She does not appreciate any shortness of breath or exertional dyspnea. She denies palpitations, diaphoresis, near syncope or syncopal event with symptom onset. She denies fevers, chills, congestion, cough, recent illness or sick contacts. She denies history of similar symptoms in the past.  She offers no other complaints or concerns at this time. Prior to Admission Medications   Prescriptions Last Dose Informant Patient Reported? Taking?    DULoxetine (CYMBALTA) 30 mg delayed release capsule 8/15/2023  No Yes   Sig: take 1 capsule by mouth once daily   Ibuprofen-Acetaminophen (Advil Dual Action) 125-250 MG TABS 8/15/2023 Self Yes Yes   Sig: Take by mouth   NON FORMULARY More than a month Self Yes No   Sig: -Balance of Nature vegetable and fruit supplements   acyclovir (ZOVIRAX) 800 mg tablet 8/15/2023 Self No Yes   Sig: Take 1 tablet (800 mg total) by mouth 2 (two) times a day   albuterol (PROVENTIL HFA,VENTOLIN HFA) 90 mcg/act inhaler Past Week Self No Yes   Sig: Inhale 2 puffs every 6 (six) hours as needed for wheezing or shortness of breath   amLODIPine (NORVASC) 10 mg tablet 8/15/2023  No Yes   Sig: take 1 tablet by mouth every morning   atorvastatin (LIPITOR) 40 mg tablet 8/15/2023 Self Yes Yes   Sig: Take 40 mg by mouth   famotidine (PEPCID) 40 MG tablet   No No   Sig: TAKE 1 TABLET BY MOUTH ONCE DAILY   fluticasone (FLONASE) 50 mcg/act nasal spray 8/15/2023 Self No Yes   Si sprays into each nostril daily   methocarbamol (ROBAXIN) 750 mg tablet 8/15/2023 Self No Yes   Sig: take 1 tablet by mouth every 6 hours if needed for muscle spasm   metoprolol succinate (TOPROL-XL) 25 mg 24 hr tablet 8/15/2023  No Yes   Sig: take 1 tablet by mouth once daily   naloxone (NARCAN) 4 mg/0.1 mL nasal spray  Self No No   Sig: Administer 1 spray into a nostril. If no response after 2-3 minutes, give another dose in the other nostril using a new spray.    oxyCODONE (ROXICODONE) 10 MG TABS 8/15/2023  No Yes   Sig: Take 1.5 tablets (15 mg total) by mouth every 4 (four) hours as needed (cancer-related pain) Max Daily Amount: 75 mg   polyethylene glycol (MIRALAX) 17 g packet  Self No No   Sig: Take 17 g by mouth daily   pregabalin (LYRICA) 100 mg capsule 8/15/2023 Self No Yes   Sig: take 1 capsule by mouth twice a day      Facility-Administered Medications: None       Past Medical History:   Diagnosis Date   • Anemia    • Cardiomyopathy (HCC)    • Chronic back pain    • Chronic narcotic dependence (HCC)    • Colon polyp    • Disease of thyroid gland    • GERD (gastroesophageal reflux disease)    • H/O autologous stem cell transplant ([address]) 2022   • Hypertension    • Multiple myeloma ([address])     in remission   • Neuropathy        Past Surgical History:   Procedure Laterality Date   •  SECTION     • COLONOSCOPY     • EYE SURGERY Left     biopsy   • THYROID LOBECTOMY Left 2023    Procedure: LEFT HEMITHYROIDECTOMY;  Surgeon: Osito Guerra MD;  Location: [facility];  Service: Surgical Oncology   • US GUIDED THYROID BIOPSY  10/20/2022       Family History   Problem Relation Age of Onset   • No Known Problems Mother    • No Known Problems Father    • Colon cancer Neg Hx    • Diabetes Neg Hx      I have reviewed and agree with the history as documented. E-Cigarette/Vaping   • E-Cigarette Use Never User      E-Cigarette/Vaping Substances   • Nicotine No    • THC No    • CBD No    • Flavoring No    • Other No    • Unknown No      Social History     Tobacco Use   • Smoking status: Heavy Smoker     Packs/day: 0.50     Years: 26.00     Total pack years: 13.00     Types: Cigarettes     Start date: 12/18/1992   • Smokeless tobacco: Current   Vaping Use   • Vaping Use: Never used   Substance Use Topics   • Alcohol use: Not Currently   • Drug use: Yes     Types: Oxycodone     Comment: 1 year       Review of Systems   Constitutional: Negative for chills and fever. Cardiovascular: Positive for chest pain. Gastrointestinal: Positive for nausea. Musculoskeletal: Positive for back pain. All other systems reviewed and are negative. Physical Exam  Physical Exam  Vitals and nursing note reviewed. Constitutional:       General: She is not in acute distress. Appearance: Normal appearance. She is well-developed. She is not ill-appearing, toxic-appearing or diaphoretic. HENT:      Head: Normocephalic and atraumatic. Right Ear: External ear normal.      Left Ear: External ear normal.   Eyes:      Conjunctiva/sclera: Conjunctivae normal.   Cardiovascular:      Rate and Rhythm: Normal rate and regular rhythm. Pulses: Normal pulses. Pulmonary:      Effort: Pulmonary effort is normal. No respiratory distress. Breath sounds: Normal breath sounds. No wheezing, rhonchi or rales. Chest:      Chest wall: Tenderness present. Comments: Diffuse tenderness upon palpation of the anterior and posterior chest wall. No overlying skin changes, crepitus or fluctuance with palpation, bony deformity or step-offs. No midline tenderness to palpation of the cervical, thoracic, or lumbar spine. Abdominal:      General: There is no distension. Palpations: Abdomen is soft. Tenderness:  There is no abdominal tenderness. Musculoskeletal:         General: Normal range of motion. Cervical back: Normal range of motion and neck supple. Right lower leg: No tenderness. No edema. Left lower leg: No tenderness. No edema. Skin:     General: Skin is warm and dry. Capillary Refill: Capillary refill takes less than 2 seconds. Neurological:      Mental Status: She is alert. Mental status is at baseline. Sensory: Sensation is intact. No sensory deficit. Motor: Motor function is intact. No weakness.    Psychiatric:         Mood and Affect: Mood normal.         Vital Signs  ED Triage Vitals [08/15/23 1208]   Temperature Pulse Respirations Blood Pressure SpO2   98 °F (36.7 °C) 91 18 (!) 176/112 99 %      Temp Source Heart Rate Source Patient Position - Orthostatic VS BP Location FiO2 (%)   Oral Monitor Sitting Right arm --      Pain Score       10 - Worst Possible Pain           Vitals:    08/17/23 0721 08/17/23 0910 08/17/23 1046 08/17/23 1540   BP: 132/85 116/72 135/91 126/88   Pulse: 65 75     Patient Position - Orthostatic VS:             Visual Acuity      ED Medications  Medications   ketorolac (TORADOL) injection 15 mg (has no administration in time range)   multi-electrolyte (PLASMALYTE-A/ISOLYTE-S PH 7.4) IV solution (0 mL/hr Intravenous Stopped 8/17/23 1553)   morphine injection 4 mg (4 mg Intravenous Given 8/15/23 1304)   ondansetron (ZOFRAN) injection 4 mg (4 mg Intravenous Given 8/15/23 1305)   iohexol (OMNIPAQUE) 350 MG/ML injection (SINGLE-DOSE) 100 mL (100 mL Intravenous Given 8/15/23 1334)   acetaminophen (TYLENOL) tablet 650 mg (650 mg Oral Given 8/15/23 1423)   aspirin chewable tablet 324 mg (324 mg Oral Given 8/15/23 1422)   droperidol (INAPSINE) injection 0.625 mg (0.625 mg Intravenous Given 8/15/23 1425)   magnesium sulfate IVPB (premix) SOLN 1 g (1 g Intravenous New Bag 8/15/23 1617)   trimethobenzamide (TIGAN) IM injection 200 mg (200 mg Intramuscular Given 8/15/23 1623)   sodium chloride 0.9 % bolus 1,000 mL (1,000 mL Intravenous New Bag 8/15/23 1621)   potassium chloride (K-DUR,KLOR-CON) CR tablet 40 mEq (40 mEq Oral Given 8/16/23 1217)   regadenoson (LEXISCAN) injection 0.4 mg (0.4 mg Intravenous Given 8/17/23 0915)       Diagnostic Studies  Results Reviewed     Procedure Component Value Units Date/Time    HS Troponin I 4hr [[id number]]  (Normal) Collected: 08/15/23 1629    Lab Status: Final result Specimen: Blood from Arm, Left Updated: 08/15/23 1659     hs TnI 4hr 9 ng/L      Delta 4hr hsTnI -2 ng/L     HS Troponin I 2hr [[id number]]  (Normal) Collected: 08/15/23 1437    Lab Status: Final result Specimen: Blood from Arm, Left Updated: 08/15/23 1540     hs TnI 2hr 7 ng/L      Delta 2hr hsTnI -4 ng/L     HS Troponin 0hr (reflex protocol) [[id number]]  (Normal) Collected: 08/15/23 1237    Lab Status: Final result Specimen: Blood from Arm, Right Updated: 08/15/23 1310     hs TnI 0hr 11 ng/L     Comprehensive metabolic panel [[id number]]  (Abnormal) Collected: 08/15/23 1237    Lab Status: Final result Specimen: Blood from Arm, Right Updated: 08/15/23 1309     Sodium 134 mmol/L      Potassium 3.7 mmol/L      Chloride 100 mmol/L      CO2 22 mmol/L      ANION GAP 12 mmol/L      BUN 18 mg/dL      Creatinine 1.14 mg/dL      Glucose 130 mg/dL      Calcium 11.1 mg/dL      AST 17 U/L      ALT 12 U/L      Alkaline Phosphatase 94 U/L      Total Protein 10.1 g/dL      Albumin 5.6 g/dL      Total Bilirubin 0.90 mg/dL      eGFR 54 ml/min/1.73sq m     Narrative:      [place] guidelines for Chronic Kidney Disease (CKD):   •  Stage 1 with normal or high GFR (GFR > 90 mL/min/1.73 square meters)  •  Stage 2 Mild CKD (GFR = 60-89 mL/min/1.73 square meters)  •  Stage 3A Moderate CKD (GFR = 45-59 mL/min/1.73 square meters)  •  Stage 3B Moderate CKD (GFR = 30-44 mL/min/1.73 square meters)  •  Stage 4 Severe CKD (GFR = 15-29 mL/min/1.73 square meters)  •  Stage 5 End Stage CKD (GFR <15 mL/min/1.73 square meters)  Note: GFR calculation is accurate only with a steady state creatinine    Protime-INR [[id number]]  (Normal) Collected: 08/15/23 1237    Lab Status: Final result Specimen: Blood from Arm, Right Updated: 08/15/23 1302     Protime 13.0 seconds      INR 1.00    APTT [[id number]]  (Normal) Collected: 08/15/23 1237    Lab Status: Final result Specimen: Blood from Arm, Right Updated: 08/15/23 1302     PTT 30 seconds     CBC and differential [[id number]]  (Abnormal) Collected: 08/15/23 1237    Lab Status: Final result Specimen: Blood from Arm, Right Updated: 08/15/23 1258     WBC 13.52 Thousand/uL      RBC 6.71 Million/uL      Hemoglobin 20.3 g/dL      Hematocrit 60.0 %      MCV 89 fL      MCH 30.3 pg      MCHC 33.8 g/dL      RDW 16.9 %      MPV 9.0 fL      Platelets 186 Thousands/uL      nRBC 0 /100 WBCs      Neutrophils Relative 80 %      Immat GRANS % 1 %      Lymphocytes Relative 15 %      Monocytes Relative 4 %      Eosinophils Relative 0 %      Basophils Relative 0 %      Neutrophils Absolute 10.94 Thousands/µL      Immature Grans Absolute 0.07 Thousand/uL      Lymphocytes Absolute 1.97 Thousands/µL      Monocytes Absolute 0.49 Thousand/µL      Eosinophils Absolute 0.00 Thousand/µL      Basophils Absolute 0.05 Thousands/µL                  CTA dissection protocol chest/abdomen/pelvis   Final Result by Divina Warner MD (08/15 1427)      1. No aortic aneurysm, intramural hematoma/dissection or penetrating ulcer. 2.  Radiopaque substance in the stomach, small bowel and colon correlate with ingestion of crushed pills, oral contrast or other radiopaque substance including Pepto-Bismol. Workstation performed: [id number]         XR chest pa & lateral   Final Result by Heather Hernandez MD (08/15 1307)      No acute cardiopulmonary disease.                   Workstation performed: [id number]                    Procedures  ECG 12 Lead Documentation Only    Date/Time: 8/15/2023 12:11 PM    Performed by: Laina Stephenson PA-C  Authorized by: Laina Stephenson PA-C    Indications / Diagnosis:  Chest pain  ECG reviewed by me, the ED Provider: yes    Patient location:  ED  Rate:     ECG rate:  71    ECG rate assessment: normal    Rhythm:     Rhythm: sinus rhythm    Ectopy:     Ectopy: none    QRS:     QRS axis:  Left  Conduction:     Conduction: abnormal      Abnormal conduction comment:  RBBB  ST segments:     ST segments:  Non-specific  T waves:     T waves: non-specific               ED Course             HEART Risk Score    Flowsheet Row Most Recent Value   Heart Score Risk Calculator    History 1 Filed at: 08/15/2023 1548   ECG 1 Filed at: 08/15/2023 1548   Age 1 Filed at: 08/15/2023 1548   Risk Factors 1 Filed at: 08/15/2023 1548   Troponin 0 Filed at: 08/15/2023 1548   HEART Score 4 Filed at: 08/15/2023 1548                        SBIRT 20yo+    Flowsheet Row Most Recent Value   Initial Alcohol Screen: US AUDIT-C     1. How often do you have a drink containing alcohol? 0 Filed at: 08/15/2023 1211   2. How many drinks containing alcohol do you have on a typical day you are drinking? 0 Filed at: 08/15/2023 1211   3b. FEMALE Any Age, or MALE 65+: How often do you have 4 or more drinks on one occassion? 0 Filed at: 08/15/2023 1211   Audit-C Score 0 Filed at: 08/15/2023 1211   VIRGIL: How many times in the past year have you. .. Used an illegal drug or used a prescription medication for non-medical reasons?  Never Filed at: 08/15/2023 1211        DAVID Risk Score    Flowsheet Row Most Recent Value   Age >= 72 0 Filed at: 08/15/2023 1611   Known CAD (stenosis >= 50%) 0 Filed at: 08/15/2023 1611   Recent (<=24 hrs) Service Angina 1 Filed at: 08/15/2023 1611   ST Deviation >= 0.5 mm 0 Filed at: 08/15/2023 1611   3+ CAD Risk Factors (FHx, HTN, HLP, DM, Smoker) 1 Filed at: 08/15/2023 1611   Aspirin Use Past 7 Days 0 Filed at: 08/15/2023 1611   Elevated Cardiac Markers 0 Filed at: 08/15/2023 1611   DAVID Risk Score (Calculated) 2 Filed at: 08/15/2023 1611                  Medical Decision Making  This is a 47-year-old female arriving by EMS for evaluation with complaint of severe chest, back, and abdominal pain. She states that symptoms were atraumatic in onset around 10 AM this morning and have essentially been constant since time of onset. She reports symptoms are accompanied by nausea and vomiting, stating that she had 1 episode of nonbloody, nonbilious emesis today. Differential diagnosis includes but is not limited to: Consider vascular process to include aortic dissection, rupture, aaa; acs/anginal equivalent, arrhythmia, costochondritis, musculoskeletal chest and back pain, esophagitis, gastritis, gerd, pud, pancreatitis, acute anh    Initial ED plan: ecg, labs, imaging    Final ED Assessment: Vital signs reviewed on ED presentation, examination as above. All labs and imaging independently reviewed with imaging interpreted by the Radiologist.  ECG is without ischemic changes, troponin 11->7. Heart score 4. CT reports no aortic aneurysm, intramural hematoma/dissection or penetrating ulcer. There is mention of radiopaque substance in the stomach with patient admitting that she did take Pepto-Bismol at home and attempt to alleviate her symptoms. She had persisted with nausea despite multiple rounds of antiemetics as well as pain. Patient advised of plan for recommendation for admission for telemetry monitoring, hydration, and continued care and further management per the internal medicine service which she is understanding of and agreeable with. Case discussed with jesus Trammell, accepts patient for admission. The patient has remained hemodynamically stable throughout ED course and is stable for admission, bridging orders placed.     Acute back pain: acute illness or injury  Chest pain, unspecified: acute illness or injury  Intractable nausea and vomiting: acute illness or injury  Amount and/or Complexity of Data Reviewed  Labs: ordered. Radiology: ordered. ECG/medicine tests: ordered. Risk  OTC drugs. Prescription drug management. Decision regarding hospitalization. Disposition  Final diagnoses:   Chest pain, unspecified   Acute back pain   Intractable nausea and vomiting     Time reflects when diagnosis was documented in both MDM as applicable and the Disposition within this note     Time User Action Codes Description Comment    8/15/2023  4:06 PM David Mattsonfisher Add [R07.9] Chest pain, unspecified     8/15/2023  4:06 PM Camillo Gaines Add [M54.9] Acute back pain     8/15/2023  4:06 PM Camian MattsonGaines Add [R11.2] Intractable nausea and vomiting     8/17/2023  3:56 PM Kristofer Bang Add [F41.8] Anxiety about health       ED Disposition     ED Disposition   Admit    Condition   Stable    Date/Time   Tue Aug 15, 2023  4:00 PM    Comment   Case was discussed with Dr. Dain Seip and the patient's admission status was agreed to be Admission Status: observation status to the service of Dr. Dain Seip .            Follow-up Information     Follow up With Specialties Details Why [address], DO Internal Medicine Schedule an appointment as soon as possible for a visit in 1 week(s)  [address]       Wendi Weber MD Palliative Care, Hospice Services Follow up as scheduled [address] [facility]      Carlie Lee MD Cardiology, Multidisciplinary Follow up as scheduled [address]  [phone]            Discharge Medication List as of 8/17/2023  3:57 PM      START taking these medications    Details   ALPRAZolam (XANAX) 0.5 mg tablet Take 1 tablet (0.5 mg total) by mouth 2 (two) times a day as needed for anxiety or sleep, Starting Thu 8/17/2023, Normal         CONTINUE these medications which have NOT CHANGED Details   acyclovir (ZOVIRAX) 800 mg tablet Take 1 tablet (800 mg total) by mouth 2 (two) times a day, Starting Thu 2/2/2023, Until Sun 1/28/2024, Normal      albuterol (PROVENTIL HFA,VENTOLIN HFA) 90 mcg/act inhaler Inhale 2 puffs every 6 (six) hours as needed for wheezing or shortness of breath, Starting Fri 3/31/2023, Normal      amLODIPine (NORVASC) 10 mg tablet take 1 tablet by mouth every morning, Starting Sat 7/29/2023, Normal      atorvastatin (LIPITOR) 40 mg tablet Take 40 mg by mouth, Historical Med      DULoxetine (CYMBALTA) 30 mg delayed release capsule take 1 capsule by mouth once daily, Normal      famotidine (PEPCID) 40 MG tablet TAKE 1 TABLET BY MOUTH ONCE DAILY, Normal      fluticasone (FLONASE) 50 mcg/act nasal spray 2 sprays into each nostril daily, Starting Fri 3/31/2023, Normal      Ibuprofen-Acetaminophen (Advil Dual Action) 125-250 MG TABS Take by mouth, Historical Med      methocarbamol (ROBAXIN) 750 mg tablet take 1 tablet by mouth every 6 hours if needed for muscle spasm, Normal      metoprolol succinate (TOPROL-XL) 25 mg 24 hr tablet take 1 tablet by mouth once daily, Starting Sat 7/29/2023, Normal      naloxone (NARCAN) 4 mg/0.1 mL nasal spray Administer 1 spray into a nostril. If no response after 2-3 minutes, give another dose in the other nostril using a new spray., Normal      NON FORMULARY -Balance of Nature vegetable and fruit supplements, Historical Med      oxyCODONE (ROXICODONE) 10 MG TABS Take 1.5 tablets (15 mg total) by mouth every 4 (four) hours as needed (cancer-related pain) Max Daily Amount: 75 mg, Starting Mon 8/7/2023, Normal      polyethylene glycol (MIRALAX) 17 g packet Take 17 g by mouth daily, Starting Thu 11/4/2021, Normal      pregabalin (LYRICA) 100 mg capsule take 1 capsule by mouth twice a day, Normal             No discharge procedures on file.     PDMP Review       Value Time User    PDMP Reviewed  Yes 8/7/2023 10:56 AM José Antonio Miranda MD          ED Provider  Electronically Signed by           Arnold Hall PA-C  08/20/23 1090

## 2023-08-15 NOTE — ASSESSMENT & PLAN NOTE
· Patient with elevated hemoglobin at baseline ranging around 17  · Hemoglobin 20.3 currently  · will give IV fluids  · Continue to monitor

## 2023-08-16 LAB
ANION GAP SERPL CALCULATED.3IONS-SCNC: 6 MMOL/L
ATRIAL RATE: 71 BPM
ATRIAL RATE: 79 BPM
BASOPHILS # BLD AUTO: 0.04 THOUSANDS/ÂΜL (ref 0–0.1)
BASOPHILS NFR BLD AUTO: 0 % (ref 0–1)
BUN SERPL-MCNC: 13 MG/DL (ref 5–25)
CALCIUM SERPL-MCNC: 9.4 MG/DL (ref 8.4–10.2)
CARDIAC TROPONIN I PNL SERPL HS: 16 NG/L (ref 8–18)
CARDIAC TROPONIN I PNL SERPL HS: 18 NG/L (ref 8–18)
CHLORIDE SERPL-SCNC: 106 MMOL/L (ref 96–108)
CO2 SERPL-SCNC: 25 MMOL/L (ref 21–32)
CREAT SERPL-MCNC: 0.88 MG/DL (ref 0.6–1.3)
EOSINOPHIL # BLD AUTO: 0.01 THOUSAND/ÂΜL (ref 0–0.61)
EOSINOPHIL NFR BLD AUTO: 0 % (ref 0–6)
ERYTHROCYTE [DISTWIDTH] IN BLOOD BY AUTOMATED COUNT: 15.5 % (ref 11.6–15.1)
GFR SERPL CREATININE-BSD FRML MDRD: 74 ML/MIN/1.73SQ M
GLUCOSE SERPL-MCNC: 97 MG/DL (ref 65–140)
HCT VFR BLD AUTO: 50.6 % (ref 34.8–46.1)
HGB BLD-MCNC: 17.4 G/DL (ref 11.5–15.4)
IMM GRANULOCYTES # BLD AUTO: 0.05 THOUSAND/UL (ref 0–0.2)
IMM GRANULOCYTES NFR BLD AUTO: 0 % (ref 0–2)
LYMPHOCYTES # BLD AUTO: 2.86 THOUSANDS/ÂΜL (ref 0.6–4.47)
LYMPHOCYTES NFR BLD AUTO: 23 % (ref 14–44)
MAGNESIUM SERPL-MCNC: 2.5 MG/DL (ref 1.9–2.7)
MCH RBC QN AUTO: 30.4 PG (ref 26.8–34.3)
MCHC RBC AUTO-ENTMCNC: 34.4 G/DL (ref 31.4–37.4)
MCV RBC AUTO: 88 FL (ref 82–98)
MONOCYTES # BLD AUTO: 0.96 THOUSAND/ÂΜL (ref 0.17–1.22)
MONOCYTES NFR BLD AUTO: 8 % (ref 4–12)
NEUTROPHILS # BLD AUTO: 8.6 THOUSANDS/ÂΜL (ref 1.85–7.62)
NEUTS SEG NFR BLD AUTO: 69 % (ref 43–75)
NRBC BLD AUTO-RTO: 0 /100 WBCS
P AXIS: 72 DEGREES
P AXIS: 78 DEGREES
PLATELET # BLD AUTO: 210 THOUSANDS/UL (ref 149–390)
PMV BLD AUTO: 8.8 FL (ref 8.9–12.7)
POTASSIUM SERPL-SCNC: 3.4 MMOL/L (ref 3.5–5.3)
PR INTERVAL: 150 MS
PR INTERVAL: 168 MS
QRS AXIS: -68 DEGREES
QRS AXIS: -74 DEGREES
QRSD INTERVAL: 114 MS
QRSD INTERVAL: 118 MS
QT INTERVAL: 412 MS
QT INTERVAL: 434 MS
QTC INTERVAL: 471 MS
QTC INTERVAL: 472 MS
RBC # BLD AUTO: 5.73 MILLION/UL (ref 3.81–5.12)
SODIUM SERPL-SCNC: 137 MMOL/L (ref 135–147)
T WAVE AXIS: 55 DEGREES
T WAVE AXIS: 59 DEGREES
VENTRICULAR RATE: 71 BPM
VENTRICULAR RATE: 79 BPM
WBC # BLD AUTO: 12.52 THOUSAND/UL (ref 4.31–10.16)

## 2023-08-16 PROCEDURE — 84484 ASSAY OF TROPONIN QUANT: CPT | Performed by: STUDENT IN AN ORGANIZED HEALTH CARE EDUCATION/TRAINING PROGRAM

## 2023-08-16 PROCEDURE — 85025 COMPLETE CBC W/AUTO DIFF WBC: CPT | Performed by: INTERNAL MEDICINE

## 2023-08-16 PROCEDURE — 93005 ELECTROCARDIOGRAM TRACING: CPT

## 2023-08-16 PROCEDURE — 83735 ASSAY OF MAGNESIUM: CPT | Performed by: STUDENT IN AN ORGANIZED HEALTH CARE EDUCATION/TRAINING PROGRAM

## 2023-08-16 PROCEDURE — 99233 SBSQ HOSP IP/OBS HIGH 50: CPT | Performed by: STUDENT IN AN ORGANIZED HEALTH CARE EDUCATION/TRAINING PROGRAM

## 2023-08-16 PROCEDURE — 80048 BASIC METABOLIC PNL TOTAL CA: CPT | Performed by: INTERNAL MEDICINE

## 2023-08-16 PROCEDURE — 93010 ELECTROCARDIOGRAM REPORT: CPT | Performed by: INTERNAL MEDICINE

## 2023-08-16 RX ORDER — POTASSIUM CHLORIDE 20 MEQ/1
40 TABLET, EXTENDED RELEASE ORAL ONCE
Status: COMPLETED | OUTPATIENT
Start: 2023-08-16 | End: 2023-08-16

## 2023-08-16 RX ORDER — SODIUM CHLORIDE, SODIUM GLUCONATE, SODIUM ACETATE, POTASSIUM CHLORIDE, MAGNESIUM CHLORIDE, SODIUM PHOSPHATE, DIBASIC, AND POTASSIUM PHOSPHATE .53; .5; .37; .037; .03; .012; .00082 G/100ML; G/100ML; G/100ML; G/100ML; G/100ML; G/100ML; G/100ML
100 INJECTION, SOLUTION INTRAVENOUS CONTINUOUS
Status: DISPENSED | OUTPATIENT
Start: 2023-08-16 | End: 2023-08-17

## 2023-08-16 RX ORDER — KETOROLAC TROMETHAMINE 30 MG/ML
15 INJECTION, SOLUTION INTRAMUSCULAR; INTRAVENOUS EVERY 6 HOURS PRN
Status: ACTIVE | OUTPATIENT
Start: 2023-08-16 | End: 2023-08-17

## 2023-08-16 RX ORDER — NITROGLYCERIN 0.4 MG/1
0.4 TABLET SUBLINGUAL
Status: DISCONTINUED | OUTPATIENT
Start: 2023-08-16 | End: 2023-08-17 | Stop reason: HOSPADM

## 2023-08-16 RX ADMIN — POTASSIUM CHLORIDE 40 MEQ: 1500 TABLET, EXTENDED RELEASE ORAL at 12:17

## 2023-08-16 RX ADMIN — METOPROLOL SUCCINATE 25 MG: 25 TABLET, EXTENDED RELEASE ORAL at 08:24

## 2023-08-16 RX ADMIN — ACYCLOVIR 800 MG: 800 TABLET ORAL at 18:29

## 2023-08-16 RX ADMIN — ACYCLOVIR 800 MG: 800 TABLET ORAL at 08:26

## 2023-08-16 RX ADMIN — PREGABALIN 100 MG: 100 CAPSULE ORAL at 18:28

## 2023-08-16 RX ADMIN — SODIUM CHLORIDE, SODIUM GLUCONATE, SODIUM ACETATE, POTASSIUM CHLORIDE, MAGNESIUM CHLORIDE, SODIUM PHOSPHATE, DIBASIC, AND POTASSIUM PHOSPHATE 100 ML/HR: .53; .5; .37; .037; .03; .012; .00082 INJECTION, SOLUTION INTRAVENOUS at 16:04

## 2023-08-16 RX ADMIN — OXYCODONE HYDROCHLORIDE 15 MG: 5 TABLET ORAL at 12:28

## 2023-08-16 RX ADMIN — PREGABALIN 100 MG: 100 CAPSULE ORAL at 08:26

## 2023-08-16 RX ADMIN — HEPARIN SODIUM 5000 UNITS: 5000 INJECTION INTRAVENOUS; SUBCUTANEOUS at 21:56

## 2023-08-16 RX ADMIN — SODIUM CHLORIDE, SODIUM GLUCONATE, SODIUM ACETATE, POTASSIUM CHLORIDE, MAGNESIUM CHLORIDE, SODIUM PHOSPHATE, DIBASIC, AND POTASSIUM PHOSPHATE 100 ML/HR: .53; .5; .37; .037; .03; .012; .00082 INJECTION, SOLUTION INTRAVENOUS at 20:12

## 2023-08-16 RX ADMIN — HEPARIN SODIUM 5000 UNITS: 5000 INJECTION INTRAVENOUS; SUBCUTANEOUS at 06:41

## 2023-08-16 RX ADMIN — OXYCODONE HYDROCHLORIDE 15 MG: 5 TABLET ORAL at 16:01

## 2023-08-16 RX ADMIN — OXYCODONE HYDROCHLORIDE 15 MG: 5 TABLET ORAL at 20:13

## 2023-08-16 RX ADMIN — FAMOTIDINE 40 MG: 20 TABLET, FILM COATED ORAL at 08:24

## 2023-08-16 RX ADMIN — HEPARIN SODIUM 5000 UNITS: 5000 INJECTION INTRAVENOUS; SUBCUTANEOUS at 14:07

## 2023-08-16 RX ADMIN — AMLODIPINE BESYLATE 10 MG: 10 TABLET ORAL at 08:25

## 2023-08-16 RX ADMIN — OXYCODONE HYDROCHLORIDE 15 MG: 5 TABLET ORAL at 04:06

## 2023-08-16 RX ADMIN — ALBUTEROL SULFATE 2 PUFF: 90 AEROSOL, METERED RESPIRATORY (INHALATION) at 20:15

## 2023-08-16 RX ADMIN — SODIUM CHLORIDE, SODIUM GLUCONATE, SODIUM ACETATE, POTASSIUM CHLORIDE, MAGNESIUM CHLORIDE, SODIUM PHOSPHATE, DIBASIC, AND POTASSIUM PHOSPHATE 100 ML/HR: .53; .5; .37; .037; .03; .012; .00082 INJECTION, SOLUTION INTRAVENOUS at 06:42

## 2023-08-16 RX ADMIN — DULOXETINE HYDROCHLORIDE 30 MG: 30 CAPSULE, DELAYED RELEASE ORAL at 08:25

## 2023-08-16 RX ADMIN — NITROGLYCERIN 0.4 MG: 0.4 TABLET SUBLINGUAL at 14:07

## 2023-08-16 RX ADMIN — ATORVASTATIN CALCIUM 40 MG: 40 TABLET, FILM COATED ORAL at 16:01

## 2023-08-16 NOTE — ASSESSMENT & PLAN NOTE
Patient presenting secondary to persistent chest pain he describes as sharp in sensation. This chest pain is reproducible on examination although patient did states she had sleep chest pain and shortness of breath with ambulation. Now reporting substernal chest pressure as well, with worsening PATTERSON. · CTA dissection study-no aortic aneurysm, intramural hematoma/dissection or penetrating ulcer. Radiopaque substance in stomach, small bowel and colon  · DAVID score of 2  · EKG-normal sinus rhythm and no ST or T wave changes noted  · Recent echocardiogram January of this year was noted to be normal and patient has since followed with cardiology as outpatient as well  · Nuclear stress test ordered  · Continue to monitor at this time and trend troponin  · Of note, last echo showing strain patterns concerning for possible amyloid. Consider cardiac MRI vs cardiology consultation if stress test negative and sx continue.

## 2023-08-16 NOTE — UTILIZATION REVIEW
Initial Clinical Review      OBS order 8/15 1611 converted to IP on 8/16 1507 for continued cardiac workup     Admission: Date/Time/Statement:   Admission Orders (From admission, onward)     Ordered        08/16/23 1507  Inpatient Admission  Once            08/15/23 1611  Place in Observation  Once                       Inpatient Admission     Standing Status:   Standing     Number of Occurrences:   1     Order Specific Question:   Level of Care     Answer:   Med Surg [16]     Order Specific Question:   Estimated length of stay     Answer:   More than 2 Midnights     Order Specific Question:   Certification     Answer:   I certify that inpatient services are medically necessary for this patient for a duration of greater than two midnights. See H&P and MD Progress Notes for additional information about the patient's course of treatment. ED Arrival Information     Expected   -    Arrival   8/15/2023 12:06    Acuity   Emergent            Means of arrival   Ambulance    Escorted by   [facility]    Service   Hospitalist    Admission type   Emergency            Arrival complaint   chest pain           Chief Complaint   Patient presents with   • Chest Pain     Chest pain that started this morning at 1000. PT describes pain as burning, stabbing, and pressure that radiates under left breast and to back. Initial Presentation: 45 y.o. female to ED from home w/ CP started this am . Worse w/ palpitation , described as sharp . SOB w/ exertion last week . PMHX HTN , multiple myeloma in remission . CTA dissection study-no aortic aneurysm, intramural hematoma/dissection or penetrating ulcer. Radiopaque substance in stomach, small bowel and colon. Admitted OBS status w/ CP , if continues consult cardiology , trend trop and monitor . Polycythemia hgb 20.3 give IVF and monitor . Opoid dependence cont oxycodone . HTN BP slightly elevated cont home meds and monitor.      8/16 IM Note   Reports stabbing pain and substernal CP 8/10. PATTERSON as well .   hgb 20.3 improved to baseline w/ IVF . F/u nuclear stress test . Consider cardiac MRI vs cardiology consult if stress test neg and sx cont. ED Triage Vitals [08/15/23 1208]   Temperature Pulse Respirations Blood Pressure SpO2   98 °F (36.7 °C) 91 18 (!) 176/112 99 %      Temp Source Heart Rate Source Patient Position - Orthostatic VS BP Location FiO2 (%)   Oral Monitor Sitting Right arm --      Pain Score       10 - Worst Possible Pain          Wt Readings from Last 1 Encounters:   08/16/23 77.8 kg (171 lb 8.3 oz)     Additional Vital Signs:   08/17/23 10:46:12 -- -- -- 135/91 106 -- -- --   08/17/23 0910 -- 75 -- 116/72 -- 98 % -- --   08/17/23 07:21:04 97.7 °F (36.5 °C) 65 13 132/85 101 90 % -- --   08/16/23 22:19:47 98 °F (36.7 °C) 70 18 124/88 100 95 % -- --   08/16/23 21:59:53 98.2 °F (36.8 °C) 67 17 136/90 105 91 % -- --   08/16/23 15:01:36 97.9 °F (36.6 °C) 69 15 108/64 79 92 % --        08/16/23 06:56:24 98.5 °F (36.9 °C) 65 16 140/95 110 88 % Abnormal  -- --   08/15/23 23:50:30 98.5 °F (36.9 °C) 81 13 111/80 90 90 % -- --   08/15/23 22:10:48 99 °F (37.2 °C) 83 17 127/94 105 93 % -- --   08/15/23 19:18:27 -- 81 -- 169/115 Abnormal  133 91 % -- --   08/15/23 17:46:59 98.9 °F (37.2 °C) 74 19 177/114 Abnormal  135 95 % -- --   08/15/23 1600 -- 93 19 159/108 Abnormal  129 100 % None (Room air) Lying   08/15/23 1500 -- 74 19 169/90 123 96 % None (Room air) Lying   08/15/23 1430 -- 76 12 175/103 Abnormal  132 97 % None (Room air) --   08/15/23 1300 -- 67 13 180/96 Abnormal  130 94 % None (Room air) Lying       Pertinent Labs/Diagnostic Test Results:   8/16 Stress test•  Resting ECG: The ECG shows normal sinus rhythm. There are nonspecific T changes. •  Stress ECG: Arrhythmias during stress: rare PVCs. The ECG was not diagnostic due to pharmacological (vasodilator) stress.   •  Perfusion: There is a left ventricular perfusion defect that is small to medium in size present in the inferior location(s) that is fixed. This defect resolved on prone imaging suggestive of artifact. There is a left ventricular perfusion defect that is small in size present in the basal anterior location(s) that is fixed. This defect resolved on prone imaging suggestive of artifact. •  Stress Combined Conclusion: There is image artifact, without diagnostic evidence for perfusion abnormality. •  Stress Function: Left ventricular function post-stress is normal.     Normal study after pharmacologic vasodilation. There was image artifact without diagnostic perfusion abnormality    8/15 EKG   Ventricular Rate BPM 71  79  82  89  99  92  69    Atrial Rate BPM 71  79  82  89  99  92  69    TX Interval ms 150  162  168  166  168  158  168    QRSD Interval ms 114  112  120  118  118  116  114    QT Interval ms 434  422  408  384  374  374  434    QTC Interval ms 471  483  476  467  479  462  465    P Axis degrees 72  70  70  82  64  76  66    QRS Axis degrees -68  0  -73  -75  -75  -56  -45    T Wave Axis degrees 59  56  71  84           CTA dissection protocol chest/abdomen/pelvis   Final Result by Sunshine Zamora MD (08/15 1427)      1. No aortic aneurysm, intramural hematoma/dissection or penetrating ulcer. 2.  Radiopaque substance in the stomach, small bowel and colon correlate with ingestion of crushed pills, oral contrast or other radiopaque substance including Pepto-Bismol. Workstation performed: [id number]         XR chest pa & lateral   Final Result by Kathy Larkin MD (08/15 1307)      No acute cardiopulmonary disease.                   Workstation performed: [id number]               Results from last 7 days   Lab Units 08/17/23  0429 08/16/23  0523 08/15/23  1821 08/15/23  1237   WBC Thousand/uL 8.97 12.52*  --  13.52*   HEMOGLOBIN g/dL 14.7 17.4*  --  20.3*   HEMATOCRIT % 44.1 50.6*  --  60.0*   PLATELETS Thousands/uL 178 210 237 263   NEUTROS ABS Thousands/µL  --  8.60*  --  10.94* Results from last 7 days   Lab Units 08/17/23  0429 08/16/23  0523 08/15/23  1237   SODIUM mmol/L 139 137 134*   POTASSIUM mmol/L 3.7 3.4* 3.7   CHLORIDE mmol/L 109* 106 100   CO2 mmol/L 25 25 22   ANION GAP mmol/L 5 6 12   BUN mg/dL 22 13 18   CREATININE mg/dL 0.93 0.88 1.14   EGFR ml/min/1.73sq m 69 74 54   CALCIUM mg/dL 8.7 9.4 11.1*   MAGNESIUM mg/dL  --  2.5  --      Results from last 7 days   Lab Units 08/15/23  1237   AST U/L 17   ALT U/L 12   ALK PHOS U/L 94   TOTAL PROTEIN g/dL 10.1*   ALBUMIN g/dL 5.6*   TOTAL BILIRUBIN mg/dL 0.90     Results from last 7 days   Lab Units 08/17/23  0429 08/16/23  0523 08/15/23  1237   GLUCOSE RANDOM mg/dL 80 97 130     Results from last 7 days   Lab Units 08/15/23  1629 08/15/23  1437 08/15/23  1237   HS TNI 0HR ng/L  --   --  11   HS TNI 2HR ng/L  --  7  --    HSTNI D2 ng/L  --  -4  --    HS TNI 4HR ng/L 9  --   --    HSTNI D4 ng/L -2  --   --      Results from last 7 days   Lab Units 08/15/23  1237   PROTIME seconds 13.0   INR  1.00   PTT seconds 30     ED Treatment:   Medication Administration from 08/15/2023 1206 to 08/15/2023 1736       Date/Time Order Dose Route Action     08/15/2023 1304 EDT morphine injection 4 mg 4 mg Intravenous Given     08/15/2023 1305 EDT ondansetron (ZOFRAN) injection 4 mg 4 mg Intravenous Given     08/15/2023 1423 EDT acetaminophen (TYLENOL) tablet 650 mg 650 mg Oral Given     08/15/2023 1422 EDT aspirin chewable tablet 324 mg 324 mg Oral Given     08/15/2023 1425 EDT droperidol (INAPSINE) injection 0.625 mg 0.625 mg Intravenous Given     08/15/2023 1617 EDT magnesium sulfate IVPB (premix) SOLN 1 g 1 g Intravenous New Bag     08/15/2023 1623 EDT trimethobenzamide (TIGAN) IM injection 200 mg 200 mg Intramuscular Given     08/15/2023 1621 EDT sodium chloride 0.9 % bolus 1,000 mL 1,000 mL Intravenous New Bag        Past Medical History:   Diagnosis Date   • Anemia    • Cardiomyopathy ([address])    • Chronic back pain    • Chronic narcotic dependence ([address])    • Colon polyp    • Disease of thyroid gland    • GERD (gastroesophageal reflux disease)    • H/O autologous stem cell transplant ([address]) 03/30/2022   • Hypertension    • Multiple myeloma (HCC)     in remission   • Neuropathy      Present on Admission:  • Hypertension  • Chronic bilateral low back pain with bilateral sciatica  • Cigarette nicotine dependence without complication  • Multiple myeloma in remission ([address])  • Continuous opioid dependence (HCC)  • Amyloidosis (HCC)      Admitting Diagnosis: Chest pain, unspecified [R07.9]  Chest pain [R07.9]  Acute back pain [M54.9]  Intractable nausea and vomiting [R11.2]  Age/Sex: 45 y.o. female  Admission Orders:  Scheduled Medications:  acyclovir, 800 mg, Oral, BID  amLODIPine, 10 mg, Oral, QAM  atorvastatin, 40 mg, Oral, Daily With Dinner  Diclofenac Sodium, 2 g, Topical, 4x Daily  DULoxetine, 30 mg, Oral, Daily  famotidine, 40 mg, Oral, Daily  fluticasone, 2 spray, Nasal, Daily  heparin (porcine), 5,000 Units, Subcutaneous, Q8H YUDITH  metoprolol succinate, 25 mg, Oral, Daily  nicotine, 1 patch, Transdermal, Daily  pregabalin, 100 mg, Oral, BID      Continuous IV Infusions:  multi-electrolyte, 100 mL/hr, Intravenous, Continuous      PRN Meds:  acetaminophen, 650 mg, Oral, Q6H PRN  albuterol, 2 puff, Inhalation, Q6H PRN  labetalol, 10 mg, Intravenous, Q6H PRN  methocarbamol, 750 mg, Oral, Q6H PRN  oxyCODONE, 15 mg, Oral, Q4H PRN    Up and OOB   Tele   Cont pulse [facility] diet       Network Utilization Review Department  ATTENTION: Please call with any questions or concerns to [phone] and carefully listen to the prompts so that you are directed to the right person. All voicemails are confidential.  Darrian Stubbs all requests for admission clinical reviews, approved or denied determinations and any other requests to dedicated fax number below belonging to the campus where the patient is receiving treatment.  List of dedicated fax numbers for the Facilities:  FACILITY NAME UR FAX NUMBER   ADMISSION DENIALS (Administrative/Medical Necessity) [phone] [address] (Maternity/NICU/Pediatrics) [phone]   [address] 1521 [facility] 1000 [facility] [phone] 1505 [facility] 207 [facility] [address] [place] [address] [address] [address] [facility] [address] [phone]

## 2023-08-16 NOTE — PLAN OF CARE
Problem: PAIN - ADULT  Goal: Verbalizes/displays adequate comfort level or baseline comfort level  Description: Interventions:  - Encourage patient to monitor pain and request assistance  - Assess pain using appropriate pain scale  - Administer analgesics based on type and severity of pain and evaluate response  - Implement non-pharmacological measures as appropriate and evaluate response  - Consider cultural and social influences on pain and pain management  - Notify physician/advanced practitioner if interventions unsuccessful or patient reports new pain  Outcome: Progressing     Problem: INFECTION - ADULT  Goal: Absence or prevention of progression during hospitalization  Description: INTERVENTIONS:  - Assess and monitor for signs and symptoms of infection  - Monitor lab/diagnostic results  - Monitor all insertion sites, i.e. indwelling lines, tubes, and drains  - Monitor endotracheal if appropriate and nasal secretions for changes in amount and color  - [place] appropriate cooling/warming therapies per order  - Administer medications as ordered  - Instruct and encourage patient and family to use good hand hygiene technique  - Identify and instruct in appropriate isolation precautions for identified infection/condition  Outcome: Progressing  Goal: Absence of fever/infection during neutropenic period  Description: INTERVENTIONS:  - Monitor WBC    Outcome: Progressing     Problem: SAFETY ADULT  Goal: Patient will remain free of falls  Description: INTERVENTIONS:  - Educate patient/family on patient safety including physical limitations  - Instruct patient to call for assistance with activity   - Consult OT/PT to assist with strengthening/mobility   - Keep Call bell within reach  - Keep bed low and locked with side rails adjusted as appropriate  - Keep care items and personal belongings within reach  - Initiate and maintain comfort rounds  - Make Fall Risk Sign visible to staff  - Offer Toileting every  Hours, in advance of need  - Initiate/Maintain alarm  - Obtain necessary fall risk management equipment:   - Apply yellow socks and bracelet for high fall risk patients  - Consider moving patient to room near nurses station  Outcome: Progressing  Goal: Maintain or return to baseline ADL function  Description: INTERVENTIONS:  -  Assess patient's ability to carry out ADLs; assess patient's baseline for ADL function and identify physical deficits which impact ability to perform ADLs (bathing, care of mouth/teeth, toileting, grooming, dressing, etc.)  - Assess/evaluate cause of self-care deficits   - Assess range of motion  - Assess patient's mobility; develop plan if impaired  - Assess patient's need for assistive devices and provide as appropriate  - Encourage maximum independence but intervene and supervise when necessary  - Involve family in performance of ADLs  - Assess for home care needs following discharge   - Consider OT consult to assist with ADL evaluation and planning for discharge  - Provide patient education as appropriate  Outcome: Progressing  Goal: Maintains/Returns to pre admission functional level  Description: INTERVENTIONS:  - Perform BMAT or MOVE assessment daily.   - Set and communicate daily mobility goal to care team and patient/family/caregiver. - Collaborate with rehabilitation services on mobility goals if consulted  - Perform Range of Motion  times a day. - Reposition patient every  hours.   - Dangle patient  times a day  - Stand patient times a day  - Ambulate patient  times a day  - Out of bed to chair  times a day   - Out of bed for manisha times a day  - Out of bed for toileting  - Record patient progress and toleration of activity level   Outcome: Progressing     Problem: DISCHARGE PLANNING  Goal: Discharge to home or other facility with appropriate resources  Description: INTERVENTIONS:  - Identify barriers to discharge w/patient and caregiver  - Arrange for needed discharge resources and transportation as appropriate  - Identify discharge learning needs (meds, wound care, etc.)  - Arrange for interpretive services to assist at discharge as needed  - Refer to Case Management Department for coordinating discharge planning if the patient needs post-hospital services based on physician/advanced practitioner order or complex needs related to functional status, cognitive ability, or social support system  Outcome: Progressing     Problem: Knowledge Deficit  Goal: Patient/family/caregiver demonstrates understanding of disease process, treatment plan, medications, and discharge instructions  Description: Complete learning assessment and assess knowledge base.   Interventions:  - Provide teaching at level of understanding  - Provide teaching via preferred learning methods  Outcome: Progressing

## 2023-08-16 NOTE — ASSESSMENT & PLAN NOTE
· Patient with elevated hemoglobin at baseline ranging around 17  · Hemoglobin 20.3 improved to baseline with IV fluids  · Continue to monitor

## 2023-08-16 NOTE — PROGRESS NOTES
[address]  Progress Note  Name: Whit Brumfield  MRN: [id number]  Unit/Bed#: -01 I Date of Admission: 8/15/2023   Date of Service: 8/16/2023 I Hospital Day: 0    Assessment/Plan   Polycythemia  Assessment & Plan  · Patient with elevated hemoglobin at baseline ranging around 17  · Hemoglobin 20.3 improved to baseline with IV fluids  · Continue to monitor    Continuous opioid dependence ([address])  Assessment & Plan  · Continue home oxycodone dose    Multiple myeloma in remission [facility])  Assessment & Plan  · Continue outpatient follow-up with oncology    Cigarette nicotine dependence without complication  Assessment & Plan  · Nicotine patch ordered  · Discussed importance of smoking cessation    Chronic bilateral low back pain with bilateral sciatica  Assessment & Plan  · Continue home medication  · We will order aqua K    Hypertension  Assessment & Plan  BP stable  · Continue home blood pressure medication  · Continue to monitor    * Chest pain  Assessment & Plan  Patient presenting secondary to persistent chest pain he describes as sharp in sensation. This chest pain is reproducible on examination although patient did states she had sleep chest pain and shortness of breath with ambulation. Now reporting substernal chest pressure as well, with worsening PATTERSON. · CTA dissection study-no aortic aneurysm, intramural hematoma/dissection or penetrating ulcer. Radiopaque substance in stomach, small bowel and colon  · DAVID score of 2  · EKG-normal sinus rhythm and no ST or T wave changes noted  · Recent echocardiogram January of this year was noted to be normal and patient has since followed with cardiology as outpatient as well  · Nuclear stress test ordered  · Continue to monitor at this time and trend troponin  · Of note, last echo showing strain patterns concerning for possible amyloid. Consider cardiac MRI vs cardiology consultation if stress test negative and sx continue.            VTE Pharmacologic Prophylaxis: VTE Score: 4 Moderate Risk (Score 3-4) - Pharmacological DVT Prophylaxis Ordered: enoxaparin (Lovenox). Patient Centered Rounds: I performed bedside rounds with nursing staff today. Discussions with Specialists or Other Care Team Provider: RAOUL    Education and Discussions with Family / Patient: PT. Total Time Spent on Date of Encounter in care of patient: 45 minutes This time was spent on one or more of the following: performing physical exam; counseling and coordination of care; obtaining or reviewing history; documenting in the medical record; reviewing/ordering tests, medications or procedures; communicating with other healthcare professionals and discussing with patient's family/caregivers. Current Length of Stay: 0 day(s)  Current Patient Status: Inpatient   Certification Statement: The patient will continue to require additional inpatient hospital stay due to nuclear stress test  Discharge Plan: Anticipate discharge in 24-48 hrs to discharge location to be determined pending rehab evaluations. Code Status: Level 1 - Full Code    Subjective:   Pt reports sharp stabbing pain and substernal chest pain that is 8/10. Reports PATTERSON as well. Denies palpitations or dizziness or nausea/vomiting. Objective:     Vitals:   Temp (24hrs), Av.6 °F (37 °C), Min:97.9 °F (36.6 °C), Max:99 °F (37.2 °C)    Temp:  [97.9 °F (36.6 °C)-99 °F (37.2 °C)] 97.9 °F (36.6 °C)  HR:  [65-93] 69  Resp:  [13-19] 15  BP: (108-177)/() 108/64  SpO2:  [88 %-100 %] 92 %  Body mass index is 29.64 kg/m². Input and Output Summary (last 24 hours): Intake/Output Summary (Last 24 hours) at 2023 1513  Last data filed at 2023 0601  Gross per 24 hour   Intake 240 ml   Output --   Net 240 ml       Physical Exam:   Physical Exam  Vitals reviewed. Constitutional:       General: She is not in acute distress. Appearance: She is well-developed. She is not diaphoretic.    HENT:      Head: Normocephalic and atraumatic. Nose: Nose normal.      Mouth/Throat:      Pharynx: No oropharyngeal exudate. Eyes:      General: No scleral icterus. Right eye: No discharge. Left eye: No discharge. Conjunctiva/sclera: Conjunctivae normal.   Cardiovascular:      Rate and Rhythm: Normal rate and regular rhythm. Heart sounds: Normal heart sounds. No murmur heard. No friction rub. No gallop. Pulmonary:      Effort: Pulmonary effort is normal. No respiratory distress. Breath sounds: Normal breath sounds. No wheezing or rales. Abdominal:      General: Bowel sounds are normal. There is no distension. Palpations: Abdomen is soft. Tenderness: There is no abdominal tenderness. There is no guarding. Musculoskeletal:         General: Normal range of motion. Cervical back: Normal range of motion and neck supple. Skin:     General: Skin is warm and dry. Findings: No erythema. Neurological:      Mental Status: She is alert and oriented to person, place, and time.    Psychiatric:         Behavior: Behavior normal.         Additional Data:     Labs:  Results from last 7 days   Lab Units 08/16/23  0523   WBC Thousand/uL 12.52*   HEMOGLOBIN g/dL 17.4*   HEMATOCRIT % 50.6*   PLATELETS Thousands/uL 210   NEUTROS PCT % 69   LYMPHS PCT % 23   MONOS PCT % 8   EOS PCT % 0     Results from last 7 days   Lab Units 08/16/23  0523 08/15/23  1237   SODIUM mmol/L 137 134*   POTASSIUM mmol/L 3.4* 3.7   CHLORIDE mmol/L 106 100   CO2 mmol/L 25 22   BUN mg/dL 13 18   CREATININE mg/dL 0.88 1.14   ANION GAP mmol/L 6 12   CALCIUM mg/dL 9.4 11.1*   ALBUMIN g/dL  --  5.6*   TOTAL BILIRUBIN mg/dL  --  0.90   ALK PHOS U/L  --  94   ALT U/L  --  12   AST U/L  --  17   GLUCOSE RANDOM mg/dL 97 130     Results from last 7 days   Lab Units 08/15/23  1237   INR  1.00                   Lines/Drains:  Invasive Devices     Peripheral Intravenous Line  Duration           Peripheral IV 08/15/23 Left Antecubital 1 day                      Imaging: No pertinent imaging reviewed. Recent Cultures (last 7 days):         Last 24 Hours Medication List:   Current Facility-Administered Medications   Medication Dose Route Frequency Provider Last Rate   • acetaminophen  650 mg Oral Q6H PRN Cassidy Enriquez, DO     • acyclovir  800 mg Oral BID Rnoaldalymarcelino Enriquez, DO     • albuterol  2 puff Inhalation Q6H PRN Ronaldalymarcelino Enriquez, DO     • amLODIPine  10 mg Oral QAM Cassidy Enriquez, DO     • atorvastatin  40 mg Oral Daily With Serafin Pollack, DO     • Diclofenac Sodium  2 g Topical 4x Daily Ronaldalymarcelino Enriquez, DO     • DULoxetine  30 mg Oral Daily Ronaldalymarcelino Enriquez, DO     • famotidine  40 mg Oral Daily Ronaldalymarcelino Enriquez, DO     • fluticasone  2 spray Nasal Daily Cassidy Enriquez, DO     • heparin (porcine)  5,000 Units Subcutaneous [facility] Cassidy Enriquez, DO     • ketorolac  15 mg Intravenous Q6H PRN Beckie Anisha Koki, DO     • labetalol  10 mg Intravenous Q6H PRN Cassidy Enriquez, DO     • metoprolol succinate  25 mg Oral Daily Ronaldalymarcelino Enriquez, DO     • multi-electrolyte  100 mL/hr Intravenous Continuous Beckie Anisha Saraiya, DO     • nicotine  1 patch Transdermal Daily Cassidy Enriquez, DO     • nitroglycerin  0.4 mg Sublingual Q5 Min PRN Beckie Anisha Saraiya, DO     • oxyCODONE  15 mg Oral Q4H PRN Cassidy Enriquez, DO     • pregabalin  100 mg Oral BID Cassidy Enriquez, DO          Today, Patient Was Seen By: Noemi Mckeon DO    **Please Note: This note may have been constructed using a voice recognition system. **

## 2023-08-16 NOTE — CASE MANAGEMENT
Case Management Assessment & Discharge Planning Note    Patient name Nikolas Alvarado  Location [address] [facility] Latoya [id number] MRN [id number]  : 1970 Date 2023       Current Admission Date: 8/15/2023  Current Admission Diagnosis:Chest pain   Patient Active Problem List    Diagnosis Date Noted   • Polycythemia 08/15/2023   • Chest pain 2023   • Thyroid nodule 2022   • H/O autologous stem cell transplant ([address]) 2022   • Amyloidosis ([address]) 2022   • Encounter for central line care 2022   • Palliative care patient 2022   • Continuous opioid dependence ([address]) 2022   • Multiple myeloma in remission ([address]) 12/15/2021   • Cancer related pain 12/15/2021   • Cigarette nicotine dependence without complication    • Chronic bilateral low back pain with bilateral sciatica 2020   • Hypertension 2018      LOS (days): 0  Geometric Mean LOS (GMLOS) (days):   Days to GMLOS:     OBJECTIVE:     Current admission status: Inpatient    Preferred Pharmacy:   [facility] #[id number] [address]/[place] [address] [address]  Phone: [phone] Fax: [phone]    [facility] ([facility]) [address] [place] [address]  Phone: [phone] Fax: [phone]    [facility] #[id number]  [place] [address]) [place] [address]  Phone: [phone] Fax: [phone]    Primary Care Provider: Fanny Oquendo DO    Primary Insurance: [facility]  Secondary Insurance:     ASSESSMENT:  [place] Proxies    There are no active Health Care Proxies on file. Advance Directives  Does patient have a Health Care POA?: Yes  Does patient have Advance Directives?: No  Was patient offered paperwork?: Yes  Primary Contact: CM discussed POA, AD and HCP document and gave a copy to patient at bedside. Patient to review and submit at a later date.  Mother Anay Bautista is current [facility]. Readmission Root Cause  30 Day Readmission: No    Patient Information  Admitted from[de-identified] Home  Mental Status: Alert  During Assessment patient was accompanied by: Not accompanied during assessment  Assessment information provided by[de-identified] Patient  Primary Caregiver: Self  Support Systems: Self, Parent  [place] of Residence: [address] [place] [address] do you live in?: [address] [place] [address] entry access options.  Select all that apply.: Stairs  Number of steps to enter home.: 3  Do the steps have railings?: Yes  Type of Current Residence: 2 story home  Upon entering residence, is there a bedroom on the main floor (no further steps)?: No  A bedroom is located on the following floor levels of residence (select all that apply):: 2nd Floor  Upon entering residence, is there a bathroom on the main floor (no further steps)?: Yes  Number of steps to 2nd floor from main floor: One Flight  In the last 12 months, was there a time when you were not able to pay the mortgage or rent on time?: No  In the last 12 months, how many places have you lived?: 1  In the last 12 months, was there a time when you did not have a steady place to sleep or slept in a shelter (including now)?: No  Homeless/housing insecurity resource given?: N/A  Living Arrangements: Lives w/ Parent(s)  Is patient a ?: Yes  Is patient active with [facility])?: No    Activities of Daily Living Prior to Admission  Functional Status: Independent  Completes ADLs independently?: Yes  Ambulates independently?: Yes  Does patient use assisted devices?: Yes  Assisted Devices (DME) used: Straight Cane  Does patient currently own DME?: Yes  What DME does the patient currently own?: Straight Cane  Does patient have a history of Outpatient Therapy (PT/OT)?: No  Does the patient have a history of Short-Term Rehab?: No  Does patient have a history of HHC?: No  Does patient currently have [facility]?: No    Patient Information Continued  Income Source: SSI/SSD  Does patient have prescription coverage?: Yes  Within the past 12 months, you worried that your food would run out before you got the money to buy more.: Never true  Within the past 12 months, the food you bought just didn't last and you didn't have money to get more.: Never true  Food insecurity resource given?: N/A  Does patient receive dialysis treatments?: No  Does patient have a history of substance abuse?: Yes  Historical substance use preference: Cocaine  History of Withdrawal Symptoms: Denies past symptoms  Is patient currently in treatment for substance abuse?: N/A - sober  Does patient have a history of Mental Health Diagnosis?: No    Means of Transportation  Means of Transport to Appts[de-identified] Drives Self  In the past 12 months, has lack of transportation kept you from medical appointments or from getting medications?: No  In the past 12 months, has lack of transportation kept you from meetings, work, or from getting things needed for daily living?: No  Was application for public transport provided?: N/A    DISCHARGE DETAILS:    Discharge planning discussed with[de-identified] Patient  Freedom of Choice: Yes  Comments - Freedom of Choice: CM discussed discharge planning if needed. CM expressed that patient had [place] of Choice if resources were recommended.   CM contacted family/caregiver?: No- see comments (Patient declined.)  Were Treatment Team discharge recommendations reviewed with patient/caregiver?: Yes  Did patient/caregiver verbalize understanding of patient care needs?: Yes  Were patient/caregiver advised of the risks associated with not following Treatment Team discharge recommendations?: Yes    [address]         Is the patient interested in [address] [place] [address] at discharge?: No    DME Referral Provided  Referral made for DME?: No    Other Referral/Resources/Interventions Provided:  Interventions: None Indicated    Would you like to participate in our [address] service program?  : No - Declined    Treatment Team Recommendation: Home  Discharge Destination Plan[de-identified] Home  Transport at Discharge : Family

## 2023-08-16 NOTE — PLAN OF CARE
Detail Level: Detailed Problem: PAIN - ADULT  Goal: Verbalizes/displays adequate comfort level or baseline comfort level  Description: Interventions:  - Encourage patient to monitor pain and request assistance  - Assess pain using appropriate pain scale  - Administer analgesics based on type and severity of pain and evaluate response  - Implement non-pharmacological measures as appropriate and evaluate response  - Consider cultural and social influences on pain and pain management  - Notify physician/advanced practitioner if interventions unsuccessful or patient reports new pain  Outcome: Progressing     Problem: SAFETY ADULT  Goal: Patient will remain free of falls  Description: INTERVENTIONS:  - Educate patient/family on patient safety including physical limitations  - Instruct patient to call for assistance with activity   - Consult OT/PT to assist with strengthening/mobility   - Keep Call bell within reach  - Keep bed low and locked with side rails adjusted as appropriate  - Keep care items and personal belongings within reach  - Initiate and maintain comfort rounds  - Make Fall Risk Sign visible to staff  - Offer Toileting every 2 Hours, in advance of need  - Initiate/Maintain 2alarm  - Obtain necessary fall risk management equipment: 2  - Apply yellow socks and bracelet for high fall risk patients  - Consider moving patient to room near nurses station  Outcome: Progressing  Goal: Maintain or return to baseline ADL function  Description: INTERVENTIONS:  -  Assess patient's ability to carry out ADLs; assess patient's baseline for ADL function and identify physical deficits which impact ability to perform ADLs (bathing, care of mouth/teeth, toileting, grooming, dressing, etc )  - Assess/evaluate cause of self-care deficits   - Assess range of motion  - Assess patient's mobility; develop plan if impaired  - Assess patient's need for assistive devices and provide as appropriate  - Encourage maximum independence but intervene and supervise when necessary  - Involve family in performance of ADLs  - Assess for home care needs following discharge   - Consider OT consult to assist with ADL evaluation and planning for discharge  - Provide patient education as appropriate  Outcome: Progressing  Goal: Maintains/Returns to pre admission functional level  Description: INTERVENTIONS:  - Perform BMAT or MOVE assessment daily    - Set and communicate daily mobility goal to care team and patient/family/caregiver  - Collaborate with rehabilitation services on mobility goals if consulted  - Perform Range of Motion 2 times a day  - Reposition patient every 2 hours  - Dangle patient 2 times a day  - Stand patient 2 times a day  - Ambulate patient 2 times a day  - Out of bed to chair 2 times a day   - Out of bed for meals 2 times a day  - Out of bed for toileting  - Record patient progress and toleration of activity level   Outcome: Progressing     Problem: Knowledge Deficit  Goal: Patient/family/caregiver demonstrates understanding of disease process, treatment plan, medications, and discharge instructions  Description: Complete learning assessment and assess knowledge base    Interventions:  - Provide teaching at level of understanding  - Provide teaching via preferred learning methods  Outcome: Progressing     Problem: CARDIOVASCULAR - ADULT  Goal: Maintains optimal cardiac output and hemodynamic stability  Description: INTERVENTIONS:  - Monitor I/O, vital signs and rhythm  - Monitor for S/S and trends of decreased cardiac output  - Administer and titrate ordered vasoactive medications to optimize hemodynamic stability  - Assess quality of pulses, skin color and temperature  - Assess for signs of decreased coronary artery perfusion  - Instruct patient to report change in severity of symptoms  Outcome: Progressing  Goal: Absence of cardiac dysrhythmias or at baseline rhythm  Description: INTERVENTIONS:  - Continuous cardiac monitoring, vital signs, obtain 12 lead EKG if ordered  - Administer antiarrhythmic and heart rate control medications as ordered  - Monitor electrolytes and administer replacement therapy as ordered  Outcome: Progressing     Problem: GASTROINTESTINAL - ADULT  Goal: Minimal or absence of nausea and/or vomiting  Description: INTERVENTIONS:  - Administer IV fluids if ordered to ensure adequate hydration  - Maintain NPO status until nausea and vomiting are resolved  - Nasogastric tube if ordered  - Administer ordered antiemetic medications as needed  - Provide nonpharmacologic comfort measures as appropriate  - Advance diet as tolerated, if ordered  - Consider nutrition services referral to assist patient with adequate nutrition and appropriate food choices  Outcome: Progressing  Goal: Maintains or returns to baseline bowel function  Description: INTERVENTIONS:  - Assess bowel function  - Encourage oral fluids to ensure adequate hydration  - Administer IV fluids if ordered to ensure adequate hydration  - Administer ordered medications as needed  - Encourage mobilization and activity  - Consider nutritional services referral to assist patient with adequate nutrition and appropriate food choices  Outcome: Progressing  Goal: Maintains adequate nutritional intake  Description: INTERVENTIONS:  - Monitor percentage of each meal consumed  - Identify factors contributing to decreased intake, treat as appropriate  - Assist with meals as needed  - Monitor I&O, weight, and lab values if indicated  - Obtain nutrition services referral as needed  Outcome: Progressing  Goal: Establish and maintain optimal ostomy function  Description: INTERVENTIONS:  - Assess bowel function  - Encourage oral fluids to ensure adequate hydration  - Administer IV fluids if ordered to ensure adequate hydration   - Administer ordered medications as needed  - Encourage mobilization and activity  - Nutrition services referral to assist patient with appropriate food choices  - Assess stoma site  - Consider wound care consult   Outcome: Progressing  Goal: Oral mucous membranes remain intact  Description: INTERVENTIONS  - Assess oral mucosa and hygiene practices  - Implement preventative oral hygiene regimen  - Implement oral medicated treatments as ordered  - Initiate Nutrition services referral as needed  Outcome: Progressing Detail Level: Zone

## 2023-08-17 ENCOUNTER — APPOINTMENT (INPATIENT)
Dept: NUCLEAR MEDICINE | Facility: HOSPITAL | Age: 53
End: 2023-08-17
Payer: COMMERCIAL

## 2023-08-17 ENCOUNTER — APPOINTMENT (INPATIENT)
Dept: NON INVASIVE DIAGNOSTICS | Facility: HOSPITAL | Age: 53
End: 2023-08-17
Payer: COMMERCIAL

## 2023-08-17 VITALS
HEIGHT: 63 IN | TEMPERATURE: 98.4 F | RESPIRATION RATE: 18 BRPM | OXYGEN SATURATION: 98 % | BODY MASS INDEX: 30.39 KG/M2 | DIASTOLIC BLOOD PRESSURE: 88 MMHG | WEIGHT: 171.52 LBS | SYSTOLIC BLOOD PRESSURE: 126 MMHG | HEART RATE: 75 BPM

## 2023-08-17 PROBLEM — R45.89 ANXIETY ABOUT HEALTH: Status: ACTIVE | Noted: 2023-08-17

## 2023-08-17 PROBLEM — F41.8 ANXIETY ABOUT HEALTH: Status: ACTIVE | Noted: 2023-08-17

## 2023-08-17 LAB
ANION GAP SERPL CALCULATED.3IONS-SCNC: 5 MMOL/L
ATRIAL RATE: 71 BPM
BUN SERPL-MCNC: 22 MG/DL (ref 5–25)
CALCIUM SERPL-MCNC: 8.7 MG/DL (ref 8.4–10.2)
CHEST PAIN STATEMENT: NORMAL
CHLORIDE SERPL-SCNC: 109 MMOL/L (ref 96–108)
CO2 SERPL-SCNC: 25 MMOL/L (ref 21–32)
CREAT SERPL-MCNC: 0.93 MG/DL (ref 0.6–1.3)
ERYTHROCYTE [DISTWIDTH] IN BLOOD BY AUTOMATED COUNT: 15.4 % (ref 11.6–15.1)
GFR SERPL CREATININE-BSD FRML MDRD: 69 ML/MIN/1.73SQ M
GLUCOSE SERPL-MCNC: 80 MG/DL (ref 65–140)
HCT VFR BLD AUTO: 44.1 % (ref 34.8–46.1)
HGB BLD-MCNC: 14.7 G/DL (ref 11.5–15.4)
MAX DIASTOLIC BP: 76 MMHG
MAX HEART RATE: 123 BPM
MAX PREDICTED HEART RATE: 166 BPM
MAX. SYSTOLIC BP: 140 MMHG
MCH RBC QN AUTO: 30.1 PG (ref 26.8–34.3)
MCHC RBC AUTO-ENTMCNC: 33.3 G/DL (ref 31.4–37.4)
MCV RBC AUTO: 90 FL (ref 82–98)
P AXIS: 71 DEGREES
PLATELET # BLD AUTO: 178 THOUSANDS/UL (ref 149–390)
PMV BLD AUTO: 9.7 FL (ref 8.9–12.7)
POTASSIUM SERPL-SCNC: 3.7 MMOL/L (ref 3.5–5.3)
PR INTERVAL: 158 MS
PROTOCOL NAME: NORMAL
QRS AXIS: -65 DEGREES
QRSD INTERVAL: 122 MS
QT INTERVAL: 434 MS
QTC INTERVAL: 471 MS
RATE PRESSURE PRODUCT: NORMAL
RBC # BLD AUTO: 4.88 MILLION/UL (ref 3.81–5.12)
REASON FOR TERMINATION: NORMAL
SL CV REST NUCLEAR ISOTOPE DOSE: 10.5 MCI
SL CV STRESS NUCLEAR ISOTOPE DOSE: 29.2 MCI
SL CV STRESS RECOVERY BP: NORMAL MMHG
SL CV STRESS RECOVERY HR: 82 BPM
SL CV STRESS RECOVERY O2 SAT: 98 %
SODIUM SERPL-SCNC: 139 MMOL/L (ref 135–147)
STRESS ANGINA INDEX: 0
STRESS BASELINE BP: NORMAL MMHG
STRESS BASELINE HR: 75 BPM
STRESS O2 SAT REST: 98 %
STRESS PEAK HR: 123 BPM
STRESS POST O2 SAT PEAK: 99 %
STRESS POST PEAK BP: 140 MMHG
STRESS/REST PERFUSION RATIO: 1.02
T WAVE AXIS: 35 DEGREES
TARGET HR FORMULA: NORMAL
TEST INDICATION: NORMAL
TIME IN EXERCISE PHASE: NORMAL
VENTRICULAR RATE: 71 BPM
WBC # BLD AUTO: 8.97 THOUSAND/UL (ref 4.31–10.16)

## 2023-08-17 PROCEDURE — 99239 HOSP IP/OBS DSCHRG MGMT >30: CPT | Performed by: INTERNAL MEDICINE

## 2023-08-17 PROCEDURE — 85027 COMPLETE CBC AUTOMATED: CPT | Performed by: STUDENT IN AN ORGANIZED HEALTH CARE EDUCATION/TRAINING PROGRAM

## 2023-08-17 PROCEDURE — 93018 CV STRESS TEST I&R ONLY: CPT | Performed by: INTERNAL MEDICINE

## 2023-08-17 PROCEDURE — 78452 HT MUSCLE IMAGE SPECT MULT: CPT | Performed by: INTERNAL MEDICINE

## 2023-08-17 PROCEDURE — 80048 BASIC METABOLIC PNL TOTAL CA: CPT | Performed by: STUDENT IN AN ORGANIZED HEALTH CARE EDUCATION/TRAINING PROGRAM

## 2023-08-17 PROCEDURE — G1004 CDSM NDSC: HCPCS

## 2023-08-17 PROCEDURE — 93016 CV STRESS TEST SUPVJ ONLY: CPT | Performed by: INTERNAL MEDICINE

## 2023-08-17 PROCEDURE — 93010 ELECTROCARDIOGRAM REPORT: CPT | Performed by: INTERNAL MEDICINE

## 2023-08-17 PROCEDURE — 78452 HT MUSCLE IMAGE SPECT MULT: CPT

## 2023-08-17 PROCEDURE — 93017 CV STRESS TEST TRACING ONLY: CPT

## 2023-08-17 PROCEDURE — A9502 TC99M TETROFOSMIN: HCPCS

## 2023-08-17 RX ORDER — REGADENOSON 0.08 MG/ML
0.4 INJECTION, SOLUTION INTRAVENOUS ONCE
Status: COMPLETED | OUTPATIENT
Start: 2023-08-17 | End: 2023-08-17

## 2023-08-17 RX ORDER — ALPRAZOLAM 0.5 MG/1
0.5 TABLET ORAL 2 TIMES DAILY PRN
Qty: 20 TABLET | Refills: 0 | Status: SHIPPED | OUTPATIENT
Start: 2023-08-17 | End: 2023-09-05 | Stop reason: SDUPTHER

## 2023-08-17 RX ADMIN — FLUTICASONE PROPIONATE 2 SPRAY: 50 SPRAY, METERED NASAL at 10:48

## 2023-08-17 RX ADMIN — DULOXETINE HYDROCHLORIDE 30 MG: 30 CAPSULE, DELAYED RELEASE ORAL at 10:48

## 2023-08-17 RX ADMIN — OXYCODONE HYDROCHLORIDE 15 MG: 5 TABLET ORAL at 15:23

## 2023-08-17 RX ADMIN — FAMOTIDINE 40 MG: 20 TABLET, FILM COATED ORAL at 10:48

## 2023-08-17 RX ADMIN — SODIUM CHLORIDE, SODIUM GLUCONATE, SODIUM ACETATE, POTASSIUM CHLORIDE, MAGNESIUM CHLORIDE, SODIUM PHOSPHATE, DIBASIC, AND POTASSIUM PHOSPHATE 100 ML/HR: .53; .5; .37; .037; .03; .012; .00082 INJECTION, SOLUTION INTRAVENOUS at 06:24

## 2023-08-17 RX ADMIN — METOPROLOL SUCCINATE 25 MG: 25 TABLET, EXTENDED RELEASE ORAL at 10:48

## 2023-08-17 RX ADMIN — HEPARIN SODIUM 5000 UNITS: 5000 INJECTION INTRAVENOUS; SUBCUTANEOUS at 13:46

## 2023-08-17 RX ADMIN — OXYCODONE HYDROCHLORIDE 15 MG: 5 TABLET ORAL at 01:13

## 2023-08-17 RX ADMIN — PREGABALIN 100 MG: 100 CAPSULE ORAL at 10:48

## 2023-08-17 RX ADMIN — REGADENOSON 0.4 MG: 0.08 INJECTION, SOLUTION INTRAVENOUS at 09:15

## 2023-08-17 RX ADMIN — OXYCODONE HYDROCHLORIDE 15 MG: 5 TABLET ORAL at 06:20

## 2023-08-17 RX ADMIN — AMLODIPINE BESYLATE 10 MG: 10 TABLET ORAL at 10:48

## 2023-08-17 RX ADMIN — ACYCLOVIR 800 MG: 800 TABLET ORAL at 10:49

## 2023-08-17 RX ADMIN — HEPARIN SODIUM 5000 UNITS: 5000 INJECTION INTRAVENOUS; SUBCUTANEOUS at 06:20

## 2023-08-17 RX ADMIN — ALBUTEROL SULFATE 2 PUFF: 90 AEROSOL, METERED RESPIRATORY (INHALATION) at 15:23

## 2023-08-17 RX ADMIN — OXYCODONE HYDROCHLORIDE 15 MG: 5 TABLET ORAL at 10:59

## 2023-08-17 NOTE — PLAN OF CARE
Problem: PAIN - ADULT  Goal: Verbalizes/displays adequate comfort level or baseline comfort level  Description: Interventions:  - Encourage patient to monitor pain and request assistance  - Assess pain using appropriate pain scale  - Administer analgesics based on type and severity of pain and evaluate response  - Implement non-pharmacological measures as appropriate and evaluate response  - Consider cultural and social influences on pain and pain management  - Notify physician/advanced practitioner if interventions unsuccessful or patient reports new pain  8/17/2023 0318 by Rose Marie Carter  Outcome: Progressing  8/17/2023 0318 by Rose Marie Carter  Outcome: Progressing

## 2023-08-17 NOTE — DISCHARGE INSTR - AVS FIRST PAGE
Increased anxiety. Alprazolam 0.5 mg sent to pharmacy. Try half a tablet (0.25 mg) to see if it is effective.   If not can take 1 tablet

## 2023-08-17 NOTE — DISCHARGE SUMMARY
[address]  Discharge- Sophy Hui 1970, 45 y.o. female MRN: [id number]  Unit/Bed#: -01 Encounter: [id number]  Primary Care Provider: Tamiko Golden DO   Date and time admitted to hospital: 8/15/2023 12:06 PM    Admitting Provider:  Saúl Reyes DO  Discharge Provider:  Alexy Miller DO  Admission Date: 8/15/2023       Discharge Date: 08/17/23   LOS: 1  Primary Care Physician at Discharge: Tamiko Golden [phone]    DISCHARGE DIAGNOSES  * Chest pain  Assessment & Plan  History of myeloma hypertension chronic pain who presented to hospital with chest pain  · CTA dissection study negative  · Underwent nuclear stress test negative for ischemia  · Symptoms may have been secondary to heightened anxiety from stressors at home. Anxiety about health  Assessment & Plan  · Increased anxiety with stressors at home. · May have caused symptoms. · Discharge: Alprazolam 0.5 mg to take half to 1 tablet as needed up to twice daily. 20 tablets sent to pharmacy. Polycythemia  Assessment & Plan  · Chronic polycythemia improved with IV fluids this admission. Results from last 7 days   Lab Units 08/17/23  0429 08/16/23  0523 08/15/23  1237   HEMOGLOBIN g/dL 14.7 17.4* 20.3*       Multiple myeloma in remission [facility])  Assessment & Plan  · Previously followed with Dr. Esau Monzon with [facility]. In remission    Hypertension  Assessment & Plan  · Stable continue amlodipine and metoprolol    REASON FOR ADMISSION  Chest Pain (Chest pain that started this morning at 1000. PT describes pain as burning, stabbing, and pressure that radiates under left breast and to back. )    HOSPITAL COURSE:  Sophy Hui is a 45 y.o. female with a history of myeloma hypertension and back pain who presented to the hospital with chest pain. She does follow-up with cardiology as an outpatient. During his hospitalization she underwent stress testing which was negative for ischemia.   Her chest pain subsided. She believes her symptoms may have been provoked by anxiety and she will be discharged with trial of alprazolam to take as needed. Her other medications remain unchanged. Please see problem list listed above. CONSULTING PROVIDERS   None    PROCEDURES PERFORMED  NM Myocardial Perfusion Spect (Pharmacological Induced Stress and/or Rest)    Result Date: 8/17/2023  Narrative: •  Resting ECG: The ECG shows normal sinus rhythm. There are nonspecific T changes. •  Stress ECG: Arrhythmias during stress: rare PVCs. The ECG was not diagnostic due to pharmacological (vasodilator) stress. •  Perfusion: There is a left ventricular perfusion defect that is small to medium in size present in the inferior location(s) that is fixed. This defect resolved on prone imaging suggestive of artifact. There is a left ventricular perfusion defect that is small in size present in the basal anterior location(s) that is fixed. This defect resolved on prone imaging suggestive of artifact. •  Stress Combined Conclusion: There is image artifact, without diagnostic evidence for perfusion abnormality. •  Stress Function: Left ventricular function post-stress is normal. Normal study after pharmacologic vasodilation. There was image artifact without diagnostic perfusion abnormality. RADIOLOGY RESULTS  CTA dissection protocol chest/abdomen/pelvis    Result Date: 8/15/2023  Impression: 1. No aortic aneurysm, intramural hematoma/dissection or penetrating ulcer. 2.  Radiopaque substance in the stomach, small bowel and colon correlate with ingestion of crushed pills, oral contrast or other radiopaque substance including Pepto-Bismol. Workstation performed: [id number]     XR chest pa & lateral    Result Date: 8/15/2023  Impression: No acute cardiopulmonary disease.  Workstation performed: Akshat Garcia  Results from last 7 days   Lab Units 08/17/23  0429 08/16/23  0523 08/15/23  1821 08/15/23  1237   WBC Thousand/uL 8.97 12.52*  --  13.52*   HEMOGLOBIN g/dL 14.7 17.4*  --  20.3*   HEMATOCRIT % 44.1 50.6*  --  60.0*   MCV fL 90 88  --  89   PLATELETS Thousands/uL 178 210 237 263   INR   --   --   --  1.00     Results from last 7 days   Lab Units 08/17/23  0429 08/16/23  0523 08/15/23  1237   SODIUM mmol/L 139 137 134*   POTASSIUM mmol/L 3.7 3.4* 3.7   CHLORIDE mmol/L 109* 106 100   CO2 mmol/L 25 25 22   BUN mg/dL 22 13 18   CREATININE mg/dL 0.93 0.88 1.14   CALCIUM mg/dL 8.7 9.4 11.1*   ALBUMIN g/dL  --   --  5.6*   TOTAL BILIRUBIN mg/dL  --   --  0.90   ALK PHOS U/L  --   --  94   ALT U/L  --   --  12   AST U/L  --   --  17   EGFR ml/min/1.73sq m 69 74 54   GLUCOSE RANDOM mg/dL 80 97 130         Results from last 7 days   Lab Units 08/15/23  1629 08/15/23  1437 08/15/23  1237   HS TNI 0HR ng/L  --   --  11   HS TNI 2HR ng/L  --  7  --    HS TNI 4HR ng/L 9  --   --                  DISCHARGE DAY VISIT AND PHYSICAL EXAM:  Subjective: Patient seen and examined. Minimal chest pain if any. Vitals:   Blood Pressure: 126/88 (08/17/23 1540)  Pulse: 75 (08/17/23 0910)  Temperature: 98.4 °F (36.9 °C) (08/17/23 1540)  Temp Source: Oral (08/15/23 1208)  Respirations: 18 (08/17/23 1540)  Height: 5' 3" (160 cm) (08/15/23 2236)  Weight - Scale: 77.8 kg (171 lb 8.3 oz) (08/16/23 1501)  SpO2: 98 % (08/17/23 0910)    Physical Exam  Vitals reviewed. Constitutional:       General: She is not in acute distress. Appearance: Normal appearance. HENT:      Head: Atraumatic. Eyes:      General: No scleral icterus. Extraocular Movements: Extraocular movements intact. Cardiovascular:      Rate and Rhythm: Regular rhythm. Heart sounds: Normal heart sounds. Pulmonary:      Breath sounds: Normal breath sounds. No wheezing. Abdominal:      General: Bowel sounds are normal.      Palpations: Abdomen is soft. Tenderness: There is no guarding or rebound. Musculoskeletal:         General: No swelling.    Skin: General: Skin is warm. Neurological:      Mental Status: She is alert. Mental status is at baseline. Psychiatric:         Mood and Affect: Mood normal.       Planned Re-admission: No  Discharge Disposition: Home/Self Care    Test Results Pending at Discharge:   Incidental findings:     Medications   · Discharge Medication List: See after visit summary for reconciled discharge medications. Diet restrictions: Cardiac diet  Activity restrictions: No strenuous activity  Discharge Condition: stable    Outpatient Follow-Up and Discharge Instructions  See after visit summary section titled Discharge Instructions for information provided to patient and family. Code Status: Level 1 - Full Code  Discharge Statement   I spent 35 minutes discharging the patient. This time was spent on the day of discharge. Greater than 50% of total time was spent with the patient and / or family counseling and / or coordination of care. ** Please Note: This note has been constructed using a voice recognition system.  **

## 2023-08-17 NOTE — ASSESSMENT & PLAN NOTE
· Increased anxiety with stressors at home. · May have caused symptoms. · Discharge: Alprazolam 0.5 mg to take half to 1 tablet as needed up to twice daily. 20 tablets sent to pharmacy.

## 2023-08-17 NOTE — ASSESSMENT & PLAN NOTE
· Chronic polycythemia improved with IV fluids this admission.     Results from last 7 days   Lab Units 08/17/23  0429 08/16/23  0523 08/15/23  1237   HEMOGLOBIN g/dL 14.7 17.4* 20.3*

## 2023-08-17 NOTE — PLAN OF CARE
Problem: PAIN - ADULT  Goal: Verbalizes/displays adequate comfort level or baseline comfort level  Description: Interventions:  - Encourage patient to monitor pain and request assistance  - Assess pain using appropriate pain scale  - Administer analgesics based on type and severity of pain and evaluate response  - Implement non-pharmacological measures as appropriate and evaluate response  - Consider cultural and social influences on pain and pain management  - Notify physician/advanced practitioner if interventions unsuccessful or patient reports new pain  Outcome: Progressing     Problem: INFECTION - ADULT  Goal: Absence or prevention of progression during hospitalization  Description: INTERVENTIONS:  - Assess and monitor for signs and symptoms of infection  - Monitor lab/diagnostic results  - Monitor all insertion sites, i.e. indwelling lines, tubes, and drains  - Monitor endotracheal if appropriate and nasal secretions for changes in amount and color  - [place] appropriate cooling/warming therapies per order  - Administer medications as ordered  - Instruct and encourage patient and family to use good hand hygiene technique  - Identify and instruct in appropriate isolation precautions for identified infection/condition  Outcome: Progressing  Goal: Absence of fever/infection during neutropenic period  Description: INTERVENTIONS:  - Monitor WBC    Outcome: Progressing     Problem: SAFETY ADULT  Goal: Patient will remain free of falls  Description: INTERVENTIONS:  - Educate patient/family on patient safety including physical limitations  - Instruct patient to call for assistance with activity   - Consult OT/PT to assist with strengthening/mobility   - Keep Call bell within reach  - Keep bed low and locked with side rails adjusted as appropriate  - Keep care items and personal belongings within reach  - Initiate and maintain comfort rounds  - Make Fall Risk Sign visible to staff  - Offer Toileting every 2 Hours, in advance of need  - Initiate/Maintain bed alarm  - Obtain necessary fall risk management equipment  - Apply yellow socks and bracelet for high fall risk patients  - Consider moving patient to room near nurses station  Outcome: Progressing  Goal: Maintain or return to baseline ADL function  Description: INTERVENTIONS:  -  Assess patient's ability to carry out ADLs; assess patient's baseline for ADL function and identify physical deficits which impact ability to perform ADLs (bathing, care of mouth/teeth, toileting, grooming, dressing, etc.)  - Assess/evaluate cause of self-care deficits   - Assess range of motion  - Assess patient's mobility; develop plan if impaired  - Assess patient's need for assistive devices and provide as appropriate  - Encourage maximum independence but intervene and supervise when necessary  - Involve family in performance of ADLs  - Assess for home care needs following discharge   - Consider OT consult to assist with ADL evaluation and planning for discharge  - Provide patient education as appropriate  Outcome: Progressing  Goal: Maintains/Returns to pre admission functional level  Description: INTERVENTIONS:  - Perform BMAT or MOVE assessment daily.   - Set and communicate daily mobility goal to care team and patient/family/caregiver. - Collaborate with rehabilitation services on mobility goals if consulted  - Perform Range of Motion 3 times a day. - Reposition patient every 2 hours.   - Dangle patient 3 times a day  - Stand patient 3 times a day  - Ambulate patient 3 times a day  - Out of bed to chair 3 times a day   - Out of bed for meals 3 times a day  - Out of bed for toileting  - Record patient progress and toleration of activity level   Outcome: Progressing     Problem: DISCHARGE PLANNING  Goal: Discharge to home or other facility with appropriate resources  Description: INTERVENTIONS:  - Identify barriers to discharge w/patient and caregiver  - Arrange for needed discharge resources and transportation as appropriate  - Identify discharge learning needs (meds, wound care, etc.)  - Arrange for interpretive services to assist at discharge as needed  - Refer to Case Management Department for coordinating discharge planning if the patient needs post-hospital services based on physician/advanced practitioner order or complex needs related to functional status, cognitive ability, or social support system  Outcome: Progressing     Problem: Knowledge Deficit  Goal: Patient/family/caregiver demonstrates understanding of disease process, treatment plan, medications, and discharge instructions  Description: Complete learning assessment and assess knowledge base.   Interventions:  - Provide teaching at level of understanding  - Provide teaching via preferred learning methods  Outcome: Progressing

## 2023-08-17 NOTE — ASSESSMENT & PLAN NOTE
History of myeloma hypertension chronic pain who presented to hospital with chest pain  · CTA dissection study negative  · Underwent nuclear stress test negative for ischemia  · Symptoms may have been secondary to heightened anxiety from stressors at home.

## 2023-08-18 ENCOUNTER — TRANSITIONAL CARE MANAGEMENT (OUTPATIENT)
Age: 53
End: 2023-08-18

## 2023-08-18 NOTE — UTILIZATION REVIEW
NOTIFICATION OF INPATIENT ADMISSION   AUTHORIZATION REQUEST   SERVICING FACILITY:   [address] [place], [place] [address] [address]  Tax ID: [id number]  NPI: [id number] ATTENDING PROVIDER:  Attending Name and NPI#: Rachel Guzman [[id number]]  [facility]  Phone: [phone]     ADMISSION INFORMATION:  [facility] [address] [facility]   Place of Service Code: 21  Inpatient Admission Date/Time: 8/16/23  3:07 PM  Discharge Date/Time: 8/17/2023  5:12 PM  Admitting Diagnosis Code/Description:  Chest pain, unspecified [R07.9]  Chest pain [R07.9]  Acute back pain [M54.9]  Intractable nausea and vomiting [R11.2]     UTILIZATION REVIEW CONTACT:  [employer] Utilization   Network Utilization Review Department  Phone: [phone]  Fax [phone]  Email: [email or web address]  Contact for approvals/pending authorizations, clinical reviews, and discharge. PHYSICIAN ADVISORY SERVICES:  Medical Necessity Denial & Sibs-po-Qzls Review  Phone: [phone]  Fax: [phone]  Email: [email or web address]

## 2023-08-21 ENCOUNTER — TELEPHONE (OUTPATIENT)
Age: 53
End: 2023-08-21

## 2023-08-21 DIAGNOSIS — G89.3 CANCER ASSOCIATED PAIN: ICD-10-CM

## 2023-08-21 DIAGNOSIS — Z51.5 PALLIATIVE CARE PATIENT: ICD-10-CM

## 2023-08-21 DIAGNOSIS — C90.00 MULTIPLE MYELOMA NOT HAVING ACHIEVED REMISSION (HCC): ICD-10-CM

## 2023-08-21 RX ORDER — OXYCODONE HYDROCHLORIDE 10 MG/1
15 TABLET ORAL EVERY 4 HOURS PRN
Qty: 105 TABLET | Refills: 0 | Status: SHIPPED | OUTPATIENT
Start: 2023-08-21

## 2023-08-21 NOTE — TELEPHONE ENCOUNTER
Last appointment: 8/7/2023    Next scheduled appointment:9/6/2023    Pharmacy: [facility]

## 2023-08-24 ENCOUNTER — OFFICE VISIT (OUTPATIENT)
Age: 53
End: 2023-08-24
Payer: COMMERCIAL

## 2023-08-24 VITALS
HEART RATE: 81 BPM | WEIGHT: 177.4 LBS | RESPIRATION RATE: 18 BRPM | HEIGHT: 63 IN | DIASTOLIC BLOOD PRESSURE: 78 MMHG | BODY MASS INDEX: 31.43 KG/M2 | SYSTOLIC BLOOD PRESSURE: 112 MMHG | TEMPERATURE: 95.8 F | OXYGEN SATURATION: 98 %

## 2023-08-24 DIAGNOSIS — Z09 ENCOUNTER FOR EXAMINATION FOLLOWING TREATMENT AT HOSPITAL: Primary | ICD-10-CM

## 2023-08-24 DIAGNOSIS — M54.41 CHRONIC BILATERAL LOW BACK PAIN WITH BILATERAL SCIATICA: ICD-10-CM

## 2023-08-24 DIAGNOSIS — D75.1 POLYCYTHEMIA: ICD-10-CM

## 2023-08-24 DIAGNOSIS — G89.29 CHRONIC BILATERAL LOW BACK PAIN WITH BILATERAL SCIATICA: ICD-10-CM

## 2023-08-24 DIAGNOSIS — G62.9 NEUROPATHY: ICD-10-CM

## 2023-08-24 DIAGNOSIS — M54.42 CHRONIC BILATERAL LOW BACK PAIN WITH BILATERAL SCIATICA: ICD-10-CM

## 2023-08-24 DIAGNOSIS — F41.8 ANXIETY ABOUT HEALTH: ICD-10-CM

## 2023-08-24 PROCEDURE — 99495 TRANSJ CARE MGMT MOD F2F 14D: CPT

## 2023-08-24 RX ORDER — DULOXETINE 40 MG/1
40 CAPSULE, DELAYED RELEASE ORAL DAILY
Qty: 90 CAPSULE | Refills: 1 | Status: SHIPPED | OUTPATIENT
Start: 2023-08-24 | End: 2024-02-20

## 2023-08-24 RX ORDER — ALPRAZOLAM 0.5 MG/1
0.5 TABLET ORAL
Qty: 20 TABLET | Refills: 0 | Status: SHIPPED | OUTPATIENT
Start: 2023-08-24

## 2023-08-24 RX ORDER — PREGABALIN 100 MG/1
100 CAPSULE ORAL 2 TIMES DAILY
Qty: 360 CAPSULE | Refills: 0 | Status: SHIPPED | OUTPATIENT
Start: 2023-08-24 | End: 2024-02-20

## 2023-08-24 NOTE — PROGRESS NOTES
Assessment & Plan     1. Encounter for examination following treatment at hospital  Assessment & Plan:  Reviewed lab work and imaging studies during hospitalization. Patient reassured. 2. Chronic bilateral low back pain with bilateral sciatica  Comments:  Stable, refilled pregabalin prescription today. Orders:  -     pregabalin (LYRICA) 100 mg capsule; Take 1 capsule (100 mg total) by mouth 2 (two) times a day    3. Anxiety about health  Assessment & Plan:  Patient has a lot of anxiety about her health and also some personal issues with her daughter. Patient asking about receiving another prescription for Xanax. Long discussion with the patient regarding the long-term use of these medications puts her at risk for dependence on these medications and may be difficult to wean off later. These are not a good medication to be on long-term to manage anxiety. She was not aware of this and is agreeable that this is not something she wants to be on long-term. Informed patient I have no issues with providing this medication for as needed use in times of significant anxiety. We will provide a short supply and given instructions on how frequent to take the medication. After discussing the different options regarding changing her SSRI versus going up on the dose, patient was agreeable to go up on her duloxetine to 40 mg a day. She has been on this for a long time and it works fairly well to help with her neuropathic pain. Also had discussion regarding talk therapy, which the patient is considering at this time. Orders:  -     ALPRAZolam (XANAX) 0.5 mg tablet; Take 1 tablet (0.5 mg total) by mouth daily at bedtime as needed for anxiety for up to 20 doses    4. Neuropathy  -     DULoxetine 40 MG CPEP; Take 1 capsule (40 mg total) by mouth daily    5. Polycythemia  Comments:  Back to baseline after IV fluids.     Pt to return in near future for annual exam.     Subjective     Transitional Care Management Review:   Leah Georges is a 45 y.o. female here for TCM follow up. During the TCM phone call patient stated:  TCM Call     Date and time call was made  8/18/2023 11:18 AM    Hospital care reviewed  Records reviewed    Patient was hospitialized at  [id number]    Date of Admission  08/15/23    Date of discharge  08/17/23    Diagnosis  CP    Disposition  Home    Were the patients medications reviewed and updated  Yes    Current Symptoms  None      TCM Call     Post hospital issues  None    Scheduled for follow up? Yes    Patients specialists  Other (comment)    Other specialists names  Queen Kristin MD, [phone]    Did you obtain your prescribed medications  Yes  metoprolol succinate    Do you need help managing your prescriptions or medications  No    Is transportation to your appointment needed  No    I have advised the patient to call PCP with any new or worsening symptoms  Chun Bernard LPN    Are you recieving any outpatient services  Yes    What type of services  Physical Therapy, US HEAD NECK LYMPH NODE MAPPING    Interperter language line needed  No    Counseling  Patient    Counseling topics  patient and family education; Importance of RX compliance        HPI     Patient doing better since discharge. Reviewed lab work, imaging studies, and events leading up to her admission. Patient was reassured that her chest pain was not cardiac in nature. She believes it is related to stress. Patient states she has had to tell her daughter to leave the home, her daughter was taking some of her medications, and was leaving grandchildren with the patient when she is in no shape to take care of them. Patient has a lot of medical issues that leave her with only enough reserve to take care of herself. Patient states her stress level has reduced since she had her daughter move out. Patient does state that the Xanax she got from upon discharge has been helping her a lot.   She has been taking it twice a day and is helping her sleep and cope with the additional stress. She states she feels more "even". Asking about additional prescription for this medication. Patient denies any chest pain, shortness of breath, abdominal pain, or other concerning symptoms. Patient's hemoglobin was elevated at over 28 while in the hospital, but this normalized after receiving IV fluids. Patient had a recent follow-up with db Choi and everything was fine. Patient has a neurology and rheumatology appointment coming up. Patient is followed by palliative care. Review of Systems   Constitutional: Negative for chills, diaphoresis and fever. HENT: Negative. Respiratory: Negative for cough, shortness of breath and wheezing. Cardiovascular: Negative for chest pain, palpitations and leg swelling. Gastrointestinal: Negative. Genitourinary: Negative. Musculoskeletal: Negative for gait problem. Skin: Negative for rash. Neurological: Negative for dizziness, syncope, facial asymmetry, weakness, light-headedness and headaches. Has neuropathy at baseline   All other systems reviewed and are negative. Objective     /78 (BP Location: Right arm, Patient Position: Sitting, Cuff Size: Standard)   Pulse 81   Temp (!) 95.8 °F (35.4 °C) (Tympanic)   Resp 18   Ht 5' 3" (1.6 m)   Wt 80.5 kg (177 lb 6.4 oz)   SpO2 98%   BMI 31.42 kg/m²      Physical Exam  Vitals and nursing note reviewed. Constitutional:       General: She is not in acute distress. Appearance: Normal appearance. Cardiovascular:      Rate and Rhythm: Normal rate and regular rhythm. Pulses:           Radial pulses are 2+ on the right side and 2+ on the left side. Heart sounds: Normal heart sounds. Pulmonary:      Effort: Pulmonary effort is normal. No respiratory distress. Breath sounds: Normal breath sounds. Musculoskeletal:         General: No tenderness, deformity or signs of injury. Right lower leg: No edema. Left lower leg: No edema. Skin:     General: Skin is warm and dry. Neurological:      General: No focal deficit present. Mental Status: She is alert and oriented to person, place, and time. Mental status is at baseline. Psychiatric:         Mood and Affect: Mood normal.         Behavior: Behavior is cooperative.        Medications have been reviewed by provider in current encounter    April Francine Hansen PA-C

## 2023-08-24 NOTE — ASSESSMENT & PLAN NOTE
Patient has a lot of anxiety about her health and also some personal issues with her daughter. Patient asking about receiving another prescription for Xanax. Long discussion with the patient regarding the long-term use of these medications puts her at risk for dependence on these medications and may be difficult to wean off later. These are not a good medication to be on long-term to manage anxiety. She was not aware of this and is agreeable that this is not something she wants to be on long-term. Informed patient I have no issues with providing this medication for as needed use in times of significant anxiety. We will provide a short supply and given instructions on how frequent to take the medication. After discussing the different options regarding changing her SSRI versus going up on the dose, patient was agreeable to go up on her duloxetine to 40 mg a day. She has been on this for a long time and it works fairly well to help with her neuropathic pain. Also had discussion regarding talk therapy, which the patient is considering at this time.

## 2023-08-25 ENCOUNTER — TELEPHONE (OUTPATIENT)
Age: 53
End: 2023-08-25

## 2023-08-25 NOTE — TELEPHONE ENCOUNTER
Patient - had an appointment with April and she changed mg on her duloxetine. It was originally 30mg once a day. She switched it to 40 mg once a day and needs a prior authorization. This medication is to go to [facility] in [place]  Questions, she can be reached at [phone].

## 2023-09-05 DIAGNOSIS — Z51.5 PALLIATIVE CARE PATIENT: ICD-10-CM

## 2023-09-05 DIAGNOSIS — F41.8 ANXIETY ABOUT HEALTH: ICD-10-CM

## 2023-09-05 DIAGNOSIS — G62.9 NEUROPATHY: ICD-10-CM

## 2023-09-05 DIAGNOSIS — C90.00 MULTIPLE MYELOMA NOT HAVING ACHIEVED REMISSION (HCC): ICD-10-CM

## 2023-09-05 DIAGNOSIS — G89.3 CANCER ASSOCIATED PAIN: ICD-10-CM

## 2023-09-05 RX ORDER — OXYCODONE HYDROCHLORIDE 10 MG/1
15 TABLET ORAL EVERY 4 HOURS PRN
Qty: 105 TABLET | Refills: 0 | Status: SHIPPED | OUTPATIENT
Start: 2023-09-05 | End: 2023-09-06

## 2023-09-05 RX ORDER — ALPRAZOLAM 0.5 MG/1
0.5 TABLET ORAL
Qty: 20 TABLET | Refills: 0 | Status: SHIPPED | OUTPATIENT
Start: 2023-09-05

## 2023-09-05 RX ORDER — DULOXETINE 40 MG/1
40 CAPSULE, DELAYED RELEASE ORAL DAILY
Qty: 90 CAPSULE | Refills: 1 | Status: SHIPPED | OUTPATIENT
Start: 2023-09-05 | End: 2024-03-03

## 2023-09-06 ENCOUNTER — OFFICE VISIT (OUTPATIENT)
Dept: PALLIATIVE MEDICINE | Facility: CLINIC | Age: 53
End: 2023-09-06
Payer: COMMERCIAL

## 2023-09-06 VITALS
OXYGEN SATURATION: 92 % | RESPIRATION RATE: 18 BRPM | TEMPERATURE: 98.5 F | DIASTOLIC BLOOD PRESSURE: 72 MMHG | HEIGHT: 63 IN | BODY MASS INDEX: 32.04 KG/M2 | WEIGHT: 180.8 LBS | SYSTOLIC BLOOD PRESSURE: 118 MMHG | HEART RATE: 99 BPM

## 2023-09-06 DIAGNOSIS — E85.9 AMYLOIDOSIS, UNSPECIFIED TYPE (HCC): ICD-10-CM

## 2023-09-06 DIAGNOSIS — F41.8 ANXIETY ABOUT HEALTH: ICD-10-CM

## 2023-09-06 DIAGNOSIS — C90.01 MULTIPLE MYELOMA IN REMISSION (HCC): Primary | Chronic | ICD-10-CM

## 2023-09-06 DIAGNOSIS — G62.9 NEUROPATHY: ICD-10-CM

## 2023-09-06 DIAGNOSIS — Z51.5 PALLIATIVE CARE PATIENT: ICD-10-CM

## 2023-09-06 DIAGNOSIS — C90.00 MULTIPLE MYELOMA NOT HAVING ACHIEVED REMISSION (HCC): ICD-10-CM

## 2023-09-06 DIAGNOSIS — G89.3 CANCER ASSOCIATED PAIN: ICD-10-CM

## 2023-09-06 DIAGNOSIS — G89.3 CANCER RELATED PAIN: ICD-10-CM

## 2023-09-06 PROCEDURE — 99214 OFFICE O/P EST MOD 30 MIN: CPT | Performed by: STUDENT IN AN ORGANIZED HEALTH CARE EDUCATION/TRAINING PROGRAM

## 2023-09-06 RX ORDER — OXYCODONE HYDROCHLORIDE 10 MG/1
15 TABLET ORAL EVERY 4 HOURS PRN
Qty: 105 TABLET | Refills: 0
Start: 2023-09-06

## 2023-09-06 NOTE — PROGRESS NOTES
Outpatient Follow-Up - Palliative and Supportive Care   Mariola Ho 45 y.o. female [id number]    Assessment & Plan  Problem List Items Addressed This Visit        Nervous and Auditory    Neuropathy       Other    Multiple myeloma in remission ([address]) - Primary (Chronic)    Relevant Medications    oxyCODONE (ROXICODONE) 10 MG TABS    Cancer related pain    Relevant Medications    oxyCODONE (ROXICODONE) 10 MG TABS    Palliative care patient    Relevant Medications    oxyCODONE (ROXICODONE) 10 MG TABS    Amyloidosis (HCC)    Anxiety about health   Other Visit Diagnoses     Multiple myeloma not having achieved remission (HCC)        Relevant Medications    oxyCODONE (ROXICODONE) 10 MG TABS    Cancer associated pain        Relevant Medications    oxyCODONE (ROXICODONE) 10 MG TABS        #symptom management  #cancer-related pain  #opioid taper   -continue APAP 1000 mg PO Q8H PRN              - max daily 4000 mg   - decrease oxy-IR 15 mg PO Q4H PRN              - max 4 doses [60 mg] per day     Continued opioid taper with plan to decrease 10-30% total daily opioids each month. Discussed s/sxs of opioid withdrawal, including nausea, vomiting, diarrhea, rhinorrhea, yawning, piloerection, insomnia, agitation. All questions/concerns addressed.     Will increase next refill to one month, as family member with OUD no longer residing within household, no access to medications.     #neuropathic pain   - continue pregabalin 100 mg PO BID   - continue duloxetine 30 mg PO QDaily   - continue lidocaine cream QID PRN     #muscle spasms   - continue methocarbamol 500 mg PO Q6H PRN     #OIC   - continue senna-docusate 2 tabs PO QHS   - continue miralax 17 g PO QDaily   - continue bisacodyl 10 mg MO QDaily PRN     #anxiety/depression   - continue duloxetine 30 mg PO QDaily    #goals of care   - continued opioid taper    #psychosocial support   - emotional support provided   - not currently    - two adult children              - Ashlee Charlton [daughter]              - Richard Baptiste [mother] [phone]   - one sibling              - Mayi [sister]   - two grandchildren [aged 5 and 3]      Next [address] [place] [address] Follow up in 2 months. Controlled Substance Review    [place]MP or [place]  reviewed: No red flags were identified; safe to proceed with prescription. Broderick Gonzalezx PDMP Review       Value Time User    PDMP Reviewed  Yes (P)  9/6/2023  7:48 AM Darrell Begum MD          Medications adjusted this encounter:  Requested Prescriptions     Signed Prescriptions Disp Refills   • oxyCODONE (ROXICODONE) 10 MG TABS 105 tablet 0     Sig: Take 1.5 tablets (15 mg total) by mouth every 4 (four) hours as needed (cancer-related pain) Max Daily Amount: 60 mg     No orders of the defined types were placed in this encounter. Medications Discontinued During This Encounter   Medication Reason   • oxyCODONE (ROXICODONE) 10 MG TABS          Marla Cheema was seen today for symptoms and planning cares related to above illnesses. I have reviewed the patient's controlled substance dispensing history in the Prescription Drug Monitoring Program in compliance with the [facility] regulations before prescribing any controlled substances. They are invited to continue to follow with us. If there are questions or concerns, please contact us through our clinic/answering service 24 hours a day, seven days a week. Darrell Begum MD  [facility] and Supportive [facility]  [phone]      Visit Information    Accompanied By: No one    Source of History: Self, Medical record    History Limitations: None      History of Present Illness    Marla Haskins is a 45 y.o. female who presents in follow up of symptoms related to multiple myeloma s/p PBSCT [03/30/2022]. Pertinent issues include: symptom management, pain, neoplasm related, assessment of goals of care, disease process education and discussion of prognosis, opioid taper.     Patient reports improved stress overall as limited contact with daughter established. Granddaughters [x2] stay with patient and mother 5 days/week. Continued diffuse pain, however, notes use of oxy-IR x 4 doses/day [lower than plan of 5/day] with no reported s/sxs concerning for opioid-induced withdrawal. Use of methocarbamol BID, pregabalin BID, and ibuprofen/APAP BID. Denies nausea, vomiting. Adequate appetite, weight stable. BM regular, daily. Adequate sleep. Use of alprazolam effective, recently added per PCP. Reported use of 1-2 tabs/week. Past medical, surgical, social, and family histories are reviewed and pertinent updates are made. Review of Systems   Constitutional: Positive for malaise/fatigue. Negative for chills, decreased appetite, fever and weight loss. HENT: Negative for congestion. Eyes: Negative for visual disturbance. Cardiovascular: Negative for chest pain. Respiratory: Negative for shortness of breath. Musculoskeletal: Positive for myalgias. Negative for falls and neck pain. Gastrointestinal: Negative for abdominal pain, constipation, nausea and vomiting. Genitourinary: Negative for frequency. Neurological: Negative for headaches. Psychiatric/Behavioral: The patient is nervous/anxious. The patient does not have insomnia. All other systems reviewed and are negative. Vital Signs    /72 (BP Location: Right arm, Patient Position: Sitting, Cuff Size: Standard)   Pulse 99   Temp 98.5 °F (36.9 °C) (Tympanic)   Resp 18   Ht 5' 3" (1.6 m)   Wt 82 kg (180 lb 12.8 oz)   SpO2 92%   BMI 32.03 kg/m²     Physical Exam and Objective Data  Physical Exam  Vitals and nursing note reviewed. Constitutional:       General: She is awake. Appearance: She is not diaphoretic. Comments: Sitting up comfortably in NAD  BMI 32.0  Non-toxic appearing   HENT:      Head: Normocephalic and atraumatic.       Right Ear: External ear normal.      Left Ear: External ear normal.      Nose: No rhinorrhea. Eyes:      Comments: No gaze preference   Cardiovascular:      Rate and Rhythm: Normal rate. Pulmonary:      Effort: No tachypnea, accessory muscle usage or respiratory distress. Comments: Completes full sentences without difficulty  Musculoskeletal:      Cervical back: Normal range of motion. Neurological:      General: No focal deficit present. Mental Status: She is alert and oriented to person, place, and time. Psychiatric:         Attention and Perception: Attention normal.         Mood and Affect: Mood and affect normal.         Speech: Speech normal.         Cognition and Memory: Cognition and memory normal.           Radiology and Laboratory:  I personally reviewed and interpreted the following results:    Most Recent COVID-19 Results:  Lab Results   Component Value Date/Time    SARSCOV2 Negative 01/23/2023 09:28 PM    SARSCOV2 Negative 10/30/2021 12:10 AM       Most Recent Lab Work:  Lab Results   Component Value Date/Time    SODIUM 139 08/17/2023 04:29 AM    K 3.7 08/17/2023 04:29 AM    BUN 22 08/17/2023 04:29 AM    CREATININE 0.93 08/17/2023 04:29 AM    GLUC 80 08/17/2023 04:29 AM     Lab Results   Component Value Date/Time    AST 17 08/15/2023 12:37 PM    ALT 12 08/15/2023 12:37 PM    ALB 5.6 (H) 08/15/2023 12:37 PM     Lab Results   Component Value Date/Time    HGB 14.7 08/17/2023 04:29 AM    WBC 8.97 08/17/2023 04:29 AM     08/17/2023 04:29 AM    INR 1.00 08/15/2023 12:37 PM    PTT 30 08/15/2023 12:37 PM       Most Recent Imaging [last 30 days]:  Procedure: NM Myocardial Perfusion Spect (Pharmacological Induced Stress and/or Rest)    Result Date: 8/17/2023  Narrative: •  Resting ECG: The ECG shows normal sinus rhythm. There are nonspecific T changes. •  Stress ECG: Arrhythmias during stress: rare PVCs. The ECG was not diagnostic due to pharmacological (vasodilator) stress.  •  Perfusion: There is a left ventricular perfusion defect that is small to medium in size present in the inferior location(s) that is fixed. This defect resolved on prone imaging suggestive of artifact. There is a left ventricular perfusion defect that is small in size present in the basal anterior location(s) that is fixed. This defect resolved on prone imaging suggestive of artifact. •  Stress Combined Conclusion: There is image artifact, without diagnostic evidence for perfusion abnormality. •  Stress Function: Left ventricular function post-stress is normal. Normal study after pharmacologic vasodilation. There was image artifact without diagnostic perfusion abnormality. Procedure: Stress strip    Result Date: 8/17/2023  Narrative: Confirmed by Cecile Moseley ([id number]),  Rosenda Long ([id number]) on 8/17/2023 3:09:18 PM    Procedure: CTA dissection protocol chest/abdomen/pelvis    Result Date: 8/15/2023  Narrative: CTA - CHEST, ABDOMEN AND PELVIS - WITHOUT AND WITH IV CONTRAST INDICATION:   acute chest/back pain, nausea. COMPARISON: January 23, 2023, August 5, 2022 and correlated with CT/PET performed October 4, 2021 TECHNIQUE: CT examination of the chest, abdomen and pelvis was performed both prior to and after the administration of intravenous contrast.   The noncontrast portion of this examination was performed utilizing low radiation dose technique. Thin section angiographic arterial phase post contrast technique was used in order to evaluate for aortic dissection. 3D reformatted images and volume rendering were performed on an independent workstation. Multiplanar 2D reformatted images were created from the source data. Radiation dose length product (DLP) for this visit:  1063 mGy-cm . This examination, like all CT scans performed in the [facility], was performed utilizing techniques to minimize radiation dose exposure, including the use of iterative reconstruction and automated exposure control.  IV Contrast:  100 mL of iohexol (OMNIPAQUE) Enteric Contrast:  Enteric contrast was not administered. FINDINGS: AORTA: Atherosclerosis. There is no aortic dissection or intramural hematoma. There is no aortic aneurysm. Normal aortic arch branching pattern. The superior mesenteric artery and inferior mesenteric artery are patent. The celiac artery is patent. 2 left and one right renal arteries which are patent. The common, external and internal iliac arteries are patent. The common femoral arteries bilaterally are patent. The visualized superficial femoral artery and profunda femoris arteries are patent. CHEST LUNGS: Unchanged 4 mm right perifissural nodule seen best on series 4 image #77. PLEURA:  Unremarkable. HEART/PULMONARY ARTERIAL TREE:  Unremarkable for patient's age. MEDIASTINUM AND JENNIFER:  Unremarkable. CHEST WALL AND LOWER NECK: Status post left thyroidectomy. ABDOMEN LIVER/BILIARY TREE:  Unremarkable. GALLBLADDER:  No calcified gallstones. No pericholecystic inflammatory change. SPLEEN:  Unremarkable. PANCREAS:  Unremarkable. ADRENAL GLANDS:  Unremarkable. KIDNEYS/URETERS:  Unremarkable. No hydronephrosis. STOMACH AND BOWEL: Small hiatal hernia. Radiopaque substance in the stomach, small bowel and colon correlate with ingestion of crushed pills, oral contrast or other radiopaque substance including Pepto-Bismol. APPENDIX:  No findings to suggest appendicitis. ABDOMINOPELVIC CAVITY:  No ascites or free intraperitoneal air. No lymphadenopathy. PELVIS REPRODUCTIVE ORGANS:  Unremarkable for patient's age. URINARY BLADDER:  Unremarkable. ABDOMINAL WALL/INGUINAL REGIONS: Small fat filled umbilical hernia again seen. OSSEOUS STRUCTURES:  No acute fracture or destructive osseous lesion. Impression: 1. No aortic aneurysm, intramural hematoma/dissection or penetrating ulcer. 2.  Radiopaque substance in the stomach, small bowel and colon correlate with ingestion of crushed pills, oral contrast or other radiopaque substance including Pepto-Bismol.  Workstation performed: [id number] Procedure: XR chest pa & lateral    Result Date: 8/15/2023  Narrative: CHEST INDICATION:   chest pain. COMPARISON: 4/14/2023 EXAM PERFORMED/VIEWS:  XR CHEST PA & LATERAL FINDINGS: Cardiomediastinal silhouette appears unremarkable. The lungs are clear. No pneumothorax or pleural effusion. Osseous structures appear within normal limits for patient age. Impression: No acute cardiopulmonary disease. Workstation performed: [id number]       40 minutes was spent face to face with Victoriano Braga with greater than 50% of the time spent in counseling or coordination of care including discussions of provided medical updates, discussed palliative care, determined goals of care, determined social/family support, discussed plans of care, discussed symptom management, provided psychosocial support. Opioid taper. Discussed s/sxs opioid-induced withdrawal. PDMP Reviewed. All of the patient's or agent's questions were answered during this discussion.

## 2023-09-18 ENCOUNTER — TELEPHONE (OUTPATIENT)
Dept: PALLIATIVE MEDICINE | Facility: CLINIC | Age: 53
End: 2023-09-18

## 2023-09-18 DIAGNOSIS — G89.3 CANCER ASSOCIATED PAIN: ICD-10-CM

## 2023-09-18 DIAGNOSIS — C90.00 MULTIPLE MYELOMA NOT HAVING ACHIEVED REMISSION (HCC): ICD-10-CM

## 2023-09-18 DIAGNOSIS — Z51.5 PALLIATIVE CARE PATIENT: ICD-10-CM

## 2023-09-18 RX ORDER — OXYCODONE HYDROCHLORIDE 10 MG/1
15 TABLET ORAL EVERY 4 HOURS PRN
Qty: 105 TABLET | Refills: 0 | Status: CANCELLED | OUTPATIENT
Start: 2023-09-18

## 2023-09-18 RX ORDER — OXYCODONE HYDROCHLORIDE 10 MG/1
15 TABLET ORAL EVERY 4 HOURS PRN
Qty: 84 TABLET | Refills: 0 | Status: SHIPPED | OUTPATIENT
Start: 2023-09-18 | End: 2023-10-02

## 2023-09-18 NOTE — TELEPHONE ENCOUNTER
Pt called in stated that the pharmacy said it was to early for her refill. And she is currently out of medications.

## 2023-09-18 NOTE — TELEPHONE ENCOUNTER
Returned call to patient. Clarified opioid taper plan with patient. Patient communicated confusion regarding Rx [written on 09/05 which stated max daily 75 mg], which differed from the opioid taper plan discussed on 09/06 [facility] stated max daily 60 mg]. Discussed with patient no further early refills will be granted. Rx sent to pharmacy for 2 weeks [reflecting 60 mg/day x 14 days]. As calculated, prior Rx of 105 tabs at max 6 tabs/day, reflects 17.5 day Rx [not 21 days as noted in PDMP]. Today is day 14, request is 3 days early. Will aishwarya early refill this once.     Lamont Alvarez MD  Palliative and Supportive Care  Ph: [phone]

## 2023-09-18 NOTE — TELEPHONE ENCOUNTER
Pt stated she is taking medication every 4 hrs 1 1/2 tablet a day starting at 7am- 11pm currently is taking 7 1/2 tablets a day.

## 2023-09-18 NOTE — TELEPHONE ENCOUNTER
Patient not taking medications as prescribed. Please call to confirm how much/often patient taking medications. Current opioid taper. Max daily 60 mg [6 tabs/day] as discussed/agreed upon at last appointment.     Earliest fill date 09/21/2023 [patient requesting 3 days early]

## 2023-09-26 ENCOUNTER — OFFICE VISIT (OUTPATIENT)
Age: 53
End: 2023-09-26
Payer: COMMERCIAL

## 2023-09-26 VITALS
WEIGHT: 182.8 LBS | SYSTOLIC BLOOD PRESSURE: 118 MMHG | HEIGHT: 63 IN | HEART RATE: 67 BPM | OXYGEN SATURATION: 97 % | RESPIRATION RATE: 16 BRPM | TEMPERATURE: 97.6 F | DIASTOLIC BLOOD PRESSURE: 88 MMHG | BODY MASS INDEX: 32.39 KG/M2

## 2023-09-26 DIAGNOSIS — Z00.00 ANNUAL PHYSICAL EXAM: Primary | ICD-10-CM

## 2023-09-26 DIAGNOSIS — Z12.4 SCREENING FOR CERVICAL CANCER: ICD-10-CM

## 2023-09-26 DIAGNOSIS — F17.200 TOBACCO DEPENDENCE: ICD-10-CM

## 2023-09-26 DIAGNOSIS — H61.23 BILATERAL IMPACTED CERUMEN: ICD-10-CM

## 2023-09-26 DIAGNOSIS — R09.81 NASAL CONGESTION: ICD-10-CM

## 2023-09-26 DIAGNOSIS — Z11.59 NEED FOR HEPATITIS C SCREENING TEST: ICD-10-CM

## 2023-09-26 DIAGNOSIS — Z11.4 SCREENING FOR HIV (HUMAN IMMUNODEFICIENCY VIRUS): ICD-10-CM

## 2023-09-26 PROCEDURE — 69209 REMOVE IMPACTED EAR WAX UNI: CPT

## 2023-09-26 PROCEDURE — 99396 PREV VISIT EST AGE 40-64: CPT

## 2023-09-26 RX ORDER — VARENICLINE TARTRATE 0.5 MG/1
TABLET, FILM COATED ORAL
Qty: 319 TABLET | Refills: 0 | Status: SHIPPED | OUTPATIENT
Start: 2023-09-26 | End: 2023-10-05 | Stop reason: SDUPTHER

## 2023-09-26 RX ORDER — FLUTICASONE PROPIONATE 50 MCG
2 SPRAY, SUSPENSION (ML) NASAL DAILY
Qty: 48 G | Refills: 3 | Status: SHIPPED | OUTPATIENT
Start: 2023-09-26

## 2023-09-26 NOTE — PROGRESS NOTES
Ear cerumen removal    Date/Time: 2023 11:20 AM    Performed by: Mercedez Brennan PA-C  Authorized by: Zhane Melendez PA-C  Universal Protocol:  Consent: Verbal consent obtained. Risks and benefits: risks, benefits and alternatives were discussed  Time out: Immediately prior to procedure a "time out" was called to verify the correct patient, procedure, equipment, support staff and site/side marked as required. Timeout called at: 2023 11:31 AM.  Patient understanding: patient states understanding of the procedure being performed  Patient consent: the patient's understanding of the procedure matches consent given  Procedure consent: procedure consent matches procedure scheduled  Patient identity confirmed: verbally with patient and provided demographic data      Patient location:  Clinic  Indications / Diagnosis:  Cerumen impaction  Procedure details:     Local anesthetic:  None    Location:  L ear and R ear    Procedure type: irrigation only      Approach:  External  Post-procedure details:     Complication:  None    Hearing quality:  Improved    Patient tolerance of procedure: Tolerated well, no immediate complications  Comments:      Exam post procedure revealed cleared cerumen of both ears. TMs and canals intact and appear normal.      ADULT ANNUAL [facility]    NAME: Venu Chavis  AGE: 45 y.o. SEX: female  : 1970     DATE: 2023     Assessment and Plan:     Problem List Items Addressed This Visit    None  Visit Diagnoses     Annual physical exam    -  Primary    Nasal congestion        Could be due to opioid taper, no fever, myalgia. Watch another week and call if it continues. Will do CXR and start abx due to her immunocompromised state. Relevant Medications    fluticasone (FLONASE) 50 mcg/act nasal spray    Screening for HIV (human immunodeficiency virus)        agreeable for screening today. Relevant Orders    HIV 1/2 AB/AG w Reflex [facility] for 2 yr old and above    Screening for cervical cancer        Has not seen GYN in a few years. Referal placed. Relevant Orders    Ambulatory referral to Obstetrics / Gynecology    Need for hepatitis C screening test        agreeable for screening today    Relevant Orders    Hepatitis C antibody    BMI 32.0-32.9,adult        Tobacco dependence        Restart Chantix, reviewed titration and quit smoking one week after start. Pt aware of potential SE. Follow up at next appt. Relevant Medications    varenicline (CHANTIX) 0.5 mg tablet    Bilateral impacted cerumen        Able to remove cerumen both ears, tolerated irrigation well. Still has ringing, could be from congestion. ENT referral if not improved over next 2 weeks. Relevant Orders    Ear cerumen removal (Completed)          Immunizations and preventive care screenings were discussed with patient today. Appropriate education was printed on patient's after visit summary. Counseling:  Dental Health: discussed importance of regular tooth brushing, flossing, and dental visits. Exercise: the importance of regular exercise/physical activity was discussed. Recommend exercise 3-5 times per week for at least 30 minutes. Depression Screening and Follow-up Plan: Patient was screened for depression during today's encounter. They screened negative with a PHQ-2 score of 0. Tobacco Cessation Counseling: Tobacco cessation counseling was provided. The patient is sincerely urged to quit consumption of tobacco. She is ready to quit tobacco. Medication options and side effects of medication discussed. Patient agreed to medication. Varenicline (chantix) was prescribed. Return in about 6 months (around 3/26/2024) for Recheck.      Chief Complaint:     Chief Complaint   Patient presents with   • Follow-up      History of Present Illness:     Adult Annual Physical   Patient here for a comprehensive physical exam. The patient reports a couple of problems. .    Pt has a cough and congestion, started about a week or two ago. Is on opioid taper and recently went down in dosage. Not feeling fatigued. No sick contacts. No fevers, chills, N/V. Is smoker. Interested in cessation. Has used Chantix in the past and did not have the suicidal ideation or other side effects that would make this medication more concerning. She would like to restart that. Doing well anxiety wise. Daughter no longer lives with her, who was stealing her medications. Taking 40 mg of duloxetine and doing well on this dosage without SE.    Pt's ears are still blocked. Would like irrigation today. Reports some ear ringing, but attributes this to congestion as well. Colonoscopy was done about 4 years ago. No issues, recommended follow up in ten years. Has not followed with GYN since moving here from [place]. Would like referral.    Diet and Physical Activity  Diet/Nutrition: well balanced diet and consuming 3-5 servings of fruits/vegetables daily. Exercise: no formal exercise. Depression Screening  PHQ-2/9 Depression Screening    Little interest or pleasure in doing things: 0 - not at all  Feeling down, depressed, or hopeless: 0 - not at all  PHQ-2 Score: 0  PHQ-2 Interpretation: Negative depression screen       General Health  Sleep: sleeps well and gets more than 8 hours of sleep on average. Hearing: normal - bilateral and has ringing in both ears. .  Vision: no vision problems, most recent eye exam >1 year ago and plans to make an appointment soon. .   Dental: just made an appointment and going soon. [facility]  Patient is: postmenopausal  Last menstrual period: n/a  Contraceptive method: n/a. Review of Systems:     Review of Systems   Constitutional: Negative. HENT: Positive for congestion, rhinorrhea and tinnitus. Negative for drooling, sore throat and trouble swallowing. Eyes: Negative. Respiratory: Positive for cough. Negative for chest tightness, shortness of breath and wheezing. Cardiovascular: Negative for chest pain, palpitations and leg swelling. Gastrointestinal: Negative. Genitourinary: Negative. Musculoskeletal: Negative for gait problem and neck pain. Skin: Negative for pallor and rash. Neurological: Negative for syncope, facial asymmetry, light-headedness and headaches. Hematological: Negative for adenopathy. Psychiatric/Behavioral: Negative for dysphoric mood and suicidal ideas. The patient is not nervous/anxious. All other systems reviewed and are negative.      Past Medical History:     Past Medical History:   Diagnosis Date   • Anemia    • Cardiomyopathy ([address])    • Chronic back pain    • Chronic narcotic dependence ([address])    • Colon polyp    • Disease of thyroid gland    • GERD (gastroesophageal reflux disease)    • H/O autologous stem cell transplant ([address]) 2022   • Hypertension    • Multiple myeloma (HCC)     in remission   • Neuropathy       Past Surgical History:     Past Surgical History:   Procedure Laterality Date   •  SECTION     • COLONOSCOPY     • EYE SURGERY Left     biopsy   • THYROID LOBECTOMY Left 2023    Procedure: LEFT HEMITHYROIDECTOMY;  Surgeon: Destinee Talley MD;  Location: [facility];  Service: Surgical Oncology   • US GUIDED THYROID BIOPSY  10/20/2022      Social History:     Social History     Socioeconomic History   • Marital status: Single     Spouse name: None   • Number of children: None   • Years of education: None   • Highest education level: None   Occupational History   • None   Tobacco Use   • Smoking status: Heavy Smoker     Packs/day: 0.50     Years: 26.00     Total pack years: 13.00     Types: Cigarettes     Start date: 1992   • Smokeless tobacco: Current   Vaping Use   • Vaping Use: Never used   Substance and Sexual Activity   • Alcohol use: Not Currently   • Drug use: Yes     Types: Oxycodone     Comment: 1 year   • Sexual activity: Not Currently     Partners: Male   Other Topics Concern   • None   Social History Narrative   • None     Social Determinants of Health     Financial Resource Strain: Not on file   Food Insecurity: No Food Insecurity (8/16/2023)    Hunger Vital Sign    • Worried About Running Out of Food in the Last Year: Never true    • Ran Out of Food in the Last Year: Never true   Transportation Needs: No Transportation Needs (8/16/2023)    PRAPARE - Transportation    • Lack of Transportation (Medical): No    • Lack of Transportation (Non-Medical):  No   Physical Activity: Not on file   Stress: Not on file   Social Connections: Not on file   Intimate Partner Violence: Not on file   Housing Stability: Low Risk  (8/16/2023)    Housing Stability Vital Sign    • Unable to Pay for Housing in the Last Year: No    • Number of Places Lived in the Last Year: 1    • Unstable Housing in the Last Year: No      Family History:     Family History   Problem Relation Age of Onset   • No Known Problems Mother    • No Known Problems Father    • Colon cancer Neg Hx    • Diabetes Neg Hx       Current Medications:     Current Outpatient Medications   Medication Sig Dispense Refill   • acyclovir (ZOVIRAX) 800 mg tablet Take 1 tablet (800 mg total) by mouth 2 (two) times a day 180 tablet 3   • albuterol (PROVENTIL HFA,VENTOLIN HFA) 90 mcg/act inhaler Inhale 2 puffs every 6 (six) hours as needed for wheezing or shortness of breath 18 g 11   • ALPRAZolam (XANAX) 0.5 mg tablet Take 1 tablet (0.5 mg total) by mouth daily at bedtime as needed for anxiety for up to 20 doses 20 tablet 0   • amLODIPine (NORVASC) 10 mg tablet take 1 tablet by mouth every morning 90 tablet 3   • DULoxetine HCl 40 MG CPEP Take 1 capsule (40 mg total) by mouth daily 90 capsule 1   • fluticasone (FLONASE) 50 mcg/act nasal spray 2 sprays into each nostril daily 48 g 3   • Ibuprofen-Acetaminophen (Advil Dual Action) 125-250 MG TABS Take by mouth     • methocarbamol (ROBAXIN) 750 mg tablet take 1 tablet by mouth every 6 hours if needed for muscle spasm 120 tablet 3   • naloxone (NARCAN) 4 mg/0.1 mL nasal spray Administer 1 spray into a nostril. If no response after 2-3 minutes, give another dose in the other nostril using a new spray. 1 each 1   • NON FORMULARY -Balance of Nature vegetable and fruit supplements     • oxyCODONE (ROXICODONE) 10 MG TABS Take 1.5 tablets (15 mg total) by mouth every 4 (four) hours as needed (cancer-related pain) for up to 14 days Max Daily Amount: 60 mg 84 tablet 0   • pregabalin (LYRICA) 100 mg capsule Take 1 capsule (100 mg total) by mouth 2 (two) times a day 360 capsule 0   • varenicline (CHANTIX) 0.5 mg tablet Take 1 tablet (0.5 mg total) by mouth daily for 3 days, THEN 1 tablet (0.5 mg total) 2 (two) times a day for 4 days, THEN 2 tablets (1 mg total) 2 (two) times a day. 319 tablet 0   • atorvastatin (LIPITOR) 40 mg tablet Take 40 mg by mouth (Patient not taking: Reported on 9/6/2023)     • famotidine (PEPCID) 40 MG tablet TAKE 1 TABLET BY MOUTH ONCE DAILY (Patient not taking: Reported on 9/6/2023) 90 tablet 1   • metoprolol succinate (TOPROL-XL) 25 mg 24 hr tablet take 1 tablet by mouth once daily (Patient not taking: Reported on 8/24/2023) 90 tablet 3   • polyethylene glycol (MIRALAX) 17 g packet Take 17 g by mouth daily (Patient not taking: Reported on 8/24/2023) 30 each 0     No current facility-administered medications for this visit. Allergies:     No Known Allergies   Physical Exam:     /88 (BP Location: Right arm, Patient Position: Sitting, Cuff Size: Standard)   Pulse 67   Temp 97.6 °F (36.4 °C) (Tympanic)   Resp 16   Ht 5' 3" (1.6 m)   Wt 82.9 kg (182 lb 12.8 oz)   SpO2 97%   BMI 32.38 kg/m²     Physical Exam  Vitals and nursing note reviewed. Constitutional:       General: She is not in acute distress. Appearance: Normal appearance. She is not toxic-appearing or diaphoretic.    HENT:      Head: Normocephalic and atraumatic. Jaw: There is normal jaw occlusion. Right Ear: Hearing and external ear normal. There is impacted cerumen. Left Ear: Hearing and external ear normal. There is impacted cerumen. Nose: Congestion present. No rhinorrhea. Mouth/Throat:      Lips: Pink. Mouth: Mucous membranes are moist. No oral lesions. Tongue: No lesions. Palate: No mass. Pharynx: Oropharynx is clear. Uvula midline. Eyes:      General: Lids are normal. Vision grossly intact. Extraocular Movements: Extraocular movements intact. Conjunctiva/sclera: Conjunctivae normal.      Pupils: Pupils are equal, round, and reactive to light. Neck:      Thyroid: No thyroid mass, thyromegaly or thyroid tenderness. Trachea: Trachea and phonation normal.   Cardiovascular:      Rate and Rhythm: Normal rate and regular rhythm. Pulses:           Radial pulses are 2+ on the right side and 2+ on the left side. Dorsalis pedis pulses are 2+ on the right side and 2+ on the left side. Heart sounds: Normal heart sounds. No murmur heard. Pulmonary:      Effort: Pulmonary effort is normal. No respiratory distress. Breath sounds: Normal breath sounds. Abdominal:      General: Abdomen is flat. Bowel sounds are normal.      Palpations: Abdomen is soft. Tenderness: There is no abdominal tenderness. There is no right CVA tenderness or left CVA tenderness. Musculoskeletal:         General: No tenderness, deformity or signs of injury. Cervical back: Full passive range of motion without pain, normal range of motion and neck supple. Right lower leg: No edema. Left lower leg: No edema. Comments: Patient with erect posture and good balance with normal gait while walking. Joints and muscles are symmetrical no swelling, redness, or deformity. Patient has active range of motion of all joints without difficulty.      Lymphadenopathy:      Cervical: No cervical adenopathy. Upper Body:      Right upper body: No supraclavicular adenopathy. Left upper body: No supraclavicular adenopathy. Skin:     General: Skin is warm and dry. Capillary Refill: Capillary refill takes less than 2 seconds. Coloration: Skin is not cyanotic, mottled or pale. Findings: No ecchymosis, petechiae or rash. Neurological:      General: No focal deficit present. Mental Status: She is alert and oriented to person, place, and time. Mental status is at baseline. GCS: GCS eye subscore is 4. GCS verbal subscore is 5. GCS motor subscore is 6. Cranial Nerves: Cranial nerves 2-12 are intact. Sensory: Sensation is intact. Motor: Motor function is intact. Coordination: Coordination is intact. Gait: Gait is intact. Psychiatric:         Mood and Affect: Mood normal.         Speech: Speech normal.         Behavior: Behavior normal. Behavior is cooperative.           Zhane Andrew PA-C  [facility] [place]

## 2023-10-02 DIAGNOSIS — G89.3 CANCER ASSOCIATED PAIN: ICD-10-CM

## 2023-10-02 DIAGNOSIS — C90.00 MULTIPLE MYELOMA NOT HAVING ACHIEVED REMISSION (HCC): ICD-10-CM

## 2023-10-02 DIAGNOSIS — Z51.5 PALLIATIVE CARE PATIENT: ICD-10-CM

## 2023-10-02 RX ORDER — OXYCODONE HYDROCHLORIDE 10 MG/1
15 TABLET ORAL EVERY 4 HOURS PRN
Qty: 180 TABLET | Refills: 0 | Status: SHIPPED | OUTPATIENT
Start: 2023-10-02

## 2023-10-02 NOTE — TELEPHONE ENCOUNTER
Last appointment:  9/6/2023    Next scheduled appointment: 11/13/2023    Pharmacy: [id number] [facility]

## 2023-10-02 NOTE — TELEPHONE ENCOUNTER
PDMP reviewed. Rx sent to the pharmacy. One Month Rx for current fill max daily 4 doses/day [60 mg/day -> oxy-IR 15 mg x 4].

## 2023-10-04 DIAGNOSIS — G62.9 NEUROPATHY: ICD-10-CM

## 2023-10-04 RX ORDER — DULOXETINE 40 MG/1
40 CAPSULE, DELAYED RELEASE ORAL DAILY
Qty: 90 CAPSULE | Refills: 1 | Status: SHIPPED | OUTPATIENT
Start: 2023-10-04 | End: 2024-04-01

## 2023-10-04 NOTE — TELEPHONE ENCOUNTER
Pt  Is looking for her RX for:  Duloxetine HC1 40 mg  Looks like 9/5/2023 it went to a mail order. Pt states she uses [facility]. Never a mail order.     Call pt when done [phone]

## 2023-10-05 ENCOUNTER — TELEPHONE (OUTPATIENT)
Age: 53
End: 2023-10-05

## 2023-10-05 DIAGNOSIS — F17.200 TOBACCO DEPENDENCE: ICD-10-CM

## 2023-10-05 RX ORDER — VARENICLINE TARTRATE 0.5 MG/1
TABLET, FILM COATED ORAL
Qty: 180 TABLET | Refills: 0 | Status: SHIPPED | OUTPATIENT
Start: 2023-10-05 | End: 2023-11-23

## 2023-10-23 DIAGNOSIS — G62.9 NEUROPATHY: ICD-10-CM

## 2023-10-23 DIAGNOSIS — F41.8 ANXIETY ABOUT HEALTH: ICD-10-CM

## 2023-10-24 ENCOUNTER — TELEPHONE (OUTPATIENT)
Age: 53
End: 2023-10-24

## 2023-10-24 DIAGNOSIS — F41.8 ANXIETY ABOUT HEALTH: Primary | ICD-10-CM

## 2023-10-24 DIAGNOSIS — G62.9 NEUROPATHY: ICD-10-CM

## 2023-10-24 DIAGNOSIS — F41.8 ANXIETY ABOUT HEALTH: ICD-10-CM

## 2023-10-24 RX ORDER — ALPRAZOLAM 0.5 MG/1
0.5 TABLET ORAL
Qty: 20 TABLET | Refills: 0 | OUTPATIENT
Start: 2023-10-24

## 2023-10-24 RX ORDER — DULOXETINE 40 MG/1
40 CAPSULE, DELAYED RELEASE ORAL DAILY
Qty: 90 CAPSULE | Refills: 1 | OUTPATIENT
Start: 2023-10-24 | End: 2024-04-21

## 2023-10-24 RX ORDER — DULOXETIN HYDROCHLORIDE 20 MG/1
CAPSULE, DELAYED RELEASE ORAL
Qty: 60 CAPSULE | Refills: 5 | Status: SHIPPED | OUTPATIENT
Start: 2023-10-24

## 2023-10-24 NOTE — TELEPHONE ENCOUNTER
Lea calling from [facility] the medication Duloxetine HCL  40 milligram capsules. Needs   a new prescription sent in to the [address]. would need a 20 milligram script sent in equaling the amount that you would want her to take with the 40s. Pharmacy dept [phone].

## 2023-10-24 NOTE — TELEPHONE ENCOUNTER
I am confused at the request for 20 mg? Patient is taking 40 mg daily.  That is what dose is currently pended to April to approve

## 2023-10-30 DIAGNOSIS — C90.00 MULTIPLE MYELOMA NOT HAVING ACHIEVED REMISSION (HCC): ICD-10-CM

## 2023-10-30 DIAGNOSIS — G89.3 CANCER ASSOCIATED PAIN: ICD-10-CM

## 2023-10-30 DIAGNOSIS — Z51.5 PALLIATIVE CARE PATIENT: ICD-10-CM

## 2023-10-30 RX ORDER — OXYCODONE HYDROCHLORIDE 10 MG/1
15 TABLET ORAL EVERY 4 HOURS PRN
Qty: 180 TABLET | Refills: 0 | Status: SHIPPED | OUTPATIENT
Start: 2023-10-31

## 2023-10-31 ENCOUNTER — TELEPHONE (OUTPATIENT)
Age: 53
End: 2023-10-31

## 2023-10-31 DIAGNOSIS — F41.8 ANXIETY ABOUT HEALTH: ICD-10-CM

## 2023-10-31 RX ORDER — ALPRAZOLAM 0.5 MG/1
0.5 TABLET ORAL
Qty: 10 TABLET | Refills: 0 | Status: SHIPPED | OUTPATIENT
Start: 2023-10-31

## 2023-11-01 ENCOUNTER — TELEPHONE (OUTPATIENT)
Age: 53
End: 2023-11-01

## 2023-11-01 DIAGNOSIS — F41.8 ANXIETY ABOUT HEALTH: Primary | ICD-10-CM

## 2023-11-01 NOTE — TELEPHONE ENCOUNTER
Good afternoon April. I understand your concern and I thank you for being concerned. My days have been heavy due to my current situation. I am open to therapy as I know it will help. Do you have any suggestions for a therapist? I don't have a clue where to begin in this area. Previous Messages    ----- Message -----       From: (proxy for April Maricel Roa)       Sent:10/31/2023 10:06 PM EDT         To:Marla Espinoza    Subject:Your Approved Medications    Marla,    I'm sorry I was out of town last week. I saw Dr. Lester Perkins note to you. As discussed at your first visit with me, I do not recommend this medication for the management of anxiety. If your duloxetine is not doing enough for you, then we need to discuss that. I didn't lay out exactly how often is reasonable to take this medication. While you are not taking it every day, you are taking it more frequently than I am comfortable moving forward with. I did send 10 more tablets to the pharmacy and that will be the last time I am refilling it. I expect that this will last you for at least 2-3 months. I can appreciate you are going through a lot, which is why I would suggest therapy as this can be very beneficial for situational stress such as what you are going through.

## 2023-11-10 DIAGNOSIS — G89.29 CHRONIC BILATERAL LOW BACK PAIN WITH BILATERAL SCIATICA: ICD-10-CM

## 2023-11-10 DIAGNOSIS — M54.41 CHRONIC BILATERAL LOW BACK PAIN WITH BILATERAL SCIATICA: ICD-10-CM

## 2023-11-10 DIAGNOSIS — M54.42 CHRONIC BILATERAL LOW BACK PAIN WITH BILATERAL SCIATICA: ICD-10-CM

## 2023-11-10 DIAGNOSIS — R12 HEARTBURN: ICD-10-CM

## 2023-11-10 DIAGNOSIS — I10 PRIMARY HYPERTENSION: ICD-10-CM

## 2023-11-10 RX ORDER — METOPROLOL SUCCINATE 25 MG/1
25 TABLET, EXTENDED RELEASE ORAL DAILY
Qty: 90 TABLET | Refills: 0 | Status: SHIPPED | OUTPATIENT
Start: 2023-11-10

## 2023-11-10 RX ORDER — AMLODIPINE BESYLATE 10 MG/1
10 TABLET ORAL EVERY MORNING
Qty: 90 TABLET | Refills: 0 | Status: SHIPPED | OUTPATIENT
Start: 2023-11-10

## 2023-11-10 RX ORDER — FAMOTIDINE 40 MG/1
40 TABLET, FILM COATED ORAL DAILY
Qty: 90 TABLET | Refills: 0 | Status: SHIPPED | OUTPATIENT
Start: 2023-11-10

## 2023-11-10 RX ORDER — PREGABALIN 100 MG/1
100 CAPSULE ORAL 2 TIMES DAILY
Qty: 360 CAPSULE | Refills: 0 | Status: SHIPPED | OUTPATIENT
Start: 2023-11-10 | End: 2024-05-08

## 2023-11-13 ENCOUNTER — OFFICE VISIT (OUTPATIENT)
Dept: PALLIATIVE MEDICINE | Facility: CLINIC | Age: 53
End: 2023-11-13
Payer: COMMERCIAL

## 2023-11-13 VITALS
HEART RATE: 91 BPM | DIASTOLIC BLOOD PRESSURE: 62 MMHG | SYSTOLIC BLOOD PRESSURE: 122 MMHG | BODY MASS INDEX: 31.75 KG/M2 | WEIGHT: 179.2 LBS | HEIGHT: 63 IN | TEMPERATURE: 97.7 F | OXYGEN SATURATION: 97 % | RESPIRATION RATE: 16 BRPM

## 2023-11-13 DIAGNOSIS — G89.3 CANCER ASSOCIATED PAIN: ICD-10-CM

## 2023-11-13 DIAGNOSIS — G89.3 CANCER RELATED PAIN: ICD-10-CM

## 2023-11-13 DIAGNOSIS — E85.9 AMYLOIDOSIS, UNSPECIFIED TYPE (HCC): ICD-10-CM

## 2023-11-13 DIAGNOSIS — F41.8 ANXIETY ABOUT HEALTH: ICD-10-CM

## 2023-11-13 DIAGNOSIS — Z51.5 PALLIATIVE CARE PATIENT: ICD-10-CM

## 2023-11-13 DIAGNOSIS — Z94.84 H/O AUTOLOGOUS STEM CELL TRANSPLANT (HCC): ICD-10-CM

## 2023-11-13 DIAGNOSIS — C90.01 MULTIPLE MYELOMA IN REMISSION (HCC): Primary | Chronic | ICD-10-CM

## 2023-11-13 DIAGNOSIS — C90.00 MULTIPLE MYELOMA NOT HAVING ACHIEVED REMISSION (HCC): ICD-10-CM

## 2023-11-13 PROCEDURE — 99214 OFFICE O/P EST MOD 30 MIN: CPT | Performed by: STUDENT IN AN ORGANIZED HEALTH CARE EDUCATION/TRAINING PROGRAM

## 2023-11-13 RX ORDER — OXYCODONE HYDROCHLORIDE 10 MG/1
10 TABLET ORAL EVERY 4 HOURS PRN
Qty: 180 TABLET | Refills: 0
Start: 2023-11-13

## 2023-11-13 NOTE — PROGRESS NOTES
Outpatient Follow-Up - Palliative and Supportive Care   Krzysztof Segovia 45 y.o. female [id number]    Assessment & Plan  Problem List Items Addressed This Visit          Other    Multiple myeloma in remission ([facility]) - Primary (Chronic)    Relevant Medications    oxyCODONE (ROXICODONE) 10 MG TABS    Cancer related pain    Relevant Medications    oxyCODONE (ROXICODONE) 10 MG TABS    Palliative care patient    Relevant Medications    oxyCODONE (ROXICODONE) 10 MG TABS    H/O autologous stem cell transplant ([facility])    Amyloidosis ([facility])    Anxiety about health     Other Visit Diagnoses       Cancer associated pain        Relevant Medications    oxyCODONE (ROXICODONE) 10 MG TABS    Multiple myeloma not having achieved remission (HCC)        Relevant Medications    oxyCODONE (ROXICODONE) 10 MG TABS          #symptom management  #cancer-related pain  #opioid taper   -continue APAP 1000 mg PO Q8H PRN              - max daily 4000 mg   - decrease oxy-IR 10 mg PO Q4H PRN              - max 5 doses [50 mg] per day     Continued opioid taper with plan to decrease 10-30% total daily opioids each month. Discussed s/sxs of opioid withdrawal, including nausea, vomiting, diarrhea, rhinorrhea, yawning, piloerection, insomnia, agitation. All questions/concerns addressed. Will increase next refill to one month, as family member with OUD no longer residing within household, no access to medications. Earliest refill for upcoming Rx 11/29/2023 [discussed with patient].      #neuropathic pain   - continue pregabalin 100 mg PO BID   - continue duloxetine 30 mg PO QDaily   - continue lidocaine cream QID PRN     #muscle spasms   - continue methocarbamol 500 mg PO Q6H PRN     #OIC   - continue senna-docusate 2 tabs PO QHS   - continue miralax 17 g PO QDaily   - continue bisacodyl 10 mg OK QDaily PRN     #anxiety/depression   - continue duloxetine 30 mg PO QDaily   - continue alprazolam 0.5 mg PO QHS   - managed per PCP    #goals of care   - continue opioid taper   - treatment focused care with no limitations at this time    #psychosocial support   - emotional support provided   - not currently    - two adult children              - Guerda [daughter]              - Oni Schaffer [mother] [phone]   - one sibling              - Mayi [sister]   - two grandchildren [aged 11 and 3]      Next [address] [place] [address] Follow up in 4 weeks. Cont[place] Substance [place]    [place] or [place]  reviewed: No red flags were identified; safe to proceed with prescription. Felisa Lai PDMP Review         Value Time User    PDMP Reviewed  Yes (P)  11/13/2023  8:36 AM Zay Mix MD            Medications adjusted this encounter:  Requested Prescriptions     Signed Prescriptions Disp Refills    oxyCODONE (ROXICODONE) 10 MG TABS 180 tablet 0     Sig: Take 1 tablet (10 mg total) by mouth every 4 (four) hours as needed (cancer-related pain) Max Daily Amount: 50 mg     No orders of the defined types were placed in this encounter. Medications Discontinued During This Encounter   Medication Reason    oxyCODONE (ROXICODONE) 10 MG TABS Reorder         Ciara Galeana was seen today for symptoms and planning cares related to above illnesses. I have reviewed the patient's controlled substance dispensing history in the Prescription Drug Monitoring Program in compliance with the [facility] regulations before prescribing any controlled substances. They are invited to continue to follow with us. If there are questions or concerns, please contact us through our clinic/answering service 24 hours a day, seven days a week.     Zay Mix MD  [facility] Palliative and Supportive Care  [phone]      Visit Information    Accompanied By: No one    Source of History: Self, Medical record    History Limitations: None      History of Present Illness    Marla Reyes is a 45 y.o. female who presents in follow up of symptoms related to multiple myeloma s/p PBSCT [03/30/2022]. Pertinent issues include: symptom management, pain, neoplasm related, depression or anxiety, assessment of goals of care, disease process education and discussion of prognosis, advance care planning. Patient reports continued opioid taper with no s/sxs concerning for opioid-induced withdrawal. Use of 60 mg daily tolerated. Oxy-IR dosing continues to help with leg and bone pain. Upcoming neurology appointment to discuss "twitching" of limbs. Denies nausea, vomiting. BM regular, daily. Appetite intact. Weight stable. Adequate sleep. Recent ED visit on 10/26 with nausea/vomiting and epigastric pain. Suspected PUD in the setting of NSAID use, since, discontinued NSAID + famotidine BID -> resolved. Past medical, surgical, social, and family histories are reviewed and pertinent updates are made. Review of Systems   Constitutional: Negative for chills, decreased appetite, fever, malaise/fatigue and weight loss. Eyes:  Negative for visual disturbance. Cardiovascular:  Negative for chest pain. Respiratory:  Negative for shortness of breath. Musculoskeletal:  Positive for myalgias. Negative for falls and neck pain. Gastrointestinal:  Negative for abdominal pain, constipation, nausea and vomiting. Genitourinary:  Negative for frequency. Neurological:  Negative for headaches. Psychiatric/Behavioral:  The patient does not have insomnia. All other systems reviewed and are negative. Vital Signs    /62 (BP Location: Right arm, Patient Position: Sitting, Cuff Size: Standard)   Pulse 91   Temp 97.7 °F (36.5 °C) (Tympanic)   Resp 16   Ht 5' 3" (1.6 m)   Wt 81.3 kg (179 lb 3.2 oz)   SpO2 97%   BMI 31.74 kg/m²     Physical Exam and Objective Data  Physical Exam  Vitals and nursing note reviewed. Constitutional:       General: She is awake. Appearance: She is not diaphoretic.       Comments: Sitting up comfortably in NAD  BMI 31.7  Non-toxic appearing   HENT: Head: Normocephalic and atraumatic. Right Ear: External ear normal.      Left Ear: External ear normal.      Nose: No rhinorrhea. Eyes:      Comments: No gaze preference   Cardiovascular:      Rate and Rhythm: Normal rate. Pulmonary:      Effort: No tachypnea or respiratory distress. Comments: Completes full sentences without difficulty  Musculoskeletal:      Cervical back: Normal range of motion. Neurological:      General: No focal deficit present. Mental Status: She is alert and oriented to person, place, and time. Psychiatric:         Attention and Perception: Attention normal.         Mood and Affect: Mood and affect normal.         Speech: Speech normal.         Cognition and Memory: Cognition and memory normal.           Radiology and Laboratory:  I personally reviewed and interpreted the following results:    Most Recent COVID-19 Results:  Lab Results   Component Value Date/Time    SARSCOV2 Negative 01/23/2023 09:28 PM    SARSCOV2 Negative 10/30/2021 12:10 AM       Most Recent Lab Work:  Lab Results   Component Value Date/Time    SODIUM 139 08/17/2023 04:29 AM    K 3.7 08/17/2023 04:29 AM    BUN 22 08/17/2023 04:29 AM    CREATININE 0.93 08/17/2023 04:29 AM    GLUC 80 08/17/2023 04:29 AM     Lab Results   Component Value Date/Time    AST 17 08/15/2023 12:37 PM    ALT 12 08/15/2023 12:37 PM    ALB 5.6 (H) 08/15/2023 12:37 PM     Lab Results   Component Value Date/Time    HGB 14.7 08/17/2023 04:29 AM    WBC 8.97 08/17/2023 04:29 AM     08/17/2023 04:29 AM    INR 1.00 08/15/2023 12:37 PM    PTT 30 08/15/2023 12:37 PM       Most Recent Imaging [last 30 days]:  Procedure: GI IMAGE CAPTURE - EGD    Result Date: 10/27/2023  Narrative: Please See Opnote for Result    Procedure: CT head chest abdomen pelvis w wo contrast    Result Date: 10/26/2023  Narrative: HISTORY: Chest pain.  TECHNIQUE: Helical CT imaging was performed of the chest, abdomen and pelvis following the intravenous injection of 100 mL of Omnipaque 350. Multiplanar reconstruction images were obtained. Additionally, helical noncontrast CT imaging also was performed of the chest. COMPARISON: 4/21/2015. FINDINGS: CHEST: There is no evidence for thoracoabdominal aortic dissection. No pneumothorax, pleural effusion or consolidation. There are patchy bibasilar atelectatic changes versus infiltrates. Mild emphysematous changes. The central airways are patent. No pathologically enlarged nodes. There is a small hiatal hernia. The heart size is within normal limits. Scattered atherosclerotic disease. ABDOMEN AND PELVIS: There is low-attenuation to the liver parenchyma consistent with hepatic steatosis. The gallbladder, spleen, pancreas, adrenal glands, kidneys and urinary tracts are without acute abnormalities. Bowel loops are without acute abnormality. No bowel obstruction. No free abdominal air or fluid. No pathologically enlarged nodes. Unremarkable urinary bladder. There is a small fat-containing periumbilical hernia. Scattered atherosclerotic disease. Visualized bony structures are intact. Impression: IMPRESSION: 1. No evidence for thoracoabdominal aortic dissection. 2. Hepatic steatosis. Workstation:[id number]    Procedure: XR chest portable    Result Date: 10/26/2023  Narrative: Chest x-ray. HISTORY: Chest pain. COMPARISON: 4/21/2015. FINDINGS and     Impression: IMPRESSION: A frontal chest x-ray was obtained. Stable cardiomediastinal silhouette. Mild patchy atelectasis versus infiltrate noted in the left lung base. No pneumothorax or pleural effusion. Workstation:[id number]     30 minutes was spent face to face with Krzysztof Segovia with greater than 50% of the time spent in counseling or coordination of care including discussions of provided medical updates, discussed palliative care, determined goals of care, determined social/family support, discussed plans of care, discussed symptom management, provided psychosocial support. Opioid taper. PDMP Reviewed. All of the patient's or agent's questions were answered during this discussion.

## 2023-11-20 DIAGNOSIS — M54.41 CHRONIC BILATERAL LOW BACK PAIN WITH BILATERAL SCIATICA: ICD-10-CM

## 2023-11-20 DIAGNOSIS — Z94.84 H/O AUTOLOGOUS STEM CELL TRANSPLANT (HCC): ICD-10-CM

## 2023-11-20 DIAGNOSIS — G89.29 CHRONIC BILATERAL LOW BACK PAIN WITH BILATERAL SCIATICA: ICD-10-CM

## 2023-11-20 DIAGNOSIS — M54.42 CHRONIC BILATERAL LOW BACK PAIN WITH BILATERAL SCIATICA: ICD-10-CM

## 2023-11-20 DIAGNOSIS — R09.81 NASAL CONGESTION: ICD-10-CM

## 2023-11-20 RX ORDER — ACYCLOVIR 800 MG/1
800 TABLET ORAL 2 TIMES DAILY
Qty: 180 TABLET | Refills: 0 | Status: SHIPPED | OUTPATIENT
Start: 2023-11-20 | End: 2024-11-14

## 2023-11-21 RX ORDER — FLUTICASONE PROPIONATE 50 MCG
2 SPRAY, SUSPENSION (ML) NASAL DAILY
Qty: 48 G | Refills: 0 | Status: SHIPPED | OUTPATIENT
Start: 2023-11-21

## 2023-11-21 RX ORDER — METHOCARBAMOL 750 MG/1
750 TABLET, FILM COATED ORAL EVERY 6 HOURS PRN
Qty: 120 TABLET | Refills: 0 | Status: SHIPPED | OUTPATIENT
Start: 2023-11-21

## 2023-11-27 DIAGNOSIS — G89.3 CANCER ASSOCIATED PAIN: ICD-10-CM

## 2023-11-27 DIAGNOSIS — C90.00 MULTIPLE MYELOMA NOT HAVING ACHIEVED REMISSION (HCC): ICD-10-CM

## 2023-11-27 DIAGNOSIS — Z51.5 PALLIATIVE CARE PATIENT: ICD-10-CM

## 2023-11-27 RX ORDER — OXYCODONE HYDROCHLORIDE 10 MG/1
10 TABLET ORAL EVERY 4 HOURS PRN
Qty: 180 TABLET | Refills: 0 | Status: SHIPPED | OUTPATIENT
Start: 2023-11-29 | End: 2023-11-28 | Stop reason: SDUPTHER

## 2023-11-27 RX ORDER — OXYCODONE HYDROCHLORIDE 10 MG/1
10 TABLET ORAL EVERY 4 HOURS PRN
Qty: 180 TABLET | Refills: 0 | Status: CANCELLED | OUTPATIENT
Start: 2023-11-29

## 2023-11-27 NOTE — TELEPHONE ENCOUNTER
Pt calling [facility] - [address] [place] [address] , system is down would like medication now sent to [address] [place]

## 2023-11-28 RX ORDER — OXYCODONE HYDROCHLORIDE 10 MG/1
10 TABLET ORAL EVERY 4 HOURS PRN
Qty: 180 TABLET | Refills: 0 | Status: SHIPPED | OUTPATIENT
Start: 2023-11-29

## 2023-11-28 NOTE — TELEPHONE ENCOUNTER
Confirmed with [facility] that their system is up and running. Pt is in need of rx today and is requesting rx be sent to [facility].

## 2023-11-29 ENCOUNTER — TELEPHONE (OUTPATIENT)
Dept: PALLIATIVE MEDICINE | Facility: CLINIC | Age: 53
End: 2023-11-29

## 2023-11-29 NOTE — TELEPHONE ENCOUNTER
[facility] insurance called needing a verbal override for patient's Oxycodone 10mg to go through insurance. Patient called with insurance on the phone. Insurance stated they would override due to increase pain.     Marti [phone]

## 2023-12-01 ENCOUNTER — TELEPHONE (OUTPATIENT)
Dept: PSYCHIATRY | Facility: CLINIC | Age: 53
End: 2023-12-01

## 2023-12-01 ENCOUNTER — APPOINTMENT (OUTPATIENT)
Dept: LAB | Facility: HOSPITAL | Age: 53
End: 2023-12-01
Payer: COMMERCIAL

## 2023-12-01 DIAGNOSIS — C90.01 MULTIPLE MYELOMA IN REMISSION (HCC): ICD-10-CM

## 2023-12-01 LAB
ALBUMIN SERPL BCP-MCNC: 4.3 G/DL (ref 3.5–5)
ALP SERPL-CCNC: 90 U/L (ref 34–104)
ALT SERPL W P-5'-P-CCNC: 13 U/L (ref 7–52)
ANION GAP SERPL CALCULATED.3IONS-SCNC: 8 MMOL/L
AST SERPL W P-5'-P-CCNC: 16 U/L (ref 13–39)
BASOPHILS # BLD AUTO: 0.04 THOUSANDS/ÂΜL (ref 0–0.1)
BASOPHILS NFR BLD AUTO: 0 % (ref 0–1)
BILIRUB SERPL-MCNC: 1.04 MG/DL (ref 0.2–1)
BUN SERPL-MCNC: 33 MG/DL (ref 5–25)
CALCIUM SERPL-MCNC: 9.6 MG/DL (ref 8.4–10.2)
CHLORIDE SERPL-SCNC: 103 MMOL/L (ref 96–108)
CO2 SERPL-SCNC: 23 MMOL/L (ref 21–32)
CREAT SERPL-MCNC: 1.5 MG/DL (ref 0.6–1.3)
EOSINOPHIL # BLD AUTO: 0.02 THOUSAND/ÂΜL (ref 0–0.61)
EOSINOPHIL NFR BLD AUTO: 0 % (ref 0–6)
ERYTHROCYTE [DISTWIDTH] IN BLOOD BY AUTOMATED COUNT: 14.2 % (ref 11.6–15.1)
GFR SERPL CREATININE-BSD FRML MDRD: 39 ML/MIN/1.73SQ M
GLUCOSE SERPL-MCNC: 113 MG/DL (ref 65–140)
HCT VFR BLD AUTO: 50 % (ref 34.8–46.1)
HGB BLD-MCNC: 17 G/DL (ref 11.5–15.4)
IGA SERPL-MCNC: 175 MG/DL (ref 66–433)
IGG SERPL-MCNC: 1398 MG/DL (ref 635–1741)
IGM SERPL-MCNC: 88 MG/DL (ref 45–281)
IMM GRANULOCYTES # BLD AUTO: 0.04 THOUSAND/UL (ref 0–0.2)
IMM GRANULOCYTES NFR BLD AUTO: 0 % (ref 0–2)
LDH SERPL-CCNC: 133 U/L (ref 140–271)
LYMPHOCYTES # BLD AUTO: 2.75 THOUSANDS/ÂΜL (ref 0.6–4.47)
LYMPHOCYTES NFR BLD AUTO: 27 % (ref 14–44)
MAGNESIUM SERPL-MCNC: 2.2 MG/DL (ref 1.9–2.7)
MCH RBC QN AUTO: 30.1 PG (ref 26.8–34.3)
MCHC RBC AUTO-ENTMCNC: 34 G/DL (ref 31.4–37.4)
MCV RBC AUTO: 89 FL (ref 82–98)
MONOCYTES # BLD AUTO: 0.62 THOUSAND/ÂΜL (ref 0.17–1.22)
MONOCYTES NFR BLD AUTO: 6 % (ref 4–12)
NEUTROPHILS # BLD AUTO: 6.77 THOUSANDS/ÂΜL (ref 1.85–7.62)
NEUTS SEG NFR BLD AUTO: 67 % (ref 43–75)
NRBC BLD AUTO-RTO: 0 /100 WBCS
PLATELET # BLD AUTO: 187 THOUSANDS/UL (ref 149–390)
PMV BLD AUTO: 9.4 FL (ref 8.9–12.7)
POTASSIUM SERPL-SCNC: 3.5 MMOL/L (ref 3.5–5.3)
PROT SERPL-MCNC: 8 G/DL (ref 6.4–8.4)
RBC # BLD AUTO: 5.64 MILLION/UL (ref 3.81–5.12)
SODIUM SERPL-SCNC: 134 MMOL/L (ref 135–147)
WBC # BLD AUTO: 10.24 THOUSAND/UL (ref 4.31–10.16)

## 2023-12-01 PROCEDURE — 84165 PROTEIN E-PHORESIS SERUM: CPT

## 2023-12-01 PROCEDURE — 36415 COLL VENOUS BLD VENIPUNCTURE: CPT

## 2023-12-01 PROCEDURE — 83615 LACTATE (LD) (LDH) ENZYME: CPT

## 2023-12-01 PROCEDURE — 82784 ASSAY IGA/IGD/IGG/IGM EACH: CPT

## 2023-12-01 PROCEDURE — 80053 COMPREHEN METABOLIC PANEL: CPT

## 2023-12-01 PROCEDURE — 83735 ASSAY OF MAGNESIUM: CPT

## 2023-12-01 PROCEDURE — 85025 COMPLETE CBC W/AUTO DIFF WBC: CPT

## 2023-12-01 PROCEDURE — 82232 ASSAY OF BETA-2 PROTEIN: CPT

## 2023-12-01 PROCEDURE — 83521 IG LIGHT CHAINS FREE EACH: CPT

## 2023-12-01 NOTE — TELEPHONE ENCOUNTER
Reached out to pt as 2nd attempt for routine referral and placing on proper wait list. LVM for pt to contact intake dept. Referral closed.

## 2023-12-02 LAB
KAPPA LC FREE SER-MCNC: 28.9 MG/L (ref 3.3–19.4)
KAPPA LC FREE/LAMBDA FREE SER: 2.13 {RATIO} (ref 0.26–1.65)
LAMBDA LC FREE SERPL-MCNC: 13.6 MG/L (ref 5.7–26.3)

## 2023-12-04 LAB
ALBUMIN SERPL ELPH-MCNC: 4.11 G/DL (ref 3.2–5.1)
ALBUMIN SERPL ELPH-MCNC: 57.9 % (ref 48–70)
ALPHA1 GLOB SERPL ELPH-MCNC: 0.33 G/DL (ref 0.15–0.47)
ALPHA1 GLOB SERPL ELPH-MCNC: 4.7 % (ref 1.8–7)
ALPHA2 GLOB SERPL ELPH-MCNC: 0.65 G/DL (ref 0.42–1.04)
ALPHA2 GLOB SERPL ELPH-MCNC: 9.2 % (ref 5.9–14.9)
B2 MICROGLOB SERPL-MCNC: 2.3 MG/L (ref 0.6–2.4)
BETA GLOB ABNORMAL SERPL ELPH-MCNC: 0.36 G/DL (ref 0.31–0.57)
BETA1 GLOB SERPL ELPH-MCNC: 5.1 % (ref 4.7–7.7)
BETA2 GLOB SERPL ELPH-MCNC: 3.7 % (ref 3.1–7.9)
BETA2+GAMMA GLOB SERPL ELPH-MCNC: 0.26 G/DL (ref 0.2–0.58)
GAMMA GLOB ABNORMAL SERPL ELPH-MCNC: 1.38 G/DL (ref 0.4–1.66)
GAMMA GLOB SERPL ELPH-MCNC: 19.4 % (ref 6.9–22.3)
IGG/ALB SER: 1.38 {RATIO} (ref 1.1–1.8)
PROT PATTERN SERPL ELPH-IMP: NORMAL
PROT SERPL-MCNC: 7.1 G/DL (ref 6.4–8.2)

## 2023-12-04 PROCEDURE — 84165 PROTEIN E-PHORESIS SERUM: CPT | Performed by: PATHOLOGY

## 2023-12-06 ENCOUNTER — TELEPHONE (OUTPATIENT)
Dept: NEUROLOGY | Facility: CLINIC | Age: 53
End: 2023-12-06

## 2023-12-06 NOTE — TELEPHONE ENCOUNTER
Pt was unable to make appt today @ 9;00 with Dr. Joshua Carmona. New appt made for 2/20/24 @ 11:00 with Dr. Joshua Carmona.

## 2023-12-12 DIAGNOSIS — R09.81 NASAL CONGESTION: ICD-10-CM

## 2023-12-12 DIAGNOSIS — M54.41 CHRONIC BILATERAL LOW BACK PAIN WITH BILATERAL SCIATICA: ICD-10-CM

## 2023-12-12 DIAGNOSIS — M54.42 CHRONIC BILATERAL LOW BACK PAIN WITH BILATERAL SCIATICA: ICD-10-CM

## 2023-12-12 DIAGNOSIS — G62.9 NEUROPATHY: ICD-10-CM

## 2023-12-12 DIAGNOSIS — G89.29 CHRONIC BILATERAL LOW BACK PAIN WITH BILATERAL SCIATICA: ICD-10-CM

## 2023-12-12 RX ORDER — DULOXETIN HYDROCHLORIDE 20 MG/1
CAPSULE, DELAYED RELEASE ORAL
Qty: 60 CAPSULE | Refills: 0 | Status: SHIPPED | OUTPATIENT
Start: 2023-12-12 | End: 2023-12-17 | Stop reason: SDUPTHER

## 2023-12-12 RX ORDER — PREGABALIN 100 MG/1
100 CAPSULE ORAL 2 TIMES DAILY
Qty: 360 CAPSULE | Refills: 0 | Status: SHIPPED | OUTPATIENT
Start: 2023-12-12 | End: 2024-06-09

## 2023-12-12 RX ORDER — FLUTICASONE PROPIONATE 50 MCG
2 SPRAY, SUSPENSION (ML) NASAL DAILY
Qty: 48 G | Refills: 0 | Status: SHIPPED | OUTPATIENT
Start: 2023-12-12

## 2023-12-14 ENCOUNTER — PATIENT MESSAGE (OUTPATIENT)
Age: 53
End: 2023-12-14

## 2023-12-14 DIAGNOSIS — R80.9 PROTEINURIA, UNSPECIFIED TYPE: Primary | ICD-10-CM

## 2023-12-15 ENCOUNTER — APPOINTMENT (OUTPATIENT)
Dept: LAB | Facility: HOSPITAL | Age: 53
End: 2023-12-15
Payer: COMMERCIAL

## 2023-12-15 DIAGNOSIS — C90.00 MULTIPLE MYELOMA, FAILED REMISSION (HCC): ICD-10-CM

## 2023-12-15 DIAGNOSIS — E85.9 AMYLOIDOSIS, UNSPECIFIED TYPE (HCC): ICD-10-CM

## 2023-12-15 DIAGNOSIS — C90.01 MULTIPLE MYELOMA IN REMISSION (HCC): Primary | ICD-10-CM

## 2023-12-15 PROCEDURE — 82340 ASSAY OF CALCIUM IN URINE: CPT

## 2023-12-15 PROCEDURE — 82570 ASSAY OF URINE CREATININE: CPT

## 2023-12-15 PROCEDURE — 82043 UR ALBUMIN QUANTITATIVE: CPT

## 2023-12-15 PROCEDURE — 83521 IG LIGHT CHAINS FREE EACH: CPT

## 2023-12-15 PROCEDURE — 84166 PROTEIN E-PHORESIS/URINE/CSF: CPT

## 2023-12-16 LAB
CALCIUM 24H UR-MCNC: 71.4 MG/24 HRS (ref 100–300)
CREAT UR-MCNC: 74.6 MG/DL
MICROALBUMIN UR-MCNC: <7 MG/L
MICROALBUMIN/CREAT 24H UR: <9 MG/G CREATININE (ref 0–30)
SPECIMEN VOL UR: 1400 ML

## 2023-12-17 DIAGNOSIS — G62.9 NEUROPATHY: ICD-10-CM

## 2023-12-17 DIAGNOSIS — M54.41 CHRONIC BILATERAL LOW BACK PAIN WITH BILATERAL SCIATICA: ICD-10-CM

## 2023-12-17 DIAGNOSIS — M54.42 CHRONIC BILATERAL LOW BACK PAIN WITH BILATERAL SCIATICA: ICD-10-CM

## 2023-12-17 DIAGNOSIS — G89.29 CHRONIC BILATERAL LOW BACK PAIN WITH BILATERAL SCIATICA: ICD-10-CM

## 2023-12-18 LAB
KAPPA LC UR-MCNC: 44.31 MG/L (ref 1.17–86.46)
KAPPA LC/LAMBDA UR: 13.68 {RATIO} (ref 1.83–14.26)
LAMBDA LC UR-MCNC: 3.24 MG/L (ref 0.27–15.21)

## 2023-12-19 ENCOUNTER — TELEPHONE (OUTPATIENT)
Dept: PALLIATIVE MEDICINE | Facility: CLINIC | Age: 53
End: 2023-12-19

## 2023-12-19 DIAGNOSIS — G47.00 INSOMNIA, UNSPECIFIED TYPE: Primary | ICD-10-CM

## 2023-12-19 RX ORDER — TRAZODONE HYDROCHLORIDE 50 MG/1
50 TABLET ORAL
Qty: 90 TABLET | Refills: 3 | Status: SHIPPED | OUTPATIENT
Start: 2023-12-19

## 2023-12-19 RX ORDER — DULOXETIN HYDROCHLORIDE 20 MG/1
CAPSULE, DELAYED RELEASE ORAL
Qty: 60 CAPSULE | Refills: 6 | Status: SHIPPED | OUTPATIENT
Start: 2023-12-19

## 2023-12-19 RX ORDER — METHOCARBAMOL 750 MG/1
750 TABLET, FILM COATED ORAL EVERY 6 HOURS PRN
Qty: 120 TABLET | Refills: 0 | Status: SHIPPED | OUTPATIENT
Start: 2023-12-19

## 2023-12-19 NOTE — TELEPHONE ENCOUNTER
Patient no-showed 12/11/2023 appointment. No show letter #1 has been sent to Pt's home address.

## 2023-12-21 DIAGNOSIS — Z51.5 PALLIATIVE CARE PATIENT: ICD-10-CM

## 2023-12-21 DIAGNOSIS — C90.00 MULTIPLE MYELOMA NOT HAVING ACHIEVED REMISSION (HCC): ICD-10-CM

## 2023-12-21 DIAGNOSIS — G89.3 CANCER ASSOCIATED PAIN: ICD-10-CM

## 2023-12-21 RX ORDER — OXYCODONE HYDROCHLORIDE 10 MG/1
10 TABLET ORAL EVERY 4 HOURS PRN
Qty: 180 TABLET | Refills: 0 | Status: SHIPPED | OUTPATIENT
Start: 2023-12-21

## 2023-12-28 DIAGNOSIS — R12 HEARTBURN: ICD-10-CM

## 2023-12-28 RX ORDER — OMEPRAZOLE 20 MG/1
20 CAPSULE, DELAYED RELEASE ORAL 2 TIMES DAILY
Refills: 0 | Status: CANCELLED | OUTPATIENT
Start: 2023-12-28

## 2023-12-28 NOTE — TELEPHONE ENCOUNTER
Patient left voice mail message     I'm calling to have refills for the following medications. The first one is my Famotidine 40 milligrams. I also need the pantoprazole 40 milligrams as well as my omeprazole 20 milligrams. These can go to the [facility] in [place]. That's [address] [facility] [place] [address]. If you have any questions, I can be reached at [phone]. Thank you.

## 2023-12-29 RX ORDER — FAMOTIDINE 40 MG/1
20 TABLET, FILM COATED ORAL 2 TIMES DAILY
Qty: 90 TABLET | Refills: 0 | Status: SHIPPED | OUTPATIENT
Start: 2023-12-29

## 2023-12-29 RX ORDER — PANTOPRAZOLE SODIUM 40 MG/1
40 TABLET, DELAYED RELEASE ORAL DAILY
Qty: 90 TABLET | Refills: 1 | Status: SHIPPED | OUTPATIENT
Start: 2023-12-29 | End: 2024-06-26

## 2024-01-03 ENCOUNTER — TELEPHONE (OUTPATIENT)
Dept: NEPHROLOGY | Facility: CLINIC | Age: 54
End: 2024-01-03

## 2024-01-04 ENCOUNTER — OFFICE VISIT (OUTPATIENT)
Dept: NEPHROLOGY | Facility: CLINIC | Age: 54
End: 2024-01-04
Payer: COMMERCIAL

## 2024-01-04 VITALS
OXYGEN SATURATION: 93 % | TEMPERATURE: 97.9 F | DIASTOLIC BLOOD PRESSURE: 80 MMHG | HEART RATE: 83 BPM | BODY MASS INDEX: 30.3 KG/M2 | WEIGHT: 171 LBS | SYSTOLIC BLOOD PRESSURE: 110 MMHG | RESPIRATION RATE: 18 BRPM | HEIGHT: 63 IN

## 2024-01-04 DIAGNOSIS — C90.01 MULTIPLE MYELOMA IN REMISSION (HCC): Chronic | ICD-10-CM

## 2024-01-04 DIAGNOSIS — E85.9 AMYLOIDOSIS, UNSPECIFIED TYPE (HCC): Primary | ICD-10-CM

## 2024-01-04 DIAGNOSIS — R80.9 PROTEINURIA, UNSPECIFIED TYPE: ICD-10-CM

## 2024-01-04 DIAGNOSIS — I10 PRIMARY HYPERTENSION: Chronic | ICD-10-CM

## 2024-01-04 PROCEDURE — 99214 OFFICE O/P EST MOD 30 MIN: CPT | Performed by: INTERNAL MEDICINE

## 2024-01-04 NOTE — ASSESSMENT & PLAN NOTE
At present urinalysis does not suggest any proteinuria with protein to creatinine ratio normal.  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  With treatment of myeloma history seems to be in remission    I discussed at length with the patient.  Signs and symptoms of proteinuria including leg swelling facial swelling and form in the urine also discussed with her    At present I will see her back in 4 months.  We will get blood and urine test at that time again

## 2024-01-04 NOTE — PROGRESS NOTES
NEPHROLOGY OFFICE FOLLOW UP  Marla Negron 53 y.o. female MRN: [id number]    Encounter: [id number] 1/4/2024    REASON FOR VISIT: Marla Negron is a 53 y.o. female who is here on 1/4/2024 for Proteinuria and Follow-up  .    HPI:    Marla came in today for follow-up of proteinuria.  53-year-old woman with history of multiple myeloma and amyloidosis    Patient was hospitalized initially in 2021 when she was seen by my colleagues.  At that time patient has a monoclonal gammopathy    Further workup including bone marrow biopsy revealed multiple myeloma and she was treated for that    At present remission and being monitored at [facility] [address]    She was advised to see me because of proteinuria    Denies any complaint    No chest pain no palpitation no shortness of breath    No nausea no vomiting    Denies any urinary complaint        REVIEW OF SYSTEMS:    Review of Systems   Constitutional:  Negative for fatigue.   HENT:  Negative for congestion.    Eyes:  Negative for photophobia and pain.   Respiratory:  Negative for chest tightness and shortness of breath.    Cardiovascular:  Negative for chest pain and palpitations.   Gastrointestinal:  Negative for abdominal distention, abdominal pain and blood in stool.   Endocrine: Negative for polydipsia.   Genitourinary:  Negative for difficulty urinating, dysuria, flank pain, hematuria and urgency.   Musculoskeletal:  Negative for arthralgias and back pain.   Skin:  Negative for rash.   Neurological:  Negative for dizziness, light-headedness and headaches.   Hematological:  Does not bruise/bleed easily.   Psychiatric/Behavioral:  Negative for behavioral problems. The patient is not nervous/anxious.          PAST MEDICAL HISTORY:  Past Medical History:   Diagnosis Date    Anemia     Cardiomyopathy (HCC)     Chronic back pain     Chronic narcotic dependence (HCC)     Colon polyp     Disease of thyroid gland     GERD (gastroesophageal reflux disease)     H/O  autologous stem cell transplant (HCC) 2022    Hypertension     Multiple myeloma (HCC)     in remission    Neuropathy        PAST SURGICAL HISTORY:  Past Surgical History:   Procedure Laterality Date     SECTION      COLONOSCOPY      EYE SURGERY Left     biopsy    THYROID LOBECTOMY Left 2023    Procedure: LEFT HEMITHYROIDECTOMY;  Surgeon: José Manuel Galarza MD;  Location: [facility];  Service: Surgical Oncology    US GUIDED THYROID BIOPSY  10/20/2022       SOCIAL HISTORY:  Social History     Substance and Sexual Activity   Alcohol Use Not Currently     Social History     Substance and Sexual Activity   Drug Use Yes    Types: Oxycodone    Comment: 1 year     Social History     Tobacco Use   Smoking Status Heavy Smoker    Current packs/day: 0.50    Average packs/day: 0.5 packs/day for 31.0 years (15.5 ttl pk-yrs)    Types: Cigarettes    Start date: 1992    Passive exposure: Current   Smokeless Tobacco Current       FAMILY HISTORY:  Family History   Problem Relation Age of Onset    No Known Problems Mother     No Known Problems Father     Colon cancer Neg Hx     Diabetes Neg Hx        MEDICATIONS:    Current Outpatient Medications:     acyclovir (ZOVIRAX) 800 mg tablet, Take 1 tablet (800 mg total) by mouth 2 (two) times a day, Disp: 180 tablet, Rfl: 0    albuterol (PROVENTIL HFA,VENTOLIN HFA) 90 mcg/act inhaler, Inhale 2 puffs every 6 (six) hours as needed for wheezing or shortness of breath, Disp: 18 g, Rfl: 11    amLODIPine (NORVASC) 10 mg tablet, Take 1 tablet (10 mg total) by mouth every morning, Disp: 90 tablet, Rfl: 0    DULoxetine (CYMBALTA) 20 mg capsule, Take 2 tablets ( 40 Mg total) by mouth daily, Disp: 60 capsule, Rfl: 6    famotidine (PEPCID) 40 MG tablet, Take 0.5 tablets (20 mg total) by mouth 2 (two) times a day, Disp: 90 tablet, Rfl: 0    fluticasone (FLONASE) 50 mcg/act nasal spray, 2 sprays into each nostril daily, Disp: 48 g, Rfl: 0    methocarbamol (ROBAXIN) 750 mg tablet, Take 1  "tablet (750 mg total) by mouth every 6 (six) hours as needed for muscle spasms, Disp: 120 tablet, Rfl: 0    metoprolol succinate (TOPROL-XL) 25 mg 24 hr tablet, Take 1 tablet (25 mg total) by mouth daily, Disp: 90 tablet, Rfl: 0    naloxone (NARCAN) 4 mg/0.1 mL nasal spray, Administer 1 spray into a nostril. If no response after 2-3 minutes, give another dose in the other nostril using a new spray., Disp: 1 each, Rfl: 1    NON FORMULARY, -Balance of Nature vegetable and fruit supplements, Disp: , Rfl:     oxyCODONE (ROXICODONE) 10 MG TABS, Take 1 tablet (10 mg total) by mouth every 4 (four) hours as needed (cancer-related pain) Max Daily Amount: 50 mg, Disp: 180 tablet, Rfl: 0    pantoprazole (PROTONIX) 40 mg tablet, Take 1 tablet (40 mg total) by mouth daily, Disp: 90 tablet, Rfl: 1    pregabalin (LYRICA) 100 mg capsule, Take 1 capsule (100 mg total) by mouth 2 (two) times a day, Disp: 360 capsule, Rfl: 0    traZODone (DESYREL) 50 mg tablet, Take 1 tablet (50 mg total) by mouth daily at bedtime, Disp: 90 tablet, Rfl: 3    PHYSICAL EXAM:  Vitals:    01/04/24 1017   BP: 110/80   BP Location: Right arm   Patient Position: Sitting   Pulse: 83   Resp: 18   Temp: 97.9 °F (36.6 °C)   TempSrc: Temporal   SpO2: 93%   Weight: 77.6 kg (171 lb)   Height: 5' 3\" (1.6 m)     Body mass index is 30.29 kg/m².    Physical Exam  Constitutional:       General: She is not in acute distress.     Appearance: She is well-developed.   HENT:      Head: Normocephalic.      Mouth/Throat:      Mouth: Mucous membranes are moist.   Eyes:      General: No scleral icterus.     Conjunctiva/sclera: Conjunctivae normal.   Neck:      Vascular: No JVD.   Cardiovascular:      Rate and Rhythm: Normal rate.      Heart sounds: Normal heart sounds.   Pulmonary:      Effort: Pulmonary effort is normal.      Breath sounds: No wheezing.   Abdominal:      Palpations: Abdomen is soft.      Tenderness: There is no abdominal tenderness.   Musculoskeletal:         " General: Normal range of motion.      Cervical back: Neck supple.   Skin:     General: Skin is warm.      Findings: No rash.   Neurological:      Mental Status: She is alert and oriented to person, place, and time.   Psychiatric:         Behavior: Behavior normal.         LAB RESULTS:  Results for orders placed or performed in visit on 12/15/23   KAPPA/LAMBDA LIGHT CHAINS, URINE, RANDOM OR 24 HOUR   Result Value Ref Range    Free Abeytas Lt Chains,Ur 44.31 1.17 - 86.46 mg/L    Free Lambda Lt Chains,Ur 3.24 0.27 - 15.21 mg/L    Kappa/Lambda Ratio,U 13.68 1.83 - 14.26   Albumin / creatinine urine ratio   Result Value Ref Range    Creatinine, Ur 74.6 Reference range not established. mg/dL    Albumin,U,Random <7.0 <20.0 mg/L    Albumin Creat Ratio <9 0 - 30 mg/g creatinine   Calcium, urine, 24 hour   Result Value Ref Range    24H Urine Volume 1,400 mL    Calcium, 24H Urine 71.4 (L) 100 - 300 mg/24 hrs       ASSESSMENT and PLAN:      Proteinuria  At present urinalysis does not suggest any proteinuria with protein to creatinine ratio normal.  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  With treatment of myeloma history seems to be in remission    I discussed at length with the patient.  Signs and symptoms of proteinuria including leg swelling facial swelling and form in the urine also discussed with her    At present I will see her back in 4 months.  We will get blood and urine test at that time again    Amyloidosis (HCC)  Patient does have possible diagnosis amyloidosis related to myeloma though she denies ever had renal biopsy or abnormal fat biopsy done    Multiple myeloma in remission (HCC)  Seems to be in remission and not on the medication.  As a mention above being closely monitored by [facility] [place]    Hypertension  Very well-controlled with present medication      Everything discussed at length with the patient.  I will see her back in 4 months.  We will get blood and urine test  "before that visit      Portions of the record may have been created with voice recognition software. Occasional wrong word or \"sound a like\" substitutions may have occurred due to the inherent limitations of voice recognition software. Read the chart carefully and recognize, using context, where substitutions have occurred.If you have any questions, please contact the dictating provider.   "

## 2024-01-04 NOTE — ASSESSMENT & PLAN NOTE
Patient does have possible diagnosis amyloidosis related to myeloma though she denies ever had renal biopsy or abnormal fat biopsy done

## 2024-01-04 NOTE — ASSESSMENT & PLAN NOTE
Seems to be in remission and not on the medication.  As a mention above being closely monitored by [facility]

## 2024-01-10 DIAGNOSIS — R09.81 NASAL CONGESTION: ICD-10-CM

## 2024-01-10 RX ORDER — FLUTICASONE PROPIONATE 50 MCG
2 SPRAY, SUSPENSION (ML) NASAL DAILY
Qty: 48 G | Refills: 0 | Status: SHIPPED | OUTPATIENT
Start: 2024-01-10

## 2024-01-15 DIAGNOSIS — G62.9 NEUROPATHY: ICD-10-CM

## 2024-01-15 RX ORDER — DULOXETIN HYDROCHLORIDE 20 MG/1
CAPSULE, DELAYED RELEASE ORAL
Qty: 180 CAPSULE | Refills: 1 | Status: SHIPPED | OUTPATIENT
Start: 2024-01-15

## 2024-01-17 DIAGNOSIS — C90.00 MULTIPLE MYELOMA NOT HAVING ACHIEVED REMISSION (HCC): ICD-10-CM

## 2024-01-17 DIAGNOSIS — Z51.5 PALLIATIVE CARE PATIENT: ICD-10-CM

## 2024-01-17 DIAGNOSIS — G89.3 CANCER ASSOCIATED PAIN: ICD-10-CM

## 2024-01-17 RX ORDER — OXYCODONE HYDROCHLORIDE 10 MG/1
10 TABLET ORAL EVERY 4 HOURS PRN
Qty: 180 TABLET | Refills: 0 | Status: SHIPPED | OUTPATIENT
Start: 2024-01-19 | End: 2024-01-18 | Stop reason: SDUPTHER

## 2024-01-18 DIAGNOSIS — G89.3 CANCER ASSOCIATED PAIN: ICD-10-CM

## 2024-01-18 DIAGNOSIS — C90.00 MULTIPLE MYELOMA NOT HAVING ACHIEVED REMISSION (HCC): ICD-10-CM

## 2024-01-18 DIAGNOSIS — Z51.5 PALLIATIVE CARE PATIENT: ICD-10-CM

## 2024-01-18 DIAGNOSIS — Z12.31 ENCOUNTER FOR SCREENING MAMMOGRAM FOR MALIGNANT NEOPLASM OF BREAST: Primary | ICD-10-CM

## 2024-01-18 RX ORDER — OXYCODONE HYDROCHLORIDE 10 MG/1
10 TABLET ORAL EVERY 4 HOURS PRN
Qty: 180 TABLET | Refills: 0 | Status: CANCELLED | OUTPATIENT
Start: 2024-01-19

## 2024-01-18 RX ORDER — OXYCODONE HYDROCHLORIDE 10 MG/1
10 TABLET ORAL EVERY 4 HOURS PRN
Qty: 180 TABLET | Refills: 0 | Status: SHIPPED | OUTPATIENT
Start: 2024-01-18 | End: 2024-01-24

## 2024-01-18 NOTE — TELEPHONE ENCOUNTER
Patient is trying to leave town today to visit her brother in [place] and is asking for refill to be sent 1-day early.  Pharmacy will not fill until tomorrow (order is pended). Patient called in with her insurance company  to try and resolve the issue. Please advise.

## 2024-01-22 ENCOUNTER — OFFICE VISIT (OUTPATIENT)
Dept: PALLIATIVE MEDICINE | Facility: CLINIC | Age: 54
End: 2024-01-22
Payer: COMMERCIAL

## 2024-01-22 VITALS
BODY MASS INDEX: 30.76 KG/M2 | RESPIRATION RATE: 18 BRPM | OXYGEN SATURATION: 92 % | SYSTOLIC BLOOD PRESSURE: 110 MMHG | DIASTOLIC BLOOD PRESSURE: 76 MMHG | TEMPERATURE: 98.6 F | HEIGHT: 63 IN | WEIGHT: 173.6 LBS | HEART RATE: 76 BPM

## 2024-01-22 DIAGNOSIS — Z51.5 PALLIATIVE CARE PATIENT: ICD-10-CM

## 2024-01-22 DIAGNOSIS — C90.00 MULTIPLE MYELOMA NOT HAVING ACHIEVED REMISSION (HCC): ICD-10-CM

## 2024-01-22 DIAGNOSIS — E85.9 AMYLOIDOSIS, UNSPECIFIED TYPE (HCC): ICD-10-CM

## 2024-01-22 DIAGNOSIS — M54.42 CHRONIC BILATERAL LOW BACK PAIN WITH BILATERAL SCIATICA: Chronic | ICD-10-CM

## 2024-01-22 DIAGNOSIS — M54.41 CHRONIC BILATERAL LOW BACK PAIN WITH BILATERAL SCIATICA: Chronic | ICD-10-CM

## 2024-01-22 DIAGNOSIS — F41.8 ANXIETY ABOUT HEALTH: ICD-10-CM

## 2024-01-22 DIAGNOSIS — G89.29 CHRONIC BILATERAL LOW BACK PAIN WITH BILATERAL SCIATICA: Chronic | ICD-10-CM

## 2024-01-22 DIAGNOSIS — G89.3 CANCER ASSOCIATED PAIN: ICD-10-CM

## 2024-01-22 DIAGNOSIS — C90.01 MULTIPLE MYELOMA IN REMISSION (HCC): Primary | Chronic | ICD-10-CM

## 2024-01-22 DIAGNOSIS — G89.3 CANCER RELATED PAIN: ICD-10-CM

## 2024-01-22 PROCEDURE — 99215 OFFICE O/P EST HI 40 MIN: CPT | Performed by: STUDENT IN AN ORGANIZED HEALTH CARE EDUCATION/TRAINING PROGRAM

## 2024-01-22 NOTE — PROGRESS NOTES
Outpatient Follow-Up - Palliative and Supportive Care   Marla Negron 53 y.o. female [id number]    Assessment & Plan  Problem List Items Addressed This Visit          Nervous and Auditory    Chronic bilateral low back pain with bilateral sciatica (Chronic)       Other    Multiple myeloma in remission (HCC) - Primary (Chronic)    Relevant Medications    oxyCODONE (ROXICODONE) 10 MG TABS    Cancer related pain    Relevant Medications    oxyCODONE (ROXICODONE) 10 MG TABS    Palliative care patient    Relevant Medications    oxyCODONE (ROXICODONE) 10 MG TABS    Amyloidosis (HCC)    Anxiety about health     Other Visit Diagnoses       Cancer associated pain        Relevant Medications    oxyCODONE (ROXICODONE) 10 MG TABS    Multiple myeloma not having achieved remission (HCC)        Relevant Medications    oxyCODONE (ROXICODONE) 10 MG TABS          #symptom management  #cancer-related pain  #opioid taper   -continue APAP 1000 mg PO Q8H PRN              - max daily 4000 mg   - decrease oxy-IR 10 mg PO Q4H PRN              - max 4 doses [40 mg] per day     Continued opioid taper with plan to decrease 10-30% total daily opioids each 1-2 months. Discussed s/sxs of opioid withdrawal, including nausea, vomiting, diarrhea, rhinorrhea, yawning, piloerection, insomnia, agitation. All questions/concerns addressed.    Utilized whiteboard for education regarding total pain concept and underlying mechanism of opioid tolerance v perception/modulation of pain throughout opioid taper. All questions/concerns addressed.     #neuropathic pain   - continue pregabalin 100 mg PO BID   - continue duloxetine 40 mg PO QDaily   - continue lidocaine cream QID PRN     #muscle spasms   - continue methocarbamol 750 mg PO Q6H PRN     #OIC   - continue bowel regimen to prevent OIC   - adequate hydration     #anxiety/depression  #insomnia   - continue duloxetine 30 mg PO QDaily   - continue trazodone 50 mg PO QHS    #goals of care   - CT imaging  scheduled on 01/30 & 01/31 for surveillance with FUV with Dr. Murrieta @ [facility]   - upcoming appointments with Rheumatology and Neurology for evaluation/management plans of underlying persistent symptoms    #psychosocial support   - emotional support provided   - not currently    - two adult children              - Guerda [daughter]              - Richard Lagos [mother] [phone]   - one sibling              - Mayi [sister]   - two grandchildren [aged 5 and 3]      Next [facility] Follow up in 4 weeks.      Cont[place] Substance Review    [place] or [place]  reviewed: No red flags were identified; safe to proceed with prescription..    PDMP Review         Value Time User    PDMP Reviewed  Yes 1/24/2024  7:13 AM Willy Almaraz MD            Medications adjusted this encounter:  Requested Prescriptions     Signed Prescriptions Disp Refills    oxyCODONE (ROXICODONE) 10 MG TABS       Sig: Take 1 tablet (10 mg total) by mouth every 4 (four) hours as needed (cancer-related pain) Max Daily Amount: 40 mg     No orders of the defined types were placed in this encounter.      Medications Discontinued During This Encounter   Medication Reason    oxyCODONE (ROXICODONE) 10 MG TABS          Marla Negron was seen today for symptoms and planning cares related to above illnesses.  I have reviewed the patient's controlled substance dispensing history in the Prescription Drug Monitoring Program in compliance with the [facility] regulations before prescribing any controlled substances.    They are invited to continue to follow with us.  If there are questions or concerns, please contact us through our clinic/answering service 24 hours a day, seven days a week.    Willy Almaraz MD  [facility] Palliative and Supportive Care  [phone]      Visit Information    Accompanied By: No one    Source of History: Self, Medical record    History Limitations: None      History of Present Illness    Marla Negron is a 53  "y.o. female who presents in follow up of symptoms related to multiple myeloma s/p PBSCT [03/30/2022]. Pertinent issues include: symptom management, pain, neoplasm related, assessment of goals of care, disease process education and discussion of prognosis.    Patient reports on-going diffuse joint and muscle pains. Also persistent neuropathy of bilateral feet, no worsening or improvement from last month. Use of oxy-IR x 4-5 doses/day [with use of four doses 4 days/week + five doses 3 days/week]. No longer utilizing NSAIDs given recent bleeding ulcers. No reported symptoms of opioid-induced withdrawal. Denies nausea, vomiting. Appetite intact, improving. Weight stable. BM regular, use of bowel regimen PRN. Sleep adequate.      Past medical, surgical, social, and family histories are reviewed and pertinent updates are made.    Review of Systems   Constitutional: Negative for chills, decreased appetite, fever, malaise/fatigue and weight loss.   HENT:  Negative for congestion.    Cardiovascular:  Negative for chest pain.   Respiratory:  Negative for shortness of breath.    Musculoskeletal:  Positive for arthritis and myalgias. Negative for falls and neck pain.   Gastrointestinal:  Negative for abdominal pain, constipation, nausea and vomiting.   Genitourinary:  Negative for frequency.   Neurological:  Negative for headaches.   Psychiatric/Behavioral:  The patient does not have insomnia.    All other systems reviewed and are negative.        Vital Signs    /76 (BP Location: Right arm, Patient Position: Sitting, Cuff Size: Standard)   Pulse 76   Temp 98.6 °F (37 °C) (Tympanic)   Resp 18   Ht 5' 3\" (1.6 m)   Wt 78.7 kg (173 lb 9.6 oz)   SpO2 92%   BMI 30.75 kg/m²     Physical Exam and Objective Data  Physical Exam  Vitals and nursing note reviewed.   Constitutional:       General: She is awake.      Appearance: She is not diaphoretic.      Comments: Sitting up in NAD  BMI 30.8  Non-toxic appearing   HENT:      " Head: Normocephalic and atraumatic.      Right Ear: External ear normal.      Left Ear: External ear normal.      Nose: No rhinorrhea.   Eyes:      Comments: No gaze preference   Cardiovascular:      Rate and Rhythm: Normal rate.   Pulmonary:      Effort: No tachypnea or respiratory distress.      Comments: Completes full sentences without difficulty  Musculoskeletal:      Cervical back: Normal range of motion.   Neurological:      General: No focal deficit present.      Mental Status: She is alert and oriented to person, place, and time.   Psychiatric:         Attention and Perception: Attention normal.         Mood and Affect: Mood and affect normal.         Speech: Speech normal.         Cognition and Memory: Cognition and memory normal.           Radiology and Laboratory:  I personally reviewed and interpreted the following results:    Most Recent COVID-19 Results:  Lab Results   Component Value Date/Time    SARSCOV2 Negative 01/23/2023 09:28 PM    SARSCOV2 Negative 10/30/2021 12:10 AM       Most Recent Lab Work:  Lab Results   Component Value Date/Time    SODIUM 134 (L) 12/01/2023 12:20 PM    K 3.5 12/01/2023 12:20 PM    BUN 33 (H) 12/01/2023 12:20 PM    CREATININE 1.50 (H) 12/01/2023 12:20 PM    GLUC 113 12/01/2023 12:20 PM     Lab Results   Component Value Date/Time    AST 16 12/01/2023 12:20 PM    ALT 13 12/01/2023 12:20 PM    ALB 4.3 12/01/2023 12:20 PM     Lab Results   Component Value Date/Time    HGB 17.0 (H) 12/01/2023 12:20 PM    WBC 10.24 (H) 12/01/2023 12:20 PM     12/01/2023 12:20 PM    INR 1.00 08/15/2023 12:37 PM    PTT 30 08/15/2023 12:37 PM       Most Recent Imaging [last 30 days]:  No results found.    40 minutes was spent face to face with Marla Negron with greater than 50% of the time spent in counseling or coordination of care including discussions of provided medical updates, discussed palliative care, determined goals of care, determined social/family support, discussed plans  of care, discussed symptom management, provided psychosocial support. Opioid taper. Total Pain and opioid education. PDMP Reviewed. All of the patient's or agent's questions were answered during this discussion.

## 2024-01-24 RX ORDER — OXYCODONE HYDROCHLORIDE 10 MG/1
10 TABLET ORAL EVERY 4 HOURS PRN
Start: 2024-01-24

## 2024-01-30 DIAGNOSIS — M54.42 CHRONIC BILATERAL LOW BACK PAIN WITH BILATERAL SCIATICA: ICD-10-CM

## 2024-01-30 DIAGNOSIS — G89.29 CHRONIC BILATERAL LOW BACK PAIN WITH BILATERAL SCIATICA: ICD-10-CM

## 2024-01-30 DIAGNOSIS — M54.41 CHRONIC BILATERAL LOW BACK PAIN WITH BILATERAL SCIATICA: ICD-10-CM

## 2024-01-31 RX ORDER — METHOCARBAMOL 750 MG/1
750 TABLET, FILM COATED ORAL EVERY 6 HOURS PRN
Qty: 120 TABLET | Refills: 0 | Status: SHIPPED | OUTPATIENT
Start: 2024-01-31

## 2024-02-05 DIAGNOSIS — I10 PRIMARY HYPERTENSION: ICD-10-CM

## 2024-02-05 RX ORDER — METOPROLOL SUCCINATE 25 MG/1
25 TABLET, EXTENDED RELEASE ORAL DAILY
Qty: 90 TABLET | Refills: 1 | Status: SHIPPED | OUTPATIENT
Start: 2024-02-05

## 2024-02-05 RX ORDER — AMLODIPINE BESYLATE 10 MG/1
10 TABLET ORAL EVERY MORNING
Qty: 90 TABLET | Refills: 1 | Status: SHIPPED | OUTPATIENT
Start: 2024-02-05

## 2024-02-06 DIAGNOSIS — G89.29 CHRONIC BILATERAL LOW BACK PAIN WITH BILATERAL SCIATICA: ICD-10-CM

## 2024-02-06 DIAGNOSIS — M54.42 CHRONIC BILATERAL LOW BACK PAIN WITH BILATERAL SCIATICA: ICD-10-CM

## 2024-02-06 DIAGNOSIS — G47.00 INSOMNIA, UNSPECIFIED TYPE: ICD-10-CM

## 2024-02-06 DIAGNOSIS — G62.9 NEUROPATHY: ICD-10-CM

## 2024-02-06 DIAGNOSIS — M54.41 CHRONIC BILATERAL LOW BACK PAIN WITH BILATERAL SCIATICA: ICD-10-CM

## 2024-02-07 RX ORDER — PREGABALIN 100 MG/1
100 CAPSULE ORAL 2 TIMES DAILY
Qty: 360 CAPSULE | Refills: 0 | Status: SHIPPED | OUTPATIENT
Start: 2024-02-07 | End: 2024-08-05

## 2024-02-07 RX ORDER — DULOXETIN HYDROCHLORIDE 20 MG/1
CAPSULE, DELAYED RELEASE ORAL
Qty: 180 CAPSULE | Refills: 0 | Status: SHIPPED | OUTPATIENT
Start: 2024-02-07

## 2024-02-07 RX ORDER — TRAZODONE HYDROCHLORIDE 50 MG/1
50 TABLET ORAL
Qty: 90 TABLET | Refills: 0 | Status: SHIPPED | OUTPATIENT
Start: 2024-02-07

## 2024-02-14 DIAGNOSIS — C90.00 MULTIPLE MYELOMA NOT HAVING ACHIEVED REMISSION (HCC): ICD-10-CM

## 2024-02-14 DIAGNOSIS — G89.3 CANCER ASSOCIATED PAIN: ICD-10-CM

## 2024-02-14 DIAGNOSIS — Z51.5 PALLIATIVE CARE PATIENT: ICD-10-CM

## 2024-02-14 RX ORDER — OXYCODONE HYDROCHLORIDE 10 MG/1
10 TABLET ORAL EVERY 4 HOURS PRN
Qty: 120 TABLET | Refills: 0 | Status: SHIPPED | OUTPATIENT
Start: 2024-02-16 | End: 2024-03-17

## 2024-02-14 NOTE — TELEPHONE ENCOUNTER
Last appointment:01/22/24    Next scheduled appointment:02/21/24    [facility]      PDMP review:

## 2024-02-14 NOTE — TELEPHONE ENCOUNTER
PDMP reviewed. Rx sent to the pharmacy.    Early Refill Request. Patient with 150 tabs Rx on 01/18 with 4 tabs/day taper change on 01/22. Calculated earliest refill, taking mid-Rx adjustment would be 02/21.    Reduced count refill provided for an explicit 30-day fill. Will discuss at upcoming [facility] appointment next week. If patient unable to comply with current plan, will initiate accelerate opioid taper, otherwise, will continue with current plan.    Willy Almaraz MD  Palliative and Supportive Care  Ph: [phone]

## 2024-02-15 DIAGNOSIS — R05.1 ACUTE COUGH: Primary | ICD-10-CM

## 2024-02-15 RX ORDER — BENZONATATE 200 MG/1
200 CAPSULE ORAL 3 TIMES DAILY PRN
Qty: 20 CAPSULE | Refills: 0 | Status: SHIPPED | OUTPATIENT
Start: 2024-02-15

## 2024-02-15 RX ORDER — METHYLPREDNISOLONE 4 MG/1
TABLET ORAL
Qty: 21 EACH | Refills: 0 | Status: SHIPPED | OUTPATIENT
Start: 2024-02-15

## 2024-02-21 ENCOUNTER — TELEPHONE (OUTPATIENT)
Dept: PALLIATIVE MEDICINE | Facility: CLINIC | Age: 54
End: 2024-02-21

## 2024-02-26 NOTE — PROGRESS NOTES
Assessment and Plan:   Ms. Negron is a 53-year-old female with history significant for multiple myeloma in remission and a fluctuating positive low titer ANDERS who presents for an evaluation of diffuse joint and muscle pain.  She is referred by Zhane Lynn PA-C for a rheumatology consult.    - Marla presents today for an evaluation of diffuse arthralgias/myalgias that she has experienced prominently over the past year but has been present even prior to this.  She is not reporting symptoms suggestive of an inflammatory arthritis such as appreciating joint swelling and there is no synovitis noted on her examination today.  I suspect she is symptomatic as a result of noninflammatory conditions such as trigger fingers, peripheral neuropathy as well as chronic pain syndrome.  For management of these conditions I recommend following up with palliative medicine, neurology and orthopedics.  To ensure an inflammatory arthritis is being ruled out I will update serologies but overall my impression for such a condition is low.      Plan:  Diagnoses and all orders for this visit:    Positive ANDERS (antinuclear antibody)  -     Antinuclear Antibodies, IFA; Future  -     Sjogren's Antibodies; Future  -     Sedimentation rate, automated; Future  -     C-reactive protein; Future  -     RF Screen w/ Reflex to Titer; Future  -     Cyclic citrul peptide antibody, IgG; Future  -     Anti-DNA antibody, double-stranded; Future  -     Anti-Pooja 1 Antibody; Future  -     Anti-scleroderma antibody; Future  -     Centromere Antibody; Future  -     Nuclear antigen antibody; Future  -     C3 complement; Future  -     C4 complement; Future  -     Urinalysis with microscopic; Future  -     Protein / creatinine ratio, urine; Future  -     CK; Future    Diffuse arthralgia  -     Antinuclear Antibodies, IFA; Future  -     Sjogren's Antibodies; Future  -     Sedimentation rate, automated; Future  -     C-reactive protein; Future  -     RF Screen w/  Reflex to Titer; Future  -     Cyclic citrul peptide antibody, IgG; Future  -     Anti-DNA antibody, double-stranded; Future  -     Anti-Pooja 1 Antibody; Future  -     Anti-scleroderma antibody; Future  -     Centromere Antibody; Future  -     Nuclear antigen antibody; Future  -     C3 complement; Future  -     C4 complement; Future  -     Urinalysis with microscopic; Future  -     Protein / creatinine ratio, urine; Future  -     CK; Future    Trigger finger of all digits of both hands  -     Ambulatory Referral to Orthopedic Surgery; Future    Multiple myeloma in remission (HCC)    Need for hepatitis C screening test    Screening for thyroid disorder  -     TSH, 3rd generation; Future    Vitamin D deficiency  -     Vitamin D 25 hydroxy; Future      I have personally reviewed pertinent films in PACS of the CXR which does not show osseous abnormalities.       Activities as tolerated.   Exercise: try to maintain a low impact exercise regimen as much as possible. Walk for 30 minutes a day for at least 3 days a week.   Continue other medications as prescribed by PCP and other specialists.       RTC PRN.       HPI  Ms. Negron is a 53-year-old female with history significant for multiple myeloma in remission and a fluctuating positive low titer ANDERS who presents for an evaluation of diffuse joint and muscle pain.  She is referred by Zhane Lynn PA-C for a rheumatology consult.    Patient reports over the past year she has experienced progressively worsening joint pains mostly affecting her thumbs with periodic locking/triggering of any of the fingers on either hand as well as joint pains in her wrists, elbows, occasionally in her shoulders, knees and ankles.  She also has pain with numbness and tingling in her hands and feet as a result of peripheral neuropathy related to the multiple myeloma diagnosis and treatment.  She denies joint swelling.  She experiences widespread morning stiffness that takes 30 to 45 minutes to  improve.  She has tried Tylenol on occasion which helps to a minimal degree.  She did try Advil duo which helps but she discontinued NSAIDs due to a history of stomach ulcers.  She is currently on a regimen through palliative medicine and her primary care provider with pregabalin 100 mg twice daily, duloxetine 40 mg once daily, methocarbamol 750 mg every 6 hours as needed and oxycodone 10 mg every 4 hours as needed.  She reports all of these medications help to keep her symptoms manageable but she does not really experience resolution of her symptoms.  None of the medications help with the neuropathy symptoms.  Of note she was recently on a methylprednisolone Dosepak due to shortness of breath and she reports while on the steroids this did not help at all with her body pain.    She denies fevers, recent unintentional weight loss, chronic dry eyes/dry mouth, inflammatory eye disease, skin rashes, psoriasis, photosensitivity, mouth/nose ulcers, swollen glands, pleuritic chest pain, inflammatory bowel disease, blood clots [reports a history of 3 miscarriages], Raynaud's or a family history of autoimmune disease.    She was last seen by rheumatology in July 2021 due to the positive ANDERS and joint pains and this was felt to be secondary to the multiple myeloma diagnosis and there were no concerns for an underlying connective tissue disease.    The following portions of the patient's history were reviewed and updated as appropriate: allergies, current medications, past family history, past medical history, past social history, past surgical history and problem list.      Review of Systems  Constitutional: Negative for weight change, fevers, chills, night sweats, fatigue.  ENT/Mouth: Negative for hearing changes, ear pain, nasal congestion, sinus pain, hoarseness, sore throat, rhinorrhea, swallowing difficulty.   Eyes: Negative for pain, redness, discharge, vision changes.   Cardiovascular: Negative for chest pain,  palpitations.   Respiratory: Negative for cough, sputum, wheezing.  Positive for shortness of breath.  Gastrointestinal: Negative for nausea, vomiting, diarrhea, constipation, pain, heartburn.  Genitourinary: Negative for dysuria, urinary frequency, hematuria.   Musculoskeletal: As per HPI.  Skin: Negative for skin rash, color changes.   Neuro: Negative for weakness, loss of consciousness.  Positive for numbness and tingling.  Psych: Negative for anxiety, depression.   Heme/Lymph: Negative for easy bruising, bleeding, lymphadenopathy.        Past Medical History:   Diagnosis Date    Anemia     Cardiomyopathy (HCC)     Chronic back pain     Chronic narcotic dependence (HCC)     Colon polyp     Disease of thyroid gland     GERD (gastroesophageal reflux disease)     H/O autologous stem cell transplant (HCC) 2022    Hypertension     Multiple myeloma (HCC)     in remission    Neuropathy        Past Surgical History:   Procedure Laterality Date     SECTION      COLONOSCOPY      EYE SURGERY Left     biopsy    THYROID LOBECTOMY Left 2023    Procedure: LEFT HEMITHYROIDECTOMY;  Surgeon: José Manuel Galarza MD;  Location: [facility];  Service: Surgical Oncology    US GUIDED THYROID BIOPSY  10/20/2022       Social History     Socioeconomic History    Marital status: Single     Spouse name: Not on file    Number of children: Not on file    Years of education: Not on file    Highest education level: Not on file   Occupational History    Not on file   Tobacco Use    Smoking status: Heavy Smoker     Current packs/day: 0.50     Average packs/day: 0.5 packs/day for 31.2 years (15.6 ttl pk-yrs)     Types: Cigarettes     Start date: 1992     Passive exposure: Current    Smokeless tobacco: Current   Vaping Use    Vaping status: Never Used   Substance and Sexual Activity    Alcohol use: Not Currently    Drug use: Not Currently     Types: Oxycodone     Comment: 1 year    Sexual activity: Not Currently     Partners: Male    Other Topics Concern    Not on file   Social History Narrative    Not on file     Social Determinants of Health     Financial Resource Strain: Low Risk  (10/26/2023)    Received from [facility]    Overall Financial Resource Strain (CARDIA)     Difficulty of Paying Living Expenses: Not hard at all   Food Insecurity: No Food Insecurity (10/26/2023)    Received from [facility]    Hunger Vital Sign     Worried About Running Out of Food in the Last Year: Never true     Ran Out of Food in the Last Year: Never true   Transportation Needs: No Transportation Needs (10/26/2023)    Received from [facility]    PRAPARE - Transportation     Lack of Transportation (Medical): No     Lack of Transportation (Non-Medical): No   Physical Activity: Not on file   Stress: Not on file   Social Connections: Not on file   Intimate Partner Violence: Not At Risk (10/26/2023)    Received from [facility]    Humiliation, Afraid, Rape, and Kick questionnaire     Fear of Current or Ex-Partner: No     Emotionally Abused: No     Physically Abused: No     Sexually Abused: No   Housing Stability: Low Risk  (10/26/2023)    Received from [facility]    Housing Stability Vital Sign     Unable to Pay for Housing in the Last Year: No     Number of Places Lived in the Last Year: 1     Unstable Housing in the Last Year: No       Family History   Problem Relation Age of Onset    No Known Problems Mother     No Known Problems Father     Colon cancer Neg Hx     Diabetes Neg Hx        No Known Allergies      Current Outpatient Medications:     acyclovir (ZOVIRAX) 800 mg tablet, Take 1 tablet (800 mg total) by mouth 2 (two) times a day, Disp: 180 tablet, Rfl: 0    albuterol (PROVENTIL HFA,VENTOLIN HFA) 90 mcg/act inhaler, Inhale 2 puffs every 6 (six) hours as needed for wheezing or shortness of breath, Disp: 18 g, Rfl: 11    amLODIPine (NORVASC) 10 mg tablet, Take 1 tablet  "(10 mg total) by mouth every morning, Disp: 90 tablet, Rfl: 1    benzonatate (TESSALON) 200 MG capsule, Take 1 capsule (200 mg total) by mouth 3 (three) times a day as needed for cough, Disp: 20 capsule, Rfl: 0    DULoxetine (CYMBALTA) 20 mg capsule, Take 2 tablets ( 40 Mg total) by mouth daily, Disp: 180 capsule, Rfl: 0    famotidine (PEPCID) 40 MG tablet, Take 0.5 tablets (20 mg total) by mouth 2 (two) times a day, Disp: 90 tablet, Rfl: 0    fluticasone (FLONASE) 50 mcg/act nasal spray, 2 sprays into each nostril daily, Disp: 48 g, Rfl: 0    methocarbamol (ROBAXIN) 750 mg tablet, Take 1 tablet (750 mg total) by mouth every 6 (six) hours as needed for muscle spasms, Disp: 120 tablet, Rfl: 0    metoprolol succinate (TOPROL-XL) 25 mg 24 hr tablet, Take 1 tablet (25 mg total) by mouth daily, Disp: 90 tablet, Rfl: 1    naloxone (NARCAN) 4 mg/0.1 mL nasal spray, Administer 1 spray into a nostril. If no response after 2-3 minutes, give another dose in the other nostril using a new spray., Disp: 1 each, Rfl: 1    NON FORMULARY, -Balance of Nature vegetable and fruit supplements, Disp: , Rfl:     oxyCODONE (ROXICODONE) 10 MG TABS, Take 1 tablet (10 mg total) by mouth every 4 (four) hours as needed (cancer-related pain) Max Daily Amount: 60 mg Do not start before February 16, 2024., Disp: 120 tablet, Rfl: 0    pantoprazole (PROTONIX) 40 mg tablet, Take 1 tablet (40 mg total) by mouth daily, Disp: 90 tablet, Rfl: 1    pregabalin (LYRICA) 100 mg capsule, Take 1 capsule (100 mg total) by mouth 2 (two) times a day, Disp: 360 capsule, Rfl: 0    traZODone (DESYREL) 50 mg tablet, Take 1 tablet (50 mg total) by mouth daily at bedtime, Disp: 90 tablet, Rfl: 0      Objective:    Vitals:    02/28/24 1251   BP: 114/74   Weight: 76.7 kg (169 lb)   Height: 5' 3\" (1.6 m)       Physical Exam  General: Well appearing, well nourished, in no distress. Oriented x 3, normal mood and affect.  Ambulating without difficulty.  Skin: Good turgor, no " rash, unusual bruising or prominent lesions.  Nails: Normal color, no deformities.  HEENT:  Head: Normocephalic, atraumatic.  Eyes: Conjunctiva clear, sclera non-icteric, EOM intact.  Extremities: No amputations or deformities, cyanosis, edema.  Musculoskeletal:   There is no peripheral joint soft tissue swelling or tenderness noted on her examination today.  No obvious triggering of her fingers noted.  Negative myofascial tender points.  Neurologic: Alert and oriented.   Psychiatric: Normal mood and affect.       Maxine Dickinson M.D.  Rheumatology

## 2024-02-28 ENCOUNTER — TELEPHONE (OUTPATIENT)
Age: 54
End: 2024-02-28

## 2024-02-28 ENCOUNTER — OFFICE VISIT (OUTPATIENT)
Dept: RHEUMATOLOGY | Facility: CLINIC | Age: 54
End: 2024-02-28
Payer: COMMERCIAL

## 2024-02-28 ENCOUNTER — LAB (OUTPATIENT)
Dept: LAB | Facility: HOSPITAL | Age: 54
End: 2024-02-28
Payer: COMMERCIAL

## 2024-02-28 VITALS
BODY MASS INDEX: 29.95 KG/M2 | SYSTOLIC BLOOD PRESSURE: 114 MMHG | DIASTOLIC BLOOD PRESSURE: 74 MMHG | HEIGHT: 63 IN | WEIGHT: 169 LBS

## 2024-02-28 DIAGNOSIS — M65.341 TRIGGER FINGER OF ALL DIGITS OF BOTH HANDS: ICD-10-CM

## 2024-02-28 DIAGNOSIS — M54.42 CHRONIC BILATERAL LOW BACK PAIN WITH BILATERAL SCIATICA: ICD-10-CM

## 2024-02-28 DIAGNOSIS — Z13.29 SCREENING FOR THYROID DISORDER: ICD-10-CM

## 2024-02-28 DIAGNOSIS — Z11.59 NEED FOR HEPATITIS C SCREENING TEST: ICD-10-CM

## 2024-02-28 DIAGNOSIS — M65.311 TRIGGER FINGER OF ALL DIGITS OF BOTH HANDS: ICD-10-CM

## 2024-02-28 DIAGNOSIS — M65.352 TRIGGER FINGER OF ALL DIGITS OF BOTH HANDS: ICD-10-CM

## 2024-02-28 DIAGNOSIS — M65.351 TRIGGER FINGER OF ALL DIGITS OF BOTH HANDS: ICD-10-CM

## 2024-02-28 DIAGNOSIS — C90.01 MULTIPLE MYELOMA IN REMISSION (HCC): ICD-10-CM

## 2024-02-28 DIAGNOSIS — M65.322 TRIGGER FINGER OF ALL DIGITS OF BOTH HANDS: ICD-10-CM

## 2024-02-28 DIAGNOSIS — M65.321 TRIGGER FINGER OF ALL DIGITS OF BOTH HANDS: ICD-10-CM

## 2024-02-28 DIAGNOSIS — G89.29 CHRONIC BILATERAL LOW BACK PAIN WITH BILATERAL SCIATICA: ICD-10-CM

## 2024-02-28 DIAGNOSIS — M25.50 DIFFUSE ARTHRALGIA: ICD-10-CM

## 2024-02-28 DIAGNOSIS — M65.332 TRIGGER FINGER OF ALL DIGITS OF BOTH HANDS: ICD-10-CM

## 2024-02-28 DIAGNOSIS — R76.8 POSITIVE ANA (ANTINUCLEAR ANTIBODY): Primary | ICD-10-CM

## 2024-02-28 DIAGNOSIS — M65.331 TRIGGER FINGER OF ALL DIGITS OF BOTH HANDS: ICD-10-CM

## 2024-02-28 DIAGNOSIS — R76.8 POSITIVE ANA (ANTINUCLEAR ANTIBODY): ICD-10-CM

## 2024-02-28 DIAGNOSIS — M54.41 CHRONIC BILATERAL LOW BACK PAIN WITH BILATERAL SCIATICA: ICD-10-CM

## 2024-02-28 DIAGNOSIS — M65.312 TRIGGER FINGER OF ALL DIGITS OF BOTH HANDS: ICD-10-CM

## 2024-02-28 DIAGNOSIS — M65.342 TRIGGER FINGER OF ALL DIGITS OF BOTH HANDS: ICD-10-CM

## 2024-02-28 DIAGNOSIS — E55.9 VITAMIN D DEFICIENCY: ICD-10-CM

## 2024-02-28 DIAGNOSIS — Z11.4 SCREENING FOR HIV (HUMAN IMMUNODEFICIENCY VIRUS): ICD-10-CM

## 2024-02-28 LAB
25(OH)D3 SERPL-MCNC: 21.4 NG/ML (ref 30–100)
ANION GAP SERPL CALCULATED.3IONS-SCNC: 8 MMOL/L
BUN SERPL-MCNC: 14 MG/DL (ref 5–25)
C3 SERPL-MCNC: 155 MG/DL (ref 87–200)
C4 SERPL-MCNC: 36 MG/DL (ref 19–52)
CALCIUM SERPL-MCNC: 9.5 MG/DL (ref 8.4–10.2)
CHLORIDE SERPL-SCNC: 104 MMOL/L (ref 96–108)
CK SERPL-CCNC: 118 U/L (ref 26–192)
CO2 SERPL-SCNC: 27 MMOL/L (ref 21–32)
CREAT SERPL-MCNC: 1.56 MG/DL (ref 0.6–1.3)
CRP SERPL QL: 29.4 MG/L
ERYTHROCYTE [SEDIMENTATION RATE] IN BLOOD: 47 MM/HOUR (ref 0–29)
GFR SERPL CREATININE-BSD FRML MDRD: 37 ML/MIN/1.73SQ M
GLUCOSE SERPL-MCNC: 122 MG/DL (ref 65–140)
HCV AB SER QL: NORMAL
HIV 1+2 AB+HIV1 P24 AG SERPL QL IA: NORMAL
HIV 2 AB SERPL QL IA: NORMAL
HIV1 AB SERPL QL IA: NORMAL
HIV1 P24 AG SERPL QL IA: NORMAL
POTASSIUM SERPL-SCNC: 3.8 MMOL/L (ref 3.5–5.3)
SODIUM SERPL-SCNC: 139 MMOL/L (ref 135–147)
TSH SERPL DL<=0.05 MIU/L-ACNC: 6.13 UIU/ML (ref 0.45–4.5)

## 2024-02-28 PROCEDURE — 86431 RHEUMATOID FACTOR QUANT: CPT

## 2024-02-28 PROCEDURE — 86200 CCP ANTIBODY: CPT

## 2024-02-28 PROCEDURE — 86430 RHEUMATOID FACTOR TEST QUAL: CPT

## 2024-02-28 PROCEDURE — 99244 OFF/OP CNSLTJ NEW/EST MOD 40: CPT | Performed by: INTERNAL MEDICINE

## 2024-02-28 PROCEDURE — 82550 ASSAY OF CK (CPK): CPT

## 2024-02-28 PROCEDURE — 87389 HIV-1 AG W/HIV-1&-2 AB AG IA: CPT

## 2024-02-28 PROCEDURE — 86160 COMPLEMENT ANTIGEN: CPT

## 2024-02-28 PROCEDURE — 82306 VITAMIN D 25 HYDROXY: CPT

## 2024-02-28 PROCEDURE — 86235 NUCLEAR ANTIGEN ANTIBODY: CPT

## 2024-02-28 PROCEDURE — 36415 COLL VENOUS BLD VENIPUNCTURE: CPT

## 2024-02-28 PROCEDURE — 84443 ASSAY THYROID STIM HORMONE: CPT

## 2024-02-28 PROCEDURE — 86038 ANTINUCLEAR ANTIBODIES: CPT

## 2024-02-28 PROCEDURE — 85652 RBC SED RATE AUTOMATED: CPT

## 2024-02-28 PROCEDURE — 86225 DNA ANTIBODY NATIVE: CPT

## 2024-02-28 PROCEDURE — 86803 HEPATITIS C AB TEST: CPT

## 2024-02-28 PROCEDURE — 86140 C-REACTIVE PROTEIN: CPT

## 2024-02-28 RX ORDER — METHOCARBAMOL 750 MG/1
750 TABLET, FILM COATED ORAL EVERY 6 HOURS PRN
Qty: 120 TABLET | Refills: 0 | Status: SHIPPED | OUTPATIENT
Start: 2024-02-28

## 2024-02-28 NOTE — TELEPHONE ENCOUNTER
Patient is being referred to a orthopedics. Please schedule accordingly.    [facility]   [phone]

## 2024-02-29 LAB
CENTROMERE B AB SER-ACNC: <0.2 AI (ref 0–0.9)
CRYOGLOB RF SER-ACNC: ABNORMAL [IU]/ML
DSDNA AB SER-ACNC: 1 IU/ML (ref 0–9)
ENA JO1 AB SER-ACNC: <0.2 AI (ref 0–0.9)
ENA RNP AB SER-ACNC: <0.2 AI (ref 0–0.9)
ENA SM AB SER-ACNC: <0.2 AI (ref 0–0.9)
ENA SS-A AB SER-ACNC: <0.2 AI (ref 0–0.9)
ENA SS-B AB SER-ACNC: <0.2 AI (ref 0–0.9)
RHEUMATOID FACT SER QL LA: POSITIVE

## 2024-03-01 ENCOUNTER — TELEPHONE (OUTPATIENT)
Age: 54
End: 2024-03-01

## 2024-03-01 LAB
ANA TITR SER IF: NEGATIVE {TITER}
CCP AB SER IA-ACNC: 1.2
ENA SCL70 AB SER-ACNC: <0.2 AI (ref 0–0.9)

## 2024-03-01 NOTE — TELEPHONE ENCOUNTER
Patient returning a call from our office. Advised message from Dr. Dickinson about a follow up on 3/6 at 10am. She states she has a palliative care appt at 9:20am that day. She is asking if there are any other times she can do and/or if she can do a virtual. Please advise.

## 2024-03-01 NOTE — TELEPHONE ENCOUNTER
Called patient back and still unable to leave message, voicemail is full. Will watch out for call back.

## 2024-03-03 DIAGNOSIS — R09.81 NASAL CONGESTION: ICD-10-CM

## 2024-03-03 DIAGNOSIS — Z94.84 H/O AUTOLOGOUS STEM CELL TRANSPLANT (HCC): ICD-10-CM

## 2024-03-03 DIAGNOSIS — G47.00 INSOMNIA, UNSPECIFIED TYPE: ICD-10-CM

## 2024-03-04 ENCOUNTER — TELEPHONE (OUTPATIENT)
Age: 54
End: 2024-03-04

## 2024-03-04 NOTE — TELEPHONE ENCOUNTER
Incoming call:    Re: Pt of Dr. Dickinson     Pt calling to schedule a virtual f/u appt with Dr. Dickinson to discuss recent lab results.

## 2024-03-05 ENCOUNTER — TELEPHONE (OUTPATIENT)
Dept: NEUROLOGY | Facility: CLINIC | Age: 54
End: 2024-03-05

## 2024-03-05 RX ORDER — FLUTICASONE PROPIONATE 50 MCG
2 SPRAY, SUSPENSION (ML) NASAL DAILY
Qty: 48 G | Refills: 0 | Status: SHIPPED | OUTPATIENT
Start: 2024-03-05

## 2024-03-05 RX ORDER — TRAZODONE HYDROCHLORIDE 50 MG/1
50 TABLET ORAL
Qty: 90 TABLET | Refills: 1 | Status: SHIPPED | OUTPATIENT
Start: 2024-03-05 | End: 2024-03-12 | Stop reason: DRUGHIGH

## 2024-03-05 RX ORDER — ACYCLOVIR 800 MG/1
800 TABLET ORAL 2 TIMES DAILY
Qty: 180 TABLET | Refills: 0 | Status: SHIPPED | OUTPATIENT
Start: 2024-03-05 | End: 2025-02-28

## 2024-03-06 ENCOUNTER — TELEMEDICINE (OUTPATIENT)
Dept: RHEUMATOLOGY | Facility: CLINIC | Age: 54
End: 2024-03-06
Payer: COMMERCIAL

## 2024-03-06 ENCOUNTER — OFFICE VISIT (OUTPATIENT)
Dept: PALLIATIVE MEDICINE | Facility: CLINIC | Age: 54
End: 2024-03-06
Payer: COMMERCIAL

## 2024-03-06 VITALS
HEIGHT: 63 IN | RESPIRATION RATE: 18 BRPM | BODY MASS INDEX: 29.84 KG/M2 | SYSTOLIC BLOOD PRESSURE: 126 MMHG | TEMPERATURE: 97.8 F | WEIGHT: 168.4 LBS | OXYGEN SATURATION: 99 % | DIASTOLIC BLOOD PRESSURE: 82 MMHG | HEART RATE: 96 BPM

## 2024-03-06 DIAGNOSIS — M79.672 BILATERAL FOOT PAIN: ICD-10-CM

## 2024-03-06 DIAGNOSIS — M25.50 DIFFUSE ARTHRALGIA: ICD-10-CM

## 2024-03-06 DIAGNOSIS — R70.0 ELEVATED SED RATE: ICD-10-CM

## 2024-03-06 DIAGNOSIS — M79.641 BILATERAL HAND PAIN: ICD-10-CM

## 2024-03-06 DIAGNOSIS — G89.3 CANCER RELATED PAIN: ICD-10-CM

## 2024-03-06 DIAGNOSIS — C90.01 MULTIPLE MYELOMA IN REMISSION (HCC): Primary | Chronic | ICD-10-CM

## 2024-03-06 DIAGNOSIS — E85.9 AMYLOIDOSIS, UNSPECIFIED TYPE (HCC): ICD-10-CM

## 2024-03-06 DIAGNOSIS — C90.01 MULTIPLE MYELOMA IN REMISSION (HCC): ICD-10-CM

## 2024-03-06 DIAGNOSIS — E55.9 VITAMIN D INSUFFICIENCY: ICD-10-CM

## 2024-03-06 DIAGNOSIS — F41.8 ANXIETY ABOUT HEALTH: ICD-10-CM

## 2024-03-06 DIAGNOSIS — R76.8 ELEVATED RHEUMATOID FACTOR: Primary | ICD-10-CM

## 2024-03-06 DIAGNOSIS — M79.642 BILATERAL HAND PAIN: ICD-10-CM

## 2024-03-06 DIAGNOSIS — R79.82 ELEVATED C-REACTIVE PROTEIN (CRP): ICD-10-CM

## 2024-03-06 DIAGNOSIS — Z51.5 PALLIATIVE CARE PATIENT: ICD-10-CM

## 2024-03-06 DIAGNOSIS — G62.9 NEUROPATHY: ICD-10-CM

## 2024-03-06 DIAGNOSIS — R79.89 ELEVATED TSH: ICD-10-CM

## 2024-03-06 DIAGNOSIS — M79.671 BILATERAL FOOT PAIN: ICD-10-CM

## 2024-03-06 PROCEDURE — 99215 OFFICE O/P EST HI 40 MIN: CPT | Performed by: INTERNAL MEDICINE

## 2024-03-06 PROCEDURE — 99214 OFFICE O/P EST MOD 30 MIN: CPT | Performed by: STUDENT IN AN ORGANIZED HEALTH CARE EDUCATION/TRAINING PROGRAM

## 2024-03-06 NOTE — PROGRESS NOTES
Virtual Regular Visit    Verification of patient location:    Patient is located at Other in the following state in which I hold an active license [place]      Assessment/Plan:    Problem List Items Addressed This Visit          Other    Multiple myeloma in remission (HCC) (Chronic)     Other Visit Diagnoses       Elevated rheumatoid factor    -  Primary    Relevant Orders    Cryoglobulin    XR hand 3+ vw right    XR hand 3+ vw left    XR foot 3+ vw right    XR foot 3+ vw left    Elevated sed rate        Relevant Orders    Cryoglobulin    Elevated C-reactive protein (CRP)        Relevant Orders    Cryoglobulin    Elevated TSH        Vitamin D insufficiency        Diffuse arthralgia        Relevant Orders    Cryoglobulin    Bilateral hand pain        Relevant Orders    XR hand 3+ vw right    XR hand 3+ vw left    US MSK limited    Bilateral foot pain        Relevant Orders    XR foot 3+ vw right    XR foot 3+ vw left    US MSK limited                 Reason for visit is follow up.      Chief Complaint   Patient presents with    Virtual Regular Visit          Encounter provider Maxine Dickinson MD    Provider located at [facility] [address] [id number]      Recent Visits  Date Type Provider Dept   02/28/24 Office Visit Maxine Dickinson MD [facility]   Showing recent visits within past 7 days and meeting all other requirements  Today's Visits  Date Type Provider Dept   03/06/24 Telemedicine Maxine Dickinson MD [facility] [place]   Showing today's visits and meeting all other requirements  Future Appointments  No visits were found meeting these conditions.  Showing future appointments within next 150 days and meeting all other requirements       The patient was identified by name and date of birth. Marla Negron was informed that this is a telemedicine visit and that the visit is being conducted through the  Epic Embedded platform. She agrees to proceed..  My office door was closed. No one else was in the room.  She acknowledged consent and understanding of privacy and security of the video platform. The patient has agreed to participate and understands they can discontinue the visit at any time.    Patient is aware this is a billable service.       [facility]     INITIAL VISIT NOTE (2/28/2024):  Ms. Negron is a 53-year-old female with history significant for multiple myeloma in remission and a fluctuating positive low titer ANDERS who presents for an evaluation of diffuse joint and muscle pain.  She is referred by Zhane Lynn PA-C for a rheumatology consult.     Patient reports over the past year she has experienced progressively worsening joint pains mostly affecting her thumbs with periodic locking/triggering of any of the fingers on either hand as well as joint pains in her wrists, elbows, occasionally in her shoulders, knees and ankles.  She also has pain with numbness and tingling in her hands and feet as a result of peripheral neuropathy related to the multiple myeloma diagnosis and treatment.  She denies joint swelling.  She experiences widespread morning stiffness that takes 30 to 45 minutes to improve.  She has tried Tylenol on occasion which helps to a minimal degree.  She did try Advil duo which helps but she discontinued NSAIDs due to a history of stomach ulcers.  She is currently on a regimen through palliative medicine and her primary care provider with pregabalin 100 mg twice daily, duloxetine 40 mg once daily, methocarbamol 750 mg every 6 hours as needed and oxycodone 10 mg every 4 hours as needed.  She reports all of these medications help to keep her symptoms manageable but she does not really experience resolution of her symptoms.  None of the medications help with the neuropathy symptoms.  Of note she was recently on a methylprednisolone Dosepak due to shortness of breath and she reports  while on the steroids this did not help at all with her body pain.     She denies fevers, recent unintentional weight loss, chronic dry eyes/dry mouth, inflammatory eye disease, skin rashes, psoriasis, photosensitivity, mouth/nose ulcers, swollen glands, pleuritic chest pain, inflammatory bowel disease, blood clots [reports a history of 3 miscarriages], Raynaud's or a family history of autoimmune disease.     She was last seen by rheumatology in 2021 due to the positive ANDERS and joint pains and this was felt to be secondary to the multiple myeloma diagnosis and there were no concerns for an underlying connective tissue disease.       3/6/2024:  Patient presents for a follow-up to review her results.  This shows an elevated rheumatoid factor of .  An ESR and CRP are elevated at 47 and 29.4, respectively.  A vitamin D level was slightly low at 21.4.  A TSH was elevated at 6.133.  An ANDERS by immunofluorescence, ANDERS specificity, C3, C4, anti-CCP antibody, CK, HIV and hepatitis C antibody were unremarkable.    She continues to report joint pains that mostly affects her hands, wrists, ankles and feet.  She does have a history of peripheral neuropathy so is unsure if she is experiencing joint pains versus numbness and pain as a result of the neuropathy.      Past Medical History:   Diagnosis Date    Anemia     Cardiomyopathy (HCC)     Chronic back pain     Chronic narcotic dependence (HCC)     Colon polyp     Disease of thyroid gland     GERD (gastroesophageal reflux disease)     H/O autologous stem cell transplant (HCC) 2022    Hypertension     Multiple myeloma (HCC)     in remission    Neuropathy        Past Surgical History:   Procedure Laterality Date     SECTION      COLONOSCOPY      EYE SURGERY Left     biopsy    THYROID LOBECTOMY Left 2023    Procedure: LEFT HEMITHYROIDECTOMY;  Surgeon: José Manuel Galarza MD;  Location: [facility];  Service: Surgical Oncology    US GUIDED THYROID BIOPSY   10/20/2022       Current Outpatient Medications   Medication Sig Dispense Refill    acyclovir (ZOVIRAX) 800 mg tablet Take 1 tablet (800 mg total) by mouth 2 (two) times a day 180 tablet 0    albuterol (PROVENTIL HFA,VENTOLIN HFA) 90 mcg/act inhaler Inhale 2 puffs every 6 (six) hours as needed for wheezing or shortness of breath 18 g 11    amLODIPine (NORVASC) 10 mg tablet Take 1 tablet (10 mg total) by mouth every morning 90 tablet 1    DULoxetine (CYMBALTA) 20 mg capsule Take 2 tablets ( 40 Mg total) by mouth daily 180 capsule 0    famotidine (PEPCID) 40 MG tablet Take 0.5 tablets (20 mg total) by mouth 2 (two) times a day 90 tablet 0    fluticasone (FLONASE) 50 mcg/act nasal spray 2 sprays into each nostril daily 48 g 0    methocarbamol (ROBAXIN) 750 mg tablet Take 1 tablet (750 mg total) by mouth every 6 (six) hours as needed for muscle spasms 120 tablet 0    metoprolol succinate (TOPROL-XL) 25 mg 24 hr tablet Take 1 tablet (25 mg total) by mouth daily 90 tablet 1    naloxone (NARCAN) 4 mg/0.1 mL nasal spray Administer 1 spray into a nostril. If no response after 2-3 minutes, give another dose in the other nostril using a new spray. 1 each 1    NON FORMULARY -Balance of Nature vegetable and fruit supplements      oxyCODONE (ROXICODONE) 10 MG TABS Take 1 tablet (10 mg total) by mouth every 4 (four) hours as needed (cancer-related pain) Max Daily Amount: 60 mg Do not start before February 16, 2024. 120 tablet 0    pantoprazole (PROTONIX) 40 mg tablet Take 1 tablet (40 mg total) by mouth daily 90 tablet 1    pregabalin (LYRICA) 100 mg capsule Take 1 capsule (100 mg total) by mouth 2 (two) times a day 360 capsule 0    traZODone (DESYREL) 50 mg tablet Take 1 tablet (50 mg total) by mouth daily at bedtime 90 tablet 1     No current facility-administered medications for this visit.        No Known Allergies      Review of Systems  Constitutional: Negative for weight change, fevers, chills, night sweats,  fatigue.  ENT/Mouth: Negative for hearing changes, ear pain, nasal congestion, sinus pain, hoarseness, sore throat, rhinorrhea, swallowing difficulty.   Eyes: Negative for pain, redness, discharge, vision changes.   Cardiovascular: Negative for chest pain, palpitations.   Respiratory: Negative for cough, sputum, wheezing.  Positive for shortness of breath.  Gastrointestinal: Negative for nausea, vomiting, diarrhea, constipation, pain, heartburn.  Genitourinary: Negative for dysuria, urinary frequency, hematuria.   Musculoskeletal: As per HPI.  Skin: Negative for skin rash, color changes.   Neuro: Negative for weakness, loss of consciousness.  Positive for numbness and tingling.  Psych: Negative for anxiety, depression.   Heme/Lymph: Negative for easy bruising, bleeding, lymphadenopathy.        Assessment and Plan:   Ms. Negron is a 53-year-old female with history significant for multiple myeloma in remission and a fluctuating positive low titer ANDERS who presents for a follow up of diffuse joint and muscle pain.      - Marla presents today for a follow up of diffuse arthralgias/myalgias that she has experienced prominently over the past year but has been present even prior to this.  She is not reporting symptoms suggestive of an inflammatory arthritis such as appreciating joint swelling and there was no synovitis noted on her examination.  I suspect she is symptomatic as a result of noninflammatory conditions such as trigger fingers, peripheral neuropathy as well as chronic pain syndrome.  For management of these conditions I recommend following up with palliative medicine, neurology and orthopedics.  To ensure an inflammatory arthritis is being ruled out I did update serologies and this shows a significantly elevated rheumatoid factor.  There are no obvious signs of rheumatoid arthritis but to further evaluate I would like to obtain x-rays of her hands and feet to assess for inflammatory changes as well as an  ultrasound of her hands/wrists and ankles/feet.  I also question if the elevated rheumatoid factor may be occurring as a result of the multiple myeloma diagnosis.  Once I receive the results of the x-rays and ultrasound she will be scheduled for a follow-up visit to review.    - In view of the vitamin D insufficiency I requested she start vitamin D 1000 to 2000 international units once daily over-the-counter.  She will follow-up with her primary care provider to discuss the elevated TSH.      Visit Time  Total Visit Duration: 12 minutes.
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
## 2024-03-11 ENCOUNTER — APPOINTMENT (OUTPATIENT)
Dept: LAB | Facility: HOSPITAL | Age: 54
End: 2024-03-11
Attending: INTERNAL MEDICINE
Payer: COMMERCIAL

## 2024-03-11 DIAGNOSIS — J30.1 SEASONAL ALLERGIC RHINITIS DUE TO POLLEN: Primary | ICD-10-CM

## 2024-03-11 DIAGNOSIS — C90.01 MULTIPLE MYELOMA IN REMISSION (HCC): Chronic | ICD-10-CM

## 2024-03-11 DIAGNOSIS — R80.9 PROTEINURIA, UNSPECIFIED TYPE: ICD-10-CM

## 2024-03-11 DIAGNOSIS — E85.9 AMYLOIDOSIS, UNSPECIFIED TYPE (HCC): ICD-10-CM

## 2024-03-11 DIAGNOSIS — I10 PRIMARY HYPERTENSION: Chronic | ICD-10-CM

## 2024-03-11 LAB
BACTERIA UR QL AUTO: ABNORMAL /HPF
BILIRUB UR QL STRIP: NEGATIVE
CLARITY UR: CLEAR
COLOR UR: ABNORMAL
CREAT UR-MCNC: 74.5 MG/DL
GLUCOSE UR STRIP-MCNC: NEGATIVE MG/DL
HGB UR QL STRIP.AUTO: ABNORMAL
KETONES UR STRIP-MCNC: NEGATIVE MG/DL
LEUKOCYTE ESTERASE UR QL STRIP: NEGATIVE
NITRITE UR QL STRIP: NEGATIVE
NON-SQ EPI CELLS URNS QL MICRO: ABNORMAL /HPF
PH UR STRIP.AUTO: 5.5 [PH]
PROT UR STRIP-MCNC: NEGATIVE MG/DL
PROT UR-MCNC: 8 MG/DL
PROT/CREAT UR: 0.11 MG/G{CREAT} (ref 0–0.1)
RBC #/AREA URNS AUTO: ABNORMAL /HPF
SP GR UR STRIP.AUTO: 1.02 (ref 1–1.03)
UROBILINOGEN UR STRIP-ACNC: <2 MG/DL
WBC #/AREA URNS AUTO: ABNORMAL /HPF

## 2024-03-11 PROCEDURE — 81001 URINALYSIS AUTO W/SCOPE: CPT

## 2024-03-11 PROCEDURE — 84156 ASSAY OF PROTEIN URINE: CPT

## 2024-03-11 PROCEDURE — 82570 ASSAY OF URINE CREATININE: CPT

## 2024-03-11 RX ORDER — LORATADINE 10 MG/1
10 TABLET ORAL DAILY
Qty: 90 TABLET | Refills: 1 | Status: SHIPPED | OUTPATIENT
Start: 2024-03-11 | End: 2024-09-07

## 2024-03-12 DIAGNOSIS — G89.3 CANCER ASSOCIATED PAIN: ICD-10-CM

## 2024-03-12 DIAGNOSIS — C90.00 MULTIPLE MYELOMA NOT HAVING ACHIEVED REMISSION (HCC): ICD-10-CM

## 2024-03-12 DIAGNOSIS — Z51.5 PALLIATIVE CARE PATIENT: ICD-10-CM

## 2024-03-12 DIAGNOSIS — G62.9 NEUROPATHY: ICD-10-CM

## 2024-03-12 RX ORDER — DULOXETIN HYDROCHLORIDE 20 MG/1
CAPSULE, DELAYED RELEASE ORAL
Qty: 180 CAPSULE | Refills: 0 | Status: SHIPPED | OUTPATIENT
Start: 2024-03-12

## 2024-03-12 RX ORDER — OXYCODONE HYDROCHLORIDE 10 MG/1
10 TABLET ORAL EVERY 4 HOURS PRN
Qty: 120 TABLET | Refills: 0 | Status: SHIPPED | OUTPATIENT
Start: 2024-03-12 | End: 2024-04-11

## 2024-03-12 RX ORDER — NALOXONE HYDROCHLORIDE 4 MG/.1ML
SPRAY NASAL
Qty: 1 EACH | Refills: 0 | Status: SHIPPED | OUTPATIENT
Start: 2024-03-12

## 2024-03-12 NOTE — TELEPHONE ENCOUNTER
Last appointment:03/06/24    Next scheduled appointment:04/03/24    Pharmacy:[facility]      PDMP review:

## 2024-03-20 ENCOUNTER — TELEPHONE (OUTPATIENT)
Age: 54
End: 2024-03-20

## 2024-03-27 ENCOUNTER — OFFICE VISIT (OUTPATIENT)
Age: 54
End: 2024-03-27
Payer: COMMERCIAL

## 2024-03-27 VITALS
SYSTOLIC BLOOD PRESSURE: 102 MMHG | TEMPERATURE: 98.2 F | RESPIRATION RATE: 18 BRPM | OXYGEN SATURATION: 98 % | BODY MASS INDEX: 31.36 KG/M2 | WEIGHT: 177 LBS | HEART RATE: 78 BPM | DIASTOLIC BLOOD PRESSURE: 68 MMHG | HEIGHT: 63 IN

## 2024-03-27 DIAGNOSIS — M54.41 CHRONIC BILATERAL LOW BACK PAIN WITH BILATERAL SCIATICA: ICD-10-CM

## 2024-03-27 DIAGNOSIS — M54.42 CHRONIC BILATERAL LOW BACK PAIN WITH BILATERAL SCIATICA: ICD-10-CM

## 2024-03-27 DIAGNOSIS — F11.20 CONTINUOUS OPIOID DEPENDENCE (HCC): Chronic | ICD-10-CM

## 2024-03-27 DIAGNOSIS — R12 HEARTBURN: ICD-10-CM

## 2024-03-27 DIAGNOSIS — I10 PRIMARY HYPERTENSION: Chronic | ICD-10-CM

## 2024-03-27 DIAGNOSIS — G89.29 CHRONIC BILATERAL LOW BACK PAIN WITH BILATERAL SCIATICA: ICD-10-CM

## 2024-03-27 DIAGNOSIS — M79.641 BILATERAL HAND PAIN: ICD-10-CM

## 2024-03-27 DIAGNOSIS — M79.642 BILATERAL HAND PAIN: ICD-10-CM

## 2024-03-27 DIAGNOSIS — C90.01 MULTIPLE MYELOMA IN REMISSION (HCC): Primary | Chronic | ICD-10-CM

## 2024-03-27 PROCEDURE — 99214 OFFICE O/P EST MOD 30 MIN: CPT

## 2024-03-27 RX ORDER — FAMOTIDINE 40 MG/1
20 TABLET, FILM COATED ORAL 2 TIMES DAILY
Qty: 90 TABLET | Refills: 0 | Status: SHIPPED | OUTPATIENT
Start: 2024-03-27

## 2024-03-27 RX ORDER — METHOCARBAMOL 1000 MG/1
1000 TABLET, COATED ORAL EVERY 6 HOURS PRN
Qty: 90 TABLET | Refills: 1 | Status: SHIPPED | OUTPATIENT
Start: 2024-03-27 | End: 2024-03-28

## 2024-03-27 NOTE — ASSESSMENT & PLAN NOTE
Well controlled, potentially could come off metoprolol succinate, but I want her to discuss with cardiology first.  Doesn't appear she was put on this for heart failure, but records only go back to about 2017.  Pt denies CHF.  She is due for appt in June 2024 and will discuss with cardiology.

## 2024-03-27 NOTE — PROGRESS NOTES
Name: Marla Negron      : 1970      MRN: [id number]  Encounter Provider: Zhane Lynn PA-C  Encounter Date: 3/27/2024   Encounter department: [facility] PRIMARY C[facility] [place]    Assessment & Plan     1. Multiple myeloma in remission (HCC)  Assessment & Plan:  In remission, is followed at [facility].      2. Heartburn  Comments:  Stable, continue current medications, avoid NSAIDs.  Orders:  -     famotidine (PEPCID) 40 MG tablet; Take 0.5 tablets (20 mg total) by mouth 2 (two) times a day    3. Chronic bilateral low back pain with bilateral sciatica  Comments:  Increase Robaxin to 1,000 mg Q6 hours. Reviewed possible sedation.  Not sure this medication is helping very much. Can add magnesium OTC for leg cramps.  Orders:  -     methocarbamol 1000 MG TABS; Take 1,000 mg by mouth every 6 (six) hours as needed for muscle spasms    4. Continuous opioid dependence (HCC)  Assessment & Plan:  Following with Palliative care.  I am concerned regarding the amount of medications she is on and the risk for falls and overuse.  Pt states she is on a tapering schedule with palliative care.  She has occasionally missed those appointments.  There was concern in the past regarding her daughter taking her medications as well.  Continue with follow with palliative care.  Hopefully with treatment for her trigger finger and follow up with rheumatology and neurology she will have better treatment options for her pain and can start to remove some medications that probably aren't helping her pain at this point.      5. Bilateral hand pain  Comments:  Following with Dr. Fajardo for her trigger fingers and Rheumatology for work up.  Continue current pain medications.  Avoid NSAIDs.    6. Primary hypertension  Assessment & Plan:  Well controlled, potentially could come off metoprolol succinate, but I want her to discuss with cardiology first.  Doesn't appear she was put on this for heart failure, but  records only go back to about 2017.  Pt denies CHF.  She is due for appt in June 2024 and will discuss with cardiology.             Subjective      HPI    Pt is doing OK.  She saw rheumatology recently.  Has an elevated rheumatoid factor.  She is having xrays done of her hands and feet and having labs done.  She is dealing with with chronic pain, neuropathy with tingling and burning in her hands and feet.  She drops objects due to the pain.  Denies joint swelling.  CRP and ESR were slightly elevated, but other autoimmune markers were negative.    She lives in her moms home.  She has her grandchildren.  Her daughter moved out because she was taking the patient's medications.  She shared this with me back in the fall when I first met her.  However, it sounds like that may still be an issue when reading recent notes with palliative care.  She is seeing palliative care and is slowly tapering off of her opiates.      Has appts coming up with DR. Fajardo for her trigger fingers in her thumbs and index fingers bilaterally.  Has an appt with neurology to review her neuropathic symptoms and leg jumping and come up with a treatment plan.  Nephrology appt is at the end of May     Needs to make appointment with cardiology for June for annual check up.  BP is well controlled, questioning whether she can come off of metoprolol.    Had thyroid u/s which showed a nodule, biopsy done with L hemithyroidectomy.  Pathology report stated it was benign.    Review of Systems   Constitutional:  Positive for fatigue. Negative for diaphoresis, fever and unexpected weight change.   Respiratory:  Negative for shortness of breath.    Cardiovascular:  Negative for chest pain.   Gastrointestinal: Negative.    Musculoskeletal:  Positive for arthralgias and myalgias. Negative for gait problem.   Skin:  Negative for rash.   Neurological:  Positive for weakness and numbness. Negative for seizures, syncope, light-headedness and headaches.   All other  "systems reviewed and are negative.      Current Outpatient Medications on File Prior to Visit   Medication Sig    acyclovir (ZOVIRAX) 800 mg tablet Take 1 tablet (800 mg total) by mouth 2 (two) times a day    albuterol (PROVENTIL HFA,VENTOLIN HFA) 90 mcg/act inhaler Inhale 2 puffs every 6 (six) hours as needed for wheezing or shortness of breath    amLODIPine (NORVASC) 10 mg tablet Take 1 tablet (10 mg total) by mouth every morning    DULoxetine (CYMBALTA) 20 mg capsule Take 2 tablets ( 40 Mg total) by mouth daily    fluticasone (FLONASE) 50 mcg/act nasal spray 2 sprays into each nostril daily    loratadine (CLARITIN) 10 mg tablet Take 1 tablet (10 mg total) by mouth daily    metoprolol succinate (TOPROL-XL) 25 mg 24 hr tablet Take 1 tablet (25 mg total) by mouth daily    naloxone (NARCAN) 4 mg/0.1 mL nasal spray Administer 1 spray into a nostril. If no response after 2-3 minutes, give another dose in the other nostril using a new spray.    NON FORMULARY -Balance of Nature vegetable and fruit supplements    oxyCODONE (ROXICODONE) 10 MG TABS Take 1 tablet (10 mg total) by mouth every 4 (four) hours as needed (cancer-related pain) Max Daily Amount: 60 mg    pantoprazole (PROTONIX) 40 mg tablet Take 1 tablet (40 mg total) by mouth daily    pregabalin (LYRICA) 100 mg capsule Take 1 capsule (100 mg total) by mouth 2 (two) times a day    [DISCONTINUED] famotidine (PEPCID) 40 MG tablet Take 0.5 tablets (20 mg total) by mouth 2 (two) times a day    [DISCONTINUED] methocarbamol (ROBAXIN) 750 mg tablet Take 1 tablet (750 mg total) by mouth every 6 (six) hours as needed for muscle spasms       Objective     /68 (BP Location: Left arm, Patient Position: Sitting, Cuff Size: Standard)   Pulse 78   Temp 98.2 °F (36.8 °C) (Tympanic)   Resp 18   Ht 5' 3\" (1.6 m)   Wt 80.3 kg (177 lb)   SpO2 98%   BMI 31.35 kg/m²     Physical Exam  Vitals reviewed.   Constitutional:       General: She is not in acute distress.     " Appearance: She is overweight. She is not toxic-appearing.   HENT:      Head: Normocephalic and atraumatic.      Right Ear: Hearing normal.      Left Ear: Hearing normal.      Nose: Nose normal.      Mouth/Throat:      Lips: Pink.      Mouth: Mucous membranes are moist.   Eyes:      General: Lids are normal. No scleral icterus.  Cardiovascular:      Rate and Rhythm: Normal rate and regular rhythm.      Heart sounds: Normal heart sounds.   Pulmonary:      Effort: Pulmonary effort is normal. No respiratory distress.      Breath sounds: Normal breath sounds.   Abdominal:      General: Bowel sounds are normal.      Palpations: Abdomen is soft.      Tenderness: There is no abdominal tenderness.   Musculoskeletal:      Right hand: Tenderness present. No swelling, deformity or bony tenderness. Decreased range of motion. Decreased strength. Normal sensation. Normal capillary refill. Normal pulse.      Left hand: Tenderness present. No swelling, deformity or bony tenderness. Decreased range of motion. Decreased strength. Normal sensation. Normal capillary refill. Normal pulse.      Cervical back: Full passive range of motion without pain.      Comments: Pt unable to do hand grasp due to pain.   Skin:     General: Skin is warm and dry.   Neurological:      General: No focal deficit present.      Mental Status: She is alert and oriented to person, place, and time.      Motor: Weakness present. No tremor.      Gait: Gait is intact.   Psychiatric:         Mood and Affect: Mood normal.         Behavior: Behavior normal.       Zhane Lynn PA-C

## 2024-03-27 NOTE — ASSESSMENT & PLAN NOTE
Following with Palliative care.  I am concerned regarding the amount of medications she is on and the risk for falls and overuse.  Pt states she is on a tapering schedule with palliative care.  She has occasionally missed those appointments.  There was concern in the past regarding her daughter taking her medications as well.  Continue with follow with palliative care.  Hopefully with treatment for her trigger finger and follow up with rheumatology and neurology she will have better treatment options for her pain and can start to remove some medications that probably aren't helping her pain at this point.

## 2024-03-28 DIAGNOSIS — M54.42 CHRONIC BILATERAL LOW BACK PAIN WITH BILATERAL SCIATICA: ICD-10-CM

## 2024-03-28 DIAGNOSIS — G89.29 CHRONIC BILATERAL LOW BACK PAIN WITH BILATERAL SCIATICA: ICD-10-CM

## 2024-03-28 DIAGNOSIS — M54.41 CHRONIC BILATERAL LOW BACK PAIN WITH BILATERAL SCIATICA: ICD-10-CM

## 2024-03-28 RX ORDER — METHOCARBAMOL 750 MG/1
750 TABLET, FILM COATED ORAL EVERY 6 HOURS PRN
Qty: 360 TABLET | Refills: 0 | Status: SHIPPED | OUTPATIENT
Start: 2024-03-28 | End: 2024-06-26

## 2024-04-01 ENCOUNTER — TELEPHONE (OUTPATIENT)
Dept: NEUROLOGY | Facility: CLINIC | Age: 54
End: 2024-04-01

## 2024-04-03 ENCOUNTER — OFFICE VISIT (OUTPATIENT)
Dept: PALLIATIVE MEDICINE | Facility: CLINIC | Age: 54
End: 2024-04-03
Payer: COMMERCIAL

## 2024-04-03 VITALS
TEMPERATURE: 98.1 F | HEART RATE: 82 BPM | DIASTOLIC BLOOD PRESSURE: 72 MMHG | OXYGEN SATURATION: 94 % | RESPIRATION RATE: 18 BRPM | HEIGHT: 63 IN | SYSTOLIC BLOOD PRESSURE: 124 MMHG | BODY MASS INDEX: 31.11 KG/M2 | WEIGHT: 175.6 LBS

## 2024-04-03 DIAGNOSIS — R45.89 ANXIETY ABOUT HEALTH: ICD-10-CM

## 2024-04-03 DIAGNOSIS — E85.9 AMYLOIDOSIS, UNSPECIFIED TYPE (HCC): ICD-10-CM

## 2024-04-03 DIAGNOSIS — G89.29 CHRONIC BILATERAL LOW BACK PAIN WITH BILATERAL SCIATICA: Chronic | ICD-10-CM

## 2024-04-03 DIAGNOSIS — Z94.84 H/O AUTOLOGOUS STEM CELL TRANSPLANT (HCC): ICD-10-CM

## 2024-04-03 DIAGNOSIS — C90.01 MULTIPLE MYELOMA IN REMISSION (HCC): Primary | Chronic | ICD-10-CM

## 2024-04-03 DIAGNOSIS — G89.3 CANCER RELATED PAIN: ICD-10-CM

## 2024-04-03 DIAGNOSIS — M54.41 CHRONIC BILATERAL LOW BACK PAIN WITH BILATERAL SCIATICA: Chronic | ICD-10-CM

## 2024-04-03 DIAGNOSIS — Z51.5 PALLIATIVE CARE PATIENT: ICD-10-CM

## 2024-04-03 DIAGNOSIS — M54.42 CHRONIC BILATERAL LOW BACK PAIN WITH BILATERAL SCIATICA: Chronic | ICD-10-CM

## 2024-04-03 PROCEDURE — 99214 OFFICE O/P EST MOD 30 MIN: CPT | Performed by: STUDENT IN AN ORGANIZED HEALTH CARE EDUCATION/TRAINING PROGRAM

## 2024-04-03 NOTE — PROGRESS NOTES
Outpatient Follow-Up - Palliative and Supportive Care   Marla Negron 53 y.o. female [id number]    Assessment & Plan  Problem List Items Addressed This Visit       Chronic bilateral low back pain with bilateral sciatica (Chronic)    Multiple myeloma in remission (HCC) - Primary (Chronic)    Cancer related pain    Palliative care patient    H/O autologous stem cell transplant (HCC)    Amyloidosis (HCC)    Anxiety about health     #symptom management  #cancer-related pain  #opioid taper   - continue APAP 1000 mg PO Q8H PRN              - max daily 4000 mg   - decrease oxy-IR 5-10 mg PO Q4H PRN              - max 4 doses [35 mg] per day     Continued opioid taper with plan to decrease 10-30% total daily opioids each 1-2 months, with overall goal to no daily opioid use. Discussed s/sxs of opioid withdrawal, including nausea, vomiting, diarrhea, rhinorrhea, yawning, piloerection, insomnia, agitation. All questions/concerns addressed.     #neuropathic pain   - continue pregabalin 100 mg PO BID   - continue duloxetine 40 mg PO QDaily   - continue lidocaine cream QID PRN     #muscle spasms   - continue methocarbamol 750 mg PO Q6H PRN     #OIC   - continue bowel regimen to prevent OIC   - adequate hydration     #anxiety/depression  #insomnia   - continue duloxetine 30 mg PO QDaily   - continue trazodone 50 mg PO QHS    #goals of care   - continued surveillance   - upcoming appointments with orthopaedics and neurology    #psychosocial support   - emotional support provided   - not currently    - two adult children              - Guerda [daughter]              - Richard Lagos [mother] [phone]   - one sibling              - Mayi [sister]   - two grandchildren [aged 5 and 3]      Next [facility] Follow up in 4 weeks.      Controlled Substance Review    [place] or [place]  reviewed: No red flags were identified; safe to proceed with prescription..    PDMP Review         Value Time User    PDMP Reviewed  Yes  4/18/2024  3:21 PM Willy Almaraz MD            Medications adjusted this encounter:  Requested Prescriptions      No prescriptions requested or ordered in this encounter     No orders of the defined types were placed in this encounter.      There are no discontinued medications.      Marla Negron was seen today for symptoms and planning cares related to above illnesses.  I have reviewed the patient's controlled substance dispensing history in the Prescription Drug Monitoring Program in compliance with the [facility] regulations before prescribing any controlled substances.    They are invited to continue to follow with us.  If there are questions or concerns, please contact us through our clinic/answering service 24 hours a day, seven days a week.    Willy Almaraz MD  [facility] Palliative [facility] Supportive [facility]  [phone]      Visit Information    Accompanied By: No one    Source of History: Self, Medical record    History Limitations: None      History of Present Illness    Marla Negron is a 53 y.o. female who presents in follow up of symptoms related to multiple myeloma s/p PBSCT [03/30/2022]. Pertinent issues include: symptom management, assessment of goals of care, disease process education and discussion of prognosis.    Patient reports continued diffuse joint pain, upcoming appointments with ortho and neuro; hopeful to continue follow-up with rheum as well. No acute injuries or changes. Denies fever/chills. Use of oxy-IR primarily at 40 mg/day [4 doses/day]. Denies nausea, vomiting. Appetite intact, weight stable. BM regular, daily. Fragmented sleep in the setting of muscle cramps/spasms. Robaxin recently increased per PCP.      Past medical, surgical, social, and family histories are reviewed and pertinent updates are made.    Review of Systems   Constitutional: Positive for malaise/fatigue. Negative for chills, decreased appetite, fever and weight loss.   HENT:  Negative for congestion.   "  Cardiovascular:  Negative for chest pain.   Respiratory:  Negative for shortness of breath.    Musculoskeletal:  Positive for back pain, joint pain, muscle cramps and myalgias. Negative for falls.   Gastrointestinal:  Negative for abdominal pain, constipation, nausea and vomiting.   Genitourinary:  Negative for frequency.   Neurological:  Negative for headaches.   Psychiatric/Behavioral:  The patient does not have insomnia.    All other systems reviewed and are negative.        Vital Signs    /72 (BP Location: Right arm, Patient Position: Sitting, Cuff Size: Standard)   Pulse 82   Temp 98.1 °F (36.7 °C) (Tympanic)   Resp 18   Ht 5' 3\" (1.6 m)   Wt 79.7 kg (175 lb 9.6 oz)   SpO2 94%   BMI 31.11 kg/m²     Physical Exam and Objective Data  Physical Exam  Vitals and nursing note reviewed.   Constitutional:       General: She is awake.      Appearance: She is not diaphoretic.      Comments: Sitting up in NAD  BMI 33.5  Non-toxic appearing   HENT:      Head: Normocephalic and atraumatic.      Right Ear: External ear normal.      Left Ear: External ear normal.      Nose: No rhinorrhea.   Eyes:      Comments: No gaze preference   Cardiovascular:      Rate and Rhythm: Normal rate.   Pulmonary:      Effort: No tachypnea, accessory muscle usage or respiratory distress.      Comments: Completes full sentences without difficulty  Musculoskeletal:      Cervical back: Normal range of motion.   Neurological:      General: No focal deficit present.      Mental Status: She is alert and oriented to person, place, and time.   Psychiatric:         Attention and Perception: Attention normal.         Mood and Affect: Mood and affect normal.         Speech: Speech normal.         Cognition and Memory: Cognition and memory normal.           Radiology and Laboratory:  I personally reviewed and interpreted the following results:    Most Recent COVID-19 Results:  Lab Results   Component Value Date/Time    SARSCOV2 Negative " 01/23/2023 09:28 PM    SARSCOV2 Negative 10/30/2021 12:10 AM       Most Recent Lab Work:  Lab Results   Component Value Date/Time    SODIUM 139 02/28/2024 02:22 PM    SODIUM 140 10/27/2023 05:02 AM    K 3.8 02/28/2024 02:22 PM    K 3.5 10/27/2023 05:02 AM    BUN 14 02/28/2024 02:22 PM    BUN 12 10/27/2023 05:02 AM    CREATININE 1.56 (H) 02/28/2024 02:22 PM    CREATININE 0.84 10/27/2023 05:02 AM    GLUC 122 02/28/2024 02:22 PM    GLUC 79 10/27/2023 05:02 AM     Lab Results   Component Value Date/Time    AST 16 12/01/2023 12:20 PM    AST 27 10/26/2023 05:05 PM    ALT 13 12/01/2023 12:20 PM    ALT 11 10/26/2023 05:05 PM    ALB 4.3 12/01/2023 12:20 PM    ALB 4.6 10/26/2023 05:05 PM     Lab Results   Component Value Date/Time    HGB 17.0 (H) 12/01/2023 12:20 PM    WBC 10.24 (H) 12/01/2023 12:20 PM     12/01/2023 12:20 PM    INR 1.0 10/26/2023 05:05 PM    PTT 30 08/15/2023 12:37 PM       Most Recent Imaging [last 30 days]:  No results found.    35 minutes was spent face to face with Marla Negron with greater than 50% of the time spent in counseling or coordination of care including discussions of provided medical updates, discussed palliative care, determined goals of care, determined social/family support, discussed plans of care, discussed symptom management, provided psychosocial support. Opioid taper. PDMP Reviewed. All of the patient's or agent's questions were answered during this discussion.

## 2024-04-05 DIAGNOSIS — Z51.5 PALLIATIVE CARE PATIENT: ICD-10-CM

## 2024-04-05 DIAGNOSIS — C90.00 MULTIPLE MYELOMA NOT HAVING ACHIEVED REMISSION (HCC): ICD-10-CM

## 2024-04-05 DIAGNOSIS — G89.3 CANCER ASSOCIATED PAIN: ICD-10-CM

## 2024-04-05 RX ORDER — OXYCODONE HYDROCHLORIDE 5 MG/1
5-10 TABLET ORAL EVERY 4 HOURS PRN
Qty: 105 TABLET | Refills: 0 | Status: SHIPPED | OUTPATIENT
Start: 2024-04-05 | End: 2024-04-20

## 2024-04-05 RX ORDER — OXYCODONE HYDROCHLORIDE 10 MG/1
10 TABLET ORAL EVERY 4 HOURS PRN
Qty: 120 TABLET | Refills: 0 | Status: CANCELLED | OUTPATIENT
Start: 2024-04-05 | End: 2024-05-05

## 2024-04-09 ENCOUNTER — OFFICE VISIT (OUTPATIENT)
Dept: OBGYN CLINIC | Facility: CLINIC | Age: 54
End: 2024-04-09
Payer: COMMERCIAL

## 2024-04-09 VITALS
WEIGHT: 183 LBS | HEIGHT: 63 IN | BODY MASS INDEX: 32.43 KG/M2 | SYSTOLIC BLOOD PRESSURE: 100 MMHG | DIASTOLIC BLOOD PRESSURE: 70 MMHG

## 2024-04-09 DIAGNOSIS — Z12.31 ENCOUNTER FOR SCREENING MAMMOGRAM FOR MALIGNANT NEOPLASM OF BREAST: ICD-10-CM

## 2024-04-09 DIAGNOSIS — Z01.419 ENCOUNTER FOR GYNECOLOGICAL EXAMINATION: Primary | ICD-10-CM

## 2024-04-09 PROCEDURE — 99386 PREV VISIT NEW AGE 40-64: CPT

## 2024-04-09 PROCEDURE — G0476 HPV COMBO ASSAY CA SCREEN: HCPCS

## 2024-04-09 NOTE — ASSESSMENT & PLAN NOTE
Postmenopausal patient here for annual exam.   Pap collected today. ASCCP guidelines reviewed.   Declined STD screening today, not currently sexually active.   Breast self awareness encouraged. Mammo due.   Colonoscopy current.   F/u annually and PRN

## 2024-04-09 NOTE — PROGRESS NOTES
Assessment/Plan:    Encounter for gynecological examination  Postmenopausal patient here for annual exam.   Pap collected today. ASCCP guidelines reviewed.   Declined STD screening today, not currently sexually active.   Breast self awareness encouraged. Mammo due.   Colonoscopy current.   F/u annually and PRN       Diagnoses and all orders for this visit:    Encounter for gynecological examination  -     Liquid-based pap, screening    Encounter for screening mammogram for malignant neoplasm of breast  -     Mammo screening bilateral w 3d & cad; Future          Health Maintenance:    Last PAP: not on file    Last Mammogram: 2019. BI-RADS 2    Last Colonoscopy: 2021     Subjective    CC: Yearly Exam     Marla Negron is a 53 y.o. postmenopausal female here for annual exam.     Menarche 10 Y/O   Menopause age 47    Sexual activity: She is not sexually active  STD testing:  She does not want STD testing today.   GYN Hx significant for: denies h/o abnormal pap smears   Current smoker, non drinker    New patient here for annual exam. She has no questions or concerns for today's visit.   Denies PMB. She denies breast concerns, vaginal issues, pelvic pain, dyspareunia, urinary symptoms, symptoms of pelvic organ prolapse, urinary, or fecal incontinence today.     Family hx of breast cancer: denies  Family hx of ovarian cancer: denies  Family hx of colon cancer: denies      Past Medical History:   Diagnosis Date    Anemia     Cardiomyopathy (HCC)     Chronic back pain     Chronic narcotic dependence (HCC)     Colon polyp     Disease of thyroid gland     GERD (gastroesophageal reflux disease)     H/O autologous stem cell transplant (HCC) 2022    Hypertension     Multiple myeloma (HCC)     in remission    Neuropathy      Past Surgical History:   Procedure Laterality Date     SECTION      COLONOSCOPY      EYE SURGERY Left     biopsy    THYROID LOBECTOMY Left 2023    Procedure: LEFT  HEMITHYROIDECTOMY;  Surgeon: José Manuel Galarza MD;  Location: [facility];  Service: Surgical Oncology    US GUIDED THYROID BIOPSY  10/20/2022      Family History   Problem Relation Age of Onset    No Known Problems Mother     No Known Problems Father     Colon cancer Neg Hx     Diabetes Neg Hx     Breast cancer Neg Hx     Cervical cancer Neg Hx     Ovarian cancer Neg Hx     Uterine cancer Neg Hx      Social History     Tobacco Use    Smoking status: Heavy Smoker     Current packs/day: 0.50     Average packs/day: 0.5 packs/day for 31.3 years (15.7 ttl pk-yrs)     Types: Cigarettes     Start date: 12/18/1992     Passive exposure: Current    Smokeless tobacco: Current   Vaping Use    Vaping status: Never Used   Substance Use Topics    Alcohol use: Not Currently    Drug use: Not Currently     Types: Oxycodone     Comment: 1 year       Current Outpatient Medications:     acyclovir (ZOVIRAX) 800 mg tablet, Take 1 tablet (800 mg total) by mouth 2 (two) times a day, Disp: 180 tablet, Rfl: 0    albuterol (PROVENTIL HFA,VENTOLIN HFA) 90 mcg/act inhaler, Inhale 2 puffs every 6 (six) hours as needed for wheezing or shortness of breath, Disp: 18 g, Rfl: 11    amLODIPine (NORVASC) 10 mg tablet, Take 1 tablet (10 mg total) by mouth every morning, Disp: 90 tablet, Rfl: 1    famotidine (PEPCID) 40 MG tablet, Take 0.5 tablets (20 mg total) by mouth 2 (two) times a day, Disp: 90 tablet, Rfl: 0    fluticasone (FLONASE) 50 mcg/act nasal spray, 2 sprays into each nostril daily, Disp: 48 g, Rfl: 0    methocarbamol (Robaxin-750) 750 mg tablet, Take 1 tablet (750 mg total) by mouth every 6 (six) hours as needed for muscle spasms, Disp: 360 tablet, Rfl: 0    metoprolol succinate (TOPROL-XL) 25 mg 24 hr tablet, Take 1 tablet (25 mg total) by mouth daily, Disp: 90 tablet, Rfl: 1    naloxone (NARCAN) 4 mg/0.1 mL nasal spray, Administer 1 spray into a nostril. If no response after 2-3 minutes, give another dose in the other nostril using a new  spray., Disp: 1 each, Rfl: 0    NON FORMULARY, -Balance of Nature vegetable and fruit supplements, Disp: , Rfl:     oxyCODONE (ROXICODONE) 5 immediate release tablet, Take 1-2 tablets (5-10 mg total) by mouth every 4 (four) hours as needed for moderate pain or severe pain (cancer-related pain) for up to 15 days Max Daily Amount: 35 mg, Disp: 105 tablet, Rfl: 0    pantoprazole (PROTONIX) 40 mg tablet, Take 1 tablet (40 mg total) by mouth daily, Disp: 90 tablet, Rfl: 1    pregabalin (LYRICA) 100 mg capsule, Take 1 capsule (100 mg total) by mouth 2 (two) times a day, Disp: 360 capsule, Rfl: 0    DULoxetine (CYMBALTA) 20 mg capsule, Take 2 tablets ( 40 Mg total) by mouth daily, Disp: 180 capsule, Rfl: 0    loratadine (CLARITIN) 10 mg tablet, Take 1 tablet (10 mg total) by mouth daily (Patient not taking: Reported on 2024), Disp: 90 tablet, Rfl: 1  Patient Active Problem List    Diagnosis Date Noted    Encounter for gynecological examination 2024    Proteinuria 2024    Neuropathy 2023    Anxiety about health 2023    Polycythemia 08/15/2023    Chest pain 2023    Thyroid nodule 2022    H/O autologous stem cell transplant (HCC) 2022    Amyloidosis (HCC) 2022    Encounter for central line care 2022    Palliative care patient 2022    Continuous opioid dependence (HCC) 2022    Multiple myeloma in remission (HCC) 12/15/2021    Encounter for examination following treatment at hospital 12/15/2021    Cancer related pain 12/15/2021    Cigarette nicotine dependence without complication 10/30/2021    Chronic bilateral low back pain with bilateral sciatica 2020    Hypertension 2018       No Known Allergies    OB History    Para Term  AB Living   5 2 2   3 2   SAB IAB Ectopic Multiple Live Births   2       2      # Outcome Date GA Lbr Shane/2nd Weight Sex Delivery Anes PTL Lv   5 Term      CS-Unspec   DOUGLAS   4 Term      CS-Unspec   DOUGLAS   3 AB    "         2 SAB            1 SAB                Vitals:    04/09/24 1109   BP: 100/70   BP Location: Left arm   Patient Position: Sitting   Weight: 83 kg (183 lb)   Height: 5' 3\" (1.6 m)     Body mass index is 32.42 kg/m².    Review of Systems   Constitutional:  Negative for chills and fever.   Gastrointestinal:  Negative for abdominal pain, constipation, diarrhea, nausea and vomiting.   Genitourinary:  Negative for difficulty urinating, dysuria, frequency, genital sores, hematuria, pelvic pain, urgency, vaginal bleeding, vaginal discharge and vaginal pain.   Musculoskeletal:  Negative for back pain and myalgias.   Skin:  Negative for pallor and rash.   Neurological:  Negative for headaches.   Hematological:  Negative for adenopathy.   Psychiatric/Behavioral:  Negative for dysphoric mood.        Physical Exam  Constitutional:       General: She is not in acute distress.     Appearance: Normal appearance. She is not ill-appearing.   Genitourinary:      No lesions in the vagina.      Right Labia: No rash, tenderness, lesions or skin changes.     Left Labia: No tenderness, lesions, skin changes or rash.     No inguinal adenopathy present in the right or left side.     No vaginal discharge, erythema, tenderness, bleeding or ulceration.        Right Adnexa: not tender, not full and no mass present.     Left Adnexa: not tender, not full and no mass present.     No cervical motion tenderness, discharge, friability, lesion, polyp or nabothian cyst.      Uterus is not enlarged, fixed or tender.      No uterine mass detected.     No urethral prolapse, tenderness or discharge present.      Bladder is not tender and urgency on palpation not present.       Pelvic exam was performed with patient in the lithotomy position.   Breasts:     Right: No swelling, inverted nipple, mass, nipple discharge, skin change or tenderness.      Left: No swelling, inverted nipple, mass, nipple discharge, skin change or tenderness.   HENT:      " Head: Normocephalic and atraumatic.   Eyes:      Conjunctiva/sclera: Conjunctivae normal.   Pulmonary:      Effort: Pulmonary effort is normal.   Abdominal:      General: There is no distension.      Palpations: Abdomen is soft.      Tenderness: There is no abdominal tenderness.   Musculoskeletal:         General: Normal range of motion.      Cervical back: Neck supple.   Lymphadenopathy:      Upper Body:      Right upper body: No supraclavicular or axillary adenopathy.      Left upper body: No supraclavicular or axillary adenopathy.      Lower Body: No right inguinal adenopathy. No left inguinal adenopathy.   Neurological:      Mental Status: She is alert and oriented to person, place, and time.   Skin:     General: Skin is warm and dry.   Psychiatric:         Mood and Affect: Mood normal.         Behavior: Behavior normal.         Thought Content: Thought content normal.         Judgment: Judgment normal.   Vitals and nursing note reviewed.

## 2024-04-10 ENCOUNTER — OFFICE VISIT (OUTPATIENT)
Dept: OBGYN CLINIC | Facility: CLINIC | Age: 54
End: 2024-04-10
Payer: COMMERCIAL

## 2024-04-10 VITALS
DIASTOLIC BLOOD PRESSURE: 84 MMHG | SYSTOLIC BLOOD PRESSURE: 132 MMHG | BODY MASS INDEX: 31.5 KG/M2 | HEART RATE: 74 BPM | WEIGHT: 177.8 LBS | HEIGHT: 63 IN

## 2024-04-10 DIAGNOSIS — M65.312 TRIGGER FINGER OF LEFT THUMB: ICD-10-CM

## 2024-04-10 DIAGNOSIS — R20.0 BILATERAL HAND NUMBNESS: ICD-10-CM

## 2024-04-10 DIAGNOSIS — M65.311 TRIGGER FINGER OF RIGHT THUMB: Primary | ICD-10-CM

## 2024-04-10 DIAGNOSIS — M65.321 TRIGGER INDEX FINGER OF RIGHT HAND: ICD-10-CM

## 2024-04-10 DIAGNOSIS — M65.322 TRIGGER INDEX FINGER OF LEFT HAND: ICD-10-CM

## 2024-04-10 DIAGNOSIS — M25.649 STIFFNESS OF FINGER JOINT, UNSPECIFIED LATERALITY: ICD-10-CM

## 2024-04-10 DIAGNOSIS — M79.641 PAIN IN BOTH HANDS: ICD-10-CM

## 2024-04-10 DIAGNOSIS — M79.642 PAIN IN BOTH HANDS: ICD-10-CM

## 2024-04-10 LAB
HPV HR 12 DNA CVX QL NAA+PROBE: NEGATIVE
HPV16 DNA CVX QL NAA+PROBE: NEGATIVE
HPV18 DNA CVX QL NAA+PROBE: NEGATIVE

## 2024-04-10 PROCEDURE — 99244 OFF/OP CNSLTJ NEW/EST MOD 40: CPT | Performed by: SURGERY

## 2024-04-10 PROCEDURE — 20550 NJX 1 TENDON SHEATH/LIGAMENT: CPT | Performed by: SURGERY

## 2024-04-10 RX ORDER — DEXAMETHASONE SODIUM PHOSPHATE 10 MG/ML
40 INJECTION, SOLUTION INTRAMUSCULAR; INTRAVENOUS
Status: COMPLETED | OUTPATIENT
Start: 2024-04-10 | End: 2024-04-10

## 2024-04-10 RX ORDER — LIDOCAINE HYDROCHLORIDE 10 MG/ML
1 INJECTION, SOLUTION INFILTRATION; PERINEURAL
Status: COMPLETED | OUTPATIENT
Start: 2024-04-10 | End: 2024-04-10

## 2024-04-10 RX ADMIN — LIDOCAINE HYDROCHLORIDE 1 ML: 10 INJECTION, SOLUTION INFILTRATION; PERINEURAL at 09:45

## 2024-04-10 RX ADMIN — DEXAMETHASONE SODIUM PHOSPHATE 40 MG: 10 INJECTION, SOLUTION INTRAMUSCULAR; INTRAVENOUS at 09:45

## 2024-04-10 NOTE — PROGRESS NOTES
Irene Mera M.D.  Attending, Orthopaedic Surgery  Hand, Wrist, and Elbow Surgery  [facility]      ORTHOPAEDIC HAND, WRIST, AND ELBOW OFFICE  VISIT       ASSESSMENT/PLAN:      53 y.o. female with bilateral thumb and index trigger fingers, known neuropathy, possible carpal tunnel and cubital tunnel syndrome    The etiology of above diagnosis was discussed along with treatment options. It was discussed with Marla that I do not think she flexing her fingers far enough to engage locking. A referral was provided to OT for stretching and ROM exercises. As her right hand symptoms are worse then her left, the decision was made to proceed with right thumb and index finger trigger finger CSI's. The CSI's were performed in the office without complication. Post injection instructions/expectations were reviewed. She has known neuropathy, may also have a component of carpal tunnel syndrome and possible cubital tunnel syndrome, ultrasounds of bilateral wrists and bilateral elbows were ordered to further evaluate for nerve compression. I will see her back in the office in 6 weeks time, at which time repeat trigger finger CSI's may be performed if the 1st was beneficial for her.     The patient verbalized understanding of exam findings and treatment plan. We engaged in the shared decision-making process and treatment options were discussed at length with the patient. Surgical and conservative management discussed today along with risks and benefits.    Diagnoses and all orders for this visit:    Trigger finger of right thumb  -     Ambulatory Referral to Orthopedic Surgery  -     Ambulatory Referral to PT/OT Hand Therapy; Future  -     Hand/upper extremity injection: R thumb A1    Trigger index finger of right hand  -     Ambulatory Referral to PT/OT Hand Therapy; Future  -     Hand/upper extremity injection: R index A1    Trigger finger of left thumb    Trigger index finger of left hand    Bilateral  hand numbness  -     US MSK limited; Future  -     US MSK limited; Future    Pain in both hands    Stiffness of finger joint, unspecified laterality  -     Ambulatory Referral to PT/OT Hand Therapy; Future      Follow Up:  Return in about 6 weeks (around 5/22/2024).    To Do Next Visit:  Re-evaluation of current issue      General Discussions:  Carpal Tunnel Syndrome: The anatomy and physiology of carpal tunnel syndrome was discussed with the patient today.  Increase pressure localized under the transverse carpal ligament can cause pain, numbness, tingling, or dysesthesias within the median nerve distribution as well as feelings of fatigue, clumsiness, or awkwardness.  These symptoms typically occur at night and worse in the morning upon waking.  Eventually, untreated carpal tunnel syndrome can result in weakness and permanent loss of muscle within the thenar compartment of the hand.  Treatment options were discussed with the patient.  Conservative treatment includes nocturnal resting splints to keep the nerve in a neutral position, ergonomic changes within the work or home environment, activity modification, and tendon gliding exercises. Vitamin B6 one tablet daily over the counter may helpful to reduce symptoms.   Steroid injections within the carpal canal can help a majority of patients, however this is often self-limited in a majority of patients.  Surgical intervention to divide the transverse carpal ligament typically results in a long-lasting relief of the patient's complaints, with the recurrence rate of less than 1%.                                                                                                                                                                                 Cubital Tunnel Syndrome: The anatomy and physiology of cubital tunnel syndrome were discussed with the patient today in the office.  Typically, increased elbow flexion activities decrease blood flow within the intraneural  spaces, resulting in a feeling of numbness, tingling, weakness, or clumsiness within the hand and fingers.  Occasionally, anatomic structures such as medial elbow osteophytes, the medial head of the triceps, were subluxing ulnar nerve may result in increased pressure or aggravation at the cubital tunnel.  Typical signs and symptoms usually include numbness and tingling within the ring and small finger, weakness with , and weakness with pinch.  Conservative treatment and includes nocturnal bracing to keep the elbow in a semi-extended position, activity modification, therapy, and avoiding excessive elbow flexion activities.  Vitamin B6 one tablet daily over the counter may helpful to reduce symptoms.  A majority of patients typically respond to conservative treatment over a period of approximately 3-6 months.  EMG/NCV testing of the ulnar nerve at the elbow is not as reliable as carpal tunnel syndrome.  Surgical intervention in the form of in situ release of the ulnar nerve at the elbow or ulnar nerve transposition may be required in up to 20% of patients.   Trigger Finger: The anatomy and physiology of trigger finger was discussed with the patient today in the office.  Edema and increased contact pressure within the flexor tendons at the A1 pulley can cause pain, crepitation, and triggering or locking of the digit resulting in limitation of function.  Symptoms can occur at anytime but are typically worse in the morning or after a brief rest from repetitive activity.  Treatment options include resting/nighttime MP blocking splints to decrease edema, oral anti-inflammatory medications, home or formal therapy exercises, up to 2 steroid injections within the tendon sheath, or surgical release.  While majority of patients do respond to conservative treatment, up to 20% may require surgical release.      ____________________________________________________________________________________________________________________________________________      CHIEF COMPLAINT:  Chief Complaint   Patient presents with    Right Hand - Triggering     Thumb and index on both hands. This has been going on for about a year    Left Hand - Triggering       SUBJECTIVE:  Marla Negron is a 53 y.o. year old LHD female who presents to the office for evaluation of both hands. I am seeing Marla in consultation at the request of Dr. Maxine Dickinson. She notes bilateral index and thumbs have been clicking, catching and locking for approx. 1 year. When locking occurs, she has her granddaughter manually unlock her fingers. She also now notes decreased ROM to her thumbs and index finger. Symptoms are worse for her on the right. She is not currently taking anything for pain control. Marla notes that she has known neuropathy to her hands and feet. She notes a burning sensation as well as numbness and tingling to all of her fingers bilaterally.      Pain/symptom timing:  Worse during the day when active  Pain/symptom context:  Worse with activites and work  Pain/symptom modifying factors:  Rest makes better, activities make worse  Pain/symptom associated signs/symptoms: none    Prior treatment   NSAIDsNo   Injections No   Bracing/Orthotics No    Physical Therapy No     I have personally reviewed all the relevant PMH, PSH, SH, FH, Medications and allergies      PAST MEDICAL HISTORY:  Past Medical History:   Diagnosis Date    Anemia     Cardiomyopathy (HCC)     Chronic back pain     Chronic narcotic dependence (HCC)     Colon polyp     Disease of thyroid gland     GERD (gastroesophageal reflux disease)     H/O autologous stem cell transplant (HCC) 2022    Hypertension     Multiple myeloma (HCC)     in remission    Neuropathy        PAST SURGICAL HISTORY:  Past Surgical History:   Procedure Laterality Date     SECTION       COLONOSCOPY      EYE SURGERY Left     biopsy    THYROID LOBECTOMY Left 2/8/2023    Procedure: LEFT HEMITHYROIDECTOMY;  Surgeon: José Manuel Galarza MD;  Location: [facility];  Service: Surgical Oncology    US GUIDED THYROID BIOPSY  10/20/2022       FAMILY HISTORY:  Family History   Problem Relation Age of Onset    No Known Problems Mother     No Known Problems Father     Colon cancer Neg Hx     Diabetes Neg Hx     Breast cancer Neg Hx     Cervical cancer Neg Hx     Ovarian cancer Neg Hx     Uterine cancer Neg Hx        SOCIAL HISTORY:  Social History     Tobacco Use    Smoking status: Heavy Smoker     Current packs/day: 0.50     Average packs/day: 0.5 packs/day for 31.3 years (15.7 ttl pk-yrs)     Types: Cigarettes     Start date: 12/18/1992     Passive exposure: Current    Smokeless tobacco: Current   Vaping Use    Vaping status: Never Used   Substance Use Topics    Alcohol use: Not Currently    Drug use: Not Currently     Types: Oxycodone     Comment: 1 year       MEDICATIONS:    Current Outpatient Medications:     acyclovir (ZOVIRAX) 800 mg tablet, Take 1 tablet (800 mg total) by mouth 2 (two) times a day, Disp: 180 tablet, Rfl: 0    albuterol (PROVENTIL HFA,VENTOLIN HFA) 90 mcg/act inhaler, Inhale 2 puffs every 6 (six) hours as needed for wheezing or shortness of breath, Disp: 18 g, Rfl: 11    amLODIPine (NORVASC) 10 mg tablet, Take 1 tablet (10 mg total) by mouth every morning, Disp: 90 tablet, Rfl: 1    DULoxetine (CYMBALTA) 20 mg capsule, Take 2 tablets ( 40 Mg total) by mouth daily, Disp: 180 capsule, Rfl: 0    famotidine (PEPCID) 40 MG tablet, Take 0.5 tablets (20 mg total) by mouth 2 (two) times a day, Disp: 90 tablet, Rfl: 0    fluticasone (FLONASE) 50 mcg/act nasal spray, 2 sprays into each nostril daily, Disp: 48 g, Rfl: 0    loratadine (CLARITIN) 10 mg tablet, Take 1 tablet (10 mg total) by mouth daily (Patient not taking: Reported on 4/9/2024), Disp: 90 tablet, Rfl: 1    methocarbamol (Robaxin-750) 750 mg  tablet, Take 1 tablet (750 mg total) by mouth every 6 (six) hours as needed for muscle spasms, Disp: 360 tablet, Rfl: 0    metoprolol succinate (TOPROL-XL) 25 mg 24 hr tablet, Take 1 tablet (25 mg total) by mouth daily, Disp: 90 tablet, Rfl: 1    naloxone (NARCAN) 4 mg/0.1 mL nasal spray, Administer 1 spray into a nostril. If no response after 2-3 minutes, give another dose in the other nostril using a new spray., Disp: 1 each, Rfl: 0    NON FORMULARY, -Balance of Nature vegetable and fruit supplements, Disp: , Rfl:     oxyCODONE (ROXICODONE) 5 immediate release tablet, Take 1-2 tablets (5-10 mg total) by mouth every 4 (four) hours as needed for moderate pain or severe pain (cancer-related pain) for up to 15 days Max Daily Amount: 35 mg, Disp: 105 tablet, Rfl: 0    pantoprazole (PROTONIX) 40 mg tablet, Take 1 tablet (40 mg total) by mouth daily, Disp: 90 tablet, Rfl: 1    pregabalin (LYRICA) 100 mg capsule, Take 1 capsule (100 mg total) by mouth 2 (two) times a day, Disp: 360 capsule, Rfl: 0    ALLERGIES:  No Known Allergies        REVIEW OF SYSTEMS:  Review of Systems   Constitutional:  Negative for chills, fever and unexpected weight change.   HENT:  Negative for hearing loss, nosebleeds and sore throat.    Eyes:  Negative for pain, redness and visual disturbance.   Respiratory:  Negative for cough, shortness of breath and wheezing.    Cardiovascular:  Negative for chest pain, palpitations and leg swelling.   Gastrointestinal:  Negative for abdominal pain, nausea and vomiting.   Endocrine: Negative for polydipsia and polyuria.   Genitourinary:  Negative for difficulty urinating and hematuria.   Musculoskeletal:  Negative for arthralgias, joint swelling and myalgias.   Skin:  Negative for rash and wound.   Neurological:  Positive for numbness. Negative for dizziness and headaches.   Psychiatric/Behavioral:  Negative for decreased concentration, dysphoric mood and suicidal ideas. The patient is not nervous/anxious.   "      VITALS:  Vitals:    04/10/24 0954   BP: 132/84   Pulse: 74       LABS:      _____________________________________________________  PHYSICAL EXAMINATION:  General: well developed and well nourished, alert, oriented times 3, and appears comfortable  Psychiatric: Normal  HEENT: Normocephalic, Atraumatic Trachea Midline, No torticollis  Pulmonary: No audible wheezing or respiratory distress   Abdomen/GI: Non tender, non distended   Cardiovascular: No pitting edema, 2+ radial pulse   Skin: No masses, erythema, lacerations, fluctation, ulcerations  Neurovascular: Sensation Intact to the Median, Ulnar, Radial Nerve, Motor Intact to the Median, Ulnar, Radial Nerve, and Pulses Intact  Musculoskeletal: Normal, except as noted in detailed exam and in HPI.      MUSCULOSKELETAL EXAMINATION:    right index finger:  Negative palpable nodule over the A1 pulley.  Negative tenderness to palpation over A1 pulley. Negative catching. Negative clicking. Positive crepitus. Limited flexion, unable to engage locking.     right thumb:  Negative palpable nodule over the A1 pulley.  Negative tenderness to palpation over A1 pulley. Negative catching. Negative clicking. Limited flexion, unable to engage locking.     Bilateral Carpal Tunnel Exam:  Negative thenar atrophy. Negative phalen's test. Negative carpal tunnel compression. Positive tinels over median nerve at the wrist.  Opposition strength 5/5.  Abduction strength 5/5.      Bilateral Ulnar Nerve Exam:  Negative intrinsic atrophy.  Negative  deformity at the elbow.   Full range of motion with flexion and extension of the elbow.   Negative ulnar nerve compression test at the elbow. Negative tinels over the ulnar nerve at the elbow.  Negative cross finger test in the index and long fingers.       ___________________________________________________  STUDIES REVIEWED:  No imaging to review     LABS REVIEWED:    HgA1c: No results found for: \"HGBA1C\"  BMP:   Lab Results   Component Value " "Date    CALCIUM 9.5 02/28/2024    K 3.8 02/28/2024    CO2 27 02/28/2024     02/28/2024    BUN 14 02/28/2024    CREATININE 1.56 (H) 02/28/2024               PROCEDURES PERFORMED:  Hand/upper extremity injection: R thumb A1  Universal Protocol:  Consent: Verbal consent obtained. Written consent not obtained.  Risks and benefits: risks, benefits and alternatives were discussed  Consent given by: patient  Time out: Immediately prior to procedure a \"time out\" was called to verify the correct patient, procedure, equipment, support staff and site/side marked as required.  Patient understanding: patient states understanding of the procedure being performed  Site marked: the operative site was marked  Patient identity confirmed: verbally with patient  Supporting Documentation  Indications: pain   Procedure Details  Condition:trigger finger Location: thumb - R thumb A1   Preparation: Patient was prepped and draped in the usual sterile fashion  Needle size: 25 G  Ultrasound guidance: no  Medications administered: 1 mL lidocaine 1 %; 40 mg dexamethasone 100 mg/10 mL  Patient tolerance: patient tolerated the procedure well with no immediate complications  Dressing:  Sterile dressing applied       Hand/upper extremity injection: R index A1  Universal Protocol:  Consent: Verbal consent obtained. Written consent not obtained.  Risks and benefits: risks, benefits and alternatives were discussed  Consent given by: patient  Time out: Immediately prior to procedure a \"time out\" was called to verify the correct patient, procedure, equipment, support staff and site/side marked as required.  Patient understanding: patient states understanding of the procedure being performed  Site marked: the operative site was marked  Patient identity confirmed: verbally with patient  Supporting Documentation  Indications: pain   Procedure Details  Condition:trigger finger Location: index finger - R index A1   Preparation: Patient was prepped and " draped in the usual sterile fashion  Needle size: 25 G  Ultrasound guidance: no  Medications administered: 1 mL lidocaine 1 %; 40 mg dexamethasone 100 mg/10 mL  Patient tolerance: patient tolerated the procedure well with no immediate complications  Dressing:  Sterile dressing applied            _____________________________________________________      Scribe Attestation      I,:  Tessie Borges am acting as a scribe while in the presence of the attending physician.:       I,:  Irene Mera MD personally performed the services described in this documentation    as scribed in my presence.:

## 2024-04-16 LAB
LAB AP GYN PRIMARY INTERPRETATION: NORMAL
Lab: NORMAL

## 2024-04-17 ENCOUNTER — CONSULT (OUTPATIENT)
Dept: NEUROLOGY | Facility: CLINIC | Age: 54
End: 2024-04-17
Payer: COMMERCIAL

## 2024-04-17 VITALS
BODY MASS INDEX: 33.52 KG/M2 | SYSTOLIC BLOOD PRESSURE: 110 MMHG | HEART RATE: 69 BPM | DIASTOLIC BLOOD PRESSURE: 80 MMHG | HEIGHT: 63 IN | WEIGHT: 189.2 LBS

## 2024-04-17 DIAGNOSIS — C90.01 MULTIPLE MYELOMA IN REMISSION (HCC): Primary | Chronic | ICD-10-CM

## 2024-04-17 DIAGNOSIS — G62.9 NEUROPATHY: ICD-10-CM

## 2024-04-17 PROCEDURE — 99244 OFF/OP CNSLTJ NEW/EST MOD 40: CPT | Performed by: PSYCHIATRY & NEUROLOGY

## 2024-04-17 NOTE — PROGRESS NOTES
Marla Negron is a 53 y.o. female.   Chief Complaint   Patient presents with    Peripheral Neuropathy       Assessment:  1. Multiple myeloma in remission (HCC)    2. Neuropathy          Plan:  Refer to Dr. Martinez  neuromuscular specialist.    Discussion:  Patient's prior records and EMG results were reviewed, she has history of multiple myeloma in remission status postchemotherapy and stem cell transplantation but her symptoms of neuropathy according to her have started even prior to that, patient is on Lyrica 200 mg a day which technically can be increased to a little higher dose of the Cymbalta can be increased but given her severe pain and sensitivity to touch I am going to have her see one of our neuromuscular specialist possibilities could be that this is idiopathic neuropathy/small fiber neuropathy that has gotten worse with chemotherapy, she has had an extensive workup in the past, she was advised to keep her blood pressure cholesterol sugar under control, to take fall and safety precautions, continue follow-up with her pain specialist and other physicians including her oncology, to go to the hospital if has any worsening symptoms and call me, she could either follow-up with the neuromuscular clinic or if the feel that there is nothing much that they can offer her other than medication management and she could follow-up with me she will decide and let us know we have not made follow-up appointment to see me.    Subjective:    HPI   53-year-old female who is here for evaluation of neuropathy, with history of multiple myeloma in remission status postchemotherapy and stem cell transplantation, she has history of neuropathy at least for the last 4 years she was seen by neurologist in [place] and prior to that was evaluated and ruled out for MS, she has a history of cardiomyopathy and does follow-up with cardiology.    She has history of chronic numbness and tingling and burning pain in her bilateral  "upper and lower extremity and has been getting progressively worse overall.  Of time she rates is anywhere between 6-10 on 10, she is very sensitive to touch in the lower extremities she denies having any symptoms in the face she does not feel that her balance is off she has not had any falls she denies any reports of dizziness she has history of chronic neck pain and has been evaluated by orthopedics and does have some arthritis, she has been ruled out for myasthenia gravis in the past and has been treated for left orbital preseptal and postseptal cellulitis with antibiotics currently denies any visual symptoms, no history of alcohol use no bowel or bladder incontinence her appetite is good weight has been stable, she has been maintained on Lyrica 100 mg twice a day and Cymbalta 20 mg 2 tablets a day and also takes Robaxin as needed and oxycodone.    Her EMG from 10/4/2021 has been reported as mild to moderate generalized sensorimotor axonal peripheral neuropathy of the left upper and left lower extremity    Vitals:    04/17/24 1509   BP: 110/80   BP Location: Right arm   Patient Position: Sitting   Cuff Size: Large   Pulse: 69   Weight: 85.8 kg (189 lb 3.2 oz)   Height: 5' 3\" (1.6 m)       Current Medications    Current Outpatient Medications:     acyclovir (ZOVIRAX) 800 mg tablet, Take 1 tablet (800 mg total) by mouth 2 (two) times a day, Disp: 180 tablet, Rfl: 0    albuterol (PROVENTIL HFA,VENTOLIN HFA) 90 mcg/act inhaler, Inhale 2 puffs every 6 (six) hours as needed for wheezing or shortness of breath, Disp: 18 g, Rfl: 11    amLODIPine (NORVASC) 10 mg tablet, Take 1 tablet (10 mg total) by mouth every morning, Disp: 90 tablet, Rfl: 1    DULoxetine (CYMBALTA) 20 mg capsule, Take 2 tablets ( 40 Mg total) by mouth daily, Disp: 180 capsule, Rfl: 0    famotidine (PEPCID) 40 MG tablet, Take 0.5 tablets (20 mg total) by mouth 2 (two) times a day, Disp: 90 tablet, Rfl: 0    fluticasone (FLONASE) 50 mcg/act nasal spray, " 2 sprays into each nostril daily, Disp: 48 g, Rfl: 0    methocarbamol (Robaxin-750) 750 mg tablet, Take 1 tablet (750 mg total) by mouth every 6 (six) hours as needed for muscle spasms, Disp: 360 tablet, Rfl: 0    metoprolol succinate (TOPROL-XL) 25 mg 24 hr tablet, Take 1 tablet (25 mg total) by mouth daily, Disp: 90 tablet, Rfl: 1    naloxone (NARCAN) 4 mg/0.1 mL nasal spray, Administer 1 spray into a nostril. If no response after 2-3 minutes, give another dose in the other nostril using a new spray., Disp: 1 each, Rfl: 0    NON FORMULARY, -Balance of Nature vegetable and fruit supplements, Disp: , Rfl:     oxyCODONE (ROXICODONE) 5 immediate release tablet, Take 1-2 tablets (5-10 mg total) by mouth every 4 (four) hours as needed for moderate pain or severe pain (cancer-related pain) for up to 15 days Max Daily Amount: 35 mg, Disp: 105 tablet, Rfl: 0    pantoprazole (PROTONIX) 40 mg tablet, Take 1 tablet (40 mg total) by mouth daily, Disp: 90 tablet, Rfl: 1    pregabalin (LYRICA) 100 mg capsule, Take 1 capsule (100 mg total) by mouth 2 (two) times a day, Disp: 360 capsule, Rfl: 0    loratadine (CLARITIN) 10 mg tablet, Take 1 tablet (10 mg total) by mouth daily (Patient not taking: Reported on 2024), Disp: 90 tablet, Rfl: 1      Allergies  Patient has no known allergies.    Past Medical History  Past Medical History:   Diagnosis Date    Anemia     Cardiomyopathy (HCC)     Chronic back pain     Chronic narcotic dependence (HCC)     Colon polyp     Disease of thyroid gland     GERD (gastroesophageal reflux disease)     H/O autologous stem cell transplant (HCC) 2022    Hypertension     Multiple myeloma (HCC)     in remission    Neuropathy          Past Surgical History:  Past Surgical History:   Procedure Laterality Date     SECTION      COLONOSCOPY      EYE SURGERY Left     biopsy    THYROID LOBECTOMY Left 2023    Procedure: LEFT HEMITHYROIDECTOMY;  Surgeon: José Manuel Galarza MD;  Location: [facility]  OR;  Service: Surgical Oncology    US GUIDED THYROID BIOPSY  10/20/2022         Family History:  Family History   Problem Relation Age of Onset    No Known Problems Mother     No Known Problems Father     Colon cancer Neg Hx     Diabetes Neg Hx     Breast cancer Neg Hx     Cervical cancer Neg Hx     Ovarian cancer Neg Hx     Uterine cancer Neg Hx        Social History:   reports that she has been smoking cigarettes. She started smoking about 31 years ago. She has a 15.7 pack-year smoking history. She has been exposed to tobacco smoke. She uses smokeless tobacco. She reports that she does not currently use alcohol. She reports that she does not currently use drugs after having used the following drugs: Oxycodone.    I have reviewed the past medical history, surgical history, social and family history, current medications, allergies vitals, review of systems, and updated this information as appropriate today.   Objective:    Physical Exam    Neurological Exam       GENERAL:  Cooperative in no acute distress. Well-developed and well-nourished     HEAD and NECK   Head is atraumatic normocephalic with no lesions or masses. Neck is supple with full range of motion     CARDIOVASCULAR  Carotid Arteries-no carotid bruits.     NEUROLOGIC:  Mental Status-the patient is awake alert and oriented without aphasia or apraxia  Cranial Nerves: Visual fields are full to confrontation.  Extraocular movements are full without nystagmus. Pupils are 2-1/2 mm and reactive. Face is symmetrical to light touch. Movements of facial expression move symmetrically. Hearing is normal to finger rub bilaterally. Soft palate lifts symmetrically. Shoulder shrug is symmetrical. Tongue is midline without atrophy.  Motor: No drift is noted on arm extension. Strength is full in the upper and lower extremities with normal bulk and tone.  Sensory: Decreased light touch pinprick temperature sensation in a glove and stocking distribution patient is very  sensitive to touch and slightly limited exam cortical function is intact.  Coordination: Finger to nose testing is performed accurately. Romberg is negative. Gait reveals a normal base with symmetrical arm swing. Tandem walk is normal.  Reflexes:      2+ and symmetrical toes are downgoing.  No spine tenderness.  ROS:  Review of Systems   Constitutional:  Negative for appetite change, fatigue and fever.   HENT: Negative.  Negative for hearing loss, tinnitus, trouble swallowing and voice change.    Eyes: Negative.  Negative for photophobia, pain and visual disturbance.   Respiratory: Negative.  Negative for shortness of breath.    Cardiovascular: Negative.  Negative for palpitations.   Gastrointestinal: Negative.  Negative for nausea and vomiting.   Endocrine: Negative.  Negative for cold intolerance.   Genitourinary: Negative.  Negative for dysuria, frequency and urgency.   Musculoskeletal:  Negative for back pain, gait problem, myalgias, neck pain and neck stiffness.   Skin: Negative.  Negative for rash.   Allergic/Immunologic: Negative.    Neurological:  Positive for numbness. Negative for dizziness, tremors, seizures, syncope, facial asymmetry, speech difficulty, weakness, light-headedness and headaches.   Hematological: Negative.  Does not bruise/bleed easily.   Psychiatric/Behavioral: Negative.  Negative for confusion, hallucinations and sleep disturbance.

## 2024-04-18 DIAGNOSIS — G89.3 CANCER ASSOCIATED PAIN: ICD-10-CM

## 2024-04-18 DIAGNOSIS — Z51.5 PALLIATIVE CARE PATIENT: ICD-10-CM

## 2024-04-18 DIAGNOSIS — C90.00 MULTIPLE MYELOMA NOT HAVING ACHIEVED REMISSION (HCC): ICD-10-CM

## 2024-04-18 RX ORDER — OXYCODONE HYDROCHLORIDE 5 MG/1
5-10 TABLET ORAL EVERY 4 HOURS PRN
Qty: 105 TABLET | Refills: 0 | Status: SHIPPED | OUTPATIENT
Start: 2024-04-19 | End: 2024-05-02 | Stop reason: SDUPTHER

## 2024-04-19 ENCOUNTER — TELEPHONE (OUTPATIENT)
Dept: NEUROLOGY | Facility: CLINIC | Age: 54
End: 2024-04-19

## 2024-04-19 NOTE — TELEPHONE ENCOUNTER
Per Dr. Sheets Notes: Plan:  Refer to Dr. Martinez  neuromuscular specialist.   Patient is now scheduled with Dr. Martinez [place]- 6/27/24 at 10 am.  Added to wait list.

## 2024-04-26 ENCOUNTER — APPOINTMENT (OUTPATIENT)
Dept: LAB | Facility: HOSPITAL | Age: 54
End: 2024-04-26
Payer: COMMERCIAL

## 2024-04-26 ENCOUNTER — HOSPITAL ENCOUNTER (OUTPATIENT)
Dept: RADIOLOGY | Facility: HOSPITAL | Age: 54
End: 2024-04-26
Payer: COMMERCIAL

## 2024-04-26 DIAGNOSIS — R76.8 ELEVATED RHEUMATOID FACTOR: ICD-10-CM

## 2024-04-26 DIAGNOSIS — G62.9 NEUROPATHY: ICD-10-CM

## 2024-04-26 DIAGNOSIS — M79.672 BILATERAL FOOT PAIN: ICD-10-CM

## 2024-04-26 DIAGNOSIS — M79.671 BILATERAL FOOT PAIN: ICD-10-CM

## 2024-04-26 DIAGNOSIS — M79.641 BILATERAL HAND PAIN: ICD-10-CM

## 2024-04-26 DIAGNOSIS — M79.642 BILATERAL HAND PAIN: ICD-10-CM

## 2024-04-26 LAB — VIT B12 SERPL-MCNC: 323 PG/ML (ref 180–914)

## 2024-04-26 PROCEDURE — 84207 ASSAY OF VITAMIN B-6: CPT

## 2024-04-26 PROCEDURE — 73130 X-RAY EXAM OF HAND: CPT

## 2024-04-26 PROCEDURE — 36415 COLL VENOUS BLD VENIPUNCTURE: CPT

## 2024-04-26 PROCEDURE — 82607 VITAMIN B-12: CPT

## 2024-04-26 PROCEDURE — 73630 X-RAY EXAM OF FOOT: CPT

## 2024-04-26 PROCEDURE — 84425 ASSAY OF VITAMIN B-1: CPT

## 2024-04-30 ENCOUNTER — HOSPITAL ENCOUNTER (OUTPATIENT)
Dept: ULTRASOUND IMAGING | Facility: HOSPITAL | Age: 54
Discharge: HOME | End: 2024-04-30
Payer: COMMERCIAL

## 2024-04-30 DIAGNOSIS — M79.641 BILATERAL HAND PAIN: ICD-10-CM

## 2024-04-30 DIAGNOSIS — M79.642 BILATERAL HAND PAIN: ICD-10-CM

## 2024-04-30 DIAGNOSIS — M79.671 BILATERAL FOOT PAIN: ICD-10-CM

## 2024-04-30 DIAGNOSIS — M79.672 BILATERAL FOOT PAIN: ICD-10-CM

## 2024-04-30 LAB — VIT B1 BLD-SCNC: 196.1 NMOL/L (ref 66.5–200)

## 2024-04-30 PROCEDURE — 76882 US LMTD JT/FCL EVL NVASC XTR: CPT

## 2024-05-01 ENCOUNTER — TELEPHONE (OUTPATIENT)
Dept: RHEUMATOLOGY | Facility: CLINIC | Age: 54
End: 2024-05-01

## 2024-05-01 LAB — VIT B6 SERPL-MCNC: 12.7 UG/L (ref 3.4–65.2)

## 2024-05-01 NOTE — TELEPHONE ENCOUNTER
----- Message from Maxine Dickinson MD sent at 4/30/2024  4:04 PM EDT -----  Please let her know the x-rays and ultrasounds were all normal without any signs of rheumatoid arthritis.

## 2024-05-02 ENCOUNTER — OFFICE VISIT (OUTPATIENT)
Dept: PALLIATIVE MEDICINE | Facility: CLINIC | Age: 54
End: 2024-05-02
Payer: COMMERCIAL

## 2024-05-02 VITALS
OXYGEN SATURATION: 95 % | RESPIRATION RATE: 18 BRPM | DIASTOLIC BLOOD PRESSURE: 78 MMHG | WEIGHT: 174 LBS | HEIGHT: 63 IN | HEART RATE: 79 BPM | TEMPERATURE: 98.3 F | SYSTOLIC BLOOD PRESSURE: 128 MMHG | BODY MASS INDEX: 30.83 KG/M2

## 2024-05-02 DIAGNOSIS — Z51.5 PALLIATIVE CARE PATIENT: ICD-10-CM

## 2024-05-02 DIAGNOSIS — R45.89 ANXIETY ABOUT HEALTH: ICD-10-CM

## 2024-05-02 DIAGNOSIS — Z94.84 H/O AUTOLOGOUS STEM CELL TRANSPLANT (HCC): ICD-10-CM

## 2024-05-02 DIAGNOSIS — E85.9 AMYLOIDOSIS, UNSPECIFIED TYPE (HCC): ICD-10-CM

## 2024-05-02 DIAGNOSIS — C90.00 MULTIPLE MYELOMA NOT HAVING ACHIEVED REMISSION (HCC): ICD-10-CM

## 2024-05-02 DIAGNOSIS — C90.01 MULTIPLE MYELOMA IN REMISSION (HCC): Primary | Chronic | ICD-10-CM

## 2024-05-02 DIAGNOSIS — G62.9 NEUROPATHY: ICD-10-CM

## 2024-05-02 DIAGNOSIS — G89.3 CANCER ASSOCIATED PAIN: ICD-10-CM

## 2024-05-02 DIAGNOSIS — M54.42 CHRONIC BILATERAL LOW BACK PAIN WITH BILATERAL SCIATICA: Chronic | ICD-10-CM

## 2024-05-02 DIAGNOSIS — M54.41 CHRONIC BILATERAL LOW BACK PAIN WITH BILATERAL SCIATICA: Chronic | ICD-10-CM

## 2024-05-02 DIAGNOSIS — G89.29 CHRONIC BILATERAL LOW BACK PAIN WITH BILATERAL SCIATICA: Chronic | ICD-10-CM

## 2024-05-02 PROCEDURE — 99214 OFFICE O/P EST MOD 30 MIN: CPT | Performed by: STUDENT IN AN ORGANIZED HEALTH CARE EDUCATION/TRAINING PROGRAM

## 2024-05-02 PROCEDURE — 3078F DIAST BP <80 MM HG: CPT | Performed by: STUDENT IN AN ORGANIZED HEALTH CARE EDUCATION/TRAINING PROGRAM

## 2024-05-02 PROCEDURE — 3074F SYST BP LT 130 MM HG: CPT | Performed by: STUDENT IN AN ORGANIZED HEALTH CARE EDUCATION/TRAINING PROGRAM

## 2024-05-02 RX ORDER — OXYCODONE HYDROCHLORIDE 5 MG/1
5-10 TABLET ORAL EVERY 4 HOURS PRN
Qty: 105 TABLET | Refills: 0 | Status: CANCELLED | OUTPATIENT
Start: 2024-05-02 | End: 2024-05-17

## 2024-05-02 RX ORDER — OXYCODONE HYDROCHLORIDE 5 MG/1
5-10 TABLET ORAL EVERY 4 HOURS PRN
Qty: 90 TABLET | Refills: 0 | Status: SHIPPED | OUTPATIENT
Start: 2024-05-02 | End: 2024-05-17

## 2024-05-02 NOTE — PROGRESS NOTES
Outpatient Follow-Up - Palliative and Supportive Care   Marla Negron 53 y.o. 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)    Multiple myeloma in remission (HCC) - Primary (Chronic)    Relevant Medications    oxyCODONE (ROXICODONE) 5 immediate release tablet    Palliative care patient    Relevant Medications    oxyCODONE (ROXICODONE) 5 immediate release tablet    H/O autologous stem cell transplant (HCC)    Amyloidosis (HCC)    Anxiety about health    Neuropathy     Other Visit Diagnoses       Cancer associated pain        Relevant Medications    oxyCODONE (ROXICODONE) 5 immediate release tablet    Multiple myeloma not having achieved remission (HCC)        Relevant Medications    oxyCODONE (ROXICODONE) 5 immediate release tablet          #symptom management  #cancer-related pain  #opioid taper   - continue APAP 1000 mg PO Q8H PRN              - max daily 4000 mg   - decrease oxy-IR 5-10 mg PO Q4H PRN              - max 30 mg per day     Continued opioid taper with plan to decrease 10-30% total daily opioids each 1-2 months, with overall goal to no daily opioid use. Discussed s/sxs of opioid withdrawal, including nausea, vomiting, diarrhea, rhinorrhea, yawning, piloerection, insomnia, agitation. All questions/concerns addressed.     #neuropathic pain   - continue pregabalin 100 mg PO BID   - continue duloxetine 40 mg PO QDaily   - continue lidocaine cream QID PRN    Managed per PCP     #muscle spasms   - continue methocarbamol 750 mg PO Q6H PRN     #OIC   - continue bowel regimen to prevent OIC   - adequate hydration     #anxiety/depression  #insomnia   - continue duloxetine 40 mg PO QDaily   - continue trazodone 50 mg PO QHS    #goals of care   - recent evaluation per Rheumatology, no e/o RA   - follow up with Neurology, 04/17, recommend follow up with Neuromuscular Specialist; per EMG 10/2021  mild/moderate generalized sensorimotor axonal peripheral neuropathy.   - continue surveillance for MM, post-SCT    #psychosocial support   - emotional support provided   - not currently    - two adult children              - Guerda [daughter]              - Richard Lagos [mother] [phone]   - one sibling              - Mayi [sister]   - two grandchildren [aged 5 and 3]      Next [facility] Follow up in 4 weeks.      Controlled Substance Review    [place] or [place]  reviewed: No red flags were identified; safe to proceed with prescription..    PDMP Review         Value Time User    PDMP Reviewed  Yes 5/2/2024  9:50 AM Willy Almaraz MD            Medications adjusted this encounter:  Requested Prescriptions     Signed Prescriptions Disp Refills    oxyCODONE (ROXICODONE) 5 immediate release tablet 90 tablet 0     Sig: Take 1-2 tablets (5-10 mg total) by mouth every 4 (four) hours as needed for moderate pain or severe pain (cancer-related pain) for up to 15 days Max Daily Amount: 30 mg     No orders of the defined types were placed in this encounter.      Medications Discontinued During This Encounter   Medication Reason    oxyCODONE (ROXICODONE) 5 immediate release tablet Reorder    loratadine (CLARITIN) 10 mg tablet Therapy completed         Marla Negron was seen today for symptoms and planning cares related to above illnesses.  I have reviewed the patient's controlled substance dispensing history in the Prescription Drug Monitoring Program in compliance with the [facility] regulations before prescribing any controlled substances.    They are invited to continue to follow with us.  If there are questions or concerns, please contact us through our clinic/answering service 24 hours a day, seven days a week.    Willy Almaraz MD  [facility]  [phone]      Visit Information    Accompanied By: No one    Source of History: Self, Medical record    History  "Limitations: None      History of Present Illness    Marla Negron is a 53 y.o. female who presents in follow up of symptoms related to multiple myeloma s/p PBSCT [03/30/2022]. Pertinent issues include: symptom management, assessment of goals of care, disease process education and discussion of prognosis, opioid taper.    Patient reports overall doing well, notes persistent peripheral neuropathy in hands/feet with no acute change. Continued use of duloxetine and pregabalin, per PCP management. Use of oxy-IR x 5-6 doses/day, no reported opioid-induced withdrawal. Denies nausea, vomiting. Appetite intact, weight fluctuates, overall stable. BM regular, daily. Sleep adequate.      Past medical, surgical, social, and family histories are reviewed and pertinent updates are made.    Review of Systems   Constitutional: Negative for chills, decreased appetite, fever, malaise/fatigue and weight loss.   HENT:  Negative for congestion.    Cardiovascular:  Negative for chest pain.   Respiratory:  Negative for shortness of breath.    Musculoskeletal:  Positive for myalgias. Negative for falls.   Gastrointestinal:  Negative for abdominal pain, constipation, nausea and vomiting.   Genitourinary:  Negative for frequency.   Neurological:  Positive for paresthesias. Negative for headaches.   Psychiatric/Behavioral:  The patient does not have insomnia.    All other systems reviewed and are negative.        Vital Signs    /78 (BP Location: Right arm, Patient Position: Sitting, Cuff Size: Standard)   Pulse 79   Temp 98.3 °F (36.8 °C) (Tympanic)   Resp 18   Ht 5' 3\" (1.6 m)   Wt 78.9 kg (174 lb)   SpO2 95%   BMI 30.82 kg/m²     Physical Exam and Objective Data  Physical Exam  Vitals and nursing note reviewed.   Constitutional:       General: She is awake.      Appearance: She is not diaphoretic.      Comments: Sitting up comfortably in NAD  BMI 30.8  Non-toxic appearing   HENT:      Head: Normocephalic and atraumatic.    "   Right Ear: External ear normal.      Left Ear: External ear normal.      Nose: No rhinorrhea.   Eyes:      Comments: No gaze preference   Cardiovascular:      Rate and Rhythm: Normal rate.   Pulmonary:      Effort: No tachypnea or respiratory distress.      Comments: Completes full sentences without difficulty  Musculoskeletal:      Cervical back: Normal range of motion.   Neurological:      General: No focal deficit present.      Mental Status: She is alert and oriented to person, place, and time.   Psychiatric:         Attention and Perception: Attention normal.         Mood and Affect: Mood and affect normal.         Speech: Speech normal.         Cognition and Memory: Cognition and memory normal.           Radiology and Laboratory:  I personally reviewed and interpreted the following results:    Most Recent COVID-19 Results:  Lab Results   Component Value Date/Time    SARSCOV2 Negative 01/23/2023 09:28 PM    SARSCOV2 Negative 10/30/2021 12:10 AM       Most Recent Lab Work:  Lab Results   Component Value Date/Time    SODIUM 139 02/28/2024 02:22 PM    SODIUM 140 10/27/2023 05:02 AM    K 3.8 02/28/2024 02:22 PM    K 3.5 10/27/2023 05:02 AM    BUN 14 02/28/2024 02:22 PM    BUN 12 10/27/2023 05:02 AM    CREATININE 1.56 (H) 02/28/2024 02:22 PM    CREATININE 0.84 10/27/2023 05:02 AM    GLUC 122 02/28/2024 02:22 PM    GLUC 79 10/27/2023 05:02 AM     Lab Results   Component Value Date/Time    AST 16 12/01/2023 12:20 PM    AST 27 10/26/2023 05:05 PM    ALT 13 12/01/2023 12:20 PM    ALT 11 10/26/2023 05:05 PM    ALB 4.3 12/01/2023 12:20 PM    ALB 4.6 10/26/2023 05:05 PM     Lab Results   Component Value Date/Time    HGB 17.0 (H) 12/01/2023 12:20 PM    WBC 10.24 (H) 12/01/2023 12:20 PM     12/01/2023 12:20 PM    INR 1.0 10/26/2023 05:05 PM    PTT 30 08/15/2023 12:37 PM       Most Recent Imaging [last 30 days]:  No results found.    35 minutes was spent face to face with Marla Negron with greater than 50% of  the time spent in counseling or coordination of care including discussions of provided medical updates, discussed palliative care, determined goals of care, determined social/family support, discussed plans of care, discussed symptom management, provided psychosocial support. Opioid taper. PDMP Reviewed. All of the patient's or agent's questions were answered during this discussion.

## 2024-05-08 DIAGNOSIS — M54.41 CHRONIC BILATERAL LOW BACK PAIN WITH BILATERAL SCIATICA: ICD-10-CM

## 2024-05-08 DIAGNOSIS — R12 HEARTBURN: ICD-10-CM

## 2024-05-08 DIAGNOSIS — G89.29 CHRONIC BILATERAL LOW BACK PAIN WITH BILATERAL SCIATICA: ICD-10-CM

## 2024-05-08 DIAGNOSIS — M54.42 CHRONIC BILATERAL LOW BACK PAIN WITH BILATERAL SCIATICA: ICD-10-CM

## 2024-05-08 RX ORDER — FAMOTIDINE 40 MG/1
20 TABLET, FILM COATED ORAL 2 TIMES DAILY
Qty: 90 TABLET | Refills: 1 | Status: SHIPPED | OUTPATIENT
Start: 2024-05-08

## 2024-05-08 RX ORDER — PREGABALIN 100 MG/1
100 CAPSULE ORAL 2 TIMES DAILY
Qty: 360 CAPSULE | Refills: 1 | Status: SHIPPED | OUTPATIENT
Start: 2024-05-08 | End: 2025-05-03

## 2024-05-09 PROBLEM — Z01.419 ENCOUNTER FOR GYNECOLOGICAL EXAMINATION: Status: RESOLVED | Noted: 2024-04-09 | Resolved: 2024-05-09

## 2024-05-15 DIAGNOSIS — G89.29 CHRONIC BILATERAL LOW BACK PAIN WITH BILATERAL SCIATICA: ICD-10-CM

## 2024-05-15 DIAGNOSIS — M54.42 CHRONIC BILATERAL LOW BACK PAIN WITH BILATERAL SCIATICA: ICD-10-CM

## 2024-05-15 DIAGNOSIS — M54.41 CHRONIC BILATERAL LOW BACK PAIN WITH BILATERAL SCIATICA: ICD-10-CM

## 2024-05-15 RX ORDER — METHOCARBAMOL 750 MG/1
750 TABLET, FILM COATED ORAL EVERY 6 HOURS PRN
Qty: 360 TABLET | Refills: 0 | Status: SHIPPED | OUTPATIENT
Start: 2024-05-15 | End: 2024-08-13

## 2024-05-17 ENCOUNTER — HOSPITAL ENCOUNTER (OUTPATIENT)
Dept: ULTRASOUND IMAGING | Facility: HOSPITAL | Age: 54
Discharge: HOME | End: 2024-05-17
Attending: SURGERY
Payer: COMMERCIAL

## 2024-05-17 DIAGNOSIS — Z51.5 PALLIATIVE CARE PATIENT: ICD-10-CM

## 2024-05-17 DIAGNOSIS — C90.00 MULTIPLE MYELOMA NOT HAVING ACHIEVED REMISSION (HCC): ICD-10-CM

## 2024-05-17 DIAGNOSIS — R20.0 BILATERAL HAND NUMBNESS: ICD-10-CM

## 2024-05-17 DIAGNOSIS — G89.3 CANCER ASSOCIATED PAIN: ICD-10-CM

## 2024-05-17 PROCEDURE — 76882 US LMTD JT/FCL EVL NVASC XTR: CPT

## 2024-05-17 RX ORDER — OXYCODONE HYDROCHLORIDE 5 MG/1
5 TABLET ORAL EVERY 4 HOURS PRN
Qty: 90 TABLET | Refills: 0 | Status: SHIPPED | OUTPATIENT
Start: 2024-05-17 | End: 2024-06-01

## 2024-05-17 NOTE — TELEPHONE ENCOUNTER
Last appointment: 05/02/2024    Next scheduled appointment: 05/29/2024    Pharmacy: [facility]      PDMP review:

## 2024-05-20 ENCOUNTER — TELEPHONE (OUTPATIENT)
Dept: NEPHROLOGY | Facility: CLINIC | Age: 54
End: 2024-05-20

## 2024-05-20 NOTE — TELEPHONE ENCOUNTER
Called left voice message to remind patient to get urine lab test done 1 week prior to 05/31/24 follow up appointment with Dr.Umesh Becky MD.

## 2024-05-29 DIAGNOSIS — G89.3 CANCER ASSOCIATED PAIN: ICD-10-CM

## 2024-05-29 DIAGNOSIS — C90.00 MULTIPLE MYELOMA NOT HAVING ACHIEVED REMISSION (HCC): ICD-10-CM

## 2024-05-29 DIAGNOSIS — Z51.5 PALLIATIVE CARE PATIENT: ICD-10-CM

## 2024-05-29 RX ORDER — OXYCODONE HYDROCHLORIDE 5 MG/1
5 TABLET ORAL EVERY 4 HOURS PRN
Qty: 90 TABLET | Refills: 0 | Status: CANCELLED | OUTPATIENT
Start: 2024-05-29 | End: 2024-06-13

## 2024-05-30 ENCOUNTER — TELEPHONE (OUTPATIENT)
Dept: NEPHROLOGY | Facility: CLINIC | Age: 54
End: 2024-05-30

## 2024-05-30 ENCOUNTER — OFFICE VISIT (OUTPATIENT)
Dept: PALLIATIVE MEDICINE | Facility: CLINIC | Age: 54
End: 2024-05-30
Payer: COMMERCIAL

## 2024-05-30 VITALS
WEIGHT: 180.6 LBS | OXYGEN SATURATION: 94 % | DIASTOLIC BLOOD PRESSURE: 90 MMHG | SYSTOLIC BLOOD PRESSURE: 128 MMHG | RESPIRATION RATE: 18 BRPM | BODY MASS INDEX: 32 KG/M2 | HEIGHT: 63 IN | TEMPERATURE: 97.6 F | HEART RATE: 82 BPM

## 2024-05-30 DIAGNOSIS — I10 PRIMARY HYPERTENSION: Primary | ICD-10-CM

## 2024-05-30 DIAGNOSIS — G89.3 CANCER ASSOCIATED PAIN: ICD-10-CM

## 2024-05-30 DIAGNOSIS — C90.01 MULTIPLE MYELOMA IN REMISSION (HCC): Primary | Chronic | ICD-10-CM

## 2024-05-30 DIAGNOSIS — G89.29 CHRONIC BILATERAL LOW BACK PAIN WITH BILATERAL SCIATICA: Chronic | ICD-10-CM

## 2024-05-30 DIAGNOSIS — Z51.5 PALLIATIVE CARE PATIENT: ICD-10-CM

## 2024-05-30 DIAGNOSIS — M54.42 CHRONIC BILATERAL LOW BACK PAIN WITH BILATERAL SCIATICA: Chronic | ICD-10-CM

## 2024-05-30 DIAGNOSIS — M54.41 CHRONIC BILATERAL LOW BACK PAIN WITH BILATERAL SCIATICA: Chronic | ICD-10-CM

## 2024-05-30 DIAGNOSIS — G89.3 CANCER RELATED PAIN: ICD-10-CM

## 2024-05-30 DIAGNOSIS — R45.89 ANXIETY ABOUT HEALTH: ICD-10-CM

## 2024-05-30 DIAGNOSIS — M19.90 ARTHRITIS: ICD-10-CM

## 2024-05-30 DIAGNOSIS — G62.9 NEUROPATHY: ICD-10-CM

## 2024-05-30 DIAGNOSIS — Z94.84 H/O AUTOLOGOUS STEM CELL TRANSPLANT (HCC): ICD-10-CM

## 2024-05-30 DIAGNOSIS — C90.00 MULTIPLE MYELOMA NOT HAVING ACHIEVED REMISSION (HCC): ICD-10-CM

## 2024-05-30 DIAGNOSIS — E85.9 AMYLOIDOSIS, UNSPECIFIED TYPE (HCC): ICD-10-CM

## 2024-05-30 PROCEDURE — 99214 OFFICE O/P EST MOD 30 MIN: CPT | Performed by: STUDENT IN AN ORGANIZED HEALTH CARE EDUCATION/TRAINING PROGRAM

## 2024-05-30 RX ORDER — PREDNISONE 5 MG/1
5 TABLET ORAL 2 TIMES DAILY WITH MEALS
Qty: 14 TABLET | Refills: 0 | Status: SHIPPED | OUTPATIENT
Start: 2024-05-30 | End: 2024-06-06

## 2024-05-30 RX ORDER — OXYCODONE HYDROCHLORIDE 5 MG/1
5 TABLET ORAL EVERY 4 HOURS PRN
Qty: 90 TABLET | Refills: 0 | Status: SHIPPED | OUTPATIENT
Start: 2024-05-30 | End: 2024-06-13 | Stop reason: SDUPTHER

## 2024-05-30 NOTE — PROGRESS NOTES
Outpatient Follow-Up - Palliative and Supportive Care   Marla Negron 53 y.o. female [id number]    Assessment & Plan  Problem List Items Addressed This Visit       Chronic bilateral low back pain with bilateral sciatica (Chronic)    Multiple myeloma in remission (HCC) - Primary (Chronic)    Relevant Medications    oxyCODONE (ROXICODONE) 5 immediate release tablet    Cancer related pain    Relevant Medications    oxyCODONE (ROXICODONE) 5 immediate release tablet    Palliative care patient    Relevant Medications    oxyCODONE (ROXICODONE) 5 immediate release tablet    predniSONE 5 mg tablet    H/O autologous stem cell transplant (HCC)    Amyloidosis (HCC)    Relevant Medications    predniSONE 5 mg tablet    Anxiety about health    Neuropathy     Other Visit Diagnoses       Cancer associated pain        Relevant Medications    oxyCODONE (ROXICODONE) 5 immediate release tablet    Multiple myeloma not having achieved remission (HCC)        Relevant Medications    oxyCODONE (ROXICODONE) 5 immediate release tablet    Arthritis        Relevant Medications    predniSONE 5 mg tablet          #symptom management  #cancer-related pain  #opioid taper   - continue APAP 1000 mg PO Q8H PRN              - max daily 4000 mg   - continue oxy-IR 5-10 mg PO Q4H PRN              - max 30 mg per day     Continued opioid taper with plan to decrease 10-30% total daily opioids each 1-2 months, with overall goal to no daily opioid use. Discussed s/sxs of opioid withdrawal, including nausea, vomiting, diarrhea, rhinorrhea, yawning, piloerection, insomnia, agitation. All questions/concerns addressed.     #neuropathic pain   - continue pregabalin 100 mg PO BID   - continue duloxetine 40 mg PO QDaily   - continue lidocaine cream QID PRN     Managed per PCP    Possible long-term side effect of Bortezomib; 02/2022 completed.     #muscle spasms   - continue methocarbamol 750 mg PO Q6H PRN     #OIC   - continue bowel regimen to prevent OIC   -  adequate hydration     #anxiety/depression  #insomnia   - continue duloxetine 40 mg PO QDaily   - continue trazodone 50 mg PO QHS    #arthralgia   - trial prednisone 5 mg PO BID x 7 days    MSK US [05/17] with no e/o carpal or cubital tunnel syndrome  R/L Hand XR [04/26] with no degenerative or other acute changes    Long-term side effect of PBSCT is AVN, however, imaging with no e/o AVN; less likely.  Consider long-term side effect of immunotherapy, Daratumumab, of arthralgia/myalgia, however, imaging with no e/o inflammatory changes.    #psychosocial support   - emotional support provided   - not currently    - two adult children              - Guerda [daughter]              - Richard Lagos [mother] [phone]   - one sibling              - Mayi [sister]   - two grandchildren [aged 5 and 3]      Next [facility] Follow up in 4 weeks.      Controlled Substance Review    [place]MP or [place]  reviewed: No red flags were identified; safe to proceed with prescription..    PDMP Review         Value Time User    PDMP Reviewed  Yes 5/30/2024  1:28 PM Willy Almaraz MD            Medications adjusted this encounter:  Requested Prescriptions     Signed Prescriptions Disp Refills    oxyCODONE (ROXICODONE) 5 immediate release tablet 90 tablet 0     Sig: Take 1 tablet (5 mg total) by mouth every 4 (four) hours as needed for moderate pain or severe pain (cancer-related pain) for up to 15 days Max Daily Amount: 30 mg    predniSONE 5 mg tablet 14 tablet 0     Sig: Take 1 tablet (5 mg total) by mouth 2 (two) times a day with meals for 7 days     No orders of the defined types were placed in this encounter.      Medications Discontinued During This Encounter   Medication Reason    oxyCODONE (ROXICODONE) 5 immediate release tablet Reorder         Marla Negron was seen today for symptoms and planning cares related to above illnesses.  I have reviewed the patient's controlled substance dispensing history in the  "Prescription Drug Monitoring Program in compliance with the [place] regulations before prescribing any controlled substances.    They are invited to continue to follow with us.  If there are questions or concerns, please contact us through our clinic/answering service 24 hours a day, seven days a week.    Willy Almaraz MD  [facility]  [phone]      Visit Information    Accompanied By: No one    Source of History: Self, Medical record    History Limitations: None      History of Present Illness    Marla Negron is a 53 y.o. female who presents in follow up of symptoms related to multiple myeloma s/p PBSCT [03/30/2022]. Pertinent issues include: symptom management, pain, neoplasm related, depression or anxiety, assessment of goals of care, disease process education and discussion of prognosis.    Patient reports persistent arthralgia, prominently in bilateral hands/wrists, unchanged. Also reports b/l feet/hand peripheral neuropathy, unchanged. Use of oxy-IR x 6 doses [4 days/week] and 5 doses [3 days/week]. Overall goal to continue taper down. Denies nausea, vomiting. Appetite intact, weight stable. BM regular, daily. Sleep adequate.       Past medical, surgical, social, and family histories are reviewed and pertinent updates are made.      Vital Signs    /90 (BP Location: Right arm, Patient Position: Sitting, Cuff Size: Standard)   Pulse 82   Temp 97.6 °F (36.4 °C) (Tympanic)   Resp 18   Ht 5' 3\" (1.6 m)   Wt 81.9 kg (180 lb 9.6 oz)   SpO2 94%   BMI 31.99 kg/m²     Physical Exam and Objective Data  Physical Exam  Vitals and nursing note reviewed.   Constitutional:       General: She is awake.      Appearance: She is not diaphoretic.      Comments: Sitting up in NAD  BMI 32.0  Non-toxic appearing   HENT:      Head: Normocephalic and atraumatic.      Right Ear: External ear normal.      Left Ear: External ear normal.      Nose: No rhinorrhea.   Eyes:      Comments: " No gaze preference   Cardiovascular:      Rate and Rhythm: Normal rate.   Pulmonary:      Effort: No tachypnea, accessory muscle usage or respiratory distress.      Comments: Completes full sentences without difficulty  Musculoskeletal:      Cervical back: Normal range of motion.   Neurological:      General: No focal deficit present.      Mental Status: She is alert and oriented to person, place, and time.   Psychiatric:         Attention and Perception: Attention normal.         Mood and Affect: Mood and affect normal.         Speech: Speech normal.         Cognition and Memory: Cognition and memory normal.           Radiology and Laboratory:  I personally reviewed and interpreted the following results:    Most Recent COVID-19 Results:  Lab Results   Component Value Date/Time    SARSCOV2 Negative 01/23/2023 09:28 PM    SARSCOV2 Negative 10/30/2021 12:10 AM       Most Recent Lab Work:  Lab Results   Component Value Date/Time    SODIUM 139 02/28/2024 02:22 PM    SODIUM 140 10/27/2023 05:02 AM    K 3.8 02/28/2024 02:22 PM    K 3.5 10/27/2023 05:02 AM    BUN 14 02/28/2024 02:22 PM    BUN 12 10/27/2023 05:02 AM    CREATININE 1.56 (H) 02/28/2024 02:22 PM    CREATININE 0.84 10/27/2023 05:02 AM    GLUC 122 02/28/2024 02:22 PM    GLUC 79 10/27/2023 05:02 AM     Lab Results   Component Value Date/Time    AST 16 12/01/2023 12:20 PM    AST 27 10/26/2023 05:05 PM    ALT 13 12/01/2023 12:20 PM    ALT 11 10/26/2023 05:05 PM    ALB 4.3 12/01/2023 12:20 PM    ALB 4.6 10/26/2023 05:05 PM     Lab Results   Component Value Date/Time    HGB 17.0 (H) 12/01/2023 12:20 PM    WBC 10.24 (H) 12/01/2023 12:20 PM     12/01/2023 12:20 PM    INR 1.0 10/26/2023 05:05 PM    PTT 30 08/15/2023 12:37 PM       Most Recent Imaging [last 30 days]:  Procedure: US MSK limited    Result Date: 5/21/2024  Narrative: ULTRASOUND BILATERAL ELBOW (CUBITAL TUNNEL SYNDROME PROTOCOL) INDICATION:   R20.0: Anesthesia of skin. TECHNIQUE:   Using a high  frequency transducer, the right and left ulnar nerves were individually scanned to evaluate for cubital tunnel syndrome and presence of ulnar nerve dislocation. Gray scale and color doppler ultrasound was used. COMPARISON: None. FINDINGS: RIGHT SIDE: Ulnar nerve cross sectional area proximally: 6.1 sq mm. Maximum ulnar nerve cross sectional area within or near the cubital tunnel: 10.9 sq mm. Ulnar nerve cross sectional area distal to cubital tunnel: 5.3 sq mm. Maximal ulnar nerve/proximal cross sectional ratio: 1.8. No evidence of ulnar nerve dislocation from the cubital tunnel on dynamic flexion-extension evaluation. No accessory anconeus epitrochlearis muscle. LEFT SIDE: Ulnar nerve cross sectional area proximally: 5.2 sq mm. Maximum ulnar nerve cross sectional area within or near the cubital tunnel: 8.6 sq mm. Ulnar nerve cross sectional area distal to cubital tunnel: 2.9 sq mm. Maximal ulnar nerve/proximal cross sectional ratio: 1.7 No evidence of ulnar nerve dislocation from the cubital tunnel on dynamic flexion-extension evaluation. No accessory anconeus epitrochlearis muscle.     Impression: RIGHT CUBITAL TUNNEL: Enlarged ulnar nerve near/within cubital tunnel suspicious for ulnar neuropathy/cubital tunnel syndrome. LEFT CUBITAL TUNNEL: No sonographic findings to suggest ulnar neuropathy/cubital tunnel syndrome. Note: Ultrasound diagnosis of cubital tunnel syndrome is suggested with visualization of hypoechoic enlargement of the ulnar nerve just proximal to the cubital tunnel, usually with a transition to normal size within the cubital tunnel.  A cross sectional  area of ulnar nerve greater than 9 sq mm at the site of maximal enlargement or increase in size with a ratio of greater than 2.8 compared to the proximal is considered abnormal. Reference: JS Niki FO Jet, MS Linda: Ultrasonographic Swelling Ratio in the Diagnosis of Ulnar Neuropathy at the Elbow. Muscle Nerve. 2008 Oct;38(4):1231-5.  https://pubmed.ncbi.nlm.nih.gov/22102385/ Workstation performed: [id number]     Procedure: US MSK limited    Result Date: 5/21/2024  Narrative: ULTRASOUND BILATERAL WRISTS (CARPAL TUNNEL SYNDROME PROTOCOL) TECHNIQUE:   Using a high frequency transducer, the right and left median nerves were individually scanned to evaluate for carpal tunnel syndrome. Gray scale and color doppler ultrasound was used. INDICATION:   R20.0: Anesthesia of skin. COMPARISON: None. FINDINGS: RIGHT SIDE: Median nerve cross sectional area distal forearm (CSAp): 5.3 sq mm. Maximum median nerve cross sectional area within carpal tunnel (CSAc): 5.9 sq mm. Delta CSA (CSAc-CSAp): 0.6 sq mm. Wrist to Forearm ratio (WFR ratio) (CSAc/CSAp): 1.1. Vascularity: No hypervascularity. LEFT SIDE: Median nerve cross sectional area distal forearm (CSAp): 4.6 sq mm. Maximum median nerve cross sectional area within carpal tunnel (CSAc): 7.4 sq mm. Delta CSA (CSAc-CSAp): 2.8 sq mm. Wrist to Forearm ratio (WFR ratio) (CSAc/CSAp): 1.6. Vascularity: No hypervascularity.     Impression: RIGHT SIDE: Carpal tunnel syndrome ruled out based on small CSA < 8 sq mm. LEFT SIDE: Carpal tunnel syndrome ruled out based on small CSA < 8 sq mm. Workstation performed: [id number]     Procedure: US MSK limited    Result Date: 4/30/2024  Narrative: ULTRASOUND BILATERAL ANKLES AND FEET TECHNIQUE: Using a high frequency transducer, the bilateral ankle and feet were scanned to evaluate for evidence of inflammatory arthritis. Grayscale and power doppler ultrasound was used to survey the joints of the ankle and feet. INDICATION: Bilateral foot pain, evaluate for inflammatory arthritis COMPARISON: Bilateral foot radiographs 4/26/2024 FINDINGS: RIGHT SIDE: Tibiotalar joint:  Normal. Intertarsal joints: Normal. 1st tarsometatarsal joint: Normal. 1st metatarsophalangeal joint: Normal. 2nd tarsometatarsal joint: Normal. 2nd metatarsophalangeal joint: Normal. 2nd PIP joint: Normal. 3rd  tarsometatarsal joint: Normal. 3rd metatarsophalangeal joint: Normal. 3rd PIP joint: Normal. 4th tarsometatarsal joint: Normal. 4th metatarsophalangeal joint: Normal. 4th PIP joint: Normal. 5th tarsometatarsal joint: Normal. 5th metatarsophalangeal joint: Normal. 5th PIP joint: Normal. LEFT SIDE: Tibiotalar joint:  Normal. Intertarsal joints: Normal. 1st tarsometatarsal joint: Normal. 1st metatarsophalangeal joint: Normal. 2nd tarsometatarsal joint: Normal. 2nd metatarsophalangeal joint: Normal. 2nd PIP joint: Normal. 3rd tarsometatarsal joint: Normal. 3rd metatarsophalangeal joint: Normal. 3rd PIP joint: Normal. 4th tarsometatarsal joint: Normal. 4th metatarsophalangeal joint: Normal. 4th PIP joint: Normal. 5th tarsometatarsal joint: Normal. 5th metatarsophalangeal joint: Normal. 5th PIP joint: Normal.     Impression: No evidence of inflammatory arthritis in the bilateral ankles or feet. Workstation performed: [id number]     Procedure: US MSK limited    Result Date: 4/30/2024  Narrative: ULTRASOUND BILATERAL HANDS AND WRISTS TECHNIQUE:   Using a high frequency transducer, the bilateral hands and wrists were scanned to evaluate for evidence of inflammatory arthritis. Grayscale and power doppler ultrasound was used to survey the joints of the hand and wrist. INDICATION:   M79.641: Pain in right hand M79.642: Pain in left hand. COMPARISON: Bilateral hand radiographs 4/26/2024 FINDINGS: RIGHT SIDE: 1st metacarpophalangeal joint: Normal. 2nd metacarpophalangeal joint: Normal. 2nd PIP joint: Normal. 3rd metacarpophalangeal joint: Normal. 3rd PIP joint: Normal. 4th metacarpophalangeal joint: Normal. 4th PIP joint: Normal. 5th metacarpophalangeal joint: Normal. 5th PIP joint: Normal. Radiocarpal/Intercarpal joints: Normal. Distal radioulnar joint: Normal. LEFT SIDE: 1st metacarpophalangeal joint: Normal. 2nd metacarpophalangeal joint: Normal. 2nd PIP joint: Normal. 3rd metacarpophalangeal joint: Normal. 3rd PIP joint:  Normal. 4th metacarpophalangeal joint: Normal. 4th PIP joint: Normal. 5th metacarpophalangeal joint: Normal. 5th PIP joint: Normal. Radiocarpal/intercarpal joints: Normal. Distal radioulnar joint: Normal.     Impression: No evidence of inflammatory arthritis in the bilateral hands or wrists. Workstation performed: [id number]      35 minutes was spent face to face with Marla Negron with greater than 50% of the time spent in counseling or coordination of care including discussions of provided medical updates, discussed palliative care, determined goals of care, determined social/family support, discussed plans of care, discussed symptom management, provided psychosocial support. Medication refill. Opioid taper. PDMP Reviewed. All of the patient's or agent's questions were answered during this discussion.

## 2024-05-30 NOTE — TELEPHONE ENCOUNTER
I called patient to confirmed appointment for tomorrow also reminded patient to have labs done prior

## 2024-05-31 ENCOUNTER — TELEPHONE (OUTPATIENT)
Dept: NEPHROLOGY | Facility: CLINIC | Age: 54
End: 2024-05-31

## 2024-05-31 NOTE — TELEPHONE ENCOUNTER
Patient had an appointment with Dr. Pena today that patient no showed for . I called and left message patients answering machine  for patient to give the office  call back about rescheduling appointment. No showed letter send to pateint

## 2024-06-06 DIAGNOSIS — R09.81 NASAL CONGESTION: ICD-10-CM

## 2024-06-06 RX ORDER — FLUTICASONE PROPIONATE 50 MCG
2 SPRAY, SUSPENSION (ML) NASAL DAILY
Qty: 48 G | Refills: 1 | Status: SHIPPED | OUTPATIENT
Start: 2024-06-06

## 2024-06-07 ENCOUNTER — TELEPHONE (OUTPATIENT)
Age: 54
End: 2024-06-07

## 2024-06-07 NOTE — TELEPHONE ENCOUNTER
Message for April 2 of her medications were canceled trazodone 50 mg and Claritin 10 mg ask to be reinstated and send to [facility] in [place] can the office call her when done [phone]

## 2024-06-10 ENCOUNTER — APPOINTMENT (OUTPATIENT)
Dept: LAB | Facility: HOSPITAL | Age: 54
End: 2024-06-10
Payer: COMMERCIAL

## 2024-06-10 DIAGNOSIS — C90.00 MULTIPLE MYELOMA, REMISSION STATUS UNSPECIFIED (HCC): ICD-10-CM

## 2024-06-10 LAB
ALBUMIN SERPL BCP-MCNC: 4.2 G/DL (ref 3.5–5)
ALP SERPL-CCNC: 66 U/L (ref 34–104)
ALT SERPL W P-5'-P-CCNC: 12 U/L (ref 7–52)
ANION GAP SERPL CALCULATED.3IONS-SCNC: 7 MMOL/L (ref 4–13)
AST SERPL W P-5'-P-CCNC: 13 U/L (ref 13–39)
BASOPHILS # BLD AUTO: 0.03 THOUSANDS/ÂΜL (ref 0–0.1)
BASOPHILS NFR BLD AUTO: 0 % (ref 0–1)
BILIRUB SERPL-MCNC: 0.9 MG/DL (ref 0.2–1)
BUN SERPL-MCNC: 14 MG/DL (ref 5–25)
CALCIUM SERPL-MCNC: 9.4 MG/DL (ref 8.4–10.2)
CHLORIDE SERPL-SCNC: 105 MMOL/L (ref 96–108)
CO2 SERPL-SCNC: 27 MMOL/L (ref 21–32)
CREAT SERPL-MCNC: 1.03 MG/DL (ref 0.6–1.3)
EOSINOPHIL # BLD AUTO: 0.15 THOUSAND/ÂΜL (ref 0–0.61)
EOSINOPHIL NFR BLD AUTO: 2 % (ref 0–6)
ERYTHROCYTE [DISTWIDTH] IN BLOOD BY AUTOMATED COUNT: 14.9 % (ref 11.6–15.1)
GFR SERPL CREATININE-BSD FRML MDRD: 61 ML/MIN/1.73SQ M
GLUCOSE P FAST SERPL-MCNC: 95 MG/DL (ref 65–99)
HCT VFR BLD AUTO: 54.3 % (ref 34.8–46.1)
HGB BLD-MCNC: 17.5 G/DL (ref 11.5–15.4)
IGM SERPL-MCNC: 86 MG/DL (ref 45–281)
IMM GRANULOCYTES # BLD AUTO: 0.05 THOUSAND/UL (ref 0–0.2)
IMM GRANULOCYTES NFR BLD AUTO: 1 % (ref 0–2)
LDH SERPL-CCNC: 113 U/L (ref 140–271)
LYMPHOCYTES # BLD AUTO: 1.9 THOUSANDS/ÂΜL (ref 0.6–4.47)
LYMPHOCYTES NFR BLD AUTO: 22 % (ref 14–44)
MAGNESIUM SERPL-MCNC: 2.2 MG/DL (ref 1.9–2.7)
MCH RBC QN AUTO: 29.7 PG (ref 26.8–34.3)
MCHC RBC AUTO-ENTMCNC: 32.2 G/DL (ref 31.4–37.4)
MCV RBC AUTO: 92 FL (ref 82–98)
MONOCYTES # BLD AUTO: 0.77 THOUSAND/ÂΜL (ref 0.17–1.22)
MONOCYTES NFR BLD AUTO: 9 % (ref 4–12)
NEUTROPHILS # BLD AUTO: 5.83 THOUSANDS/ÂΜL (ref 1.85–7.62)
NEUTS SEG NFR BLD AUTO: 66 % (ref 43–75)
NRBC BLD AUTO-RTO: 0 /100 WBCS
PLATELET # BLD AUTO: 197 THOUSANDS/UL (ref 149–390)
PMV BLD AUTO: 10.1 FL (ref 8.9–12.7)
POTASSIUM SERPL-SCNC: 4 MMOL/L (ref 3.5–5.3)
PROT SERPL-MCNC: 7.4 G/DL (ref 6.4–8.4)
RBC # BLD AUTO: 5.89 MILLION/UL (ref 3.81–5.12)
SODIUM SERPL-SCNC: 139 MMOL/L (ref 135–147)
WBC # BLD AUTO: 8.73 THOUSAND/UL (ref 4.31–10.16)

## 2024-06-10 PROCEDURE — 84165 PROTEIN E-PHORESIS SERUM: CPT

## 2024-06-10 PROCEDURE — 83521 IG LIGHT CHAINS FREE EACH: CPT

## 2024-06-10 PROCEDURE — 83615 LACTATE (LD) (LDH) ENZYME: CPT

## 2024-06-10 PROCEDURE — 83735 ASSAY OF MAGNESIUM: CPT

## 2024-06-10 PROCEDURE — 82784 ASSAY IGA/IGD/IGG/IGM EACH: CPT

## 2024-06-10 PROCEDURE — 80053 COMPREHEN METABOLIC PANEL: CPT

## 2024-06-10 PROCEDURE — 36415 COLL VENOUS BLD VENIPUNCTURE: CPT

## 2024-06-10 PROCEDURE — 86334 IMMUNOFIX E-PHORESIS SERUM: CPT

## 2024-06-10 PROCEDURE — 85025 COMPLETE CBC W/AUTO DIFF WBC: CPT

## 2024-06-11 DIAGNOSIS — J30.1 SEASONAL ALLERGIC RHINITIS DUE TO POLLEN: ICD-10-CM

## 2024-06-11 DIAGNOSIS — G47.00 INSOMNIA, UNSPECIFIED TYPE: Primary | ICD-10-CM

## 2024-06-11 LAB
ALBUMIN SERPL ELPH-MCNC: 3.9 G/DL (ref 3.2–5.1)
ALBUMIN SERPL ELPH-MCNC: 59.1 % (ref 48–70)
ALPHA1 GLOB SERPL ELPH-MCNC: 0.26 G/DL (ref 0.15–0.47)
ALPHA1 GLOB SERPL ELPH-MCNC: 3.9 % (ref 1.8–7)
ALPHA2 GLOB SERPL ELPH-MCNC: 0.62 G/DL (ref 0.42–1.04)
ALPHA2 GLOB SERPL ELPH-MCNC: 9.4 % (ref 5.9–14.9)
BETA GLOB ABNORMAL SERPL ELPH-MCNC: 0.4 G/DL (ref 0.31–0.57)
BETA1 GLOB SERPL ELPH-MCNC: 6.1 % (ref 4.7–7.7)
BETA2 GLOB SERPL ELPH-MCNC: 4.4 % (ref 3.1–7.9)
BETA2+GAMMA GLOB SERPL ELPH-MCNC: 0.29 G/DL (ref 0.2–0.58)
GAMMA GLOB ABNORMAL SERPL ELPH-MCNC: 1.13 G/DL (ref 0.4–1.66)
GAMMA GLOB SERPL ELPH-MCNC: 17.1 % (ref 6.9–22.3)
IGA SERPL-MCNC: 173 MG/DL (ref 66–433)
IGG SERPL-MCNC: 1320 MG/DL (ref 635–1741)
IGG/ALB SER: 1.44 {RATIO} (ref 1.1–1.8)
INTERPRETATION UR IFE-IMP: NORMAL
KAPPA LC FREE SER-MCNC: 24.4 MG/L (ref 3.3–19.4)
KAPPA LC FREE/LAMBDA FREE SER: 1.62 {RATIO} (ref 0.26–1.65)
LAMBDA LC FREE SERPL-MCNC: 15.1 MG/L (ref 5.7–26.3)
PROT PATTERN SERPL ELPH-IMP: NORMAL
PROT SERPL-MCNC: 6.6 G/DL (ref 6.4–8.2)

## 2024-06-11 PROCEDURE — 84165 PROTEIN E-PHORESIS SERUM: CPT | Performed by: PATHOLOGY

## 2024-06-11 PROCEDURE — 86334 IMMUNOFIX E-PHORESIS SERUM: CPT | Performed by: PATHOLOGY

## 2024-06-11 RX ORDER — TRAZODONE HYDROCHLORIDE 50 MG/1
50 TABLET ORAL
Qty: 90 TABLET | Refills: 3 | Status: ON HOLD | OUTPATIENT
Start: 2024-06-11

## 2024-06-11 RX ORDER — LORATADINE 10 MG/1
10 TABLET ORAL DAILY
Qty: 90 TABLET | Refills: 1 | Status: ON HOLD | OUTPATIENT
Start: 2024-06-11 | End: 2024-12-08

## 2024-06-12 ENCOUNTER — APPOINTMENT (OUTPATIENT)
Dept: LAB | Facility: HOSPITAL | Age: 54
End: 2024-06-12
Payer: COMMERCIAL

## 2024-06-12 PROCEDURE — 84156 ASSAY OF PROTEIN URINE: CPT

## 2024-06-12 PROCEDURE — 84166 PROTEIN E-PHORESIS/URINE/CSF: CPT

## 2024-06-12 PROCEDURE — 36415 COLL VENOUS BLD VENIPUNCTURE: CPT

## 2024-06-12 PROCEDURE — 82232 ASSAY OF BETA-2 PROTEIN: CPT

## 2024-06-13 DIAGNOSIS — C90.00 MULTIPLE MYELOMA NOT HAVING ACHIEVED REMISSION (HCC): ICD-10-CM

## 2024-06-13 DIAGNOSIS — G89.3 CANCER ASSOCIATED PAIN: ICD-10-CM

## 2024-06-13 DIAGNOSIS — Z51.5 PALLIATIVE CARE PATIENT: ICD-10-CM

## 2024-06-13 RX ORDER — OXYCODONE HYDROCHLORIDE 5 MG/1
5 TABLET ORAL EVERY 4 HOURS PRN
Qty: 90 TABLET | Refills: 0 | Status: ON HOLD | OUTPATIENT
Start: 2024-06-13 | End: 2024-06-28

## 2024-06-14 LAB — B2 MICROGLOB SERPL-MCNC: 2.3 MG/L (ref 0.6–2.4)

## 2024-06-17 DIAGNOSIS — Z94.84 H/O AUTOLOGOUS STEM CELL TRANSPLANT (HCC): ICD-10-CM

## 2024-06-18 ENCOUNTER — TELEPHONE (OUTPATIENT)
Dept: NEUROLOGY | Facility: CLINIC | Age: 54
End: 2024-06-18

## 2024-06-18 RX ORDER — ACYCLOVIR 800 MG/1
800 TABLET ORAL 2 TIMES DAILY
Qty: 180 TABLET | Refills: 1 | Status: SHIPPED | OUTPATIENT
Start: 2024-06-18 | End: 2025-06-13

## 2024-06-18 NOTE — TELEPHONE ENCOUNTER
LMOM to confirm pt's appt w/ Dr. Martinez In [facility] on 6/27/24 at 10. Advised pt to arrive 15 minutes prior to check in with the .

## 2024-06-19 LAB — MISCELLANEOUS LAB TEST RESULT: NORMAL

## 2024-06-22 ENCOUNTER — HOSPITAL ENCOUNTER (OUTPATIENT)
Facility: HOSPITAL | Age: 54
Setting detail: OBSERVATION
Discharge: HOME | End: 2024-06-24
Attending: EMERGENCY MEDICINE | Admitting: FAMILY MEDICINE
Payer: COMMERCIAL

## 2024-06-22 ENCOUNTER — APPOINTMENT (EMERGENCY)
Dept: RADIOLOGY | Facility: HOSPITAL | Age: 54
End: 2024-06-22
Payer: COMMERCIAL

## 2024-06-22 DIAGNOSIS — F41.9 ANXIETY: ICD-10-CM

## 2024-06-22 DIAGNOSIS — R07.9 CHEST PAIN: Primary | ICD-10-CM

## 2024-06-22 DIAGNOSIS — F17.219 CIGARETTE NICOTINE DEPENDENCE WITH NICOTINE-INDUCED DISORDER: ICD-10-CM

## 2024-06-22 DIAGNOSIS — R09.02 HYPOXIA: ICD-10-CM

## 2024-06-22 LAB
2HR DELTA HS TROPONIN: -2 NG/L
ALBUMIN SERPL BCG-MCNC: 4.5 G/DL (ref 3.5–5)
ALP SERPL-CCNC: 76 U/L (ref 34–104)
ALT SERPL W P-5'-P-CCNC: 12 U/L (ref 7–52)
ANION GAP SERPL CALCULATED.3IONS-SCNC: 11 MMOL/L (ref 4–13)
AST SERPL W P-5'-P-CCNC: 19 U/L (ref 13–39)
ATRIAL RATE: 85 BPM
BASOPHILS # BLD AUTO: 0.03 THOUSANDS/ÂΜL (ref 0–0.1)
BASOPHILS NFR BLD AUTO: 0 % (ref 0–1)
BILIRUB SERPL-MCNC: 0.81 MG/DL (ref 0.2–1)
BUN SERPL-MCNC: 10 MG/DL (ref 5–25)
CALCIUM SERPL-MCNC: 9.9 MG/DL (ref 8.4–10.2)
CARDIAC TROPONIN I PNL SERPL HS: 6 NG/L
CARDIAC TROPONIN I PNL SERPL HS: 8 NG/L
CHLORIDE SERPL-SCNC: 105 MMOL/L (ref 96–108)
CO2 SERPL-SCNC: 22 MMOL/L (ref 21–32)
CREAT SERPL-MCNC: 1.05 MG/DL (ref 0.6–1.3)
EOSINOPHIL # BLD AUTO: 0.14 THOUSAND/ÂΜL (ref 0–0.61)
EOSINOPHIL NFR BLD AUTO: 1 % (ref 0–6)
ERYTHROCYTE [DISTWIDTH] IN BLOOD BY AUTOMATED COUNT: 14.6 % (ref 11.6–15.1)
GFR SERPL CREATININE-BSD FRML MDRD: 59 ML/MIN/1.73SQ M
GLUCOSE SERPL-MCNC: 111 MG/DL (ref 65–140)
HCT VFR BLD AUTO: 52.5 % (ref 34.8–46.1)
HGB BLD-MCNC: 17.6 G/DL (ref 11.5–15.4)
IMM GRANULOCYTES # BLD AUTO: 0.03 THOUSAND/UL (ref 0–0.2)
IMM GRANULOCYTES NFR BLD AUTO: 0 % (ref 0–2)
LYMPHOCYTES # BLD AUTO: 2.89 THOUSANDS/ÂΜL (ref 0.6–4.47)
LYMPHOCYTES NFR BLD AUTO: 24 % (ref 14–44)
MCH RBC QN AUTO: 30.6 PG (ref 26.8–34.3)
MCHC RBC AUTO-ENTMCNC: 33.5 G/DL (ref 31.4–37.4)
MCV RBC AUTO: 91 FL (ref 82–98)
MONOCYTES # BLD AUTO: 0.86 THOUSAND/ÂΜL (ref 0.17–1.22)
MONOCYTES NFR BLD AUTO: 7 % (ref 4–12)
NEUTROPHILS # BLD AUTO: 8 THOUSANDS/ÂΜL (ref 1.85–7.62)
NEUTS SEG NFR BLD AUTO: 68 % (ref 43–75)
NRBC BLD AUTO-RTO: 0 /100 WBCS
P AXIS: 74 DEGREES
PLATELET # BLD AUTO: 191 THOUSANDS/UL (ref 149–390)
PMV BLD AUTO: 9.3 FL (ref 8.9–12.7)
POTASSIUM SERPL-SCNC: 3.9 MMOL/L (ref 3.5–5.3)
PR INTERVAL: 170 MS
PROT SERPL-MCNC: 8.4 G/DL (ref 6.4–8.4)
QRS AXIS: -67 DEGREES
QRSD INTERVAL: 116 MS
QT INTERVAL: 416 MS
QTC INTERVAL: 495 MS
RBC # BLD AUTO: 5.76 MILLION/UL (ref 3.81–5.12)
SODIUM SERPL-SCNC: 138 MMOL/L (ref 135–147)
T WAVE AXIS: 70 DEGREES
VENTRICULAR RATE: 85 BPM
WBC # BLD AUTO: 11.95 THOUSAND/UL (ref 4.31–10.16)

## 2024-06-22 PROCEDURE — 85025 COMPLETE CBC W/AUTO DIFF WBC: CPT | Performed by: EMERGENCY MEDICINE

## 2024-06-22 PROCEDURE — 93005 ELECTROCARDIOGRAM TRACING: CPT

## 2024-06-22 PROCEDURE — 99285 EMERGENCY DEPT VISIT HI MDM: CPT

## 2024-06-22 PROCEDURE — 96376 TX/PRO/DX INJ SAME DRUG ADON: CPT

## 2024-06-22 PROCEDURE — 96375 TX/PRO/DX INJ NEW DRUG ADDON: CPT

## 2024-06-22 PROCEDURE — 84484 ASSAY OF TROPONIN QUANT: CPT | Performed by: EMERGENCY MEDICINE

## 2024-06-22 PROCEDURE — 96374 THER/PROPH/DIAG INJ IV PUSH: CPT

## 2024-06-22 PROCEDURE — 85379 FIBRIN DEGRADATION QUANT: CPT | Performed by: EMERGENCY MEDICINE

## 2024-06-22 PROCEDURE — 80053 COMPREHEN METABOLIC PANEL: CPT | Performed by: EMERGENCY MEDICINE

## 2024-06-22 PROCEDURE — 84145 PROCALCITONIN (PCT): CPT | Performed by: STUDENT IN AN ORGANIZED HEALTH CARE EDUCATION/TRAINING PROGRAM

## 2024-06-22 PROCEDURE — 36415 COLL VENOUS BLD VENIPUNCTURE: CPT | Performed by: EMERGENCY MEDICINE

## 2024-06-22 PROCEDURE — 93010 ELECTROCARDIOGRAM REPORT: CPT | Performed by: INTERNAL MEDICINE

## 2024-06-22 PROCEDURE — 94640 AIRWAY INHALATION TREATMENT: CPT

## 2024-06-22 PROCEDURE — 71045 X-RAY EXAM CHEST 1 VIEW: CPT

## 2024-06-22 RX ORDER — MORPHINE SULFATE 4 MG/ML
4 INJECTION, SOLUTION INTRAMUSCULAR; INTRAVENOUS ONCE
Status: COMPLETED | OUTPATIENT
Start: 2024-06-22 | End: 2024-06-22

## 2024-06-22 RX ORDER — KETOROLAC TROMETHAMINE 30 MG/ML
15 INJECTION, SOLUTION INTRAMUSCULAR; INTRAVENOUS ONCE
Status: COMPLETED | OUTPATIENT
Start: 2024-06-22 | End: 2024-06-22

## 2024-06-22 RX ORDER — METHYLPREDNISOLONE SODIUM SUCCINATE 125 MG/2ML
125 INJECTION, POWDER, LYOPHILIZED, FOR SOLUTION INTRAMUSCULAR; INTRAVENOUS ONCE
Status: COMPLETED | OUTPATIENT
Start: 2024-06-22 | End: 2024-06-22

## 2024-06-22 RX ORDER — ALBUTEROL SULFATE 2.5 MG/3ML
5 SOLUTION RESPIRATORY (INHALATION) ONCE
Status: COMPLETED | OUTPATIENT
Start: 2024-06-22 | End: 2024-06-22

## 2024-06-22 RX ADMIN — ALBUTEROL SULFATE 5 MG: 2.5 SOLUTION RESPIRATORY (INHALATION) at 21:05

## 2024-06-22 RX ADMIN — MORPHINE SULFATE 4 MG: 4 INJECTION INTRAVENOUS at 22:41

## 2024-06-22 RX ADMIN — MORPHINE SULFATE 4 MG: 4 INJECTION INTRAVENOUS at 23:53

## 2024-06-22 RX ADMIN — METHYLPREDNISOLONE SODIUM SUCCINATE 125 MG: 125 INJECTION, POWDER, FOR SOLUTION INTRAMUSCULAR; INTRAVENOUS at 23:53

## 2024-06-22 RX ADMIN — ALBUTEROL SULFATE 5 MG: 2.5 SOLUTION RESPIRATORY (INHALATION) at 23:54

## 2024-06-22 RX ADMIN — KETOROLAC TROMETHAMINE 15 MG: 30 INJECTION, SOLUTION INTRAMUSCULAR; INTRAVENOUS at 21:08

## 2024-06-22 RX ADMIN — IPRATROPIUM BROMIDE 0.5 MG: 0.5 SOLUTION RESPIRATORY (INHALATION) at 21:05

## 2024-06-22 RX ADMIN — IPRATROPIUM BROMIDE 0.5 MG: 0.5 SOLUTION RESPIRATORY (INHALATION) at 23:54

## 2024-06-23 ENCOUNTER — APPOINTMENT (OUTPATIENT)
Dept: CT IMAGING | Facility: HOSPITAL | Age: 54
End: 2024-06-23
Payer: COMMERCIAL

## 2024-06-23 PROBLEM — R06.00 DYSPNEA: Status: ACTIVE | Noted: 2024-06-23

## 2024-06-23 LAB
APTT PPP: 28 SECONDS (ref 23–37)
D DIMER PPP FEU-MCNC: 0.42 UG/ML FEU
FLUAV RNA RESP QL NAA+PROBE: NEGATIVE
FLUBV RNA RESP QL NAA+PROBE: NEGATIVE
INR PPP: 0.97 (ref 0.84–1.19)
L PNEUMO1 AG UR QL IA.RAPID: NEGATIVE
LACTATE SERPL-SCNC: 1.5 MMOL/L (ref 0.5–2)
PROCALCITONIN SERPL-MCNC: <0.05 NG/ML
PROCALCITONIN SERPL-MCNC: <0.05 NG/ML
PROTHROMBIN TIME: 13.5 SECONDS (ref 11.6–14.5)
RSV RNA RESP QL NAA+PROBE: NEGATIVE
S PNEUM AG UR QL: NEGATIVE
SARS-COV-2 RNA RESP QL NAA+PROBE: NEGATIVE

## 2024-06-23 PROCEDURE — 83605 ASSAY OF LACTIC ACID: CPT | Performed by: EMERGENCY MEDICINE

## 2024-06-23 PROCEDURE — 99223 1ST HOSP IP/OBS HIGH 75: CPT | Performed by: STUDENT IN AN ORGANIZED HEALTH CARE EDUCATION/TRAINING PROGRAM

## 2024-06-23 PROCEDURE — 0241U HB NFCT DS VIR RESP RNA 4 TRGT: CPT | Performed by: PHYSICIAN ASSISTANT

## 2024-06-23 PROCEDURE — 0202U NFCT DS 22 TRGT SARS-COV-2: CPT | Performed by: STUDENT IN AN ORGANIZED HEALTH CARE EDUCATION/TRAINING PROGRAM

## 2024-06-23 PROCEDURE — 36415 COLL VENOUS BLD VENIPUNCTURE: CPT | Performed by: EMERGENCY MEDICINE

## 2024-06-23 PROCEDURE — 85730 THROMBOPLASTIN TIME PARTIAL: CPT | Performed by: EMERGENCY MEDICINE

## 2024-06-23 PROCEDURE — 99497 ADVNCD CARE PLAN 30 MIN: CPT | Performed by: STUDENT IN AN ORGANIZED HEALTH CARE EDUCATION/TRAINING PROGRAM

## 2024-06-23 PROCEDURE — 84145 PROCALCITONIN (PCT): CPT | Performed by: EMERGENCY MEDICINE

## 2024-06-23 PROCEDURE — 85610 PROTHROMBIN TIME: CPT | Performed by: EMERGENCY MEDICINE

## 2024-06-23 PROCEDURE — 87040 BLOOD CULTURE FOR BACTERIA: CPT | Performed by: EMERGENCY MEDICINE

## 2024-06-23 PROCEDURE — 87449 NOS EACH ORGANISM AG IA: CPT | Performed by: PHYSICIAN ASSISTANT

## 2024-06-23 PROCEDURE — 71250 CT THORAX DX C-: CPT

## 2024-06-23 PROCEDURE — 99285 EMERGENCY DEPT VISIT HI MDM: CPT | Performed by: EMERGENCY MEDICINE

## 2024-06-23 RX ORDER — ACYCLOVIR 800 MG/1
800 TABLET ORAL 2 TIMES DAILY
Status: DISCONTINUED | OUTPATIENT
Start: 2024-06-23 | End: 2024-06-24 | Stop reason: HOSPADM

## 2024-06-23 RX ORDER — OXYCODONE HYDROCHLORIDE 5 MG/1
5 TABLET ORAL EVERY 4 HOURS PRN
Status: DISCONTINUED | OUTPATIENT
Start: 2024-06-23 | End: 2024-06-24 | Stop reason: HOSPADM

## 2024-06-23 RX ORDER — ALBUTEROL SULFATE 90 UG/1
2 AEROSOL, METERED RESPIRATORY (INHALATION) EVERY 4 HOURS PRN
Status: DISCONTINUED | OUTPATIENT
Start: 2024-06-23 | End: 2024-06-24 | Stop reason: HOSPADM

## 2024-06-23 RX ORDER — PREGABALIN 100 MG/1
100 CAPSULE ORAL 2 TIMES DAILY
Status: DISCONTINUED | OUTPATIENT
Start: 2024-06-23 | End: 2024-06-24 | Stop reason: HOSPADM

## 2024-06-23 RX ORDER — FLUTICASONE PROPIONATE 50 MCG
2 SPRAY, SUSPENSION (ML) NASAL DAILY
Status: DISCONTINUED | OUTPATIENT
Start: 2024-06-23 | End: 2024-06-24 | Stop reason: HOSPADM

## 2024-06-23 RX ORDER — METOPROLOL SUCCINATE 25 MG/1
25 TABLET, EXTENDED RELEASE ORAL DAILY
Status: DISCONTINUED | OUTPATIENT
Start: 2024-06-23 | End: 2024-06-24 | Stop reason: HOSPADM

## 2024-06-23 RX ORDER — FAMOTIDINE 20 MG/1
20 TABLET, FILM COATED ORAL 2 TIMES DAILY
Status: DISCONTINUED | OUTPATIENT
Start: 2024-06-23 | End: 2024-06-24 | Stop reason: HOSPADM

## 2024-06-23 RX ORDER — DULOXETIN HYDROCHLORIDE 20 MG/1
40 CAPSULE, DELAYED RELEASE ORAL DAILY
Status: DISCONTINUED | OUTPATIENT
Start: 2024-06-23 | End: 2024-06-24 | Stop reason: HOSPADM

## 2024-06-23 RX ORDER — NICOTINE 21 MG/24HR
1 PATCH, TRANSDERMAL 24 HOURS TRANSDERMAL DAILY
Status: DISCONTINUED | OUTPATIENT
Start: 2024-06-23 | End: 2024-06-24 | Stop reason: HOSPADM

## 2024-06-23 RX ORDER — AMLODIPINE BESYLATE 10 MG/1
10 TABLET ORAL EVERY MORNING
Status: DISCONTINUED | OUTPATIENT
Start: 2024-06-23 | End: 2024-06-24 | Stop reason: HOSPADM

## 2024-06-23 RX ORDER — GUAIFENESIN/DEXTROMETHORPHAN 100-10MG/5
10 SYRUP ORAL EVERY 6 HOURS PRN
Status: DISCONTINUED | OUTPATIENT
Start: 2024-06-23 | End: 2024-06-24 | Stop reason: HOSPADM

## 2024-06-23 RX ORDER — ENOXAPARIN SODIUM 100 MG/ML
40 INJECTION SUBCUTANEOUS DAILY
Status: DISCONTINUED | OUTPATIENT
Start: 2024-06-23 | End: 2024-06-24 | Stop reason: HOSPADM

## 2024-06-23 RX ORDER — ACETAMINOPHEN 325 MG/1
650 TABLET ORAL EVERY 6 HOURS PRN
Status: DISCONTINUED | OUTPATIENT
Start: 2024-06-23 | End: 2024-06-24 | Stop reason: HOSPADM

## 2024-06-23 RX ORDER — MAGNESIUM HYDROXIDE/ALUMINUM HYDROXICE/SIMETHICONE 120; 1200; 1200 MG/30ML; MG/30ML; MG/30ML
30 SUSPENSION ORAL EVERY 6 HOURS PRN
Status: DISCONTINUED | OUTPATIENT
Start: 2024-06-23 | End: 2024-06-24 | Stop reason: HOSPADM

## 2024-06-23 RX ORDER — METHOCARBAMOL 750 MG/1
750 TABLET, FILM COATED ORAL EVERY 6 HOURS PRN
Status: DISCONTINUED | OUTPATIENT
Start: 2024-06-23 | End: 2024-06-24 | Stop reason: HOSPADM

## 2024-06-23 RX ORDER — LEVALBUTEROL INHALATION SOLUTION 1.25 MG/3ML
1.25 SOLUTION RESPIRATORY (INHALATION) EVERY 8 HOURS PRN
Status: DISCONTINUED | OUTPATIENT
Start: 2024-06-23 | End: 2024-06-24 | Stop reason: HOSPADM

## 2024-06-23 RX ORDER — LORATADINE 10 MG/1
10 TABLET ORAL DAILY
Status: DISCONTINUED | OUTPATIENT
Start: 2024-06-23 | End: 2024-06-24 | Stop reason: HOSPADM

## 2024-06-23 RX ORDER — TRAZODONE HYDROCHLORIDE 50 MG/1
50 TABLET ORAL
Status: DISCONTINUED | OUTPATIENT
Start: 2024-06-23 | End: 2024-06-24 | Stop reason: HOSPADM

## 2024-06-23 RX ORDER — PANTOPRAZOLE SODIUM 40 MG/1
40 TABLET, DELAYED RELEASE ORAL DAILY
Status: DISCONTINUED | OUTPATIENT
Start: 2024-06-23 | End: 2024-06-24 | Stop reason: HOSPADM

## 2024-06-23 RX ADMIN — PREGABALIN 100 MG: 100 CAPSULE ORAL at 17:10

## 2024-06-23 RX ADMIN — CEFTRIAXONE SODIUM 1000 MG: 10 INJECTION, POWDER, FOR SOLUTION INTRAVENOUS at 03:18

## 2024-06-23 RX ADMIN — OXYCODONE HYDROCHLORIDE 5 MG: 5 TABLET ORAL at 17:10

## 2024-06-23 RX ADMIN — OXYCODONE HYDROCHLORIDE 5 MG: 5 TABLET ORAL at 04:23

## 2024-06-23 RX ADMIN — METOPROLOL SUCCINATE 25 MG: 25 TABLET, EXTENDED RELEASE ORAL at 08:44

## 2024-06-23 RX ADMIN — ACYCLOVIR 800 MG: 800 TABLET ORAL at 08:45

## 2024-06-23 RX ADMIN — ENOXAPARIN SODIUM 40 MG: 40 INJECTION SUBCUTANEOUS at 08:43

## 2024-06-23 RX ADMIN — DULOXETINE HYDROCHLORIDE 40 MG: 20 CAPSULE, DELAYED RELEASE ORAL at 08:44

## 2024-06-23 RX ADMIN — AMLODIPINE BESYLATE 10 MG: 10 TABLET ORAL at 08:44

## 2024-06-23 RX ADMIN — ACETAMINOPHEN 650 MG: 325 TABLET, FILM COATED ORAL at 20:05

## 2024-06-23 RX ADMIN — PANTOPRAZOLE SODIUM 40 MG: 40 TABLET, DELAYED RELEASE ORAL at 08:44

## 2024-06-23 RX ADMIN — TRAZODONE HYDROCHLORIDE 50 MG: 50 TABLET ORAL at 22:29

## 2024-06-23 RX ADMIN — FLUTICASONE PROPIONATE 2 SPRAY: 50 SPRAY, METERED NASAL at 08:45

## 2024-06-23 RX ADMIN — ACYCLOVIR 800 MG: 800 TABLET ORAL at 17:11

## 2024-06-23 RX ADMIN — LORATADINE 10 MG: 10 TABLET ORAL at 08:44

## 2024-06-23 RX ADMIN — NICOTINE 1 PATCH: 14 PATCH, EXTENDED RELEASE TRANSDERMAL at 08:43

## 2024-06-23 RX ADMIN — PREGABALIN 100 MG: 100 CAPSULE ORAL at 08:44

## 2024-06-23 RX ADMIN — OXYCODONE HYDROCHLORIDE 5 MG: 5 TABLET ORAL at 12:53

## 2024-06-23 RX ADMIN — FAMOTIDINE 20 MG: 20 TABLET, FILM COATED ORAL at 08:44

## 2024-06-23 RX ADMIN — FAMOTIDINE 20 MG: 20 TABLET, FILM COATED ORAL at 17:10

## 2024-06-23 RX ADMIN — OXYCODONE HYDROCHLORIDE 5 MG: 5 TABLET ORAL at 22:29

## 2024-06-23 RX ADMIN — OXYCODONE HYDROCHLORIDE 5 MG: 5 TABLET ORAL at 08:50

## 2024-06-23 NOTE — H&P
[facility]  H&P  Name: Marla Negron 53 y.o. female I MRN: [id number]  Unit/Bed#: ED 07 I Date of Admission: 6/22/2024   Date of Service: 6/23/2024 I Hospital Day: 0      Assessment & Plan   * Dyspnea  Assessment & Plan  Rule out secondary to viral infection versus developing pneumonia?  Chest x-ray without any significant opacity, questionable opacity in right lower lobe, pending radiology read  Can also check sputum culture and Gram stain, strep pneumoniae, Legionella  D-dimer WNL  Supportive care, as needed Xopenex nebulizer treatment, albuterol inhaler  Patient was given a dose of IV ceftriaxone in the ED, will hold for now as patient currently afebrile, currently not meeting SIRS criteria  Check COVID/flu/RSV    Chest pain  Assessment & Plan  Less likely musculoskeletal due to cough, congestion  Troponin x 2 negative, EKG revealing prolonged QTc, sinus with sinus arrhythmia, no significant change from previous  Due to persistent chest pain for now we will monitor on telemetry           VTE Pharmacologic Prophylaxis: VTE Score: 3 Moderate Risk (Score 3-4) - Pharmacological DVT Prophylaxis Ordered: enoxaparin (Lovenox).  Code Status: Level 1 - Full Code   Discussion with family:  pt.     Anticipated Length of Stay: Patient will be admitted on an observation basis with an anticipated length of stay of less than 2 midnights secondary to see above.    Total Time Spent on Date of Encounter in care of patient: 66 mins. This time was spent on one or more of the following: performing physical exam; counseling and coordination of care; obtaining or reviewing history; documenting in the medical record; reviewing/ordering tests, medications or procedures; communicating with other healthcare professionals and discussing with patient's family/caregivers.    Chief Complaint:    Chief Complaint   Patient presents with    Chest Pain     Pt c/o mid chest pain that radiates into her back x 4 hrs          History of Present Illness:  Marla Negron is a 53 y.o. female with a PMH of multiple myeloma, stem cell transplant tobacco use disorder, polycythemia who presents with complaint of sudden onset shortness of breath, dyspnea, chest pain going across her chest that started yesterday afternoon.  She also reports she has had an intermittent cough.  Denies dizziness, abdominal pain, fevers or chills.    Review of Systems:  Review of Systems   Constitutional:  Negative for activity change, chills and fever.   HENT:  Positive for congestion.    Respiratory:  Positive for cough and shortness of breath.    Cardiovascular:  Positive for chest pain.   Gastrointestinal:  Negative for abdominal pain, diarrhea, nausea and vomiting.   Neurological:  Negative for dizziness and headaches.       Past Medical and Surgical History:   Past Medical History:   Diagnosis Date    Anemia     Cardiomyopathy (HCC)     Chronic back pain     Chronic narcotic dependence (HCC)     Colon polyp     Disease of thyroid gland     GERD (gastroesophageal reflux disease)     H/O autologous stem cell transplant (HCC) 2022    Hypertension     Multiple myeloma (HCC)     in remission    Neuropathy        Past Surgical History:   Procedure Laterality Date     SECTION      COLONOSCOPY      EYE SURGERY Left     biopsy    THYROID LOBECTOMY Left 2023    Procedure: LEFT HEMITHYROIDECTOMY;  Surgeon: José Manuel Galarza MD;  Location: [facility];  Service: Surgical Oncology    US GUIDED THYROID BIOPSY  10/20/2022       Meds/Allergies:  Prior to Admission medications    Medication Sig Start Date End Date Taking? Authorizing Provider   acyclovir (ZOVIRAX) 800 mg tablet Take 1 tablet (800 mg total) by mouth 2 (two) times a day 24 Sherlyn Lynn PA-C   albuterol (PROVENTIL HFA,VENTOLIN HFA) 90 mcg/act inhaler Inhale 2 puffs every 6 (six) hours as needed for wheezing or shortness of breath 3/31/23   James Mcarthur,    amLODIPine  (NORVASC) 10 mg tablet Take 1 tablet (10 mg total) by mouth every morning 2/5/24   Zhane Lynn PA-C   DULoxetine (CYMBALTA) 20 mg capsule Take 2 tablets ( 40 Mg total) by mouth daily 3/12/24   Zhane Lynn PA-C   famotidine (PEPCID) 40 MG tablet Take 0.5 tablets (20 mg total) by mouth 2 (two) times a day 5/8/24   Zhane Lynn PA-C   fluticasone (FLONASE) 50 mcg/act nasal spray 2 sprays into each nostril daily 6/6/24 April Sherlyn Lynn PA-C   loratadine (CLARITIN) 10 mg tablet Take 1 tablet (10 mg total) by mouth daily 6/11/24 12/8/24 April Sherlyn Lynn PA-C   methocarbamol (Robaxin-750) 750 mg tablet Take 1 tablet (750 mg total) by mouth every 6 (six) hours as needed for muscle spasms 5/15/24 8/13/24  Zhane Lynn PA-C   metoprolol succinate (TOPROL-XL) 25 mg 24 hr tablet Take 1 tablet (25 mg total) by mouth daily 2/5/24   Zhane Lynn PA-C   naloxone (NARCAN) 4 mg/0.1 mL nasal spray Administer 1 spray into a nostril. If no response after 2-3 minutes, give another dose in the other nostril using a new spray. 3/12/24   Willy Almaraz MD   NON FORMULARY -Balance of Nature vegetable and fruit supplements    Historical Provider, MD   oxyCODONE (ROXICODONE) 5 immediate release tablet Take 1 tablet (5 mg total) by mouth every 4 (four) hours as needed for moderate pain or severe pain (cancer-related pain) for up to 15 days Max Daily Amount: 30 mg 6/13/24 6/28/24  Willy Almaraz MD   pantoprazole (PROTONIX) 40 mg tablet Take 1 tablet (40 mg total) by mouth daily 12/29/23 6/26/24 April Sherlyn Lynn PA-C   pregabalin (LYRICA) 100 mg capsule Take 1 capsule (100 mg total) by mouth 2 (two) times a day 5/8/24 5/3/25  Zhane Lynn PA-C   traZODone (DESYREL) 50 mg tablet Take 1 tablet (50 mg total) by mouth daily at bedtime 6/11/24   Zhane Lynn PA-C     I have reviewed her medications per review of chart and PDMP.     Allergies: No Known  "Allergies    Social History:  Marital Status: Single     Patient Pre-hospital Living Situation: Home  Patient Pre-hospital Level of Mobility: walks  Patient Pre-hospital Diet Restrictions: n/a  Substance Use History:   Social History     Substance and Sexual Activity   Alcohol Use Not Currently     Social History     Tobacco Use   Smoking Status Heavy Smoker    Current packs/day: 0.50    Average packs/day: 0.5 packs/day for 31.5 years (15.8 ttl pk-yrs)    Types: Cigarettes    Start date: 12/18/1992    Passive exposure: Current   Smokeless Tobacco Current     Social History     Substance and Sexual Activity   Drug Use Not Currently    Types: Oxycodone    Comment: 1 year       Family History:  Family History   Problem Relation Age of Onset    No Known Problems Mother     No Known Problems Father     Colon cancer Neg Hx     Diabetes Neg Hx     Breast cancer Neg Hx     Cervical cancer Neg Hx     Ovarian cancer Neg Hx     Uterine cancer Neg Hx        Physical Exam:     Vitals:   Blood Pressure: 134/94 (06/23/24 0100)  Pulse: 92 (06/23/24 0100)  Temperature: 98.1 °F (36.7 °C) (06/22/24 2033)  Temp Source: Temporal (06/22/24 2033)  Respirations: 20 (06/23/24 0100)  Height: 5' 3\" (160 cm) (06/22/24 2033)  Weight - Scale: 79.4 kg (175 lb) (06/22/24 2033)  SpO2: 99 % (06/23/24 0100)    Physical Exam  Vitals and nursing note reviewed.   Constitutional:       General: She is not in acute distress.     Appearance: She is not toxic-appearing.   HENT:      Head: Normocephalic.   Cardiovascular:      Rate and Rhythm: Normal rate and regular rhythm.   Pulmonary:      Effort: Pulmonary effort is normal. No respiratory distress.      Breath sounds: No stridor. No wheezing, rhonchi or rales.      Comments: Patient with noted congestion in mid to upper airways bilaterally, little worse on left versus right  Abdominal:      General: Bowel sounds are normal.      Palpations: Abdomen is soft.      Tenderness: There is no abdominal " "tenderness.   Musculoskeletal:      Right lower leg: No edema.      Left lower leg: No edema.   Skin:     General: Skin is warm and dry.   Neurological:      General: No focal deficit present.      Mental Status: She is alert and oriented to person, place, and time.   Psychiatric:         Mood and Affect: Mood normal.         Behavior: Behavior normal.         Thought Content: Thought content normal.          Additional Data:     Lab Results:  Results from last 7 days   Lab Units 06/22/24 2054   WBC Thousand/uL 11.95*   HEMOGLOBIN g/dL 17.6*   HEMATOCRIT % 52.5*   PLATELETS Thousands/uL 191   SEGS PCT % 68   LYMPHO PCT % 24   MONO PCT % 7   EOS PCT % 1     Results from last 7 days   Lab Units 06/22/24 2054   SODIUM mmol/L 138   POTASSIUM mmol/L 3.9   CHLORIDE mmol/L 105   CO2 mmol/L 22   BUN mg/dL 10   CREATININE mg/dL 1.05   ANION GAP mmol/L 11   CALCIUM mg/dL 9.9   ALBUMIN g/dL 4.5   TOTAL BILIRUBIN mg/dL 0.81   ALK PHOS U/L 76   ALT U/L 12   AST U/L 19   GLUCOSE RANDOM mg/dL 111     Results from last 7 days   Lab Units 06/23/24  0311   INR  0.97         No results found for: \"HGBA1C\"        Lines/Drains:  Invasive Devices       Peripheral Intravenous Line  Duration             Peripheral IV 06/22/24 Right;Ventral (anterior) Forearm <1 day    Peripheral IV 06/23/24 Dorsal (posterior);Right Hand <1 day                        Imaging: Personally reviewed the following imaging: chest xray  XR chest 1 view portable    (Results Pending)       EKG and Other Studies Reviewed on Admission:   EKG:  Prolonged QTc, sinus rhythm with sinus arrhythmia.    ** Please Note: This note has been constructed using a voice recognition system. **    "

## 2024-06-23 NOTE — ED PROVIDER NOTES
History  Chief Complaint   Patient presents with    Chest Pain     Pt c/o mid chest pain that radiates into her back x 4 hrs      53-year-old female presents emergency department for evaluation of chest pain.  Patient has midsternal chest pain that radiates into her back been present for the past 4 hours associate with some cough and congestion as well no fevers, somewhat short of breath.        Prior to Admission Medications   Prescriptions Last Dose Informant Patient Reported? Taking?   DULoxetine (CYMBALTA) 20 mg capsule   No No   Sig: Take 2 tablets ( 40 Mg total) by mouth daily   NON FORMULARY  Self Yes No   Sig: -Balance of Nature vegetable and fruit supplements   acyclovir (ZOVIRAX) 800 mg tablet   No No   Sig: Take 1 tablet (800 mg total) by mouth 2 (two) times a day   albuterol (PROVENTIL HFA,VENTOLIN HFA) 90 mcg/act inhaler  Self No No   Sig: Inhale 2 puffs every 6 (six) hours as needed for wheezing or shortness of breath   amLODIPine (NORVASC) 10 mg tablet   No No   Sig: Take 1 tablet (10 mg total) by mouth every morning   famotidine (PEPCID) 40 MG tablet   No No   Sig: Take 0.5 tablets (20 mg total) by mouth 2 (two) times a day   fluticasone (FLONASE) 50 mcg/act nasal spray   No No   Si sprays into each nostril daily   loratadine (CLARITIN) 10 mg tablet   No No   Sig: Take 1 tablet (10 mg total) by mouth daily   methocarbamol (Robaxin-750) 750 mg tablet   No No   Sig: Take 1 tablet (750 mg total) by mouth every 6 (six) hours as needed for muscle spasms   metoprolol succinate (TOPROL-XL) 25 mg 24 hr tablet   No No   Sig: Take 1 tablet (25 mg total) by mouth daily   naloxone (NARCAN) 4 mg/0.1 mL nasal spray   No No   Sig: Administer 1 spray into a nostril. If no response after 2-3 minutes, give another dose in the other nostril using a new spray.   oxyCODONE (ROXICODONE) 5 immediate release tablet   No No   Sig: Take 1 tablet (5 mg total) by mouth every 4 (four) hours as needed for moderate pain or  severe pain (cancer-related pain) for up to 15 days Max Daily Amount: 30 mg   pantoprazole (PROTONIX) 40 mg tablet  Self No No   Sig: Take 1 tablet (40 mg total) by mouth daily   pregabalin (LYRICA) 100 mg capsule   No No   Sig: Take 1 capsule (100 mg total) by mouth 2 (two) times a day   traZODone (DESYREL) 50 mg tablet   No No   Sig: Take 1 tablet (50 mg total) by mouth daily at bedtime      Facility-Administered Medications: None       Past Medical History:   Diagnosis Date    Anemia     Cardiomyopathy (HCC)     Chronic back pain     Chronic narcotic dependence (HCC)     Colon polyp     Disease of thyroid gland     GERD (gastroesophageal reflux disease)     H/O autologous stem cell transplant (HCC) 2022    Hypertension     Multiple myeloma (HCC)     in remission    Neuropathy        Past Surgical History:   Procedure Laterality Date     SECTION      COLONOSCOPY      EYE SURGERY Left     biopsy    THYROID LOBECTOMY Left 2023    Procedure: LEFT HEMITHYROIDECTOMY;  Surgeon: José Manuel Galarza MD;  Location: [facility];  Service: Surgical Oncology    US GUIDED THYROID BIOPSY  10/20/2022       Family History   Problem Relation Age of Onset    No Known Problems Mother     No Known Problems Father     Colon cancer Neg Hx     Diabetes Neg Hx     Breast cancer Neg Hx     Cervical cancer Neg Hx     Ovarian cancer Neg Hx     Uterine cancer Neg Hx      I have reviewed and agree with the history as documented.    E-Cigarette/Vaping    E-Cigarette Use Never User      E-Cigarette/Vaping Substances    Nicotine No     THC No     CBD No     Flavoring No     Other No     Unknown No      Social History     Tobacco Use    Smoking status: Heavy Smoker     Current packs/day: 0.50     Average packs/day: 0.5 packs/day for 31.5 years (15.8 ttl pk-yrs)     Types: Cigarettes     Start date: 1992     Passive exposure: Current    Smokeless tobacco: Current   Vaping Use    Vaping status: Never Used   Substance Use Topics     Alcohol use: Not Currently    Drug use: Not Currently     Types: Oxycodone     Comment: 1 year       Review of Systems   Constitutional:  Negative for appetite change, chills, fatigue and fever.   HENT:  Negative for sneezing and sore throat.    Eyes:  Negative for visual disturbance.   Respiratory:  Positive for cough, chest tightness and shortness of breath. Negative for choking and wheezing.    Cardiovascular:  Positive for chest pain. Negative for palpitations.   Gastrointestinal:  Negative for abdominal pain, constipation, diarrhea, nausea and vomiting.   Genitourinary:  Negative for difficulty urinating and dysuria.   Neurological:  Negative for dizziness, weakness, light-headedness, numbness and headaches.   All other systems reviewed and are negative.      Physical Exam  Physical Exam  Vitals and nursing note reviewed.   Constitutional:       General: She is not in acute distress.     Appearance: She is well-developed. She is not diaphoretic.   HENT:      Head: Normocephalic and atraumatic.   Eyes:      Pupils: Pupils are equal, round, and reactive to light.   Neck:      Vascular: No JVD.      Trachea: No tracheal deviation.   Cardiovascular:      Rate and Rhythm: Normal rate and regular rhythm.      Heart sounds: Normal heart sounds. No murmur heard.     No friction rub. No gallop.   Pulmonary:      Effort: Pulmonary effort is normal. No respiratory distress.      Breath sounds: Decreased breath sounds and rhonchi present. No wheezing or rales.   Abdominal:      General: Bowel sounds are normal. There is no distension.      Palpations: Abdomen is soft.      Tenderness: There is no abdominal tenderness. There is no guarding or rebound.   Skin:     General: Skin is warm and dry.      Coloration: Skin is not pale.   Neurological:      Mental Status: She is alert and oriented to person, place, and time.      Cranial Nerves: No cranial nerve deficit.      Motor: No abnormal muscle tone.   Psychiatric:          Behavior: Behavior normal.         Vital Signs  ED Triage Vitals [06/22/24 2033]   Temperature Pulse Respirations Blood Pressure SpO2   98.1 °F (36.7 °C) 90 18 157/97 94 %      Temp Source Heart Rate Source Patient Position - Orthostatic VS BP Location FiO2 (%)   Temporal Monitor Lying Right arm --      Pain Score       10 - Worst Possible Pain           Vitals:    06/22/24 2033 06/22/24 2321 06/23/24 0100 06/23/24 0414   BP: 157/97 127/93 134/94 144/86   Pulse: 90 92 92 101   Patient Position - Orthostatic VS: Lying Sitting Sitting          Visual Acuity      ED Medications  Medications   acyclovir (ZOVIRAX) tablet 800 mg (has no administration in time range)   albuterol (PROVENTIL HFA,VENTOLIN HFA) inhaler 2 puff (has no administration in time range)   amLODIPine (NORVASC) tablet 10 mg (has no administration in time range)   DULoxetine (CYMBALTA) delayed release capsule 40 mg (has no administration in time range)   famotidine (PEPCID) tablet 20 mg (has no administration in time range)   fluticasone (FLONASE) 50 mcg/act nasal spray 2 spray (has no administration in time range)   loratadine (CLARITIN) tablet 10 mg (has no administration in time range)   methocarbamol (ROBAXIN) tablet 750 mg (has no administration in time range)   metoprolol succinate (TOPROL-XL) 24 hr tablet 25 mg (has no administration in time range)   pantoprazole (PROTONIX) EC tablet 40 mg (has no administration in time range)   oxyCODONE (ROXICODONE) IR tablet 5 mg (5 mg Oral Given 6/23/24 0423)   pregabalin (LYRICA) capsule 100 mg (has no administration in time range)   traZODone (DESYREL) tablet 50 mg (has no administration in time range)   dextromethorphan-guaiFENesin (ROBITUSSIN DM) oral syrup 10 mL (has no administration in time range)   levalbuterol (XOPENEX) inhalation solution 1.25 mg (has no administration in time range)   acetaminophen (TYLENOL) tablet 650 mg (has no administration in time range)   aluminum-magnesium  hydroxide-simethicone (MAALOX) oral suspension 30 mL (has no administration in time range)   nicotine (NICODERM CQ) 14 mg/24hr TD 24 hr patch 1 patch (has no administration in time range)   enoxaparin (LOVENOX) subcutaneous injection 40 mg (has no administration in time range)   albuterol inhalation solution 5 mg (5 mg Nebulization Given 6/22/24 2105)   ipratropium (ATROVENT) 0.02 % inhalation solution 0.5 mg (0.5 mg Nebulization Given 6/22/24 2105)   ketorolac (TORADOL) injection 15 mg (15 mg Intravenous Given 6/22/24 2108)   morphine injection 4 mg (4 mg Intravenous Given 6/22/24 2241)   albuterol inhalation solution 5 mg (5 mg Nebulization Given 6/22/24 2354)   ipratropium (ATROVENT) 0.02 % inhalation solution 0.5 mg (0.5 mg Nebulization Given 6/22/24 2354)   methylPREDNISolone sodium succinate (Solu-MEDROL) injection 125 mg (125 mg Intravenous Given 6/22/24 2353)   morphine injection 4 mg (4 mg Intravenous Given 6/22/24 2353)   ceftriaxone (ROCEPHIN) 1 g/50 mL in dextrose IVPB (1,000 mg Intravenous New Bag 6/23/24 0318)       Diagnostic Studies  Results Reviewed       Procedure Component Value Units Date/Time    COVID/FLU/RSV [[id number]]  (Normal) Collected: 06/23/24 0320    Lab Status: Final result Specimen: Nares from Nose Updated: 06/23/24 0403     SARS-CoV-2 Negative     INFLUENZA A PCR Negative     INFLUENZA B PCR Negative     RSV PCR Negative    Narrative:      FOR PEDIATRIC PATIENTS - copy/paste COVID Guidelines URL to browser: [email or web address]    SARS-CoV-2 assay is a Nucleic Acid Amplification assay intended for the  qualitative detection of nucleic acid from SARS-CoV-2 in nasopharyngeal  swabs. Results are for the presumptive identification of SARS-CoV-2 RNA.    Positive results are indicative of infection with SARS-CoV-2, the virus  causing COVID-19, but do not rule out bacterial infection or co-infection  with other viruses. Laboratories  within the [place] and its  territories are required to report all positive results to the appropriate  public health authorities. Negative results do not preclude SARS-CoV-2  infection and should not be used as the sole basis for treatment or other  patient management decisions. Negative results must be combined with  clinical observations, patient history, and epidemiological information.  This test has not been FDA cleared or approved.    This test has been authorized by FDA under an Emergency Use Authorization  (EUA). This test is only authorized for the duration of time the  declaration that circumstances exist justifying the authorization of the  emergency use of an in vitro diagnostic tests for detection of SARS-CoV-2  virus and/or diagnosis of COVID-19 infection under section 564(b)(1) of  the Act, 21 U.S.C. 360bbb-3(b)(1), unless the authorization is terminated  or revoked sooner. The test has been validated but independent review by FDA  and CLIA is pending.    Test performed using [employer] GeneXpert: This RT-PCR assay targets N2,  a region unique to SARS-CoV-2. A conserved region in the E-gene was chosen  for pan-Sarbecovirus detection which includes SARS-CoV-2.    According to CMS-2020-01-R, this platform meets the definition of high-throughput technology.    Procalcitonin [[id number]]  (Normal) Collected: 06/23/24 0311    Lab Status: Final result Specimen: Blood from Arm, Right Updated: 06/23/24 0345     Procalcitonin <0.05 ng/ml     Lactic acid [[id number]]  (Normal) Collected: 06/23/24 0311    Lab Status: Final result Specimen: Blood from Arm, Right Updated: 06/23/24 0337     LACTIC ACID 1.5 mmol/L     Narrative:      Result may be elevated if tourniquet was used during collection.    Protime-INR [[id number]]  (Normal) Collected: 06/23/24 0311    Lab Status: Final result Specimen: Blood from Arm, Right Updated: 06/23/24 0334     Protime 13.5 seconds      INR 0.97    APTT [[id number]]  (Normal)  Collected: 06/23/24 0311    Lab Status: Final result Specimen: Blood from Arm, Right Updated: 06/23/24 0334     PTT 28 seconds     Sputum culture and Gram stain [[id number]]     Lab Status: No result Specimen: Sputum     Legionella antigen, urine [[id number]]     Lab Status: No result Specimen: Urine     Strep Pneumoniae, Urine [[id number]]     Lab Status: No result Specimen: Urine     Blood culture #1 [[id number]] Collected: 06/23/24 0311    Lab Status: In process Specimen: Blood from Arm, Left Updated: 06/23/24 0318    Blood culture #2 [[id number]] Collected: 06/23/24 0311    Lab Status: In process Specimen: Blood from Arm, Right Updated: 06/23/24 0318    UA w Reflex to Microscopic w Reflex to Culture [[id number]]     Lab Status: No result Specimen: Urine     D-dimer, quantitative [[id number]]  (Normal) Collected: 06/22/24 2348    Lab Status: Final result Specimen: Blood from Arm, Right Updated: 06/23/24 0006     D-Dimer, Quant 0.42 ug/ml FEU     Narrative:      In the evaluation for possible pulmonary embolism, in the appropriate (Well's Score of 4 or less) patient, the age adjusted d-dimer cutoff for this patient can be calculated as:    Age x 0.01 (in ug/mL) for Age-adjusted D-dimer exclusion threshold for a patient over 50 years.    HS Troponin I 2hr [[id number]]  (Normal) Collected: 06/22/24 2306    Lab Status: Final result Specimen: Blood from Arm, Right Updated: 06/22/24 2332     hs TnI 2hr 6 ng/L      Delta 2hr hsTnI -2 ng/L     HS Troponin I 4hr [[id number]]     Lab Status: No result Specimen: Blood     HS Troponin 0hr (reflex protocol) [[id number]]  (Normal) Collected: 06/22/24 2054    Lab Status: Final result Specimen: Blood from Arm, Right Updated: 06/22/24 2119     hs TnI 0hr 8 ng/L     Comprehensive metabolic panel [[id number]] Collected: 06/22/24 2054    Lab Status: Final result Specimen: Blood from Arm, Right Updated: 06/22/24 2117     Sodium 138 mmol/L      Potassium 3.9 mmol/L      Chloride 105 mmol/L       CO2 22 mmol/L      ANION GAP 11 mmol/L      BUN 10 mg/dL      Creatinine 1.05 mg/dL      Glucose 111 mg/dL      Calcium 9.9 mg/dL      AST 19 U/L      ALT 12 U/L      Alkaline Phosphatase 76 U/L      Total Protein 8.4 g/dL      Albumin 4.5 g/dL      Total Bilirubin 0.81 mg/dL      eGFR 59 ml/min/1.73sq m     Narrative:      National Kidney Disease Foundation guidelines for Chronic Kidney Disease (CKD):     Stage 1 with normal or high GFR (GFR > 90 mL/min/1.73 square meters)    Stage 2 Mild CKD (GFR = 60-89 mL/min/1.73 square meters)    Stage 3A Moderate CKD (GFR = 45-59 mL/min/1.73 square meters)    Stage 3B Moderate CKD (GFR = 30-44 mL/min/1.73 square meters)    Stage 4 Severe CKD (GFR = 15-29 mL/min/1.73 square meters)    Stage 5 End Stage CKD (GFR <15 mL/min/1.73 square meters)  Note: GFR calculation is accurate only with a steady state creatinine    CBC and differential [[id number]]  (Abnormal) Collected: 06/22/24 2054    Lab Status: Final result Specimen: Blood from Arm, Right Updated: 06/22/24 2057     WBC 11.95 Thousand/uL      RBC 5.76 Million/uL      Hemoglobin 17.6 g/dL      Hematocrit 52.5 %      MCV 91 fL      MCH 30.6 pg      MCHC 33.5 g/dL      RDW 14.6 %      MPV 9.3 fL      Platelets 191 Thousands/uL      nRBC 0 /100 WBCs      Segmented % 68 %      Immature Grans % 0 %      Lymphocytes % 24 %      Monocytes % 7 %      Eosinophils Relative 1 %      Basophils Relative 0 %      Absolute Neutrophils 8.00 Thousands/µL      Absolute Immature Grans 0.03 Thousand/uL      Absolute Lymphocytes 2.89 Thousands/µL      Absolute Monocytes 0.86 Thousand/µL      Eosinophils Absolute 0.14 Thousand/µL      Basophils Absolute 0.03 Thousands/µL                    XR chest 1 view portable    (Results Pending)              Procedures  Procedures         ED Course                               SBIRT 22yo+      Flowsheet Row Most Recent Value   Initial Alcohol Screen: US AUDIT-C     1. How often do you have a drink  containing alcohol? 0 Filed at: 06/22/2024 2033   2. How many drinks containing alcohol do you have on a typical day you are drinking?  0 Filed at: 06/22/2024 2033   3b. FEMALE Any Age, or MALE 65+: How often do you have 4 or more drinks on one occassion? 0 Filed at: 06/22/2024 2033   Audit-C Score 0 Filed at: 06/22/2024 2033   VIRGIL: How many times in the past year have you...    Used an illegal drug or used a prescription medication for non-medical reasons? Never Filed at: 06/22/2024 2033          DAVID Risk Score      Flowsheet Row Most Recent Value   Age >= 65 0 Filed at: 06/23/2024 0321   Known CAD (stenosis >= 50%) 0 Filed at: 06/23/2024 0321   Recent (<=24 hrs) Service Angina 1 Filed at: 06/23/2024 0321   ST Deviation >= 0.5 mm 0 Filed at: 06/23/2024 0321   3+ CAD Risk Factors (FHx, HTN, HLP, DM, Smoker) 0 Filed at: 06/23/2024 0321   Aspirin Use Past 7 Days 0 Filed at: 06/23/2024 0321   Elevated Cardiac Markers 0 Filed at: 06/23/2024 0321   DAVID Risk Score (Calculated) 1 Filed at: 06/23/2024 0321                    Medical Decision Making  53-year-old female presenting to the emergency department for evaluation of chest pain likely has some component of bronchospasm will check serial labs, give nebs, reassess.      Patient's pain is not improved, will add dimer, repeat neb, she is also noted to desat so may admit.    Amount and/or Complexity of Data Reviewed  Labs: ordered.  Radiology: ordered.    Risk  Prescription drug management.  Decision regarding hospitalization.             Disposition  Final diagnoses:   Chest pain   Hypoxia     Time reflects when diagnosis was documented in both MDM as applicable and the Disposition within this note       Time User Action Codes Description Comment    6/23/2024  2:47 AM Mehul Esparza Add [R07.9] Chest pain     6/23/2024  2:48 AM Mehul Esparza [R09.02] Hypoxia           ED Disposition       ED Disposition   Admit    Condition   Stable    Date/Time   Sun Jun 23, 2024  0244    Comment   Case was discussed with TYRONE and the patient's admission status was agreed to be Admission Status: observation status to the service of Dr. Almaraz .               Follow-up Information    None         Current Discharge Medication List        CONTINUE these medications which have NOT CHANGED    Details   acyclovir (ZOVIRAX) 800 mg tablet Take 1 tablet (800 mg total) by mouth 2 (two) times a day  Qty: 180 tablet, Refills: 1    Associated Diagnoses: H/O autologous stem cell transplant (HCC)      albuterol (PROVENTIL HFA,VENTOLIN HFA) 90 mcg/act inhaler Inhale 2 puffs every 6 (six) hours as needed for wheezing or shortness of breath  Qty: 18 g, Refills: 11    Comments: Substitution to a formulary equivalent within the same pharmaceutical class is authorized.  Associated Diagnoses: Shortness of breath      amLODIPine (NORVASC) 10 mg tablet Take 1 tablet (10 mg total) by mouth every morning  Qty: 90 tablet, Refills: 1    Associated Diagnoses: Primary hypertension      DULoxetine (CYMBALTA) 20 mg capsule Take 2 tablets ( 40 Mg total) by mouth daily  Qty: 180 capsule, Refills: 0    Associated Diagnoses: Neuropathy      famotidine (PEPCID) 40 MG tablet Take 0.5 tablets (20 mg total) by mouth 2 (two) times a day  Qty: 90 tablet, Refills: 1    Associated Diagnoses: Heartburn      fluticasone (FLONASE) 50 mcg/act nasal spray 2 sprays into each nostril daily  Qty: 48 g, Refills: 1    Associated Diagnoses: Nasal congestion      loratadine (CLARITIN) 10 mg tablet Take 1 tablet (10 mg total) by mouth daily  Qty: 90 tablet, Refills: 1    Associated Diagnoses: Seasonal allergic rhinitis due to pollen      methocarbamol (Robaxin-750) 750 mg tablet Take 1 tablet (750 mg total) by mouth every 6 (six) hours as needed for muscle spasms  Qty: 360 tablet, Refills: 0    Associated Diagnoses: Chronic bilateral low back pain with bilateral sciatica      metoprolol succinate (TOPROL-XL) 25 mg 24 hr tablet Take 1 tablet (25 mg  total) by mouth daily  Qty: 90 tablet, Refills: 1    Associated Diagnoses: Primary hypertension      naloxone (NARCAN) 4 mg/0.1 mL nasal spray Administer 1 spray into a nostril. If no response after 2-3 minutes, give another dose in the other nostril using a new spray.  Qty: 1 each, Refills: 0    Associated Diagnoses: Multiple myeloma not having achieved remission (HCC); Cancer associated pain; Palliative care patient      NON FORMULARY -Balance of Nature vegetable and fruit supplements      oxyCODONE (ROXICODONE) 5 immediate release tablet Take 1 tablet (5 mg total) by mouth every 4 (four) hours as needed for moderate pain or severe pain (cancer-related pain) for up to 15 days Max Daily Amount: 30 mg  Qty: 90 tablet, Refills: 0    Comments: Opioid taper - max 30 mg/day. 15-day Rx to reduce tab count.  Associated Diagnoses: Palliative care patient; Cancer associated pain; Multiple myeloma not having achieved remission (HCC)      pantoprazole (PROTONIX) 40 mg tablet Take 1 tablet (40 mg total) by mouth daily  Qty: 90 tablet, Refills: 1    Associated Diagnoses: Heartburn      pregabalin (LYRICA) 100 mg capsule Take 1 capsule (100 mg total) by mouth 2 (two) times a day  Qty: 360 capsule, Refills: 1    Associated Diagnoses: Chronic bilateral low back pain with bilateral sciatica      traZODone (DESYREL) 50 mg tablet Take 1 tablet (50 mg total) by mouth daily at bedtime  Qty: 90 tablet, Refills: 3    Associated Diagnoses: Insomnia, unspecified type             No discharge procedures on file.    PDMP Review         Value Time User    PDMP Reviewed  Yes 6/23/2024  3:24 AM Pearl Almaraz PA-C            ED Provider  Electronically Signed by             Mehul Esparza MD  06/23/24 5150

## 2024-06-23 NOTE — ASSESSMENT & PLAN NOTE
Less likely musculoskeletal due to cough, congestion  Troponin x 2 negative, EKG revealing prolonged QTc, sinus with sinus arrhythmia, no significant change from previous  Due to persistent chest pain for now we will monitor on telemetry

## 2024-06-23 NOTE — ACP (ADVANCE CARE PLANNING)
Advanced Care Planning Progress Note    Serious Illness Conversation    1. What is your understanding now of where you are with your illness?  Prognostic Understanding: appropriate understanding of prognosis     2. How much information about what is likely to be ahead with your illness would you like to have?  Information: patient wants to be fully informed     3. What did you (clinician) communicate to the patient?     4. If your health situation worsens, what are your most important goals?  Goals: live as long as possible, no matter what, be mentally aware     5. What are the biggest fears and worries about the future and your health?  Fears/Worries: ability to care for others - children, ill spouse, pain, preparing for death     6. What abilities are so critical to your life that you cannot imagine living without them?  Unacceptable Function: being unable to interact with others     7. What gives you strength as you think about the future with your illness?  brianne     8. If you become sicker, how much are you willing to go through for the possibility of gaining more time?  Be in the hospital: Yes Have a feeding tube: Yes   Be in the ICU: Yes Live in a nursing home: Yes   Be on a ventilator: Yes    Be on dialysis: Yes Undergo aggressive test and/or procedures: Yes   9. How much does your proxy and family know about your priorities and wishes?  Discussion Discussion: extensive discussion with family about goals and wishes        How does this plan sound to you? I will do everything I can to help you through this.  Patient verbalized understanding of the plan     I have spent 30 minutes speaking with my patient on advanced care planning today or during this visit     Advanced directives  Five Wishes: Patient has Five Wishes, not in chart         Ruddy Minor MD

## 2024-06-23 NOTE — ASSESSMENT & PLAN NOTE
Rule out secondary to viral infection versus developing pneumonia?  Chest x-ray without any significant opacity, questionable opacity in right lower lobe, pending radiology read  Can also check sputum culture and Gram stain, strep pneumoniae, Legionella  D-dimer WNL  Supportive care, as needed Xopenex nebulizer treatment, albuterol inhaler  Patient was given a dose of IV ceftriaxone in the ED, will hold for now as patient currently afebrile, currently not meeting SIRS criteria  Check COVID/flu/RSV

## 2024-06-23 NOTE — PLAN OF CARE
Problem: PAIN - ADULT  Goal: Verbalizes/displays adequate comfort level or baseline comfort level  Description: Interventions:  - Encourage patient to monitor pain and request assistance  - Assess pain using appropriate pain scale  - Administer analgesics based on type and severity of pain and evaluate response  - Implement non-pharmacological measures as appropriate and evaluate response  - Consider cultural and social influences on pain and pain management  - Notify physician/advanced practitioner if interventions unsuccessful or patient reports new pain  Outcome: Progressing     Problem: INFECTION - ADULT  Goal: Absence or prevention of progression during hospitalization  Description: INTERVENTIONS:  - Assess and monitor for signs and symptoms of infection  - Monitor lab/diagnostic results  - Monitor all insertion sites, i.e. indwelling lines, tubes, and drains  - Monitor endotracheal if appropriate and nasal secretions for changes in amount and color  - [place] appropriate cooling/warming therapies per order  - Administer medications as ordered  - Instruct and encourage patient and family to use good hand hygiene technique  - Identify and instruct in appropriate isolation precautions for identified infection/condition  Outcome: Progressing  Goal: Absence of fever/infection during neutropenic period  Description: INTERVENTIONS:  - Monitor WBC    Outcome: Progressing     Problem: SAFETY ADULT  Goal: Patient will remain free of falls  Description: INTERVENTIONS:  - Educate patient/family on patient safety including physical limitations  - Instruct patient to call for assistance with activity   - Consult OT/PT to assist with strengthening/mobility   - Keep Call bell within reach  - Keep bed low and locked with side rails adjusted as appropriate  - Keep care items and personal belongings within reach  - Initiate and maintain comfort rounds  - Make Fall Risk Sign visible to staff  - Offer Toileting every 2 Hours,  in advance of need  - Initiate/Maintain alarm  - Obtain necessary fall risk management equipment:   - Apply yellow socks and bracelet for high fall risk patients  - Consider moving patient to room near nurses station  Outcome: Progressing  Goal: Maintain or return to baseline ADL function  Description: INTERVENTIONS:  -  Assess patient's ability to carry out ADLs; assess patient's baseline for ADL function and identify physical deficits which impact ability to perform ADLs (bathing, care of mouth/teeth, toileting, grooming, dressing, etc.)  - Assess/evaluate cause of self-care deficits   - Assess range of motion  - Assess patient's mobility; develop plan if impaired  - Assess patient's need for assistive devices and provide as appropriate  - Encourage maximum independence but intervene and supervise when necessary  - Involve family in performance of ADLs  - Assess for home care needs following discharge   - Consider OT consult to assist with ADL evaluation and planning for discharge  - Provide patient education as appropriate  Outcome: Progressing  Goal: Maintains/Returns to pre admission functional level  Description: INTERVENTIONS:  - Perform AM-PAC 6 Click Basic Mobility/ Daily Activity assessment daily.  - Set and communicate daily mobility goal to care team and patient/family/caregiver.   - Collaborate with rehabilitation services on mobility goals if consulted  - Perform Range of Motion 3 times a day.  - Reposition patient every 2 hours.  - Dangle patient 3 times a day  - Stand patient 3 times a day  - Ambulate patient 3 times a day  - Out of bed to chair 3 times a day   - Out of bed for meals 3 times a day  - Out of bed for toileting  - Record patient progress and toleration of activity level   Outcome: Progressing     Problem: DISCHARGE PLANNING  Goal: Discharge to home or other facility with appropriate resources  Description: INTERVENTIONS:  - Identify barriers to discharge w/patient and caregiver  -  Arrange for needed discharge resources and transportation as appropriate  - Identify discharge learning needs (meds, wound care, etc.)  - Arrange for interpretive services to assist at discharge as needed  - Refer to Case Management Department for coordinating discharge planning if the patient needs post-hospital services based on physician/advanced practitioner order or complex needs related to functional status, cognitive ability, or social support system  Outcome: Progressing     Problem: Knowledge Deficit  Goal: Patient/family/caregiver demonstrates understanding of disease process, treatment plan, medications, and discharge instructions  Description: Complete learning assessment and assess knowledge base.  Interventions:  - Provide teaching at level of understanding  - Provide teaching via preferred learning methods  Outcome: Progressing

## 2024-06-24 ENCOUNTER — APPOINTMENT (OUTPATIENT)
Dept: VASCULAR ULTRASOUND | Facility: HOSPITAL | Age: 54
End: 2024-06-24
Payer: COMMERCIAL

## 2024-06-24 VITALS
HEIGHT: 63 IN | SYSTOLIC BLOOD PRESSURE: 120 MMHG | DIASTOLIC BLOOD PRESSURE: 78 MMHG | WEIGHT: 171.96 LBS | OXYGEN SATURATION: 91 % | HEART RATE: 81 BPM | BODY MASS INDEX: 30.47 KG/M2 | TEMPERATURE: 98 F | RESPIRATION RATE: 18 BRPM

## 2024-06-24 PROBLEM — F41.9 ANXIETY: Status: ACTIVE | Noted: 2023-08-17

## 2024-06-24 LAB
B PARAP IS1001 DNA NPH QL NAA+NON-PROBE: NOT DETECTED
B PERT.PT PRMT NPH QL NAA+NON-PROBE: NOT DETECTED
C PNEUM DNA NPH QL NAA+NON-PROBE: NOT DETECTED
FLUAV RNA NPH QL NAA+NON-PROBE: NOT DETECTED
FLUBV RNA NPH QL NAA+NON-PROBE: NOT DETECTED
HADV DNA NPH QL NAA+NON-PROBE: NOT DETECTED
HCOV 229E RNA NPH QL NAA+NON-PROBE: NOT DETECTED
HCOV HKU1 RNA NPH QL NAA+NON-PROBE: NOT DETECTED
HCOV NL63 RNA NPH QL NAA+NON-PROBE: NOT DETECTED
HCOV OC43 RNA NPH QL NAA+NON-PROBE: NOT DETECTED
HMPV RNA NPH QL NAA+NON-PROBE: NOT DETECTED
HPIV1 RNA NPH QL NAA+NON-PROBE: NOT DETECTED
HPIV2 RNA NPH QL NAA+NON-PROBE: NOT DETECTED
HPIV3 RNA NPH QL NAA+NON-PROBE: NOT DETECTED
HPIV4 RNA NPH QL NAA+NON-PROBE: NOT DETECTED
M PNEUMO DNA NPH QL NAA+NON-PROBE: NOT DETECTED
RSV RNA NPH QL NAA+NON-PROBE: NOT DETECTED
RV+EV RNA NPH QL NAA+NON-PROBE: NOT DETECTED
SARS-COV-2 RNA NPH QL NAA+NON-PROBE: NOT DETECTED

## 2024-06-24 PROCEDURE — 99239 HOSP IP/OBS DSCHRG MGMT >30: CPT | Performed by: NURSE PRACTITIONER

## 2024-06-24 PROCEDURE — 93970 EXTREMITY STUDY: CPT

## 2024-06-24 PROCEDURE — 87205 SMEAR GRAM STAIN: CPT | Performed by: PHYSICIAN ASSISTANT

## 2024-06-24 PROCEDURE — 93970 EXTREMITY STUDY: CPT | Performed by: SURGERY

## 2024-06-24 PROCEDURE — 87070 CULTURE OTHR SPECIMN AEROBIC: CPT | Performed by: PHYSICIAN ASSISTANT

## 2024-06-24 RX ORDER — NICOTINE 21 MG/24HR
1 PATCH, TRANSDERMAL 24 HOURS TRANSDERMAL DAILY
Qty: 28 PATCH | Refills: 0 | Status: SHIPPED | OUTPATIENT
Start: 2024-06-25

## 2024-06-24 RX ORDER — ALPRAZOLAM 0.25 MG/1
0.25 TABLET ORAL 2 TIMES DAILY PRN
Qty: 10 TABLET | Refills: 0 | Status: SHIPPED | OUTPATIENT
Start: 2024-06-24 | End: 2024-07-04

## 2024-06-24 RX ADMIN — DULOXETINE HYDROCHLORIDE 40 MG: 20 CAPSULE, DELAYED RELEASE ORAL at 08:13

## 2024-06-24 RX ADMIN — FLUTICASONE PROPIONATE 2 SPRAY: 50 SPRAY, METERED NASAL at 08:16

## 2024-06-24 RX ADMIN — PANTOPRAZOLE SODIUM 40 MG: 40 TABLET, DELAYED RELEASE ORAL at 08:12

## 2024-06-24 RX ADMIN — PREGABALIN 100 MG: 100 CAPSULE ORAL at 08:14

## 2024-06-24 RX ADMIN — AMLODIPINE BESYLATE 10 MG: 10 TABLET ORAL at 08:13

## 2024-06-24 RX ADMIN — NICOTINE 1 PATCH: 14 PATCH, EXTENDED RELEASE TRANSDERMAL at 08:15

## 2024-06-24 RX ADMIN — ACYCLOVIR 800 MG: 800 TABLET ORAL at 08:16

## 2024-06-24 RX ADMIN — OXYCODONE HYDROCHLORIDE 5 MG: 5 TABLET ORAL at 07:27

## 2024-06-24 RX ADMIN — METOPROLOL SUCCINATE 25 MG: 25 TABLET, EXTENDED RELEASE ORAL at 08:14

## 2024-06-24 RX ADMIN — ENOXAPARIN SODIUM 40 MG: 40 INJECTION SUBCUTANEOUS at 08:14

## 2024-06-24 RX ADMIN — FAMOTIDINE 20 MG: 20 TABLET, FILM COATED ORAL at 08:13

## 2024-06-24 RX ADMIN — LORATADINE 10 MG: 10 TABLET ORAL at 08:13

## 2024-06-24 RX ADMIN — OXYCODONE HYDROCHLORIDE 5 MG: 5 TABLET ORAL at 12:13

## 2024-06-24 NOTE — ASSESSMENT & PLAN NOTE
Rule out secondary to viral infection versus developing pneumonia?  Chest x-ray without any significant opacity, questionable opacity in right lower lobe, radiology read showing no acute cardiopulmonary findings  CT chest pending final reading  Duplex study negative for DVT  Strep pneumoniae, Legionella negative  Sputum culture if able   Respiratory panel negative  D-dimer WNL  Supportive care, as needed Xopenex nebulizer treatment, albuterol inhaler  Patient was given a dose of IV ceftriaxone in the ED, will hold for now as patient currently afebrile, currently not meeting SIRS criteria  COVID/flu/RSV negative  Patient of note is a smoker 1/2 pack per day.   CT chest obtained this shows no active respiratory disease atelectasis noticed at bases

## 2024-06-24 NOTE — PLAN OF CARE
Problem: PAIN - ADULT  Goal: Verbalizes/displays adequate comfort level or baseline comfort level  Description: Interventions:  - Encourage patient to monitor pain and request assistance  - Assess pain using appropriate pain scale  - Administer analgesics based on type and severity of pain and evaluate response  - Implement non-pharmacological measures as appropriate and evaluate response  - Consider cultural and social influences on pain and pain management  - Notify physician/advanced practitioner if interventions unsuccessful or patient reports new pain  Outcome: Progressing     Problem: INFECTION - ADULT  Goal: Absence or prevention of progression during hospitalization  Description: INTERVENTIONS:  - Assess and monitor for signs and symptoms of infection  - Monitor lab/diagnostic results  - Monitor all insertion sites, i.e. indwelling lines, tubes, and drains  - Monitor endotracheal if appropriate and nasal secretions for changes in amount and color  - [place] appropriate cooling/warming therapies per order  - Administer medications as ordered  - Instruct and encourage patient and family to use good hand hygiene technique  - Identify and instruct in appropriate isolation precautions for identified infection/condition  Outcome: Progressing  Goal: Absence of fever/infection during neutropenic period  Description: INTERVENTIONS:  - Monitor WBC    Outcome: Progressing     Problem: SAFETY ADULT  Goal: Patient will remain free of falls  Description: INTERVENTIONS:  - Educate patient/family on patient safety including physical limitations  - Instruct patient to call for assistance with activity   - Consult OT/PT to assist with strengthening/mobility   - Keep Call bell within reach  - Keep bed low and locked with side rails adjusted as appropriate  - Keep care items and personal belongings within reach  - Initiate and maintain comfort rounds  - Make Fall Risk Sign visible to staff  - Offer Toileting every 2 Hours,  in advance of need  - Initiate/Maintain alarm  - Obtain necessary fall risk management equipment:   - Apply yellow socks and bracelet for high fall risk patients  - Consider moving patient to room near nurses station  Outcome: Progressing  Goal: Maintain or return to baseline ADL function  Description: INTERVENTIONS:  -  Assess patient's ability to carry out ADLs; assess patient's baseline for ADL function and identify physical deficits which impact ability to perform ADLs (bathing, care of mouth/teeth, toileting, grooming, dressing, etc.)  - Assess/evaluate cause of self-care deficits   - Assess range of motion  - Assess patient's mobility; develop plan if impaired  - Assess patient's need for assistive devices and provide as appropriate  - Encourage maximum independence but intervene and supervise when necessary  - Involve family in performance of ADLs  - Assess for home care needs following discharge   - Consider OT consult to assist with ADL evaluation and planning for discharge  - Provide patient education as appropriate  Outcome: Progressing  Goal: Maintains/Returns to pre admission functional level  Description: INTERVENTIONS:  - Perform AM-PAC 6 Click Basic Mobility/ Daily Activity assessment daily.  - Set and communicate daily mobility goal to care team and patient/family/caregiver.   - Collaborate with rehabilitation services on mobility goals if consulted  - Perform Range of Motion 3 times a day.  - Reposition patient every 2 hours.  - Dangle patient 3 times a day  - Stand patient 3 times a day  - Ambulate patient 3 times a day  - Out of bed to chair 3 times a day   - Out of bed for meals 3 times a day  - Out of bed for toileting  - Record patient progress and toleration of activity level   Outcome: Progressing     Problem: DISCHARGE PLANNING  Goal: Discharge to home or other facility with appropriate resources  Description: INTERVENTIONS:  - Identify barriers to discharge w/patient and caregiver  -  Arrange for needed discharge resources and transportation as appropriate  - Identify discharge learning needs (meds, wound care, etc.)  - Arrange for interpretive services to assist at discharge as needed  - Refer to Case Management Department for coordinating discharge planning if the patient needs post-hospital services based on physician/advanced practitioner order or complex needs related to functional status, cognitive ability, or social support system  Outcome: Progressing     Problem: Knowledge Deficit  Goal: Patient/family/caregiver demonstrates understanding of disease process, treatment plan, medications, and discharge instructions  Description: Complete learning assessment and assess knowledge base.  Interventions:  - Provide teaching at level of understanding  - Provide teaching via preferred learning methods  Outcome: Progressing

## 2024-06-24 NOTE — ASSESSMENT & PLAN NOTE
Patient has been having increased anxiety due to health along with taking over the care of her 2 grandchildren ages 4 and 6  Discussed case with on call Palliative AP Zhane Ty ok to give a one time rx for xanax 0.25 mg bid prn anxiety disp 10 tabs  Patient can further discuss rx with palliative or pcp if more is needed in the future but explained that will be up to the provider

## 2024-06-24 NOTE — ASSESSMENT & PLAN NOTE
Known history smokes 1/2 pack per day  Continue nicotine patch  Cessation strongly encouraged. Spent 10+ minutes providing education to the patient   Patient is interested in quitting Rx for patches given at discharge

## 2024-06-24 NOTE — DISCHARGE SUMMARY
[facility]  Discharge Summary  Name: Marla Negron I MRN: [id number]  Unit/Bed#: [facility] Date of Admission: 6/22/2024   Date of Service: 6/22/2024  I Hospital Day: 0    * Dyspnea  Assessment & Plan  Rule out secondary to viral infection versus developing pneumonia?  Chest x-ray without any significant opacity, questionable opacity in right lower lobe, radiology read showing no acute cardiopulmonary findings  CT chest pending final reading  Duplex study negative for DVT  Strep pneumoniae, Legionella negative  Sputum culture if able   Respiratory panel negative  D-dimer WNL  Supportive care, as needed Xopenex nebulizer treatment, albuterol inhaler  Patient was given a dose of IV ceftriaxone in the ED, will hold for now as patient currently afebrile, currently not meeting SIRS criteria  COVID/flu/RSV negative  Patient of note is a smoker 1/2 pack per day.   CT chest obtained this shows no active respiratory disease atelectasis noticed at bases    Anxiety  Assessment & Plan  Patient has been having increased anxiety due to health along with taking over the care of her 2 grandchildren ages 4 and 6  Discussed case with on call Palliative AP April Simons ok to give a one time rx for xanax 0.25 mg bid prn anxiety disp 10 tabs  Patient can further discuss rx with palliative or pcp if more is needed in the future but explained that will be up to the provider     Chest pain  Assessment & Plan  Appears to be atypical   Troponin x 2 negative, EKG revealing prolonged QTc, sinus with sinus arrhythmia, no significant change from previous  Telemetry monitoring no events can discontinue    Continuous opioid dependence (HCC)  Assessment & Plan  PDMP reviewed patient takes oxycodone 5 mg q4h prn pain  Continued upon admission     Dependence on nicotine from cigarettes  Assessment & Plan  Known history smokes 1/2 pack per day  Continue nicotine patch  Cessation strongly encouraged. Spent 10+ minutes  providing education to the patient   Patient is interested in quitting Rx for patches given at discharge        Medical Problems       Resolved Problems  Date Reviewed: 6/24/2024   None       Discharging Physician / Practitioner: WILBUR Castillo  PCP: Zhane Lynn PA-C  Admission Date:   Admission Orders (From admission, onward)       Ordered        06/23/24 0249  Place in Observation  Once                          Discharge Date: 06/24/24    Consultations During Hospital Stay:  None    Procedures Performed:   None    Significant Findings / Test Results:   CT chest wo contrast    Result Date: 6/24/2024  Mild plate atelectasis at the lung bases, otherwise clear lungs. Workstation performed: [id number]     XR chest 1 view portable    Result Date: 6/23/2024  No acute cardiopulmonary disease. Workstation performed: [id number]       Incidental Findings:   None      Test Results Pending at Discharge (will require follow up):   None     Outpatient Tests Requested:  Consider PFTs if shortness of breath persists    Complications: None    Reason for Admission: Dyspnea chest pain    Hospital Course:   Marla Negron is a 53 y.o. female patient with  has a past medical history of Anemia, Cardiomyopathy (HCC), Chronic back pain, Chronic narcotic dependence (HCC), Colon polyp, Disease of thyroid gland, GERD (gastroesophageal reflux disease), H/O autologous stem cell transplant (HCC) (03/30/2022), Hypertension, Multiple myeloma (HCC), and Neuropathy. who originally presented to the hospital on 6/22/2024 due to Chest Pain (Pt c/o mid chest pain that radiates into her back x 4 hrs ).  Chest pain atypical EKG and telemetry monitoring unremarkable/unchanged.  Given patient with ongoing complaints of cough respiratory workup included COVID flu RSV that was negative respiratory panel that was negative.  Given persistent cough with improvement in pain and feeling during deep breathing suspect could be possible  "costochondritis.  Given overall improvement negative workup patient was cleared for discharge and further discussion patient noted to be having increased anxiety as well as she is recently taken on the care of her 2 grandchildren ages 4 and 6 which is putting extra burden on top of her life along with her ongoing chronic conditions.  Did discuss with palliative team and patient requested small prescription of Xanax which was cleared for the palliative team given she is on a pain contract as an outpatient.  Patient overall deemed medically cleared and discharged.      Please see above list of diagnoses and related plan for additional information.     Condition at Discharge: good    Discharge Day Visit / Exam:   Subjective:    Patient overall reports coughing has improved along with her pain symptoms has returned closer to her baseline.  Patient of note asked and requested yesterday for a prescription of Xanax upon discharge this was discussed at length with the patient and she had reported about taking over the care of her grandchildren which has been very stressful along with the ongoing concerns with her daughter who is a drug user also has psychiatric issues and when she acts up and takes it out on her and her grandchildren.  Patient states she does feel safe at home it is more behavioral with her daughter.  Vitals: Blood Pressure: 133/90 (06/24/24 1144)  Pulse: 83 (06/24/24 1144)  Temperature: 97.5 °F (36.4 °C) (06/24/24 1144)  Temp Source: Oral (06/24/24 0223)  Respirations: 18 (06/24/24 1144)  Height: 5' 3\" (160 cm) (06/23/24 0414)  Weight - Scale: 78 kg (171 lb 15.3 oz) (06/23/24 0414)  SpO2: 94 % (06/24/24 1144)  Exam:   Physical Exam  Vitals and nursing note reviewed.   Constitutional:       General: She is not in acute distress.     Appearance: She is well-developed.   HENT:      Head: Normocephalic and atraumatic.   Eyes:      Conjunctiva/sclera: Conjunctivae normal.   Cardiovascular:      Rate and Rhythm: " Normal rate and regular rhythm.      Heart sounds: No murmur heard.  Pulmonary:      Effort: Pulmonary effort is normal. No respiratory distress.      Breath sounds: Normal breath sounds.   Abdominal:      Palpations: Abdomen is soft.      Tenderness: There is no abdominal tenderness.   Musculoskeletal:         General: No swelling.      Cervical back: Neck supple.   Skin:     General: Skin is warm and dry.      Capillary Refill: Capillary refill takes less than 2 seconds.   Neurological:      Mental Status: She is alert and oriented to person, place, and time.   Psychiatric:         Mood and Affect: Mood normal.          Discussion with Family: Patient declined call to .     Discharge instructions/Information to patient and family:   See after visit summary for information provided to patient and family.      Provisions for Follow-Up Care:  See after visit summary for information related to follow-up care and any pertinent home health orders.      Mobility at time of Discharge:   Basic Mobility Inpatient Raw Score: 24  JH-HLM Goal: 8: Walk 250 feet or more  JH-HLM Achieved: 7: Walk 25 feet or more  HLM Goal NOT achieved. Continue to encourage mobility in post discharge setting.     Disposition:   Home    Planned Readmission: None     Discharge Statement:  I spent 65 minutes discharging the patient. This time was spent on the day of discharge. I had direct contact with the patient on the day of discharge. Greater than 50% of the total time was spent examining patient, answering all patient questions, arranging and discussing plan of care with patient as well as directly providing post-discharge instructions.  Additional time then spent on discharge activities.    Discharge Medications:  See after visit summary for reconciled discharge medications provided to patient and/or family.      **Please Note: This note may have been constructed using a voice recognition system**

## 2024-06-24 NOTE — CASE MANAGEMENT
Case Management Assessment & Discharge Planning Note    Patient name Marla Negron  Location /-01 MRN [id number]  : 1970 Date 2024       Current Admission Date: 2024  Current Admission Diagnosis:Dyspnea   Patient Active Problem List    Diagnosis Date Noted Date Diagnosed    Dyspnea 2024     Proteinuria 2024     Neuropathy 2023     Anxiety about health 2023     Polycythemia 08/15/2023     Chest pain 2023     Thyroid nodule 2022     H/O autologous stem cell transplant (HCC) 2022     Amyloidosis (HCC) 2022     Encounter for central line care 2022     Palliative care patient 2022     Continuous opioid dependence (HCC) 2022     Multiple myeloma in remission (HCC) 12/15/2021     Encounter for examination following treatment at hospital 12/15/2021     Cancer related pain 12/15/2021     Dependence on nicotine from cigarettes 10/30/2021     Chronic bilateral low back pain with bilateral sciatica 2020     Hypertension 2018       LOS (days): 0  Geometric Mean LOS (GMLOS) (days):   Days to GMLOS:     OBJECTIVE:              Current admission status: Observation       Preferred Pharmacy:   [id number] [address] [address]  Phone: [phone] Fax: [phone]    [id number] [address] [address]  Phone: [phone] Fax: [phone]    Primary Care Provider: Zhane Lynn PA-C    Primary Insurance: [facility]  Secondary Insurance:     ASSESSMENT:  Active Health Care Proxies    There are no active Health Care Proxies on file.       Advance Directives  Does patient have a Health Care POA?: Yes  Does patient have Advance Directives?: Yes  Advance Directives: Power of  for health care  Primary Contact: Yolis Negron (Mother)  [phone]          Readmission Root Cause  30 Day Readmission: No    Patient Information  Admitted from:: Home  Mental Status: Alert  During Assessment patient was accompanied by: Not accompanied during assessment  Assessment information provided by:: Patient  Primary Caregiver: Self  Support Systems: Self, Parent  County of Residence: [place]  What city do you live in?: [place]  Home entry access options. Select all that apply.: Stairs  Number of steps to enter home.: 3  Do the steps have railings?: Yes  Type of Current Residence: [address] [place] home  Upon entering residence, is there a bedroom on the main floor (no further steps)?: Yes  Upon entering residence, is there a bathroom on the main floor (no further steps)?: Yes  Living Arrangements: Lives w/ Extended Family  Is patient a ?: No    Activities of Daily Living Prior to Admission  Functional Status: Independent  Completes ADLs independently?: Yes  Ambulates independently?: Yes  Does patient use assisted devices?: No  Does patient currently own DME?: No  Does patient have a history of Outpatient Therapy (PT/OT)?: No  Does the patient have a history of Short-Term Rehab?: No  Does patient have a history of HHC?: No  Does patient currently have HHC?: No         Patient Information Continued  Income Source: SSI/SSD  Does patient have prescription coverage?: Yes  Does patient receive dialysis treatments?: No  Does patient have a history of substance abuse?: No    PHQ 2/9 Screening   Reviewed PHQ 2/9 Depression Screening Score?: No    Means of Transportation  Means of Transport to Appts:: Drives Self      Social Determinants of Health (SDOH)      Flowsheet Row Most Recent Value   Housing Stability    In the last 12 months, was there a time when you were not able to pay the mortgage or rent on time? N   In the past 12 months, how many times have you moved where you were living? 0   At any time in the past 12 months, were you homeless or living in a shelter (including now)? N    Transportation Needs    In the past 12 months, has lack of transportation kept you from medical appointments or from getting medications? no   In the past 12 months, has lack of transportation kept you from meetings, work, or from getting things needed for daily living? No   Food Insecurity    Within the past 12 months, you worried that your food would run out before you got the money to buy more. Never true   Within the past 12 months, the food you bought just didn't last and you didn't have money to get more. Never true   Utilities    In the past 12 months has the electric, gas, oil, or water company threatened to shut off services in your home? No            DISCHARGE DETAILS:    Discharge planning discussed with:: Pt at bedside  [place] of Choice: Yes  Comments - Freedom of Choice: CM met with pt at bedside and introduced self/role. Pt is alert and oriented x3 able to make her needs known and encouraged to do so. FOC discused with pt at length. Pt has an AMPAC scored of 24. Pt has no CM needs anticiapted. CM continues to follow.  CM contacted family/caregiver?: No- see comments (Pt declined and will update her family)  Were Treatment Team discharge recommendations reviewed with patient/caregiver?: Yes  Did patient/caregiver verbalize understanding of patient care needs?: Yes  Were patient/caregiver advised of the risks associated with not following Treatment Team discharge recommendations?: Yes    Contacts  Reason/Outcome: Continuity of Care, Emergency Contact, Discharge Planning    Requested Home Health Care         Is the patient interested in HHC at discharge?: No    DME Referral Provided  Referral made for DME?: No    Other Referral/Resources/Interventions Provided:  Interventions: None Indicated    Would you like to participate in our [facility] service program?  : No - Declined    Treatment Team Recommendation: Home  Discharge Destination Plan:: Home  Transport at Discharge : Family

## 2024-06-24 NOTE — UTILIZATION REVIEW
Initial Clinical Review    Admission: Date/Time/Statement:   Admission Orders (From admission, onward)       Ordered        06/23/24 0249  Place in Observation  Once                          Orders Placed This Encounter   Procedures    Place in Observation     Standing Status:   Standing     Number of Occurrences:   1     Order Specific Question:   Level of Care     Answer:   Med Surg [16]     ED Arrival Information       Expected   -    Arrival   6/22/2024 20:29    Acuity   Urgent              Means of arrival   Walk-In    Escorted by   Self    Service   Hospitalist    Admission type   Emergency              Arrival complaint   chest pain             Chief Complaint   Patient presents with    Chest Pain     Pt c/o mid chest pain that radiates into her back x 4 hrs        Initial Presentation: 53 y.o. female a PMH of multiple myeloma, stem cell transplant tobacco use disorder, polycythemia who presents with complaint of sudden onset shortness of breath, dyspnea, chest pain going across her chest that started yesterday afternoon.  She also reports she has had an intermittent cough.  CXR w/ questionable opacity in right lower lobe. Admitted OBS status w/ dyspnea, CP plan to check sputum culture and Gram stain, strep pneumoniae, Legionella . Cont supportive care , nebd , inhaler , check COVID , flu , RSV . CP trend trop .     Anticipated Length of Stay/Certification Statement: : Patient will be admitted on an observation basis with an anticipated length of stay of less than 2 midnights secondary to see above.       ED Triage Vitals [06/22/24 2033]   Temperature Pulse Respirations Blood Pressure SpO2 Pain Score   98.1 °F (36.7 °C) 90 18 157/97 94 % 10 - Worst Possible Pain     Weight (last 2 days)       Date/Time Weight    06/23/24 04:14:32 78 (171.96)    06/22/24 2033 79.4 (175)            Vital Signs (last 3 days)       Date/Time Temp Pulse Resp BP MAP (mmHg) SpO2 Calculated FIO2 (%) - Nasal Cannula Nasal Cannula O2  Flow Rate (L/min) O2 Device Patient Position - Orthostatic VS Pain    06/24/24 0727 -- -- -- -- -- -- -- -- -- -- 8 06/24/24 0228 -- -- -- -- -- -- -- -- -- -- 6 06/24/24 0223 97.7 °F (36.5 °C) 81 16 133/85 101 91 % -- -- -- -- --    06/23/24 2229 -- -- -- -- -- -- -- -- -- -- 9 06/23/24 22:28:38 -- 84 20 132/91 105 90 % -- -- -- -- --    06/23/24 2005 -- -- -- -- -- -- -- -- -- -- 8 06/23/24 19:02:51 -- 90 -- 125/90 102 94 % -- -- -- -- --    06/23/24 1710 -- -- -- -- -- -- -- -- -- -- 9 06/23/24 14:50:10 -- 103 18 132/89 103 91 % -- -- -- -- --    06/23/24 1300 -- -- -- -- -- -- -- -- -- -- No Pain    06/23/24 1253 -- -- -- -- -- -- -- -- -- -- 9 06/23/24 11:05:56 -- 90 16 129/86 100 93 % -- -- -- -- --    06/23/24 0850 -- -- -- -- -- -- -- -- -- -- 9 06/23/24 07:48:49 -- -- -- 135/87 103 -- -- -- -- -- --    06/23/24 0519 -- -- -- -- -- -- -- -- -- -- 2    06/23/24 0423 -- -- -- -- -- -- -- -- -- -- 9 06/23/24 04:14:32 97.6 °F (36.4 °C) 101 20 144/86 105 91 % -- -- -- -- --    06/23/24 0100 -- 92 20 134/94 108 99 % -- -- Aerosol mask Sitting --    06/22/24 2321 -- 92 18 127/93 106 98 % 28 2 L/min Nasal cannula Sitting --    06/22/24 2320 -- -- -- -- -- -- -- -- -- -- 9    06/22/24 2045 -- -- -- -- -- -- -- -- None (Room air) -- --    06/22/24 2033 98.1 °F (36.7 °C) 90 18 157/97 -- 94 % -- -- None (Room air) Lying 10 - Worst Possible Pain              Pertinent Labs/Diagnostic Test Results:   Radiology:  6/23 EKG   Prolonged QTc, sinus rhythm with sinus arrhythmia   XR chest 1 view portable   Final Interpretation by Lachelle Flores MD (06/23 0654)      No acute cardiopulmonary disease.            Workstation performed: [id number]         CT chest wo contrast    (Results Pending)    VAS VENOUS DUPLEX - LOWER LIMB BILATERAL    (Results Pending)     Cardiology:  No orders to display     GI:  No orders to display       Results from last 7 days   Lab Units 06/23/24  0320   SARS-COV-2   Negative     Results from last 7 days   Lab Units 06/22/24 2054   WBC Thousand/uL 11.95*   HEMOGLOBIN g/dL 17.6*   HEMATOCRIT % 52.5*   PLATELETS Thousands/uL 191   TOTAL NEUT ABS Thousands/µL 8.00*         Results from last 7 days   Lab Units 06/22/24 2054   SODIUM mmol/L 138   POTASSIUM mmol/L 3.9   CHLORIDE mmol/L 105   CO2 mmol/L 22   ANION GAP mmol/L 11   BUN mg/dL 10   CREATININE mg/dL 1.05   EGFR ml/min/1.73sq m 59   CALCIUM mg/dL 9.9     Results from last 7 days   Lab Units 06/22/24 2054   AST U/L 19   ALT U/L 12   ALK PHOS U/L 76   TOTAL PROTEIN g/dL 8.4   ALBUMIN g/dL 4.5   TOTAL BILIRUBIN mg/dL 0.81         Results from last 7 days   Lab Units 06/22/24 2054   GLUCOSE RANDOM mg/dL 111       Results from last 7 days   Lab Units 06/22/24 2348   D-DIMER QUANTITATIVE ug/ml FEU 0.42     Results from last 7 days   Lab Units 06/23/24  0311   PROTIME seconds 13.5   INR  0.97   PTT seconds 28         Results from last 7 days   Lab Units 06/23/24 0311 06/22/24 2054   PROCALCITONIN ng/ml <0.05 <0.05     Results from last 7 days   Lab Units 06/23/24  0311   LACTIC ACID mmol/L 1.5     Results from last 7 days   Lab Units 06/23/24  0510 06/23/24  0320   STREP PNEUMONIAE ANTIGEN, URINE  Negative  --    LEGIONELLA URINARY ANTIGEN  Negative  --    INFLUENZA A PCR   --  Negative   INFLUENZA B PCR   --  Negative   RSV PCR   --  Negative     Results from last 7 days   Lab Units 06/23/24  0311   BLOOD CULTURE  Received in Microbiology Lab. Culture in Progress.  Received in Microbiology Lab. Culture in Progress.       ED Treatment-Medication Administration from 06/22/2024 2028 to 06/23/2024 0404         Date/Time Order Dose Route Action     06/22/2024 2105 albuterol inhalation solution 5 mg 5 mg Nebulization Given     06/22/2024 2105 ipratropium (ATROVENT) 0.02 % inhalation solution 0.5 mg 0.5 mg Nebulization Given     06/22/2024 2108 ketorolac (TORADOL) injection 15 mg 15 mg Intravenous Given     06/22/2024 3686  morphine injection 4 mg 4 mg Intravenous Given     06/22/2024 2354 albuterol inhalation solution 5 mg 5 mg Nebulization Given     06/22/2024 2354 ipratropium (ATROVENT) 0.02 % inhalation solution 0.5 mg 0.5 mg Nebulization Given     06/22/2024 2353 methylPREDNISolone sodium succinate (Solu-MEDROL) injection 125 mg 125 mg Intravenous Given     06/22/2024 2353 morphine injection 4 mg 4 mg Intravenous Given     06/23/2024 0318 ceftriaxone (ROCEPHIN) 1 g/50 mL in dextrose IVPB 1,000 mg Intravenous New Bag            Past Medical History:   Diagnosis Date    Anemia     Cardiomyopathy (HCC)     Chronic back pain     Chronic narcotic dependence (HCC)     Colon polyp     Disease of thyroid gland     GERD (gastroesophageal reflux disease)     H/O autologous stem cell transplant (HCC) 03/30/2022    Hypertension     Multiple myeloma (HCC)     in remission    Neuropathy      Present on Admission:   Chest pain      Admitting Diagnosis: Chest pain [R07.9]  Hypoxia [R09.02]  Age/Sex: 53 y.o. female  Admission Orders:  Scheduled Medications:  acyclovir, 800 mg, Oral, BID  amLODIPine, 10 mg, Oral, QAM  DULoxetine, 40 mg, Oral, Daily  enoxaparin, 40 mg, Subcutaneous, Daily  famotidine, 20 mg, Oral, BID  fluticasone, 2 spray, Nasal, Daily  loratadine, 10 mg, Oral, Daily  metoprolol succinate, 25 mg, Oral, Daily  nicotine, 1 patch, Transdermal, Daily  pantoprazole, 40 mg, Oral, Daily  pregabalin, 100 mg, Oral, BID  traZODone, 50 mg, Oral, HS      Continuous IV Infusions:     PRN Meds:  acetaminophen, 650 mg, Oral, Q6H PRN  albuterol, 2 puff, Inhalation, Q4H PRN  aluminum-magnesium hydroxide-simethicone, 30 mL, Oral, Q6H PRN  dextromethorphan-guaiFENesin, 10 mL, Oral, Q6H PRN  levalbuterol, 1.25 mg, Nebulization, Q8H PRN  methocarbamol, 750 mg, Oral, Q6H PRN  oxyCODONE, 5 mg, Oral, Q4H PRN      Tele   Up and OOB   [facility] diet         Network Utilization Review Department  ATTENTION: Please call with any questions or concerns to  [phone] and carefully listen to the prompts so that you are directed to the right person. All voicemails are confidential.   For Discharge needs, contact Care Management DC Support Team at [phone] opt. 2  Send all requests for admission clinical reviews, approved or denied determinations and any other requests to dedicated fax number below belonging to the campus where the patient is receiving treatment. List of dedicated fax numbers for the Facilities:  FACILITY NAME UR FAX NUMBER   ADMISSION DENIALS (Administrative/Medical Necessity) [phone]   DISCHARGE SUPPORT TEAM (NETWORK) [phone]   PARENT CHILD HEALTH (Maternity/NICU/Pediatrics) [phone]   [facility] [phone]   [facility] [phone]   [facility] [phone]   [facility] [phone]   [facility] [phone]   [facility] [phone]   [facility] [phone]   [facility] [phone]   [facility] [phone]   [facility] [phone]   [facility] [phone]   [facility] [phone]

## 2024-06-24 NOTE — ASSESSMENT & PLAN NOTE
Appears to be atypical   Troponin x 2 negative, EKG revealing prolonged QTc, sinus with sinus arrhythmia, no significant change from previous  Telemetry monitoring no events can discontinue

## 2024-06-25 ENCOUNTER — TRANSITIONAL CARE MANAGEMENT (OUTPATIENT)
Age: 54
End: 2024-06-25

## 2024-06-26 LAB
BACTERIA SPT RESP CULT: ABNORMAL
GRAM STN SPEC: ABNORMAL

## 2024-06-27 ENCOUNTER — OFFICE VISIT (OUTPATIENT)
Dept: NEUROLOGY | Facility: CLINIC | Age: 54
End: 2024-06-27
Payer: COMMERCIAL

## 2024-06-27 VITALS
TEMPERATURE: 98.3 F | HEART RATE: 84 BPM | BODY MASS INDEX: 32.48 KG/M2 | DIASTOLIC BLOOD PRESSURE: 78 MMHG | SYSTOLIC BLOOD PRESSURE: 118 MMHG | HEIGHT: 63 IN | WEIGHT: 183.3 LBS | OXYGEN SATURATION: 92 %

## 2024-06-27 DIAGNOSIS — M54.42 CHRONIC BILATERAL LOW BACK PAIN WITH BILATERAL SCIATICA: ICD-10-CM

## 2024-06-27 DIAGNOSIS — G62.9 NEUROPATHY: Primary | ICD-10-CM

## 2024-06-27 DIAGNOSIS — M54.16 RADICULOPATHY, LUMBAR REGION: ICD-10-CM

## 2024-06-27 DIAGNOSIS — M54.41 CHRONIC BILATERAL LOW BACK PAIN WITH BILATERAL SCIATICA: ICD-10-CM

## 2024-06-27 DIAGNOSIS — G89.29 CHRONIC BILATERAL LOW BACK PAIN WITH BILATERAL SCIATICA: ICD-10-CM

## 2024-06-27 PROCEDURE — 99215 OFFICE O/P EST HI 40 MIN: CPT | Performed by: PSYCHIATRY & NEUROLOGY

## 2024-06-27 RX ORDER — PREGABALIN 100 MG/1
100 CAPSULE ORAL 3 TIMES DAILY
Qty: 270 CAPSULE | Refills: 1 | Status: SHIPPED | OUTPATIENT
Start: 2024-06-27 | End: 2024-12-24

## 2024-06-27 RX ORDER — CYCLOBENZAPRINE HCL 10 MG
10 TABLET ORAL 3 TIMES DAILY PRN
Qty: 90 TABLET | Refills: 1 | Status: SHIPPED | OUTPATIENT
Start: 2024-06-27

## 2024-06-27 NOTE — PROGRESS NOTES
Ambulatory Visit  Name: Marla Negron      : 1970      MRN: [id number]  Encounter Provider: Nellie Martinez MD  Encounter Date: 2024   Encounter department: NEUROLOGY [facility]    Assessment & Plan   1. Radiculopathy, lumbar region  -     Ambulatory referral to Spine & Pain Management; Future  2. Neuropathy  Assessment & Plan:  This patient has had peripheral neuropathy symptoms since 2019, which have been very gradually progressive, symptoms are predominantly sensory without any muscle weakness.  Patient since has been diagnosed with multiple myeloma/amyloidosis, status post chemotherapy, and bone marrow transplant.  Doing very well in regards to myeloma, has been in remission, not on any immunotherapy for the last 1 year.  On exam, I did not appreciate any muscle weakness, does have sensory loss, predominantly to pinprick and temperature up to mid legs bilaterally, vibration was relatively preserved, 8 seconds at the toes, proprioception was intact.  Reflexes were normal in the upper extremities as well as the knees, ankles were trace bilaterally.  We did discuss repeat electrodiagnostic testing, last EMG was in , patient agreeable, this will be scheduled for her in the near future.  Does have lower back pain with radicular symptoms in the left lower extremity, will evaluate for neuropathy/radiculopathy, plan to do left upper and lower extremity.  For neuropathic pain, patient is currently on pregabalin 100 mg 2 times daily, duloxetine 40 mg daily, along with that takes oxycodone on a regular basis.  Discussed referral to pain management for possible epidural steroid injections for the lower back, in addition will increase the pregabalin to 100 mg 3 times a day, can further titrate the duloxetine if needed.  Also prescribed transdermal compound ointment.  Lastly, will start B12 replacement.   We did briefly discuss medical marijuana, this can be considered in the future if  needed.  Orders:  -     Ambulatory Referral to Neurology  -     EMG 1 Upper/1 Lower Neuropathy; Future  3. Chronic bilateral low back pain with bilateral sciatica  Comments:  Stable, refilled pregabalin prescription today.  Assessment & Plan:  Patient does have longstanding history of lower back pain with radicular symptoms to the left lower extremity.  I did not appreciate any weakness today.  Offered her physical therapy which she deferred.  Will proceed with EMG as noted below, and referred her to pain management  Orders:  -     pregabalin (LYRICA) 100 mg capsule; Take 1 capsule (100 mg total) by mouth 3 (three) times a day      History of Present Illness     Marla Negron is a 53 y.o. female who presents here for evaluation for neuropathy. Pt last seen by Dr. Sheets, here for neuromuscular consultation    Neuropathy symptoms have been going on since 2019, started off as burning, numbness and tingling. Have been gradually getting worse.  As a part of her evaluation, patient had blood work, that showed abnormal SPEP, further evaluated by hematology, had blurry and double vision at the time, eventually diagnosed with multiple myeloma after bone marrow biopsy.  Remainder of the details from hematology as noted below.  Over the last several years, she reports neuropathy symptoms have been gradually getting worse.  Continues to have predominantly pain and paresthesias in both upper and lower extremities.  Symptoms are worse in the morning, symptoms start from both feet till the mid legs and up to forearms in Ue's.   Does get muscle spasms in feet, now getting in hands, as well as proximal thighs and even in upper arms. This has been getting worse, the spasms may last for minutes, on methocarbamol. Does not think it is helping. Spasms are mostly nocturnal, are happening once or twice during the day as well. Does report muscle weakness, which is generalized, uses a hand rail to support to go up a flight of  stairs.  Balance is affected, has a tendency to fall, does not use any assistive devices.   Is able to drive.   Chronic lower back pain, with radicular symptoms, more to the left lower extremity, reports pain in the left hip/buttocks.  No bladder or bowel issues.    Recent labs June 2024 SPEP unremarkable, no monoclonal protein.  Quantitative immunoglobulins unremarkable, beta-2 microglobulin unremarkable.  Vitamin B1, B6 levels were normal, vitamin B12 323.    Feb 2024: Cryoglobulins, rheumatoid factor, hepatitis C, HIV, TSH were normal, CK was normal, complement levels negative, nuclear antigen, centromere, scleroderma, Pooja 1,  double-stranded DNA, CCP, rheumatoid factor, ANDERS, Sjogren's were all unremarkable.    December 2023 Free kappa, lambda chains were within normal range, with normal kappa to lambda ratio.    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  Raw data not available for my review.    Hematology hx:Patient initially presented to oncology for abnormal serum protein electrophoresis, which was performed as an evaluation for neuropathy for neuropathic pain in upper and lower extremities.  She also had puffy eyes and diplopia, CT orbits was done suggesting an infiltrative disorder, did have dyspnea, elevated BNP, echo consistent with diastolic dysfunction.  Cardiac catheterization did not reveal any coronary artery disease, MRI of the heart showed hypertrophy and late enhancement seen with infiltrative disorders such as amyloidosis.  Bone marrow biopsy was performed, Congo red was negative, patient had 20% kappa restricted plasmacytosis, consistent with plasma cell dyscrasia.  Skeletal survey was negative for lytic lesions, PET/CT did not show any evidence of PET avid   Abdominal fat pad biopsy November 2021 negative for amyloid deposition.  PRIOR THERAPY:   1. Induction: RVd x 1 cycle   2. Induction: María-CyBorD, C1D1 11/1/21 bc she was treated as amyloidosis    TRANSPLANT HISTORY:  1.  "Consolidation: Melphalan (140 mg/m2)-conditioned autologous PBSCT, stem cells infused on 3/30/22     Besides this she has HTN and dyslipidemia. Hospitalized last week for SOB which has been a chronic issue with [facility].  CT chest June 23rd, 2024: Mild plate atelectasis at the lung bases, otherwise clear lungs.     No h/o alcohol use, recently stopped smoking, no other drugs.     On chronic opioids for pain. And lyrica 100mg bid, and cymbalta 40mg a day.    I reviewed the below ROS and what is mentioned in HPI, the remainder of ROS was negative.  Review of Systems   Constitutional:  Negative for appetite change, fatigue and fever.   HENT: Negative.  Negative for hearing loss, tinnitus, trouble swallowing and voice change.    Eyes: Negative.  Negative for photophobia, pain and visual disturbance.   Respiratory: Negative.  Negative for shortness of breath.    Cardiovascular: Negative.  Negative for palpitations.   Gastrointestinal: Negative.  Negative for nausea and vomiting.   Endocrine: Negative.  Negative for cold intolerance.   Genitourinary: Negative.  Negative for dysuria, frequency and urgency.   Musculoskeletal:  Positive for gait problem (slightly unbalanced). Negative for back pain, myalgias, neck pain and neck stiffness.        B/l legs cramping and spasms     Skin: Negative.  Negative for rash.   Allergic/Immunologic: Negative.    Neurological:  Positive for numbness (b/l hands and feet constant). Negative for dizziness, tremors, seizures, syncope, facial asymmetry, speech difficulty, weakness, light-headedness and headaches.   Hematological: Negative.  Does not bruise/bleed easily.   Psychiatric/Behavioral: Negative.  Negative for confusion, hallucinations and sleep disturbance.    All other systems reviewed and are negative.      Objective     /78 (BP Location: Left arm, Patient Position: Sitting, Cuff Size: Adult)   Pulse 84   Temp 98.3 °F (36.8 °C) (Temporal)   Ht 5' 3\" (1.6 m)   Wt 83.1 kg (183 " "lb 4.8 oz)   SpO2 92%   BMI 32.47 kg/m²     Physical Exam  General exam: Pt was awake, alert and oriented.   HEENT: atraumatic, normocephalic.  Normal oral mucosa, neck was supple, no lymphadenopathy.  Normal peripheral pulses.   Extremities did not show any edema or cyanosis.    Neurologically, pt was awake and alert.  Speech was normal, no dysarthria or aphasia.   Cranial nerve exam showed normal extraocular movements, no nystagmus or diplopia.   There was no ptosis at baseline or with sustained upward gaze.   Strength of eye closure muscles was normal.  Facial sensations were normal bilaterally.   No facial weakness, able to blow out the cheeks and push the tongue in the cheeks well.   No tongue atrophy or fasciculations.    Motor exam revealed normal tone and muscle bulk. There was no atrophy, scapular winging, high arches, hammertoes, shortening of achilles tendons or any other features of neuromuscular disease.   Muscle strength was normal in neck flexors and extensors, and all muscle groups in both upper and lower extremities, including the toes.   Reflexes were graded as 2+ throughout in UE's. Toes were downgoing.   There was no exaggerated jaw jerk or ingram's sign. No ankle clonus.  Sensory exam revealed length dependent, decreased sensation to pin and temp up to mid legs b/l, had hypersensitivity to pin in the lower legs and up to the wrists b/l. Vibration was 8 sec at the toes. Proprioception was relatively intact.   Gait was antalgic.       Portions of the record may have been created with voice recognition software. Occasional wrong word or \"sound a like\" substitutions may have occurred due to the inherent limitations of voice recognition software. Read the chart carefully and recognize, using context, where substitutions have occurred.     Administrative Statements   I have spent a total time of 60 minutes on 06/27/24 In caring for this patient including Risks and benefits of tx options, " Instructions for management, Documenting in the medical record, Reviewing / ordering tests, medicine, procedures  , and Obtaining or reviewing history  .

## 2024-06-27 NOTE — ASSESSMENT & PLAN NOTE
This patient has had peripheral neuropathy symptoms since 2019, which have been very gradually progressive, symptoms are predominantly sensory without any muscle weakness.  Patient since has been diagnosed with multiple myeloma/amyloidosis, status post chemotherapy, and bone marrow transplant.  Doing very well in regards to myeloma, has been in remission, not on any immunotherapy for the last 1 year.  On exam, I did not appreciate any muscle weakness, does have sensory loss, predominantly to pinprick and temperature up to mid legs bilaterally, vibration was relatively preserved, 8 seconds at the toes, proprioception was intact.  Reflexes were normal in the upper extremities as well as the knees, ankles were trace bilaterally.  We did discuss repeat electrodiagnostic testing, last EMG was in 2021, patient agreeable, this will be scheduled for her in the near future.  Does have lower back pain with radicular symptoms in the left lower extremity, will evaluate for neuropathy/radiculopathy, plan to do left upper and lower extremity.  For neuropathic pain, patient is currently on pregabalin 100 mg 2 times daily, duloxetine 40 mg daily, along with that takes oxycodone on a regular basis.  Has been getting muscle spasms, currently on methocarbamol, which has not been helpful, discussed Flexeril, prescription was provided today.  Discussed referral to pain management for possible epidural steroid injections for the lower back, in addition will increase the pregabalin to 100 mg 3 times a day, can further titrate the duloxetine if needed.  Also prescribed transdermal compound ointment.  Lastly, will start B12 replacement.   We did briefly discuss medical marijuana, this can be considered in the future if needed.

## 2024-06-27 NOTE — ASSESSMENT & PLAN NOTE
Patient does have longstanding history of lower back pain with radicular symptoms to the left lower extremity.  I did not appreciate any weakness today.  Offered her physical therapy which she deferred.  Will proceed with EMG as noted below, and referred her to pain management

## 2024-06-28 DIAGNOSIS — G89.3 CANCER ASSOCIATED PAIN: ICD-10-CM

## 2024-06-28 DIAGNOSIS — C90.00 MULTIPLE MYELOMA NOT HAVING ACHIEVED REMISSION (HCC): ICD-10-CM

## 2024-06-28 DIAGNOSIS — Z51.5 PALLIATIVE CARE PATIENT: ICD-10-CM

## 2024-06-28 LAB
BACTERIA BLD CULT: NORMAL
BACTERIA BLD CULT: NORMAL

## 2024-06-28 RX ORDER — OXYCODONE HYDROCHLORIDE 5 MG/1
5 TABLET ORAL EVERY 4 HOURS PRN
Qty: 75 TABLET | Refills: 0 | Status: SHIPPED | OUTPATIENT
Start: 2024-06-28 | End: 2024-07-13

## 2024-06-28 NOTE — TELEPHONE ENCOUNTER
Patient was notified that their INR result was not within therapeutic range. Patient was instructed to take their Coumadin/Warfarin as follows:   Anticoagulation Dose Instructions as of 2/9/2017       Sun Mon Tue Wed Thu Fri Sat    New Dose 5 mg 2.5 mg 5 mg 5 mg 5 mg 2.5 mg 5 mg      Description          2-9-17 2.5 mg M/F, 5 mg all other days. Recheck in 2 weeks.          Next INR is due in 2 weeks on 2-23-17.  Patient was instructed to contact us with any unusual bleeding, bruising, any changes in medications, diet or health status and if there are any other questions or concerns.   Patient verbalized understanding of above.         Refill must be reviewed and completed by the office or provider. The refill is unable to be approved or denied by the medication management team.      PATIENT ID PRESCRIPTION # FILLED WRITTEN DRUG LABEL QTY DAYS STRENGTH MME** PRESCRIBER PHARMACY PAYMENT REFILL #/AUTH STATE DETAIL   1 [id number] 06/24/2024 06/24/2024 ALPRAZolam (Tablet) 10.0 5 0.25 MG NA NORA MENG [facility] Commercial Insurance 0 / 0 [place]    1 [id number] 06/15/2024 06/13/2024 oxyCODONE HCL (Tablet) 90.0 15 5 MG 45.0 [facility] Commercial Insurance 0 / 0 [place]    1 [id number] 05/31/2024 05/30/2024 oxyCODONE HCL (Tablet) 90.0 15 5 MG 45.0 [facility] Commercial Insurance 0 / 0 [place]

## 2024-07-12 DIAGNOSIS — C90.00 MULTIPLE MYELOMA NOT HAVING ACHIEVED REMISSION (HCC): ICD-10-CM

## 2024-07-12 DIAGNOSIS — G89.3 CANCER ASSOCIATED PAIN: ICD-10-CM

## 2024-07-12 DIAGNOSIS — Z51.5 PALLIATIVE CARE PATIENT: ICD-10-CM

## 2024-07-12 NOTE — TELEPHONE ENCOUNTER
Reason for call:   [x] Refill   [] Prior Auth  [] Other:     Office:   [] PCP/Provider -   [x] Specialty/Provider - Palliative- Dr Almaraz     Medication: xyCODONE (ROXICODONE)     Dose/Frequency: 5 mg, Every 4 hours PRN     Quantity: 75 tab    Pharmacy: [id number]    Does the patient have enough for 3 days?   [x] Yes   [] No - Send as HP to POD

## 2024-07-12 NOTE — TELEPHONE ENCOUNTER
Last appointment:05/30/24    Next scheduled appointment:08/01/24    [facility]      PDMP review:

## 2024-07-14 DIAGNOSIS — R12 HEARTBURN: ICD-10-CM

## 2024-07-14 RX ORDER — FAMOTIDINE 40 MG/1
20 TABLET, FILM COATED ORAL 2 TIMES DAILY
Qty: 100 TABLET | Refills: 1 | Status: SHIPPED | OUTPATIENT
Start: 2024-07-14

## 2024-07-15 ENCOUNTER — HOSPITAL ENCOUNTER (OUTPATIENT)
Dept: NEUROLOGY | Facility: CLINIC | Age: 54
Discharge: HOME | End: 2024-07-15
Payer: COMMERCIAL

## 2024-07-15 DIAGNOSIS — G62.9 NEUROPATHY: ICD-10-CM

## 2024-07-15 PROBLEM — M54.17 LUMBOSACRAL RADICULOPATHY: Status: ACTIVE | Noted: 2024-07-15

## 2024-07-15 PROBLEM — M54.12 CERVICAL RADICULOPATHY: Status: ACTIVE | Noted: 2024-07-15

## 2024-07-15 PROCEDURE — 95912 NRV CNDJ TEST 11-12 STUDIES: CPT | Performed by: PSYCHIATRY & NEUROLOGY

## 2024-07-15 PROCEDURE — 95886 MUSC TEST DONE W/N TEST COMP: CPT | Performed by: PSYCHIATRY & NEUROLOGY

## 2024-07-15 RX ORDER — OXYCODONE HYDROCHLORIDE 5 MG/1
5 TABLET ORAL EVERY 4 HOURS PRN
Qty: 75 TABLET | Refills: 0 | Status: SHIPPED | OUTPATIENT
Start: 2024-07-15 | End: 2024-07-30

## 2024-07-17 ENCOUNTER — TELEPHONE (OUTPATIENT)
Age: 54
End: 2024-07-17

## 2024-07-17 NOTE — TELEPHONE ENCOUNTER
Patient called about handicap application.    She notes the application she was given is for a handicap license.  Patient is looking for the handicap card that gets placed in the window to allow for handicap parking ([id number]).    Patient states she will stop by the office to  the correct form.

## 2024-07-17 NOTE — TELEPHONE ENCOUNTER
Pt called states they picked up the parking placard earlier today. Looks like April completed a 2nd form.  Told pt that was in the reception area for .  Pt was confused.

## 2024-07-17 NOTE — TELEPHONE ENCOUNTER
Patient returned call regarding disability placard. Advised the new one is ready and at the . Patient said she will pick it up tomorrow.

## 2024-07-18 DIAGNOSIS — R12 HEARTBURN: ICD-10-CM

## 2024-07-18 RX ORDER — PANTOPRAZOLE SODIUM 40 MG/1
40 TABLET, DELAYED RELEASE ORAL DAILY
Qty: 90 TABLET | Refills: 1 | Status: SHIPPED | OUTPATIENT
Start: 2024-07-18 | End: 2025-01-14

## 2024-07-24 DIAGNOSIS — G62.9 NEUROPATHY: ICD-10-CM

## 2024-07-24 RX ORDER — DULOXETIN HYDROCHLORIDE 20 MG/1
CAPSULE, DELAYED RELEASE ORAL
Qty: 180 CAPSULE | Refills: 0 | Status: SHIPPED | OUTPATIENT
Start: 2024-07-24

## 2024-07-29 DIAGNOSIS — Z51.5 PALLIATIVE CARE PATIENT: ICD-10-CM

## 2024-07-29 DIAGNOSIS — G89.3 CANCER ASSOCIATED PAIN: ICD-10-CM

## 2024-07-29 DIAGNOSIS — C90.00 MULTIPLE MYELOMA NOT HAVING ACHIEVED REMISSION (HCC): ICD-10-CM

## 2024-07-29 RX ORDER — OXYCODONE HYDROCHLORIDE 5 MG/1
5 TABLET ORAL EVERY 4 HOURS PRN
Qty: 20 TABLET | Refills: 0 | Status: SHIPPED | OUTPATIENT
Start: 2024-07-29 | End: 2024-07-30 | Stop reason: SDUPTHER

## 2024-07-29 NOTE — TELEPHONE ENCOUNTER
PDMP reviewed. Rx sent to the pharmacy.    Sent 4-day Rx to get patient to next OP [facility] visit, which is scheduled on 08/01/2024 -> opioid taper plan may be adjusted at that time, which allows for any refills to be sent at that time without conflict of timing with pharmacy.

## 2024-07-29 NOTE — TELEPHONE ENCOUNTER
Reason for call:   [x] Refill   [] Prior Auth  [] Other:     Office:   [] PCP/Provider -   [x] Specialty/Provider - Palliative/    Medication:       Pharmacy: [facility] [address]    Does the patient have enough for 3 days?   [] Yes   [x] No - Send as HP to POD

## 2024-07-29 NOTE — TELEPHONE ENCOUNTER
Refill must be reviewed and completed by the office or provider. The refill is unable to be approved or denied by the medication management team.      Refill cannot be delegated.

## 2024-07-30 ENCOUNTER — TELEPHONE (OUTPATIENT)
Age: 54
End: 2024-07-30

## 2024-07-30 DIAGNOSIS — G89.3 CANCER ASSOCIATED PAIN: ICD-10-CM

## 2024-07-30 DIAGNOSIS — Z51.5 PALLIATIVE CARE PATIENT: ICD-10-CM

## 2024-07-30 DIAGNOSIS — C90.00 MULTIPLE MYELOMA NOT HAVING ACHIEVED REMISSION (HCC): ICD-10-CM

## 2024-07-30 DIAGNOSIS — R09.81 NASAL CONGESTION: ICD-10-CM

## 2024-07-30 RX ORDER — OXYCODONE HYDROCHLORIDE 5 MG/1
5 TABLET ORAL EVERY 4 HOURS PRN
Qty: 18 TABLET | Refills: 0 | Status: SHIPPED | OUTPATIENT
Start: 2024-07-30 | End: 2024-08-01 | Stop reason: SDUPTHER

## 2024-07-30 NOTE — TELEPHONE ENCOUNTER
Updated to reflect 30 mg/day [6 tabs]. Rx is now for 3-days to cover patient until next OP Appointment on 08/01.

## 2024-07-30 NOTE — TELEPHONE ENCOUNTER
Pt states that pharmacy [facility]  [address] in [place] will not fill her pain medication prescription because of conflicting information about the dosage.     Please call pharmacy to clarify and so pt can have her script filled.     Please call pt back once resolved so she knows to  her medication.     Pt states she is out of medication at this time    Best contact number is [phone]    Thank you

## 2024-07-30 NOTE — TELEPHONE ENCOUNTER
Received call from patient inquiring about her oxycodone prescription. [facility] would not fill the script because there were 2 separate daily max doses provided. Informed patient that new script was sent over to [facility] at 11:31 by Dr. Almaraz to reflect the 30mg/day (6 tabs) for 3 day supply to cover her until she sees April in the office on 8/1. Patient verbalized an understanding.

## 2024-07-30 NOTE — TELEPHONE ENCOUNTER
Received call from POD , pt unable to fill prescription , called [facility] unable to reach anyone . Left pharmacy a VM with pts name and  . Waiting for a call back from pharmacy .

## 2024-07-31 RX ORDER — FLUTICASONE PROPIONATE 50 MCG
2 SPRAY, SUSPENSION (ML) NASAL DAILY
Qty: 48 G | Refills: 1 | Status: SHIPPED | OUTPATIENT
Start: 2024-07-31

## 2024-08-01 ENCOUNTER — OFFICE VISIT (OUTPATIENT)
Dept: PALLIATIVE MEDICINE | Facility: CLINIC | Age: 54
End: 2024-08-01
Payer: COMMERCIAL

## 2024-08-01 VITALS
HEIGHT: 63 IN | HEART RATE: 94 BPM | BODY MASS INDEX: 32.47 KG/M2 | OXYGEN SATURATION: 95 % | TEMPERATURE: 97.2 F | SYSTOLIC BLOOD PRESSURE: 120 MMHG | DIASTOLIC BLOOD PRESSURE: 78 MMHG

## 2024-08-01 DIAGNOSIS — C90.00 MULTIPLE MYELOMA NOT HAVING ACHIEVED REMISSION (HCC): ICD-10-CM

## 2024-08-01 DIAGNOSIS — F11.20 CONTINUOUS OPIOID DEPENDENCE (HCC): Primary | Chronic | ICD-10-CM

## 2024-08-01 DIAGNOSIS — G89.3 CANCER ASSOCIATED PAIN: ICD-10-CM

## 2024-08-01 DIAGNOSIS — M79.2 NEUROPATHIC PAIN: ICD-10-CM

## 2024-08-01 DIAGNOSIS — Z51.5 PALLIATIVE CARE PATIENT: ICD-10-CM

## 2024-08-01 DIAGNOSIS — F17.210 DEPENDENCE ON NICOTINE FROM CIGARETTES: ICD-10-CM

## 2024-08-01 DIAGNOSIS — C90.01 MULTIPLE MYELOMA IN REMISSION (HCC): Chronic | ICD-10-CM

## 2024-08-01 PROCEDURE — 99214 OFFICE O/P EST MOD 30 MIN: CPT | Performed by: NURSE PRACTITIONER

## 2024-08-01 RX ORDER — OXYCODONE HYDROCHLORIDE 5 MG/1
5 TABLET ORAL EVERY 4 HOURS PRN
Qty: 75 TABLET | Refills: 0 | Status: SHIPPED | OUTPATIENT
Start: 2024-08-01 | End: 2024-08-15

## 2024-08-01 NOTE — PROGRESS NOTES
Ambulatory Visit  Name: Marla Negron      : 1970      MRN: [id number]  Encounter Provider: WILBUR Braden  Encounter Date: 2024   Encounter department: [facility] PALLIATIVE CARE [place]    Assessment & Plan   1. Continuous opioid dependence (HCC)  Assessment & Plan:  Continued opioid taper with plan to decrease 10-30% total daily opioids each 1-2 months, with overall goal to no daily opioid use.   Discussed s/sxs of opioid withdrawal, including nausea, vomiting, diarrhea, rhinorrhea, yawning, insomnia, agitation. All questions/concerns addressed.   Current dose Oxycodone 5 mg Q 4 hours prn max dose 30  mg daily max dose 25 mg  Discussed acetaminophen use for mild pain  2. Multiple myeloma in remission (HCC)  Assessment & Plan:  Follows at [facility] s/p stem cell transplant .  3. Palliative care patient  Assessment & Plan:  Patient now has custody of her two olivia, 4 and 6 years..   Orders:  -     oxyCODONE (ROXICODONE) 5 immediate release tablet; Take 1 tablet (5 mg total) by mouth every 4 (four) hours as needed for severe pain (cancer-related pain) for up to 14 days Max Daily Amount: 30 mg  4. Neuropathic pain  Assessment & Plan:  Managed by PCP and neurology   pregabalin 100 mg PO BID, duloxetine 40 mg PO Daily    5. Dependence on nicotine from cigarettes  Assessment & Plan:  Has reduced usage.  Has script for Chantix, has not filled.   6. Cancer associated pain  -     oxyCODONE (ROXICODONE) 5 immediate release tablet; Take 1 tablet (5 mg total) by mouth every 4 (four) hours as needed for severe pain (cancer-related pain) for up to 14 days Max Daily Amount: 30 mg  7. Multiple myeloma not having achieved remission (HCC)  -     oxyCODONE (ROXICODONE) 5 immediate release tablet; Take 1 tablet (5 mg total) by mouth every 4 (four) hours as needed for severe pain (cancer-related pain) for up to 14 days Max Daily Amount: 30 mg      Advance Directive / Living Will  / POLST: none on file      PDMP Review: I have reviewed the patient's controlled substance dispensing history in the Prescription Drug Monitoring Program in compliance with the [facility] regulations before prescribing any controlled substances.    History of Present Illness     Marla Negron is a 53 y.o. female with history of multiple myeloma status post aSCT with remission who presents for follow up of symptoms management and opioid weaning.      The patient presents to the office independently ambulatory.  She reports things have been going well overall.  She is receptive to continue down titration of opioid regimen at this time.  Per discussion will reduce from 30 mg daily to 25 mg daily did discuss starting acetaminophen in the afternoon with her oxycodone dose for better pain control.  The patient reports the pain is at its worst in the morning hours and will continue her regular dose opioids in the a.m.    Today she is looking forward to going to the pool with her 2 grandchildren who will be starting  and first grade in the fall.    Review of Systems   Musculoskeletal:  Positive for arthralgias and back pain.   All other systems reviewed and are negative.    Current Outpatient Medications on File Prior to Visit   Medication Sig Dispense Refill    acyclovir (ZOVIRAX) 800 mg tablet Take 1 tablet (800 mg total) by mouth 2 (two) times a day 180 tablet 1    albuterol (PROVENTIL HFA,VENTOLIN HFA) 90 mcg/act inhaler Inhale 2 puffs every 6 (six) hours as needed for wheezing or shortness of breath 18 g 11    amLODIPine (NORVASC) 10 mg tablet Take 1 tablet (10 mg total) by mouth every morning 90 tablet 1    cyclobenzaprine (FLEXERIL) 10 mg tablet Take 1 tablet (10 mg total) by mouth 3 (three) times a day as needed for muscle spasms 90 tablet 1    DULoxetine (CYMBALTA) 20 mg capsule Take 2 tablets ( 40 Mg total) by mouth daily 180 capsule 0    famotidine (PEPCID) 40 MG tablet Take 0.5 tablets (20 mg total) by  "mouth 2 (two) times a day 100 tablet 1    fluticasone (FLONASE) 50 mcg/act nasal spray 2 sprays into each nostril daily 48 g 1    loratadine (CLARITIN) 10 mg tablet Take 1 tablet (10 mg total) by mouth daily 90 tablet 1    metoprolol succinate (TOPROL-XL) 25 mg 24 hr tablet Take 1 tablet (25 mg total) by mouth daily 90 tablet 1    naloxone (NARCAN) 4 mg/0.1 mL nasal spray Administer 1 spray into a nostril. If no response after 2-3 minutes, give another dose in the other nostril using a new spray. 1 each 0    nicotine (NICODERM CQ) 14 mg/24hr TD 24 hr patch Place 1 patch on the skin over 24 hours daily 28 patch 0    pantoprazole (PROTONIX) 40 mg tablet Take 1 tablet (40 mg total) by mouth daily 90 tablet 1    pregabalin (LYRICA) 100 mg capsule Take 1 capsule (100 mg total) by mouth 3 (three) times a day 270 capsule 1    traZODone (DESYREL) 50 mg tablet Take 1 tablet (50 mg total) by mouth daily at bedtime 90 tablet 3    [DISCONTINUED] oxyCODONE (ROXICODONE) 5 immediate release tablet Take 1 tablet (5 mg total) by mouth every 4 (four) hours as needed for severe pain (cancer-related pain) for up to 3 days Max Daily Amount: 30 mg 18 tablet 0    ALPRAZolam (XANAX) 0.25 mg tablet Take 1 tablet (0.25 mg total) by mouth 2 (two) times a day as needed for anxiety for up to 10 days 10 tablet 0    NON FORMULARY -Balance of Nature vegetable and fruit supplements (Patient not taking: Reported on 6/27/2024)       No current facility-administered medications on file prior to visit.          Objective     /78 (BP Location: Right arm, Patient Position: Sitting, Cuff Size: Standard)   Pulse 94   Temp (!) 97.2 °F (36.2 °C) (Tympanic)   Ht 5' 3\" (1.6 m)   SpO2 95%   BMI 32.47 kg/m²     Physical Exam  Vitals and nursing note reviewed.   Constitutional:       General: She is not in acute distress.     Appearance: She is well-developed.   HENT:      Head: Normocephalic and atraumatic.   Eyes:      General: Lids are normal.      " Conjunctiva/sclera: Conjunctivae normal.   Cardiovascular:      Rate and Rhythm: Normal rate and regular rhythm.   Pulmonary:      Effort: Pulmonary effort is normal. No respiratory distress or retractions.   Abdominal:      Palpations: Abdomen is soft.   Musculoskeletal:         General: No swelling.      Cervical back: Neck supple.   Skin:     General: Skin is warm and dry.   Neurological:      General: No focal deficit present.      Mental Status: She is alert and oriented to person, place, and time.      GCS: GCS eye subscore is 4. GCS motor subscore is 6.   Psychiatric:         Attention and Perception: Attention normal.         Mood and Affect: Mood normal.         Thought Content: Thought content normal.         Cognition and Memory: Cognition and memory normal.         Recent labs:  Lab Results   Component Value Date/Time    SODIUM 138 06/22/2024 08:54 PM    SODIUM 140 10/27/2023 05:02 AM    K 3.9 06/22/2024 08:54 PM    K 3.5 10/27/2023 05:02 AM    BUN 10 06/22/2024 08:54 PM    BUN 12 10/27/2023 05:02 AM    CREATININE 1.05 06/22/2024 08:54 PM    CREATININE 0.84 10/27/2023 05:02 AM    GLUC 111 06/22/2024 08:54 PM    GLUC 79 10/27/2023 05:02 AM    CALCIUM 9.9 06/22/2024 08:54 PM    CALCIUM 8.8 10/27/2023 05:02 AM    AST 19 06/22/2024 08:54 PM    AST 27 10/26/2023 05:05 PM    ALT 12 06/22/2024 08:54 PM    ALT 11 10/26/2023 05:05 PM    ALB 4.5 06/22/2024 08:54 PM    ALB 4.6 10/26/2023 05:05 PM    TP 8.4 06/22/2024 08:54 PM    TP 8.4 (H) 10/26/2023 05:05 PM    EGFR 59 06/22/2024 08:54 PM    EGFR 82 10/27/2023 05:02 AM     Lab Results   Component Value Date/Time    HGB 17.6 (H) 06/22/2024 08:54 PM    WBC 11.95 (H) 06/22/2024 08:54 PM     06/22/2024 08:54 PM    INR 0.97 06/23/2024 03:11 AM    INR 1.0 10/26/2023 05:05 PM    PTT 28 06/23/2024 03:11 AM     Lab Results   Component Value Date/Time    [id number] 6.133 (H) 02/28/2024 02:22 PM       Recent Imaging:  Procedure:  VAS VENOUS DUPLEX - LOWER LIMB  BILATERAL    Result Date: 6/24/2024  Narrative:  [facility] REPORT CLINICAL: Indications: Patient presents with bilateral lower extremity edema x few days. Risk Factors The patient has history of HTN.  She has no history of DVT.   CONCLUSION:   Impression: RIGHT LOWER LIMB: No evidence of acute or chronic deep vein thrombosis. No evidence of superficial thrombophlebitis noted. Doppler evaluation shows a normal response to augmentation maneuvers.. Popliteal, posterior tibial and anterior tibial arterial Doppler waveform's are triphasic.  LEFT LOWER LIMB: No evidence of acute or chronic deep vein thrombosis. No evidence of superficial thrombophlebitis noted. Doppler evaluation shows a normal response to augmentation maneuvers. Popliteal, posterior tibial and anterior tibial arterial Doppler waveform's are triphasic.  SIGNATURE: Electronically Signed by: KRISTINA LENZ DO on 2024-06-24 11:42:25 AM    Procedure: CT chest wo contrast    Result Date: 6/24/2024  Narrative: CT CHEST WITHOUT IV CONTRAST INDICATION: lower localized chest pain with cough. Patient is a current smoker. Based on Dr. Esparza's note from 6/23/2024, patient has midsternal chest pain radiating to the back. Patient also has cough and congestion. COMPARISON: Chest, abdomen, pelvic CT from 8/15/2023. TECHNIQUE: CT examination of the chest was performed without intravenous contrast. Multiplanar 2D reformatted images were created from the source data. This examination, like all CT scans performed in the [facility], was performed utilizing techniques to minimize radiation dose exposure, including the use of iterative reconstruction and automated exposure control. Radiation dose length product (DLP) for this visit: 279 mGy-cm FINDINGS: LUNGS: Mild plate atelectasis at the lung bases, otherwise clear lungs. There is no tracheal or endobronchial lesion. PLEURA: Unremarkable. HEART/GREAT VESSELS: Heart is unremarkable for patient's age. No  thoracic aortic aneurysm. MEDIASTINUM AND JENNIFER: Unremarkable. CHEST WALL AND LOWER NECK: Unremarkable. VISUALIZED STRUCTURES IN THE UPPER ABDOMEN: There is a gastric diverticulum (series 2 image [id number].) OSSEOUS STRUCTURES: No acute fracture or destructive osseous lesion.     Impression: Mild plate atelectasis at the lung bases, otherwise clear lungs. Workstation performed: [id number]     Procedure: XR chest 1 view portable    Result Date: 6/23/2024  Narrative: XR CHEST PORTABLE INDICATION: chest pain. COMPARISON: CXR and chest CT 8/15/2023. FINDINGS: Clear lungs. No pneumothorax or pleural effusion. Normal cardiomediastinal silhouette. Bones are unremarkable for age. Mild chronic elevation of the left diaphragm.     Impression: No acute cardiopulmonary disease. Workstation performed: [id number]     Procedure: US MSK limited    Result Date: 5/21/2024  Narrative: ULTRASOUND BILATERAL ELBOW (CUBITAL TUNNEL SYNDROME PROTOCOL) INDICATION:   R20.0: Anesthesia of skin. TECHNIQUE:   Using a high frequency transducer, the right and left ulnar nerves were individually scanned to evaluate for cubital tunnel syndrome and presence of ulnar nerve dislocation. Gray scale and color doppler ultrasound was used. COMPARISON: None. FINDINGS: RIGHT SIDE: Ulnar nerve cross sectional area proximally: 6.1 sq mm. Maximum ulnar nerve cross sectional area within or near the cubital tunnel: 10.9 sq mm. Ulnar nerve cross sectional area distal to cubital tunnel: 5.3 sq mm. Maximal ulnar nerve/proximal cross sectional ratio: 1.8. No evidence of ulnar nerve dislocation from the cubital tunnel on dynamic flexion-extension evaluation. No accessory anconeus epitrochlearis muscle. LEFT SIDE: Ulnar nerve cross sectional area proximally: 5.2 sq mm. Maximum ulnar nerve cross sectional area within or near the cubital tunnel: 8.6 sq mm. Ulnar nerve cross sectional area distal to cubital tunnel: 2.9 sq mm. Maximal ulnar nerve/proximal cross sectional  ratio: 1.7 No evidence of ulnar nerve dislocation from the cubital tunnel on dynamic flexion-extension evaluation. No accessory anconeus epitrochlearis muscle.     Impression: RIGHT CUBITAL TUNNEL: Enlarged ulnar nerve near/within cubital tunnel suspicious for ulnar neuropathy/cubital tunnel syndrome. LEFT CUBITAL TUNNEL: No sonographic findings to suggest ulnar neuropathy/cubital tunnel syndrome. Note: Ultrasound diagnosis of cubital tunnel syndrome is suggested with visualization of hypoechoic enlargement of the ulnar nerve just proximal to the cubital tunnel, usually with a transition to normal size within the cubital tunnel.  A cross sectional  area of ulnar nerve greater than 9 sq mm at the site of maximal enlargement or increase in size with a ratio of greater than 2.8 compared to the proximal is considered abnormal. Reference: JANES Prince FO Jet, MS Linda: Ultrasonographic Swelling Ratio in the Diagnosis of Ulnar Neuropathy at the Elbow. Muscle Nerve. 2008 Oct;38(4):1231-5. https://pubmed.ncbi.nlm.nih.gov/80490437/ Workstation performed: [id number]     Procedure: US MSK limited    Result Date: 5/21/2024  Narrative: ULTRASOUND BILATERAL WRISTS (CARPAL TUNNEL SYNDROME PROTOCOL) TECHNIQUE:   Using a high frequency transducer, the right and left median nerves were individually scanned to evaluate for carpal tunnel syndrome. Gray scale and color doppler ultrasound was used. INDICATION:   R20.0: Anesthesia of skin. COMPARISON: None. FINDINGS: RIGHT SIDE: Median nerve cross sectional area distal forearm (CSAp): 5.3 sq mm. Maximum median nerve cross sectional area within carpal tunnel (CSAc): 5.9 sq mm. Delta CSA (CSAc-CSAp): 0.6 sq mm. Wrist to Forearm ratio (WFR ratio) (CSAc/CSAp): 1.1. Vascularity: No hypervascularity. LEFT SIDE: Median nerve cross sectional area distal forearm (CSAp): 4.6 sq mm. Maximum median nerve cross sectional area within carpal tunnel (CSAc): 7.4 sq mm. Delta CSA (CSAc-CSAp): 2.8 sq mm.  Wrist to Forearm ratio (WFR ratio) (CSAc/CSAp): 1.6. Vascularity: No hypervascularity.     Impression: RIGHT SIDE: Carpal tunnel syndrome ruled out based on small CSA < 8 sq mm. LEFT SIDE: Carpal tunnel syndrome ruled out based on small CSA < 8 sq mm. Workstation performed: [id number]     Procedure: US MSK limited    Result Date: 4/30/2024  Narrative: ULTRASOUND BILATERAL ANKLES AND FEET TECHNIQUE: Using a high frequency transducer, the bilateral ankle and feet were scanned to evaluate for evidence of inflammatory arthritis. Grayscale and power doppler ultrasound was used to survey the joints of the ankle and feet. INDICATION: Bilateral foot pain, evaluate for inflammatory arthritis COMPARISON: Bilateral foot radiographs 4/26/2024 FINDINGS: RIGHT SIDE: Tibiotalar joint:  Normal. Intertarsal joints: Normal. 1st tarsometatarsal joint: Normal. 1st metatarsophalangeal joint: Normal. 2nd tarsometatarsal joint: Normal. 2nd metatarsophalangeal joint: Normal. 2nd PIP joint: Normal. 3rd tarsometatarsal joint: Normal. 3rd metatarsophalangeal joint: Normal. 3rd PIP joint: Normal. 4th tarsometatarsal joint: Normal. 4th metatarsophalangeal joint: Normal. 4th PIP joint: Normal. 5th tarsometatarsal joint: Normal. 5th metatarsophalangeal joint: Normal. 5th PIP joint: Normal. LEFT SIDE: Tibiotalar joint:  Normal. Intertarsal joints: Normal. 1st tarsometatarsal joint: Normal. 1st metatarsophalangeal joint: Normal. 2nd tarsometatarsal joint: Normal. 2nd metatarsophalangeal joint: Normal. 2nd PIP joint: Normal. 3rd tarsometatarsal joint: Normal. 3rd metatarsophalangeal joint: Normal. 3rd PIP joint: Normal. 4th tarsometatarsal joint: Normal. 4th metatarsophalangeal joint: Normal. 4th PIP joint: Normal. 5th tarsometatarsal joint: Normal. 5th metatarsophalangeal joint: Normal. 5th PIP joint: Normal.     Impression: No evidence of inflammatory arthritis in the bilateral ankles or feet. Workstation performed: [id number]     Procedure: US  MSK limited    Result Date: 4/30/2024  Narrative: ULTRASOUND BILATERAL HANDS AND WRISTS TECHNIQUE:   Using a high frequency transducer, the bilateral hands and wrists were scanned to evaluate for evidence of inflammatory arthritis. Grayscale and power doppler ultrasound was used to survey the joints of the hand and wrist. INDICATION:   M79.641: Pain in right hand M79.642: Pain in left hand. COMPARISON: Bilateral hand radiographs 4/26/2024 FINDINGS: RIGHT SIDE: 1st metacarpophalangeal joint: Normal. 2nd metacarpophalangeal joint: Normal. 2nd PIP joint: Normal. 3rd metacarpophalangeal joint: Normal. 3rd PIP joint: Normal. 4th metacarpophalangeal joint: Normal. 4th PIP joint: Normal. 5th metacarpophalangeal joint: Normal. 5th PIP joint: Normal. Radiocarpal/Intercarpal joints: Normal. Distal radioulnar joint: Normal. LEFT SIDE: 1st metacarpophalangeal joint: Normal. 2nd metacarpophalangeal joint: Normal. 2nd PIP joint: Normal. 3rd metacarpophalangeal joint: Normal. 3rd PIP joint: Normal. 4th metacarpophalangeal joint: Normal. 4th PIP joint: Normal. 5th metacarpophalangeal joint: Normal. 5th PIP joint: Normal. Radiocarpal/intercarpal joints: Normal. Distal radioulnar joint: Normal.     Impression: No evidence of inflammatory arthritis in the bilateral hands or wrists. Workstation performed: [id number]     Procedure: XR foot 3+ vw right    Result Date: 4/26/2024  Narrative: RIGHT FOOT INDICATION:   Other specified abnormal immunological findings in serum. Pain in right foot. Pain in left foot.  COMPARISON:  None. VIEWS:  XR FOOT 3+ VW RIGHT Images: 3 FINDINGS: There is no acute fracture or dislocation. No significant degenerative changes. No lytic or blastic osseous lesion. Soft tissues are unremarkable.     Impression: No acute osseous abnormality. Electronically signed: 04/26/2024 06:38 PM Dusty Soliz MD    Procedure: XR hand 3+ vw right    Result Date: 4/26/2024  Narrative: RIGHT HAND INDICATION:   Other specified  abnormal immunological findings in serum. Pain in right hand. Pain in left hand. COMPARISON:  None. VIEWS:  XR HAND 3+ VW RIGHT Images: 3 For the purposes of institution wide universal language the following terms will apply: (thumb=1st digit/finger, index finger=2nd digit/finger, long finger=3rd digit/finger, ring=4th digit/finger and small finger=5th digit/finger) FINDINGS: There is no acute fracture or dislocation. No significant degenerative changes. No lytic or blastic osseous lesion. Soft tissues are unremarkable.     Impression: No acute osseous abnormality. Electronically signed: 04/26/2024 06:36 PM Dusty Soliz MD    Procedure: XR foot 3+ vw left    Result Date: 4/26/2024  Narrative: LEFT FOOT INDICATION:   Other specified abnormal immunological findings in serum. Pain in right foot. Pain in left foot. COMPARISON:  None. VIEWS:  XR FOOT 3+ VW LEFT Images: 3 FINDINGS: There is no acute fracture or dislocation. No significant degenerative changes. No lytic or blastic osseous lesion. Soft tissues are unremarkable.     Impression: No acute osseous abnormality. Electronically signed: 04/26/2024 06:31 PM Dusty Soliz MD    Procedure: XR hand 3+ vw left    Result Date: 4/26/2024  Narrative: LEFT HAND INDICATION:   Other specified abnormal immunological findings in serum. Pain in right hand. Pain in left hand. COMPARISON:  None. VIEWS:  XR HAND 3+ VW LEFT Images: 3 For the purposes of [facility] wide universal language the following terms will apply: (thumb=1st digit/finger, index finger=2nd digit/finger, long finger=3rd digit/finger, ring=4th digit/finger and small finger=5th digit/finger) FINDINGS: There is no acute fracture or dislocation. No significant degenerative changes. No lytic or blastic osseous lesion. Soft tissues are unremarkable.     Impression: No acute osseous abnormality. Electronically signed: 04/26/2024 06:29 PM Dusty Soliz MD       Administrative Statements   I have spent a total time of 30+   minutes in caring for this patient on the day of the visit/encounter including Instructions for management, Importance of tx compliance, Impressions, Documenting in the medical record, Reviewing / ordering tests, medicine, procedures  , and Obtaining or reviewing history  . Topics discussed with the patient / family include symptom assessment and management, medication review, medication adjustment, opioid titration, supportive listening, and anticipatory guidance.

## 2024-08-01 NOTE — ASSESSMENT & PLAN NOTE
Continued opioid taper with plan to decrease 10-30% total daily opioids each 1-2 months, with overall goal to no daily opioid use.   Discussed s/sxs of opioid withdrawal, including nausea, vomiting, diarrhea, rhinorrhea, yawning, insomnia, agitation. All questions/concerns addressed.   Current dose Oxycodone 5 mg Q 4 hours prn max dose 30  mg daily max dose 25 mg  Discussed acetaminophen use for mild pain  
Follows at [facility] s/p stem cell transplant 2022.  
Has reduced usage.  Has script for Chantix, has not filled.   
Managed by PCP and neurology   pregabalin 100 mg PO BID, duloxetine 40 mg PO Daily    
Patient now has custody of her two greanddaughters, 4 and 6 years..   
No

## 2024-08-06 DIAGNOSIS — I10 PRIMARY HYPERTENSION: ICD-10-CM

## 2024-08-06 DIAGNOSIS — R09.81 NASAL CONGESTION: ICD-10-CM

## 2024-08-06 DIAGNOSIS — R12 HEARTBURN: ICD-10-CM

## 2024-08-07 RX ORDER — METOPROLOL SUCCINATE 25 MG/1
25 TABLET, EXTENDED RELEASE ORAL DAILY
Qty: 90 TABLET | Refills: 1 | Status: SHIPPED | OUTPATIENT
Start: 2024-08-07

## 2024-08-07 RX ORDER — FAMOTIDINE 40 MG/1
20 TABLET, FILM COATED ORAL 2 TIMES DAILY
Qty: 100 TABLET | Refills: 0 | OUTPATIENT
Start: 2024-08-07

## 2024-08-07 RX ORDER — AMLODIPINE BESYLATE 10 MG/1
10 TABLET ORAL EVERY MORNING
Qty: 90 TABLET | Refills: 1 | Status: SHIPPED | OUTPATIENT
Start: 2024-08-07

## 2024-08-07 RX ORDER — FLUTICASONE PROPIONATE 50 MCG
2 SPRAY, SUSPENSION (ML) NASAL DAILY
Qty: 48 G | Refills: 0 | OUTPATIENT
Start: 2024-08-07

## 2024-08-13 DIAGNOSIS — Z51.5 PALLIATIVE CARE PATIENT: ICD-10-CM

## 2024-08-13 DIAGNOSIS — C90.00 MULTIPLE MYELOMA NOT HAVING ACHIEVED REMISSION (HCC): ICD-10-CM

## 2024-08-13 DIAGNOSIS — G89.3 CANCER ASSOCIATED PAIN: ICD-10-CM

## 2024-08-13 RX ORDER — OXYCODONE HYDROCHLORIDE 5 MG/1
5 TABLET ORAL EVERY 4 HOURS PRN
Qty: 75 TABLET | Refills: 0 | Status: SHIPPED | OUTPATIENT
Start: 2024-08-13 | End: 2024-08-27

## 2024-08-13 NOTE — TELEPHONE ENCOUNTER
Reason for call:   [x] Refill   [] Prior Auth  [] Other:     Office:   [] PCP/Provider -   [x] Specialty/Provider - Palliative Care / April D WILBUR Gonzalez    Medication:  oxyCODONE (ROXICODONE) 5 immediate release tablet    Dose/Frequency: Take 1 tablet (5 mg total) by mouth every 4 (four) hours as needed for severe pain (cancer-related pain) for up to 14 days Max Daily Amount: 30 mg           Quantity: 75 tablet    Pharmacy: [facility]/pharmacy #[id number]  [place] - [address] [place] [address].     Does the patient have enough for 3 days?   [] Yes   [x] No - Send as HP to POD

## 2024-08-22 DIAGNOSIS — G62.9 NEUROPATHY: ICD-10-CM

## 2024-08-22 RX ORDER — CYCLOBENZAPRINE HCL 10 MG
10 TABLET ORAL 3 TIMES DAILY PRN
Qty: 90 TABLET | Refills: 1 | Status: SHIPPED | OUTPATIENT
Start: 2024-08-22

## 2024-08-27 DIAGNOSIS — C90.00 MULTIPLE MYELOMA NOT HAVING ACHIEVED REMISSION (HCC): ICD-10-CM

## 2024-08-27 DIAGNOSIS — Z51.5 PALLIATIVE CARE PATIENT: ICD-10-CM

## 2024-08-27 DIAGNOSIS — G89.3 CANCER ASSOCIATED PAIN: ICD-10-CM

## 2024-08-27 RX ORDER — OXYCODONE HYDROCHLORIDE 5 MG/1
5 TABLET ORAL EVERY 4 HOURS PRN
Qty: 75 TABLET | Refills: 0 | Status: SHIPPED | OUTPATIENT
Start: 2024-08-27 | End: 2024-09-10

## 2024-08-27 NOTE — TELEPHONE ENCOUNTER
1 [id number] 08/13/2024 08/13/2024 oxyCODONE HCL (Tablet) 75.0 15 5 MG 37.50 [facility] Commercial Insurance 0 / 0 PA   1 [id number] 08/01/2024 08/01/2024 oxyCODONE HCL (Tablet) 75.0 12 5 MG 46.88 [facility] Commercial Insurance 0 / 0 PA   1 [id number] 07/30/2024 07/30/2024 oxyCODONE HCL (Tablet) 18.0 3 5 MG 45.0 [facility] Commercial Insurance 0 / 0 PA   1 [id number] 07/15/2024 07/15/2024 oxyCODONE HCL (Tablet) 75.0 15 5 MG 37.50 [facility] Commercial Insurance 0 / 0 PA   1 [id number] 06/30/2024 06/28/2024 oxyCODONE HCL (Tablet) 75.0 15 5 MG 37.50 [facility] Commercial Insurance 0 / 0 PA

## 2024-08-27 NOTE — TELEPHONE ENCOUNTER
Reason for call:   [x] Refill   [] Prior Auth  [] Other:     Office:   [] PCP/Provider -   [x] Specialty/Provider - PALLIATIVE     Medication: oxyCODONE (ROXICODONE) 5 immediate release tablet     Dose/Frequency:      5 mg, Oral, Every 4 hours PRN                       Quantity: 75    Pharmacy: [facility]/pharmacy #[id number] - [place] - [address]     Does the patient have enough for 3 days?   [] Yes   [x] No - Send as HP to POD

## 2024-09-09 DIAGNOSIS — C90.00 MULTIPLE MYELOMA NOT HAVING ACHIEVED REMISSION (HCC): ICD-10-CM

## 2024-09-09 DIAGNOSIS — G89.3 CANCER ASSOCIATED PAIN: ICD-10-CM

## 2024-09-09 DIAGNOSIS — Z51.5 PALLIATIVE CARE PATIENT: ICD-10-CM

## 2024-09-09 RX ORDER — OXYCODONE HYDROCHLORIDE 5 MG/1
5 TABLET ORAL EVERY 4 HOURS PRN
Qty: 75 TABLET | Refills: 0 | Status: SHIPPED | OUTPATIENT
Start: 2024-09-11 | End: 2024-09-25

## 2024-09-09 NOTE — TELEPHONE ENCOUNTER
Reason for call:   [x] Refill   [] Prior Auth  [] Other:     Office:   [] PCP/Provider -   [x] Specialty/Provider - palliative/ WILBUR Braden     Medication: oxyCODONE (ROXICODONE) 5 immediate release tablet     Dose/Frequency:Take 1 tablet (5 mg total) by mouth every 4 (four) hours as needed for severe pain (cancer-related pain - max 5 doses daily) for up to 14 days Max Daily Amount: 25 mg     Quantity: 75    Pharmacy: [facility]/pharmacy #[id number] - [place] - [address] [phone]     Does the patient have enough for 3 days?   [] Yes   [x] No - Send as HP to POD

## 2024-09-09 NOTE — TELEPHONE ENCOUNTER
Medication: oxycodone 5mg  PDMP   08/27/2024 08/27/2024 oxyCODONE HCL (Tablet) 75.0 15 5 MG 37.50 APRIL SIMONS 08/13/2024 08/13/2024 oxyCODONE HCL (Tablet) 75.0 15 5 MG 37.50 APRIL SIMONS    Refill must be reviewed and completed by the office or provider. The refill is unable to be approved or denied by the medication management team.

## 2024-09-10 ENCOUNTER — TELEPHONE (OUTPATIENT)
Dept: NEUROLOGY | Facility: CLINIC | Age: 54
End: 2024-09-10

## 2024-09-10 NOTE — TELEPHONE ENCOUNTER
Called patient to reschedule 10/17/24 appointment with Dr Sheets since he will be out of the office day.  No answer.  LVM to call back to reschedule.

## 2024-09-13 ENCOUNTER — TELEPHONE (OUTPATIENT)
Dept: NEUROLOGY | Facility: CLINIC | Age: 54
End: 2024-09-13

## 2024-09-20 NOTE — ASSESSMENT & PLAN NOTE
Palliative Diagnosis: Hx of multiple myeloma  Palliative will follow for ongoing goals of care discussions as situation evolves.    Social Support:  Supportive listening provided  Normalized experience of patient  Provided anxiety containment  Advocated for patient/family with interdisciplinary team  Mediated conflict      Follow-up  Continue weaning process. Follow up 8 week.      Patient now has custody of her two granddaughters, 4 and 6 years..

## 2024-09-20 NOTE — ASSESSMENT & PLAN NOTE
Continued opioid taper with plan to decrease 10-30% total daily opioids each 1-2 months, with overall goal to no daily opioid use.   Discussed s/sxs of opioid withdrawal, including nausea, vomiting, diarrhea, rhinorrhea, yawning, insomnia, agitation. All questions/concerns addressed.   Current dose Oxycodone 5 mg Q 4 hours prn max dose 25 mg last reduction 8/1/24, change frequency to Q 6 hours prn, max dose 24 hour 20 mg.    Reports pain is worse in the morning and less in the afternoon, goal to push afternoon dosing to Q 6 hours, rather then Q 4.    Patient feels opioid tapering process has been okay.    Increased difficulty sleeping, will be discussing with PCP.    Discussed acetaminophen use for mild pain

## 2024-09-23 ENCOUNTER — OFFICE VISIT (OUTPATIENT)
Dept: PALLIATIVE MEDICINE | Facility: CLINIC | Age: 54
End: 2024-09-23
Payer: MEDICARE

## 2024-09-23 VITALS
OXYGEN SATURATION: 92 % | SYSTOLIC BLOOD PRESSURE: 118 MMHG | WEIGHT: 195 LBS | HEIGHT: 63 IN | RESPIRATION RATE: 18 BRPM | TEMPERATURE: 98 F | HEART RATE: 90 BPM | BODY MASS INDEX: 34.55 KG/M2 | DIASTOLIC BLOOD PRESSURE: 82 MMHG

## 2024-09-23 DIAGNOSIS — C90.01 MULTIPLE MYELOMA IN REMISSION (HCC): Chronic | ICD-10-CM

## 2024-09-23 DIAGNOSIS — Z51.5 PALLIATIVE CARE PATIENT: ICD-10-CM

## 2024-09-23 DIAGNOSIS — M79.2 NEUROPATHIC PAIN: ICD-10-CM

## 2024-09-23 DIAGNOSIS — F11.20 CONTINUOUS OPIOID DEPENDENCE (HCC): Primary | Chronic | ICD-10-CM

## 2024-09-23 PROCEDURE — 99214 OFFICE O/P EST MOD 30 MIN: CPT | Performed by: NURSE PRACTITIONER

## 2024-09-23 PROCEDURE — G2211 COMPLEX E/M VISIT ADD ON: HCPCS | Performed by: NURSE PRACTITIONER

## 2024-09-23 NOTE — ASSESSMENT & PLAN NOTE
-  ON lyrica and cymbalta, managed by PCP and neurology.  Reports opioid use is for ongoing neuropathic pain, patient is still agreeable to opioid taper however if pain continues to be managed with use of opioid, PCP may want to take over prescribing.

## 2024-09-23 NOTE — PROGRESS NOTES
Ambulatory Visit  Name: Marla Negron      : 1970      MRN: [id number]  Encounter Provider: WILBUR Braden  Encounter Date: 2024   Encounter department: [facility] PALLIATIVE CARE [place]    Assessment & Plan  Continuous opioid dependence (HCC)  Continued opioid taper with plan to decrease 10-30% total daily opioids each 1-2 months, with overall goal to no daily opioid use.   Discussed s/sxs of opioid withdrawal, including nausea, vomiting, diarrhea, rhinorrhea, yawning, insomnia, agitation. All questions/concerns addressed.   Current dose Oxycodone 5 mg Q 4 hours prn max dose 25 mg last reduction 24, change frequency to Q 6 hours prn, max dose 24 hour 20 mg.    Reports pain is worse in the morning and less in the afternoon, goal to push afternoon dosing to Q 6 hours, rather then Q 4.    Patient feels opioid tapering process has been okay.    Increased difficulty sleeping, will be discussing with PCP.    Discussed acetaminophen use for mild pain         Palliative care patient  Palliative Diagnosis: Hx of multiple myeloma  Palliative will follow for ongoing goals of care discussions as situation evolves.    Social Support:  Supportive listening provided  Normalized experience of patient  Provided anxiety containment  Advocated for patient/family with interdisciplinary team  Mediated conflict      Follow-up  Continue weaning process. Follow up 8 week.      Patient now has custody of her two granddaughters, 4 and 6 years..   Multiple myeloma in remission (HCC)  Follows at [facility] s/p stem cell transplant .    Neuropathic pain  -  ON lyrica and cymbalta, managed by PCP and neurology.  Reports opioid use is for ongoing neuropathic pain, patient is still agreeable to opioid taper however if pain continues to be managed with use of opioid, PCP may want to take over prescribing.             PDMP Review: I have reviewed the patient's controlled substance dispensing  "history in the Prescription Drug Monitoring Program in compliance with the [facility] regulations before prescribing any controlled substances.    History of Present Illness     Marla Negron is a 53 y.o. female with history of multiple myeloma, currently in remission s/p stem cell transplant who presents for ongoing symptom management, opioid tapering and support.      History obtained from : patient  Review of Systems   Musculoskeletal:  Positive for arthralgias and myalgias.     Social History     Tobacco Use    Smoking status: Former     Current packs/day: 0.50     Average packs/day: 0.5 packs/day for 31.8 years (15.9 ttl pk-yrs)     Types: Cigarettes     Start date: 12/18/1992     Passive exposure: Past    Smokeless tobacco: Former   Vaping Use    Vaping status: Never Used   Substance and Sexual Activity    Alcohol use: Not Currently    Drug use: Not Currently     Types: Oxycodone     Comment: 1 year    Sexual activity: Not Currently     Partners: Male             Objective     /82 (BP Location: Left arm, Patient Position: Sitting, Cuff Size: Standard)   Pulse 90   Temp 98 °F (36.7 °C) (Tympanic)   Resp 18   Ht 5' 3\" (1.6 m)   Wt 88.5 kg (195 lb)   SpO2 92%   BMI 34.54 kg/m²     Physical Exam  Vitals and nursing note reviewed.   Constitutional:       General: She is not in acute distress.     Appearance: She is well-developed.   HENT:      Head: Normocephalic and atraumatic.   Eyes:      Conjunctiva/sclera: Conjunctivae normal.   Cardiovascular:      Rate and Rhythm: Normal rate and regular rhythm.      Heart sounds: No murmur heard.  Pulmonary:      Effort: Pulmonary effort is normal. No respiratory distress.   Abdominal:      Palpations: Abdomen is soft.      Tenderness: There is no abdominal tenderness.   Musculoskeletal:         General: No swelling.      Cervical back: Neck supple.      Comments: Generalized aches and pains, neuropathic pain in extremities.   Skin:     General: Skin is warm and " dry.   Neurological:      General: No focal deficit present.      Mental Status: She is alert and oriented to person, place, and time.      GCS: GCS eye subscore is 4. GCS verbal subscore is 5. GCS motor subscore is 6.   Psychiatric:         Attention and Perception: Attention and perception normal.         Mood and Affect: Mood normal.         Behavior: Behavior is cooperative.         Cognition and Memory: Cognition and memory normal.         Recent labs:  Lab Results   Component Value Date/Time    SODIUM 138 06/22/2024 08:54 PM    SODIUM 140 10/27/2023 05:02 AM    K 3.9 06/22/2024 08:54 PM    K 3.5 10/27/2023 05:02 AM    BUN 10 06/22/2024 08:54 PM    BUN 12 10/27/2023 05:02 AM    CREATININE 1.05 06/22/2024 08:54 PM    CREATININE 0.84 10/27/2023 05:02 AM    GLUC 111 06/22/2024 08:54 PM    GLUC 79 10/27/2023 05:02 AM    CALCIUM 9.9 06/22/2024 08:54 PM    CALCIUM 8.8 10/27/2023 05:02 AM    AST 19 06/22/2024 08:54 PM    AST 27 10/26/2023 05:05 PM    ALT 12 06/22/2024 08:54 PM    ALT 11 10/26/2023 05:05 PM    ALB 4.5 06/22/2024 08:54 PM    ALB 4.6 10/26/2023 05:05 PM    TP 8.4 06/22/2024 08:54 PM    TP 8.4 (H) 10/26/2023 05:05 PM    EGFR 59 06/22/2024 08:54 PM    EGFR 82 10/27/2023 05:02 AM     Lab Results   Component Value Date/Time    HGB 17.6 (H) 06/22/2024 08:54 PM    WBC 11.95 (H) 06/22/2024 08:54 PM     06/22/2024 08:54 PM    INR 0.97 06/23/2024 03:11 AM    INR 1.0 10/26/2023 05:05 PM    PTT 28 06/23/2024 03:11 AM     Lab Results   Component Value Date/Time    SED5VAJRCZRJ 6.133 (H) 02/28/2024 02:22 PM       Recent Imaging:  Procedure:  VAS VENOUS DUPLEX - LOWER LIMB BILATERAL    Result Date: 6/24/2024  Narrative:  [facility] REPORT CLINICAL: Indications: Patient presents with bilateral lower extremity edema x few days. Risk Factors The patient has history of HTN.  She has no history of DVT.   CONCLUSION:   Impression: RIGHT LOWER LIMB: No evidence of acute or chronic deep vein thrombosis. No  evidence of superficial thrombophlebitis noted. Doppler evaluation shows a normal response to augmentation maneuvers.. Popliteal, posterior tibial and anterior tibial arterial Doppler waveform's are triphasic.  LEFT LOWER LIMB: No evidence of acute or chronic deep vein thrombosis. No evidence of superficial thrombophlebitis noted. Doppler evaluation shows a normal response to augmentation maneuvers. Popliteal, posterior tibial and anterior tibial arterial Doppler waveform's are triphasic.  SIGNATURE: Electronically Signed by: KRISTINA LENZ DO on 2024-06-24 11:42:25 AM    Procedure: CT chest wo contrast    Result Date: 6/24/2024  Narrative: CT CHEST WITHOUT IV CONTRAST INDICATION: lower localized chest pain with cough. Patient is a current smoker. Based on Dr. Esparza's note from 6/23/2024, patient has midsternal chest pain radiating to the back. Patient also has cough and congestion. COMPARISON: Chest, abdomen, pelvic CT from 8/15/2023. TECHNIQUE: CT examination of the chest was performed without intravenous contrast. Multiplanar 2D reformatted images were created from the source data. This examination, like all CT scans performed in the [facility], was performed utilizing techniques to minimize radiation dose exposure, including the use of iterative reconstruction and automated exposure control. Radiation dose length product (DLP) for this visit: 279 mGy-cm FINDINGS: LUNGS: Mild plate atelectasis at the lung bases, otherwise clear lungs. There is no tracheal or endobronchial lesion. PLEURA: Unremarkable. HEART/GREAT VESSELS: Heart is unremarkable for patient's age. No thoracic aortic aneurysm. MEDIASTINUM AND JENNIFER: Unremarkable. CHEST WALL AND LOWER NECK: Unremarkable. VISUALIZED STRUCTURES IN THE UPPER ABDOMEN: There is a gastric diverticulum (series 2 image [id number].) OSSEOUS STRUCTURES: No acute fracture or destructive osseous lesion.     Impression: Mild plate atelectasis at the lung bases, otherwise  clear lungs. Workstation performed: [id number]     Procedure: XR chest 1 view portable    Result Date: 6/23/2024  Narrative: XR CHEST PORTABLE INDICATION: chest pain. COMPARISON: CXR and chest CT 8/15/2023. FINDINGS: Clear lungs. No pneumothorax or pleural effusion. Normal cardiomediastinal silhouette. Bones are unremarkable for age. Mild chronic elevation of the left diaphragm.     Impression: No acute cardiopulmonary disease. Workstation performed: [id number]      Administrative Statements   I have spent a total time of 30+  minutes in caring for this patient on the day of the visit/encounter including Instructions for management, Patient and family education, Importance of tx compliance, Counseling / Coordination of care, Documenting in the medical record, Reviewing / ordering tests, medicine, procedures  , Obtaining or reviewing history  , and Communicating with other healthcare professionals . Topics discussed with the patient / family include symptom assessment and management, medication review, medication adjustment, psychosocial support, and supportive listening.

## 2024-09-24 DIAGNOSIS — G47.00 INSOMNIA, UNSPECIFIED TYPE: ICD-10-CM

## 2024-09-24 DIAGNOSIS — Z94.84 H/O AUTOLOGOUS STEM CELL TRANSPLANT (HCC): ICD-10-CM

## 2024-09-24 RX ORDER — ACYCLOVIR 800 MG/1
800 TABLET ORAL 2 TIMES DAILY
Qty: 180 TABLET | Refills: 0 | Status: SHIPPED | OUTPATIENT
Start: 2024-09-24 | End: 2025-09-19

## 2024-09-24 RX ORDER — TRAZODONE HYDROCHLORIDE 50 MG/1
50 TABLET, FILM COATED ORAL
Qty: 90 TABLET | Refills: 0 | Status: SHIPPED | OUTPATIENT
Start: 2024-09-24 | End: 2024-09-27 | Stop reason: SDUPTHER

## 2024-09-25 DIAGNOSIS — C90.00 MULTIPLE MYELOMA NOT HAVING ACHIEVED REMISSION (HCC): ICD-10-CM

## 2024-09-25 DIAGNOSIS — Z51.5 PALLIATIVE CARE PATIENT: ICD-10-CM

## 2024-09-25 DIAGNOSIS — G89.3 CANCER ASSOCIATED PAIN: ICD-10-CM

## 2024-09-25 RX ORDER — OXYCODONE HYDROCHLORIDE 5 MG/1
5 TABLET ORAL EVERY 4 HOURS PRN
Qty: 75 TABLET | Refills: 0 | Status: SHIPPED | OUTPATIENT
Start: 2024-09-25 | End: 2024-10-09

## 2024-09-25 NOTE — TELEPHONE ENCOUNTER
Pharmacy: [facility]    Patient will like a call back once prescription is signed and sent to pharmacy.       Last appointment:9/23/24   Next appointment: 11/20/24

## 2024-09-26 ENCOUNTER — TELEPHONE (OUTPATIENT)
Dept: NEUROLOGY | Facility: CLINIC | Age: 54
End: 2024-09-26

## 2024-09-26 ENCOUNTER — OFFICE VISIT (OUTPATIENT)
Dept: NEUROLOGY | Facility: CLINIC | Age: 54
End: 2024-09-26
Payer: MEDICARE

## 2024-09-26 VITALS
DIASTOLIC BLOOD PRESSURE: 80 MMHG | HEART RATE: 81 BPM | SYSTOLIC BLOOD PRESSURE: 100 MMHG | TEMPERATURE: 98.4 F | BODY MASS INDEX: 34.55 KG/M2 | WEIGHT: 195 LBS | HEIGHT: 63 IN

## 2024-09-26 DIAGNOSIS — M54.16 RADICULOPATHY, LUMBAR REGION: ICD-10-CM

## 2024-09-26 DIAGNOSIS — G62.9 SENSORY NEUROPATHY: Primary | ICD-10-CM

## 2024-09-26 PROCEDURE — 99214 OFFICE O/P EST MOD 30 MIN: CPT | Performed by: NURSE PRACTITIONER

## 2024-09-26 RX ORDER — DULOXETIN HYDROCHLORIDE 60 MG/1
60 CAPSULE, DELAYED RELEASE ORAL DAILY
Qty: 90 CAPSULE | Refills: 2 | Status: SHIPPED | OUTPATIENT
Start: 2024-09-26 | End: 2024-09-26

## 2024-09-26 RX ORDER — DULOXETIN HYDROCHLORIDE 60 MG/1
60 CAPSULE, DELAYED RELEASE ORAL DAILY
Qty: 90 CAPSULE | Refills: 2 | Status: SHIPPED | OUTPATIENT
Start: 2024-09-26

## 2024-09-26 NOTE — LETTER
Marla Negron  [address] [place] [address]      Our office has been unsuccessful in trying to reach you by phone regarding:    ? Other:____Aqua therapy locations_______________________________________        [facility] [address]   [place] [address]  [phone]  [address]      [facility] [address]   [place] [address]  Phone [phone]  Fax [phone]            Please contact  Edie Christine if interested in referral to be sent to any of the above physical therapy locations at [phone]        Sincerely,    Edie Christine      [phone]   [facility]

## 2024-09-26 NOTE — PATIENT INSTRUCTIONS
Continue lyrica, talk with palliative care about medical marijuana   Increase duloxetine to 60 mg daily    Will look into aqua therapy closer to you if available    Refer to pain management for intervention for back pain   Obtain mri lumbar spine     Magnesium supplement at bedtime for cramping

## 2024-09-26 NOTE — ASSESSMENT & PLAN NOTE
Patient with neuropathy symptoms since 2019 which have been gradually progressive.  At the time she was diagnosed with multiple myeloma/amyloidosis, status postchemotherapy and bone marrow transplant, is currently in remission.  Since last visit she continues to deal with neuropathic pain along with symptoms from lumbar radiculopathy.  Her exam is overall stable since last visit.  She did have a repeat EMG which continues to show a peripheral neuropathy along with left C6 radiculopathy and left L4-S1 radiculopathy.    Last visit her Lyrica was increased to assist with neuropathic pain with minimal improvement.  Will plan to increase her duloxetine to 60 mg daily.  She will remain on her current dose of Lyrica 100 mg 3 times daily for now.  She also takes oxycodone on a regular basis.  She does continue to consider medical marijuana and will discuss with palliative care.  She is also on cyclobenzaprine.      Plan follow-up in 6 months. To contact the office sooner with any concerns or worsening symptoms.    Orders:    DULoxetine (CYMBALTA) 60 mg delayed release capsule; Take 1 capsule (60 mg total) by mouth daily    BUN; Future

## 2024-09-26 NOTE — PROGRESS NOTES
Ambulatory Visit  Name: Marla Negron      : 1970      MRN: [id number]  Encounter Provider: WILBUR Ndiaye  Encounter Date: 2024   Encounter department: NEUROLOGY [facility]    Assessment & Plan  Radiculopathy, lumbar region  Patient continues with low back pain with radicular symptoms.  She also notes cramping in the calves.  She will start magnesium supplement, plan to increase her duloxetine, she will remain on Lyrica.  Given her symptoms and EMG findings will obtain MRI lumbar spine and refer to pain management for potential intervention.  We did also discuss aqua therapy, will have social work to see if there is a provider closer to her home location.  Orders:    Ambulatory referral to Spine & Pain Management; Future    MRI lumbar spine with and without contrast; Future    Creatinine, serum; Future    BUN; Future    Sensory neuropathy  Patient with neuropathy symptoms since 2019 which have been gradually progressive.  At the time she was diagnosed with multiple myeloma/amyloidosis, status postchemotherapy and bone marrow transplant, is currently in remission.  Since last visit she continues to deal with neuropathic pain along with symptoms from lumbar radiculopathy.  Her exam is overall stable since last visit.  She did have a repeat EMG which continues to show a peripheral neuropathy along with left C6 radiculopathy and left L4-S1 radiculopathy.    Last visit her Lyrica was increased to assist with neuropathic pain with minimal improvement.  Will plan to increase her duloxetine to 60 mg daily.  She will remain on her current dose of Lyrica 100 mg 3 times daily for now.  She also takes oxycodone on a regular basis.  She does continue to consider medical marijuana and will discuss with palliative care.  She is also on cyclobenzaprine.      Plan follow-up in 6 months. To contact the office sooner with any concerns or worsening symptoms.    Orders:    DULoxetine (CYMBALTA)  60 mg delayed release capsule; Take 1 capsule (60 mg total) by mouth daily    BUN; Future        History of Present Illness   HPI  Patient is a 53-year-old with past medical history of hypertension, polycythemia, anxiety, multiple myeloma status post stem cell transplant, amyloidosis who presents for follow-up for neuropathy.    To review, she has had neuropathy symptoms since 2019 that started off as burning numbness and tingling that in the feet that were progressive.  Workup did show an abnormal SPEP was sent to hematology and diagnosed with multiple myeloma and amyloidosis.  She is status postchemotherapy and bone marrow transplant.    Last office visit 6/2024 in which she was to have repeat EMG, lyrica was increased, was to referred to pain management for possible intervention for low back pain.     Interval History:  She continues with numbness, tingling, burning pain in the hands and feet. She has imbaalnce, no falls. No muscle weakness but she does feel some of her pain can inhibit her strength.  She drop items at times, trouble opening jars and bottles, no other UE weakness other them some limitation with lifting arms overheard.       She continues with back pain down the low back can radiate down the legs L>R.   She gets cramping in the calves more often throughout the day. Takes flexeril TID.   No new bowel or bladder symptoms.   She has done PT for her low back pain in the past but was not helpful.     She does have some neck pain intermittently can radiate down the left arm, not as significant as back pain     He is currently on Lyrica 100 mg TID duloxetine 40 mg daily, takes oxycodone on a regular basis.    Prior work up:  Recent labs June 2024 SPEP unremarkable, no monoclonal protein.  Quantitative immunoglobulins unremarkable, beta-2 microglobulin unremarkable.  Vitamin B1, B6 levels were normal, vitamin B12 323.     Feb 2024: Cryoglobulins, rheumatoid factor, hepatitis C, HIV, TSH were normal, CK  "was normal, complement levels negative, nuclear antigen, centromere, scleroderma, Pooja 1,  double-stranded DNA, CCP, rheumatoid factor, ANDERS, Sjogren's were all unremarkable.     December 2023 Free kappa, lambda chains were within normal range, with normal kappa to lambda ratio.     EMG 7/2024: 1. There is neurophysiological evidence of moderate to severe axonal, large-fiber, sensory polyneuropathy. Compared to the previous study done at [facility] on 10/4/2021, there has been interval worsening. Left upper and lower extremity nerve conduction studies were present on the previous study and all but the radial sensory nerve action potential are absent on this study. 2. The neurophysiological pattern of abnormalities is consistent with chronic C6 radiculopathy on the left side. This is a new finding compared to the previous study done at [facility] on 10/4/2021. 3. The neurophysiological pattern of abnormalities is consistent with chronic L4-S1 radiculopathy on the left side. This is a new finding compared to the previous study done at [facility] on 10/4/2021.    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  Raw data not available for my review.  Review of Systems  I have personally reviewed the MA's review of systems and made changes as necessary.      Objective     /80 (BP Location: Left arm, Patient Position: Sitting, Cuff Size: Standard)   Pulse 81   Temp 98.4 °F (36.9 °C) (Temporal)   Ht 5' 3\" (1.6 m)   Wt 88.5 kg (195 lb)   BMI 34.54 kg/m²     Physical Exam  Eyes:      Extraocular Movements: EOM normal.   Neurological:      Mental Status: She is oriented to person, place, and time.      Deep Tendon Reflexes:      Reflex Scores:       Bicep reflexes are 2+ on the right side and 2+ on the left side.       Brachioradialis reflexes are 2+ on the right side and 2+ on the left side.       Patellar reflexes are 2+ on the right side and 0 on the left side.       Achilles reflexes are 0 on the right side and 0 on " the left side.  Psychiatric:         Speech: Speech normal.       Neurologic Exam     Mental Status   Oriented to person, place, and time.   Attention: normal.   Speech: speech is normal   Level of consciousness: alert  Knowledge: good.     Cranial Nerves     CN III, IV, VI   Extraocular motions are normal.     CN V   Right facial sensation deficit: none  Left facial sensation deficit: none    CN VII   Facial expression full, symmetric.     CN VIII   Hearing: intact    CN XI   Right trapezius strength: normal  Left trapezius strength: normal    CN XII   Tongue deviation: none    Motor Exam   Muscle bulk: normal    Strength   Strength 5/5 except as noted. Pain limited strength testing in proximal UE and LE but gave brief full effort,  strength 4/5 b/l      Sensory Exam   Light touch normal.   Right leg vibration: decreased from toes (3 secs)  Left leg vibration: decreased from toes (3 secs)  Right leg proprioception: normal  Left leg proprioception: normal  Right arm pinprick: normal  Left arm pinprick: normal  Right leg pinprick: decreased from ankle  Left leg pinprick: decreased from ankle  Temperature normal in the UE, decreased just above the ankle in the b/l LE     Gait, Coordination, and Reflexes     Gait  Gait: (wide based, antalgic)    Reflexes   Right brachioradialis: 2+  Left brachioradialis: 2+  Right biceps: 2+  Left biceps: 2+  Right patellar: 2+  Left patellar: 0  Right achilles: 0  Left achilles: 0  Right plantar: normal  Left plantar: normal

## 2024-09-26 NOTE — TELEPHONE ENCOUNTER
Msw phoned pt and lvm to provide info regarding   [facility] Aqua therapy. Please call back       [facility] Physical Therapy [address]   [place] [address] [phone]  *Pool temp is kept 86-88 degrees. Room temp varies (hotter in summer due to windows).  *No lift available. Patient need to navigate 5 steps to enter/exit pool.  28 mins     [facility] [place]  [address]   [place] [address]  Phone [phone]  Fax [phone]  *they do not accept [employer] Medicare  *Pool is kept between 88-92 degrees. Room can be stuffy.   *Lift is available.   24mins

## 2024-09-26 NOTE — TELEPHONE ENCOUNTER
----- Message from Apple HORNE sent at 9/26/2024 11:00 AM EDT -----    ----- Message -----  From: WILBUR Ndiaye  Sent: 9/26/2024  10:37 AM EDT  To: Neurology     Hello,  I am following with this patient for neuropathy and lumbar radiculopathy. I would like to refer to aqua therapy, is there any provider closer to her home as I am only familiar with [facility] [place] and [facility].     Thanks!  Darlin

## 2024-09-26 NOTE — PROGRESS NOTES
Review of Systems   Constitutional: Negative.    HENT: Negative.     Eyes: Negative.    Respiratory: Negative.     Cardiovascular: Negative.    Gastrointestinal: Negative.    Endocrine: Negative.    Genitourinary: Negative.    Musculoskeletal:  Positive for back pain.   Skin: Negative.    Allergic/Immunologic: Negative.    Neurological:  Positive for numbness.        Tingling   Burning Sensation    Hematological: Negative.    Psychiatric/Behavioral: Negative.

## 2024-09-26 NOTE — ASSESSMENT & PLAN NOTE
Patient with neuropathy symptoms since 2019 which have been gradually progressive.  At the time she was diagnosed with multiple myeloma/amyloidosis, status postchemotherapy and bone marrow transplant, is currently in remission.  Since last visit she continues to deal with neuropathic pain along with symptoms from lumbar radiculopathy.  Her exam is overall stable since last visit.  She did have a repeat EMG which continues to show a peripheral neuropathy along with left C6 radiculopathy and left L4-S1 radiculopathy.    Last visit her Lyrica was increased to assist with neuropathic pain with minimal improvement.  Will plan to increase her duloxetine to 60 mg daily.  She will remain on her current dose of Lyrica 100 mg 3 times daily for now.  She also takes oxycodone on a regular basis.  She does continue to consider medical marijuana and will discuss with palliative care.  She is also on cyclobenzaprine.      Plan follow-up in 6 months. To contact the office sooner with any concerns or worsening symptoms.

## 2024-09-27 ENCOUNTER — OFFICE VISIT (OUTPATIENT)
Age: 54
End: 2024-09-27
Payer: MEDICARE

## 2024-09-27 VITALS
WEIGHT: 198.8 LBS | HEART RATE: 88 BPM | BODY MASS INDEX: 35.22 KG/M2 | OXYGEN SATURATION: 99 % | TEMPERATURE: 98.6 F | RESPIRATION RATE: 16 BRPM | DIASTOLIC BLOOD PRESSURE: 80 MMHG | HEIGHT: 63 IN | SYSTOLIC BLOOD PRESSURE: 104 MMHG

## 2024-09-27 DIAGNOSIS — I42.9 CARDIOMYOPATHY, UNSPECIFIED TYPE (HCC): ICD-10-CM

## 2024-09-27 DIAGNOSIS — Z00.00 MEDICARE ANNUAL WELLNESS VISIT, SUBSEQUENT: Primary | ICD-10-CM

## 2024-09-27 DIAGNOSIS — D75.1 POLYCYTHEMIA: ICD-10-CM

## 2024-09-27 DIAGNOSIS — Z94.84 H/O AUTOLOGOUS STEM CELL TRANSPLANT (HCC): ICD-10-CM

## 2024-09-27 DIAGNOSIS — G47.00 INSOMNIA, UNSPECIFIED TYPE: ICD-10-CM

## 2024-09-27 DIAGNOSIS — I10 PRIMARY HYPERTENSION: Chronic | ICD-10-CM

## 2024-09-27 DIAGNOSIS — Z13.6 SCREENING FOR CARDIOVASCULAR CONDITION: ICD-10-CM

## 2024-09-27 DIAGNOSIS — C90.01 MULTIPLE MYELOMA IN REMISSION (HCC): Chronic | ICD-10-CM

## 2024-09-27 DIAGNOSIS — G89.3 CANCER RELATED PAIN: ICD-10-CM

## 2024-09-27 DIAGNOSIS — E66.01 OBESITY, MORBID (HCC): ICD-10-CM

## 2024-09-27 PROCEDURE — G0439 PPPS, SUBSEQ VISIT: HCPCS

## 2024-09-27 RX ORDER — TRAZODONE HYDROCHLORIDE 100 MG/1
100 TABLET ORAL
Qty: 90 TABLET | Refills: 1 | Status: SHIPPED | OUTPATIENT
Start: 2024-09-27 | End: 2025-03-26

## 2024-09-27 NOTE — ASSESSMENT & PLAN NOTE
Status post stem cell transplant and chemotherapy.  Follows at [facility].  It has been almost a year since she has seen them, she was encouraged to follow-up.  Patient has no complaints or symptoms at this appointment.

## 2024-09-27 NOTE — PATIENT INSTRUCTIONS
Medicare Preventive Visit Patient Instructions  Thank you for completing your Welcome to Medicare Visit or Medicare Annual Wellness Visit today. Your next wellness visit will be due in one year (9/28/2025).  The screening/preventive services that you may require over the next 5-10 years are detailed below. Some tests may not apply to you based off risk factors and/or age. Screening tests ordered at today's visit but not completed yet may show as past due. Also, please note that scanned in results may not display below.  Preventive Screenings:  Service Recommendations Previous Testing/Comments   Colorectal Cancer Screening  * Colonoscopy    * Fecal Occult Blood Test (FOBT)/Fecal Immunochemical Test (FIT)  * Fecal DNA/Cologuard Test  * Flexible Sigmoidoscopy Age: 45-75 years old   Colonoscopy: every 10 years (may be performed more frequently if at higher risk)  OR  FOBT/FIT: every 1 year  OR  Cologuard: every 3 years  OR  Sigmoidoscopy: every 5 years  Screening may be recommended earlier than age 45 if at higher risk for colorectal cancer. Also, an individualized decision between you and your healthcare provider will decide whether screening between the ages of 76-85 would be appropriate. Colonoscopy: Not on file  FOBT/FIT: Not on file  Cologuard: Not on file  Sigmoidoscopy: Not on file          Breast Cancer Screening Age: 40+ years old  Frequency: every 1-2 years  Not required if history of left and right mastectomy Mammogram: 02/21/2019        Cervical Cancer Screening Between the ages of 21-29, pap smear recommended once every 3 years.   Between the ages of 30-65, can perform pap smear with HPV co-testing every 5 years.   Recommendations may differ for women with a history of total hysterectomy, cervical cancer, or abnormal pap smears in past. Pap Smear: 04/09/2024        Hepatitis C Screening Once for adults born between 1945 and 1965  More frequently in patients at high risk for Hepatitis C Hep C Antibody:  02/28/2024        Diabetes Screening 1-2 times per year if you're at risk for diabetes or have pre-diabetes Fasting glucose: 95 mg/dL (6/10/2024)  A1C: No results in last 5 years (No results in last 5 years)      Cholesterol Screening Once every 5 years if you don't have a lipid disorder. May order more often based on risk factors. Lipid panel: Not on file          Other Preventive Screenings Covered by Medicare:  Abdominal Aortic Aneurysm (AAA) Screening: covered once if your at risk. You're considered to be at risk if you have a family history of AAA.  Lung Cancer Screening: covers low dose CT scan once per year if you meet all of the following conditions: (1) Age 55-77; (2) No signs or symptoms of lung cancer; (3) Current smoker or have quit smoking within the last 15 years; (4) You have a tobacco smoking history of at least 20 pack years (packs per day multiplied by number of years you smoked); (5) You get a written order from a healthcare provider.  Glaucoma Screening: covered annually if you're considered high risk: (1) You have diabetes OR (2) Family history of glaucoma OR (3)  aged 50 and older OR (4)  American aged 65 and older  Osteoporosis Screening: covered every 2 years if you meet one of the following conditions: (1) You're estrogen deficient and at risk for osteoporosis based off medical history and other findings; (2) Have a vertebral abnormality; (3) On glucocorticoid therapy for more than 3 months; (4) Have primary hyperparathyroidism; (5) On osteoporosis medications and need to assess response to drug therapy.   Last bone density test (DXA Scan): Not on file.  HIV Screening: covered annually if you're between the age of 15-65. Also covered annually if you are younger than 15 and older than 65 with risk factors for HIV infection. For pregnant patients, it is covered up to 3 times per pregnancy.    Immunizations:  Immunization Recommendations   Influenza Vaccine Annual  influenza vaccination during flu season is recommended for all persons aged >= 6 months who do not have contraindications   Pneumococcal Vaccine   * Pneumococcal conjugate vaccine = PCV13 (Prevnar 13), PCV15 (Vaxneuvance), PCV20 (Prevnar 20)  * Pneumococcal polysaccharide vaccine = PPSV23 (Pneumovax) Adults 19-63 yo with certain risk factors or if 65+ yo  If never received any pneumonia vaccine: recommend Prevnar 20 (PCV20)  Give PCV20 if previously received 1 dose of PCV13 or PPSV23   Hepatitis B Vaccine 3 dose series if at intermediate or high risk (ex: diabetes, end stage renal disease, liver disease)   Respiratory syncytial virus (RSV) Vaccine - COVERED BY MEDICARE PART D  * RSVPreF3 (Arexvy) CDC recommends that adults 60 years of age and older may receive a single dose of RSV vaccine using shared clinical decision-making (SCDM)   Tetanus (Td) Vaccine - COST NOT COVERED BY MEDICARE PART B Following completion of primary series, a booster dose should be given every 10 years to maintain immunity against tetanus. Td may also be given as tetanus wound prophylaxis.   Tdap Vaccine - COST NOT COVERED BY MEDICARE PART B Recommended at least once for all adults. For pregnant patients, recommended with each pregnancy.   Shingles Vaccine (Shingrix) - COST NOT COVERED BY MEDICARE PART B  2 shot series recommended in those 19 years and older who have or will have weakened immune systems or those 50 years and older     Health Maintenance Due:      Topic Date Due   • Breast Cancer Screening: Mammogram  02/21/2020   • Lung Cancer Screening  06/23/2025   • Colorectal Cancer Screening  09/30/2028   • Cervical Cancer Screening  04/09/2029   • HIV Screening  Completed   • Hepatitis C Screening  Completed     Immunizations Due:      Topic Date Due   • Hepatitis A Vaccine (1 of 2 - Risk 2-dose series) Never done   • COVID-19 Vaccine (3 - Moderna risk series) 07/07/2021   • Pneumococcal Vaccine: Pediatrics (0 to 5 Years) and At-Risk  Patients (6 to 64 Years) (3 of 3 - PCV) 11/13/2021   • Influenza Vaccine (1) 09/01/2024     Advance Directives   What are advance directives?  Advance directives are legal documents that state your wishes and plans for medical care. These plans are made ahead of time in case you lose your ability to make decisions for yourself. Advance directives can apply to any medical decision, such as the treatments you want, and if you want to donate organs.   What are the types of advance directives?  There are many types of advance directives, and each state has rules about how to use them. You may choose a combination of any of the following:  Living will:  This is a written record of the treatment you want. You can also choose which treatments you do not want, which to limit, and which to stop at a certain time. This includes surgery, medicine, IV fluid, and tube feedings.   Durable power of  for healthcare (DPAHC):  This is a written record that states who you want to make healthcare choices for you when you are unable to make them for yourself. This person, called a proxy, is usually a family member or a friend. You may choose more than 1 proxy.  Do not resuscitate (DNR) order:  A DNR order is used in case your heart stops beating or you stop breathing. It is a request not to have certain forms of treatment, such as CPR. A DNR order may be included in other types of advance directives.  Medical directive:  This covers the care that you want if you are in a coma, near death, or unable to make decisions for yourself. You can list the treatments you want for each condition. Treatment may include pain medicine, surgery, blood transfusions, dialysis, IV or tube feedings, and a ventilator (breathing machine).  Values history:  This document has questions about your views, beliefs, and how you feel and think about life. This information can help others choose the care that you would choose.  Why are advance directives  important?  An advance directive helps you control your care. Although spoken wishes may be used, it is better to have your wishes written down. Spoken wishes can be misunderstood, or not followed. Treatments may be given even if you do not want them. An advance directive may make it easier for your family to make difficult choices about your care.   Cigarette Smoking and Your Health   Risks to your health if you smoke:  Nicotine and other chemicals found in tobacco damage every cell in your body. Even if you are a light smoker, you have an increased risk for cancer, heart disease, and lung disease. If you are pregnant or have diabetes, smoking increases your risk for complications.   Benefits to your health if you stop smoking:   You decrease respiratory symptoms such as coughing, wheezing, and shortness of breath.   You reduce your risk for cancers of the lung, mouth, throat, kidney, bladder, pancreas, stomach, and cervix. If you already have cancer, you increase the benefits of chemotherapy. You also reduce your risk for cancer returning or a second cancer from developing.   You reduce your risk for heart disease, blood clots, heart attack, and stroke.   You reduce your risk for lung infections, and diseases such as pneumonia, asthma, chronic bronchitis, and emphysema.  Your circulation improves. More oxygen can be delivered to your body. If you have diabetes, you lower your risk for complications, such as kidney, artery, and eye diseases. You also lower your risk for nerve damage. Nerve damage can lead to amputations, poor vision, and blindness.  You improve your body's ability to heal and to fight infections.  For more information and support to stop smoking:   [employer]  Phone: 3- 719 - 198-1855  Web Address: www.[employer].gov  How to Quit Using Smokeless Tobacco   Why it is important to stop using smokeless tobacco:  Smokeless tobacco comes in many forms. Examples include chew, snuff, dip, dissolvable  tobacco, and snus. All smokeless tobacco products contain nicotine and may contain as much nicotine as 3 cigarettes. You may be physically dependent on nicotine. You may also be emotionally addicted to it. The cravings can be strong, but it is important to quit using smokeless tobacco. You will improve your health and decrease your cancer, stroke, and heart attack risk. Mouth sores and tooth problems will also improve when you quit. You can benefit from quitting no matter how long you have used smokeless tobacco.   Prepare to stop using smokeless tobacco:  Nicotine is a highly addictive drug. Withdrawal symptoms can happen when you stop and make it hard to quit. The following can help keep you on track:  Set a quit date.    Tell friends, family, and coworkers that you plan to quit.    Remove all smokeless tobacco products from your home, car, and workplace.    Manage weight gain after you quit:  Nicotine can affect your metabolism. You may gain a few pounds after you quit. The following can help you control your weight:  Eat healthy foods.    Drink water before, during, and between meals.    Exercise as directed.      Weight Management   Why it is important to manage your weight:  Being overweight increases your risk of health conditions such as heart disease, high blood pressure, type 2 diabetes, and certain types of cancer. It can also increase your risk for osteoarthritis, sleep apnea, and other respiratory problems. Aim for a slow, steady weight loss. Even a small amount of weight loss can lower your risk of health problems.  How to lose weight safely:  A safe and healthy way to lose weight is to eat fewer calories and get regular exercise. You can lose up about 1 pound a week by decreasing the number of calories you eat by 500 calories each day.   Healthy meal plan for weight management:  A healthy meal plan includes a variety of foods, contains fewer calories, and helps you stay healthy. A healthy meal plan  includes the following:  Eat whole-grain foods more often.  A healthy meal plan should contain fiber. Fiber is the part of grains, fruits, and vegetables that is not broken down by your body. Whole-grain foods are healthy and provide extra fiber in your diet. Some examples of whole-grain foods are whole-wheat breads and pastas, oatmeal, brown rice, and bulgur.  Eat a variety of vegetables every day.  Include dark, leafy greens such as spinach, kale, morenita greens, and mustard greens. Eat yellow and orange vegetables such as carrots, sweet potatoes, and winter squash.   Eat a variety of fruits every day.  Choose fresh or canned fruit (canned in its own juice or light syrup) instead of juice. Fruit juice has very little or no fiber.  Eat low-fat dairy foods.  Drink fat-free (skim) milk or 1% milk. Eat fat-free yogurt and low-fat cottage cheese. Try low-fat cheeses such as mozzarella and other reduced-fat cheeses.  Choose meat and other protein foods that are low in fat.  Choose beans or other legumes such as split peas or lentils. Choose fish, skinless poultry (chicken or turkey), or lean cuts of red meat (beef or pork). Before you cook meat or poultry, cut off any visible fat.   Use less fat and oil.  Try baking foods instead of frying them. Add less fat, such as margarine, sour cream, regular salad dressing and mayonnaise to foods. Eat fewer high-fat foods. Some examples of high-fat foods include french fries, doughnuts, ice cream, and cakes.  Eat fewer sweets.  Limit foods and drinks that are high in sugar. This includes candy, cookies, regular soda, and sweetened drinks.  Exercise:  Exercise at least 30 minutes per day on most days of the week. Some examples of exercise include walking, biking, dancing, and swimming. You can also fit in more physical activity by taking the stairs instead of the elevator or parking farther away from stores. Ask your healthcare provider about the best exercise plan for you.      ©  Copyright [employer] 2018 Information is for End User's use only and may not be sold, redistributed or otherwise used for commercial purposes. All illustrations and images included in CareNotes® are the copyrighted property of [employer] or [employer]

## 2024-09-27 NOTE — TELEPHONE ENCOUNTER
Attempt # 2    Msw phoned pt and lvm to provide info regarding   [facility] therapy. Please call back       [facility] [address]   [place] [address] [phone]  *Pool temp is kept 86-88 degrees. Room temp varies (hotter in summer due to windows).  *No lift available. Patient need to navigate 5 steps to enter/exit pool.  28 mins     [facility]  [address]   [place] [address]  Phone [phone]  Fax [phone]  *they do not accept [employer] Medicare  *Pool is kept between 88-92 degrees. Room can be stuffy.   *Lift is available.   24mins

## 2024-09-27 NOTE — ASSESSMENT & PLAN NOTE
Well-controlled.  Orders:    CBC and differential; Future    Comprehensive metabolic panel; Future     <<-----Click on this checkbox to enter Pre-Op Dx

## 2024-09-27 NOTE — PROGRESS NOTES
Ambulatory Visit  Name: Marla Negron      : 1970      MRN: [id number]  Encounter Provider: Zhane Lynn PA-C  Encounter Date: 2024   Encounter department: [facility] PRIMARY CARE [facility]  Medicare annual wellness visit, subsequent    Annual physical exam completed today.  - Medical history reviewed, including existing medical conditions, medications, and surgeries.   - Labs discussed to evaluate cholesterol, blood sugar, kidney function, liver function, and other important markers of health.  - BMI evaluated and discussed.  - Cancer screenings discussed: Mammogram/pap smear/CT lung/colonoscopy.   - Vaccination status reviewed and pertinent immunizations and booster shots discussed.   - Bone health reviewed.   - Skin examination.  Discussed importance of sunscreen and other preventative measures for skin cancer.    - Lifestyle and health counseling completed including diet, exercise habits, smoking status, alcohol consumption.   - Mental health and wellbeing evaluated and discussed.  - Family history obtained to identify any of hereditary health risks.            Insomnia, unspecified type  Will increase her trazodone to 100 mg at night.  Orders:    traZODone (DESYREL) 100 mg tablet; Take 1 tablet (100 mg total) by mouth daily at bedtime    Primary hypertension  Well-controlled.  Orders:    CBC and differential; Future    Comprehensive metabolic panel; Future    Multiple myeloma in remission (HCC)  Status post stem cell transplant and chemotherapy.  Follows at [facility].  It has been almost a year since she has seen them, she was encouraged to follow-up.  Patient has no complaints or symptoms at this appointment.       H/O autologous stem cell transplant (HCC)  Follows with oncology through [facility].  Needs to make a follow-up appointment.  Has been stable for several years.       Cancer related pain  Continue to follow with palliative care.       Screening for  cardiovascular condition  Does not have a lipid panel on file.  Will get 1 when fasting.  Orders:    Lipid Panel with Direct LDL reflex; Future    Obesity, morbid (HCC)  Patient has chronic pain and stressful situation with caring for her grandchildren.  Encouragement to make small steps with diet and exercise to lose weight given.       Cardiomyopathy, unspecified type (HCC)  Had a normal echocardiogram in January 2023.  Follows with cardiology who states there is no evidence of cardiomyopathy at this time.  Abnormalities likely secondary to her chemo and chronic illnesses, but seems to have improved.  Blood pressure is well-controlled.  No indication to do further testing.  It was recommended she follow-up in 1 year, which would have been due back in June 2024.  Patient encouraged to do this as well as to quit smoking.         Depression Screening and Follow-up Plan: Patient was screened for depression during today's encounter. They screened negative with a PHQ-2 score of 0.    Tobacco Cessation Counseling: Tobacco cessation counseling was provided. The patient is sincerely urged to quit consumption of tobacco. She is not ready to quit tobacco. Patient is not ready to quit.  She is aware of her options for medical assistance to quit smoking.      Preventive health issues were discussed with patient, and age appropriate screening tests were ordered as noted in patient's After Visit Summary. Personalized health advice and appropriate referrals for health education or preventive services given if needed, as noted in patient's After Visit Summary.    History of Present Illness     HPI     Pt now has sole custody of her grandchildren and her daughter is now in a treatment facility.      Saw neurology last year, things are stable from that standpoint.  Patient also follows with palliative care.    Doing trigger finger exercise, but still having a lot of issues.  She plans to return to the hand surgeon to  follow-up.    Pt does telehealth or go down to [place] at [place] for her mulitiple myeloma.  Pt did have an insurance change and it has been almost a year since she has seen heme onc for this at [place].  She was encouraged to make an appointment for follow-up.    She is up-to-date on her mammogram, colorectal cancer screening, cervical cancer screening, and lung cancer screening.        Patient Care Team:  Zhane Lynn PA-C as PCP - General (Physician Assistant)  James QuiñonezDO shane as PCP - PCP-[employer] (RTE)  José Manuel Galarza MD (Surgical Oncology)  Kvng Pena MD (Nephrology)  WILBUR Braden as Nurse Practitioner (Palliative Care)    Review of Systems   Constitutional: Negative.    HENT: Negative.     Respiratory:  Negative for cough and shortness of breath.    Cardiovascular:  Negative for chest pain.   Gastrointestinal: Negative.    Genitourinary: Negative.    Musculoskeletal:  Negative for gait problem.   Skin:  Negative for rash.   Neurological:  Negative for syncope, light-headedness and headaches.   All other systems reviewed and are negative.    Medical History Reviewed by provider this encounter:       Annual Wellness Visit Questionnaire   Marla is here for her Welcome to Medicare visit.     Health Risk Assessment:   Patient rates overall health as good. Patient feels that their physical health rating is same. Patient is very satisfied with their life. Eyesight was rated as same. Hearing was rated as same. Patient feels that their emotional and mental health rating is same. Patients states they are never, rarely angry. Patient states they are always unusually tired/fatigued. Pain experienced in the last 7 days has been a lot. Patient's pain rating has been 8/10. Patient states that she has experienced weight loss or gain in last 6 months.     Depression Screening:   PHQ-2 Score: 0      Fall Risk Screening:   In the past year, patient has experienced: no history of falling  in past year      Urinary Incontinence Screening:   Patient has not leaked urine accidently in the last six months.     Home Safety:  Patient has trouble with stairs inside or outside of their home. Patient has working smoke alarms and has working carbon monoxide detector. Home safety hazards include: none.     Nutrition:   Current diet is Regular.     Medications:   Patient is not currently taking any over-the-counter supplements. Patient is able to manage medications.     Activities of Daily Living (ADLs)/Instrumental Activities of Daily Living (IADLs):   Walk and transfer into and out of bed and chair?: Yes  Dress and groom yourself?: Yes    Bathe or shower yourself?: Yes    Feed yourself? Yes  Do your laundry/housekeeping?: Yes  Manage your money, pay your bills and track your expenses?: Yes  Make your own meals?: Yes    Do your own shopping?: Yes    Previous Hospitalizations:   Any hospitalizations or ED visits within the last 12 months?: Yes    How many hospitalizations have you had in the last year?: 1-2    Advance Care Planning:   Living will: No    Advanced directive: No      Cognitive Screening:   Provider or family/friend/caregiver concerned regarding cognition?: No    PREVENTIVE SCREENINGS      Cardiovascular Screening:    General: Risks and Benefits Discussed    Due for: Lipid Panel      Diabetes Screening:     General: Screening Current    Due for: Blood Glucose      Colorectal Cancer Screening:     General: Screening Current      Breast Cancer Screening:     General: Risks and Benefits Discussed    Due for: Mammogram        Cervical Cancer Screening:    General: Screening Current      Osteoporosis Screening:    General: Screening Not Indicated      Abdominal Aortic Aneurysm (AAA) Screening:        General: Screening Not Indicated      Lung Cancer Screening:     General: Screening Current      Hepatitis C Screening:    General: Screening Current    Screening, Brief Intervention, and Referral to  Treatment (SBIRT)    Screening  Typical number of drinks in a day: 0  Typical number of drinks in a week: 0  Interpretation: Low risk drinking behavior.    Single Item Drug Screening:  How often have you used an illegal drug (including marijuana) or a prescription medication for non-medical reasons in the past year? never    Single Item Drug Screen Score: 0  Interpretation: Negative screen for possible drug use disorder    Review of Current Opioid Use    Opioid Risk Tool (ORT) Interpretation: Complete Opioid Risk Tool (ORT)    Other Counseling Topics:   Car/seat belt/driving safety, skin self-exam, sunscreen and calcium and vitamin D intake and regular weightbearing exercise.     Social Determinants of Health     Financial Resource Strain: Low Risk  (10/26/2023)    [facility]    Overall Financial Resource Strain (CARDIA)     Difficulty of Paying Living Expenses: Not hard at all   Food Insecurity: No Food Insecurity (6/24/2024)    Hunger Vital Sign     Worried About Running Out of Food in the Last Year: Never true     Ran Out of Food in the Last Year: Never true   Transportation Needs: No Transportation Needs (6/24/2024)    PRAPARE - Transportation     Lack of Transportation (Medical): No     Lack of Transportation (Non-Medical): No   Housing Stability: Low Risk  (6/24/2024)    Housing Stability Vital Sign     Unable to Pay for Housing in the Last Year: No     Number of Times Moved in the Last Year: 0     Homeless in the Last Year: No   Utilities: Not At Risk (6/24/2024)    [facility] Utilities     Threatened with loss of utilities: No     No results found.    Objective     Wt 90.2 kg (198 lb 12.8 oz)   BMI 35.22 kg/m²     Physical Exam  Vitals and nursing note reviewed.   Constitutional:       General: She is not in acute distress.     Appearance: Normal appearance. She is not toxic-appearing or diaphoretic.   HENT:      Head: Normocephalic and atraumatic.       Jaw: There is normal jaw occlusion.      Right Ear: Hearing and external ear normal. There is no impacted cerumen.      Left Ear: Hearing and external ear normal. There is no impacted cerumen.      Nose: Congestion present. No rhinorrhea.      Mouth/Throat:      Lips: Pink.      Mouth: Mucous membranes are moist. No oral lesions.      Tongue: No lesions.      Palate: No mass.      Pharynx: Oropharynx is clear. Uvula midline.   Eyes:      General: Lids are normal. Vision grossly intact.      Extraocular Movements: Extraocular movements intact.      Conjunctiva/sclera: Conjunctivae normal.      Pupils: Pupils are equal, round, and reactive to light.   Neck:      Thyroid: No thyroid mass, thyromegaly or thyroid tenderness.      Trachea: Trachea and phonation normal.   Cardiovascular:      Rate and Rhythm: Normal rate and regular rhythm.      Pulses:           Radial pulses are 2+ on the right side and 2+ on the left side.        Dorsalis pedis pulses are 2+ on the right side and 2+ on the left side.      Heart sounds: Normal heart sounds. No murmur heard.  Pulmonary:      Effort: Pulmonary effort is normal. No respiratory distress.      Breath sounds: Normal breath sounds.   Abdominal:      General: Abdomen is flat. Bowel sounds are normal.      Palpations: Abdomen is soft.      Tenderness: There is no abdominal tenderness. There is no right CVA tenderness or left CVA tenderness.   Musculoskeletal:         General: No tenderness, deformity or signs of injury.      Cervical back: Full passive range of motion without pain, normal range of motion and neck supple.      Right lower leg: No edema.      Left lower leg: No edema.      Comments: Patient with erect posture and good balance with normal gait while walking.  Joints and muscles are symmetrical no swelling, redness, or deformity.  Patient has active range of motion of all joints without difficulty.     Lymphadenopathy:      Cervical: No cervical adenopathy.       Upper Body:      Right upper body: No supraclavicular adenopathy.      Left upper body: No supraclavicular adenopathy.   Skin:     General: Skin is warm and dry.      Capillary Refill: Capillary refill takes less than 2 seconds.      Coloration: Skin is not cyanotic, mottled or pale.      Findings: No ecchymosis, petechiae or rash.   Neurological:      General: No focal deficit present.      Mental Status: She is alert and oriented to person, place, and time. Mental status is at baseline.      GCS: GCS eye subscore is 4. GCS verbal subscore is 5. GCS motor subscore is 6.      Cranial Nerves: Cranial nerves 2-12 are intact.      Sensory: Sensation is intact.      Motor: Motor function is intact.      Coordination: Coordination is intact.      Gait: Gait is intact.   Psychiatric:         Mood and Affect: Mood normal.         Speech: Speech normal.         Behavior: Behavior normal. Behavior is cooperative.

## 2024-09-30 PROBLEM — Z09 ENCOUNTER FOR EXAMINATION FOLLOWING TREATMENT AT HOSPITAL: Status: RESOLVED | Noted: 2021-12-15 | Resolved: 2024-09-30

## 2024-09-30 PROBLEM — E66.01 OBESITY, MORBID (HCC): Status: ACTIVE | Noted: 2024-09-30

## 2024-09-30 PROBLEM — M54.17 LUMBOSACRAL RADICULOPATHY: Chronic | Status: ACTIVE | Noted: 2020-07-11

## 2024-09-30 PROBLEM — I42.9 CARDIOMYOPATHY, UNSPECIFIED TYPE (HCC): Status: ACTIVE | Noted: 2024-09-30

## 2024-09-30 PROBLEM — R80.9 PROTEINURIA: Status: RESOLVED | Noted: 2024-01-04 | Resolved: 2024-09-30

## 2024-09-30 PROBLEM — Z45.2 ENCOUNTER FOR CENTRAL LINE CARE: Status: RESOLVED | Noted: 2022-03-11 | Resolved: 2024-09-30

## 2024-09-30 NOTE — ASSESSMENT & PLAN NOTE
Patient has chronic pain and stressful situation with caring for her grandchildren.  Encouragement to make small steps with diet and exercise to lose weight given.

## 2024-09-30 NOTE — TELEPHONE ENCOUNTER
Attempt # 3    MSW phoned pt and lvm to provide info regarding   [facility]. Please call back       [facility] [address]   [place] [address] [phone]  *Pool temp is kept 86-88 degrees. Room temp varies (hotter in summer due to windows).  *No lift available. Patient need to navigate 5 steps to enter/exit pool.  28 mins     [facility]  [address]   [place] [address]  Phone [phone]  Fax [phone]  *they do not accept [employer] Medicare  *Pool is kept between 88-92 degrees. Room can be stuffy.   *Lift is available.   24mins

## 2024-09-30 NOTE — ASSESSMENT & PLAN NOTE
Had a normal echocardiogram in January 2023.  Follows with cardiology who states there is no evidence of cardiomyopathy at this time.  Abnormalities likely secondary to her chemo and chronic illnesses, but seems to have improved.  Blood pressure is well-controlled.  No indication to do further testing.  It was recommended she follow-up in 1 year, which would have been due back in June 2024.  Patient encouraged to do this as well as to quit smoking.

## 2024-09-30 NOTE — ASSESSMENT & PLAN NOTE
Follows with oncology through [facility].  Needs to make a follow-up appointment.  Has been stable for several years.

## 2024-10-09 DIAGNOSIS — G89.3 CANCER ASSOCIATED PAIN: ICD-10-CM

## 2024-10-09 DIAGNOSIS — Z51.5 PALLIATIVE CARE PATIENT: ICD-10-CM

## 2024-10-09 DIAGNOSIS — C90.00 MULTIPLE MYELOMA NOT HAVING ACHIEVED REMISSION (HCC): ICD-10-CM

## 2024-10-09 RX ORDER — OXYCODONE HYDROCHLORIDE 5 MG/1
5 TABLET ORAL EVERY 4 HOURS PRN
Qty: 75 TABLET | Refills: 0 | Status: SHIPPED | OUTPATIENT
Start: 2024-10-09 | End: 2024-10-23

## 2024-10-09 NOTE — TELEPHONE ENCOUNTER
Reason for call: Zhane Ty, manage this medication.    [x] Refill   [] Prior Auth  [] Other:     Office:   [x] PCP/Provider -   [] Specialty/Provider -     Medication:oxyCODONE (ROXICODONE) 5 immediate release tablet     Dose/Frequency: Take 1 tablet (5 mg total) by mouth every 4 (four) hours as needed for severe pain (cancer-related pain     Quantity: 75 tablet     Pharmacy: [facility]/pharmacy #[id number] - [place] - [address] [phone]     Does the patient have enough for 3 days?   [] Yes   [x] No - Send as HP to POD

## 2024-10-09 NOTE — TELEPHONE ENCOUNTER
Medication: oxycodone 5mg  PDM   09/25/2024 09/25/2024 oxyCODONE HCL (Tablet) 75.0 15 5 MG 37.50 APRIL ELENA  09/11/2024 09/09/2024 oxyCODONE HCL (Tablet) 75.0 15 5 MG 37.50 APRIL SIMONS

## 2024-10-10 DIAGNOSIS — Z94.84 H/O AUTOLOGOUS STEM CELL TRANSPLANT (HCC): ICD-10-CM

## 2024-10-10 RX ORDER — ACYCLOVIR 800 MG/1
800 TABLET ORAL 2 TIMES DAILY
Qty: 180 TABLET | Refills: 0 | Status: SHIPPED | OUTPATIENT
Start: 2024-10-10 | End: 2025-10-05

## 2024-10-21 DIAGNOSIS — G62.9 NEUROPATHY: ICD-10-CM

## 2024-10-22 ENCOUNTER — TELEPHONE (OUTPATIENT)
Age: 54
End: 2024-10-22

## 2024-10-22 DIAGNOSIS — Z51.5 PALLIATIVE CARE PATIENT: ICD-10-CM

## 2024-10-22 DIAGNOSIS — C90.00 MULTIPLE MYELOMA NOT HAVING ACHIEVED REMISSION (HCC): ICD-10-CM

## 2024-10-22 DIAGNOSIS — G89.3 CANCER ASSOCIATED PAIN: ICD-10-CM

## 2024-10-22 RX ORDER — OXYCODONE HYDROCHLORIDE 5 MG/1
5 TABLET ORAL EVERY 4 HOURS PRN
Qty: 70 TABLET | Refills: 0 | Status: SHIPPED | OUTPATIENT
Start: 2024-10-23 | End: 2024-11-06

## 2024-10-22 RX ORDER — CYCLOBENZAPRINE HCL 10 MG
TABLET ORAL
Qty: 90 TABLET | Refills: 1 | Status: SHIPPED | OUTPATIENT
Start: 2024-10-22

## 2024-10-22 NOTE — TELEPHONE ENCOUNTER
Last appointment:09/23/24    Next scheduled appointment:11/20/24    [facility]      PDMP review:

## 2024-10-22 NOTE — TELEPHONE ENCOUNTER
Patient is waiting for her oxycodone prescription to be refilled today due to having no more medication left but the earliest refill date on this medication says 10/23/2024. Questioning if this is able to be changed to be filled today.

## 2024-10-22 NOTE — TELEPHONE ENCOUNTER
Reason for call:   [x] Refill   [] Prior Auth  [] Other:     Office:   [] PCP/Provider -   [x] Specialty/Provider - Palliative     Medication: Oxycodone 5 mg, take 1 tablet by mouth every 4 hours as needed       Pharmacy: [facility]     Does the patient have enough for 3 days?   [x] Yes   [] No - Send as HP to POD

## 2024-10-22 NOTE — TELEPHONE ENCOUNTER
Medication: oxycodone 5mg  PDM   10/09/2024 10/09/2024 oxyCODONE HCL (Tablet) 75.0 15 5 MG 37.50 APRIL ELENA  09/25/2024 09/25/2024 oxyCODONE HCL (Tablet) 75.0 15 5 MG 37.50 APRIL SIMONS

## 2024-10-23 ENCOUNTER — TELEPHONE (OUTPATIENT)
Age: 54
End: 2024-10-23

## 2024-10-23 NOTE — TELEPHONE ENCOUNTER
Call received from patient questioning if her oxycodone prescription was sent to her pharmacy. Informed her that this was sent and her pharmacy and they confirmed that they received this medication yesterday morning.

## 2024-10-25 NOTE — TELEPHONE ENCOUNTER
Problem: At Risk for Falls  Goal: Patient does not fall  Outcome: Monitoring/Evaluating progress  Goal: Patient takes action to control fall-related risks  Outcome: Monitoring/Evaluating progress     Problem: At Risk for Injury Due to Fall  Goal: Patient does not fall  Outcome: Monitoring/Evaluating progress  Goal: Takes action to control condition specific risks  Outcome: Monitoring/Evaluating progress  Goal: Verbalizes understanding of fall-related injury personal risks  Description: Document education using the patient education activity  Outcome: Monitoring/Evaluating progress     Problem: Skin Integrity Alteration  Goal: Skin remains intact with no new/deterioration of wound or pressure injury  Outcome: Monitoring/Evaluating progress  Goal: Participates in wound care activities  Outcome: Monitoring/Evaluating progress  Goal: Comfort optimized with pressure injury prevention strategies guided by patient/family preference. (Hospice)  Outcome: Monitoring/Evaluating progress  Goal: Wound care provided to promote comfort needs (Hospice)  Outcome: Monitoring/Evaluating progress     Problem: Potential for injury, Restraints  Goal: # Remains free from injury  Outcome: Monitoring/Evaluating progress  Goal: Verbalizes criteria for restraint discontinuation  Description: Document on Patient Education Activity  Outcome: Monitoring/Evaluating progress      Ashvin Wynn, from [address] [place] [address] Transplant Program, calling to request pathology slides  She can be reached at [phone] and her fax number is [phone]

## 2024-10-30 ENCOUNTER — TELEPHONE (OUTPATIENT)
Age: 54
End: 2024-10-30

## 2024-10-30 DIAGNOSIS — Z51.5 PALLIATIVE CARE PATIENT: Primary | ICD-10-CM

## 2024-10-30 RX ORDER — BUPRENORPHINE 10 UG/H
1 PATCH TRANSDERMAL
Qty: 4 PATCH | Refills: 0 | Status: SHIPPED | OUTPATIENT
Start: 2024-10-30 | End: 2024-11-18 | Stop reason: SDUPTHER

## 2024-10-30 RX ORDER — OXYCODONE HYDROCHLORIDE 5 MG/1
5 TABLET ORAL EVERY 6 HOURS PRN
Qty: 24 TABLET | Refills: 0 | Status: SHIPPED | OUTPATIENT
Start: 2024-10-30 | End: 2024-10-31

## 2024-10-30 NOTE — TELEPHONE ENCOUNTER
"Pt stated Palliative Care Provider:  Zhane Ty     Actionable item:  Call back to patient ASAP    What is the reason for the call/chief complaint?  Received a phone call from patient.  Patient admitting to misusing Oxycodone.  Patient stated that she received a refill of 70 tablets X1 week ago and was out of them X3 days ago.  Patient stated that she was taking 12-14 tablets a day.  Patient admitted to obtaining Oxycodone \"in the streets\" X2 days ago.  Patient last took Oxycodone X2 days ago.  Patient stated that she no longer wants to take Oxycodone and is requesting a different medication to relieve pain.  Please call patient.    Priority: High Priority  "

## 2024-10-31 PROBLEM — F11.10 OPIOID ABUSE (HCC): Status: ACTIVE | Noted: 2024-10-31

## 2024-10-31 NOTE — TELEPHONE ENCOUNTER
Patient states that she forgot about coming in today and wants to know if she's able to come in tomorrow for her UDS?

## 2024-11-01 DIAGNOSIS — G89.3 CANCER ASSOCIATED PAIN: Primary | ICD-10-CM

## 2024-11-01 RX ORDER — OXYCODONE HYDROCHLORIDE 5 MG/1
5 TABLET ORAL EVERY 6 HOURS PRN
Qty: 20 TABLET | Refills: 0 | Status: SHIPPED | OUTPATIENT
Start: 2024-11-01 | End: 2024-11-05 | Stop reason: SDUPTHER

## 2024-11-04 ENCOUNTER — TELEPHONE (OUTPATIENT)
Dept: PALLIATIVE MEDICINE | Facility: CLINIC | Age: 54
End: 2024-11-04

## 2024-11-04 NOTE — TELEPHONE ENCOUNTER
"   Received message from POD on Wednesday afternoon 10/30 that patient had called and admitted to oxycodone misuse.       Returned call to patient who expresses significant remorse and disappointment in herself as she states 2 months ago she started misusing her oxycodone taking 112-14 tablets daily.  When she would run out of prescribed oxycodone she would purchase \"from the street\".  The patient expresses desire to safely be weaned off oxycodone.  Encouraged self referral to addiction support/medicine.  Patient declined.  Patient states she has resources available to her and will start going to meetings.   Established plan of care.    Patient will make in person [facility] appointment as soon as one is available.  She will need inpatient close followup every 2 weeks.  She will submit UDS, every two weeks and upon request.  Butrans patch will be initiated.  Oxycodone reduced to 4 tablets daily for 2 weeks, Followed by rapid taper of oxycodone.  If UDS is reflective of misuse of prescribed or unprescribed substance, patient will be dismissed.  If patient fails to provide drug screen or does not come to appointments, patient will be dismissed.     Strongly encourage addiction medicine.   "

## 2024-11-04 NOTE — TELEPHONE ENCOUNTER
Call received from patient. Questioning if April Lisa called [facility] to verify her patches and oxycodone. Informed her that she did speak to them this morning. Per patient, [facility] is not able to give her the patches and medications. Requesting April to call her [facility] again.    yes

## 2024-11-04 NOTE — TELEPHONE ENCOUNTER
"Patient called stating she is at [facility] and was told that she is unable to  the transdermal patches. She asked if I could place her on hold and call [facility] to see what is going on. I called [facility] and spoke to the pharmacist/Suzanne who wanted to confirm if patient will be taking both the Oxycodone and applying the transdermal patches together. I reviewed notes from April,CRNP   \"I called [facility] phamracy verified patches and oxycodone to be used.  Patient should be able to  patches\"    Suzanne verbalized understanding and will dispense patches as prescribed. Patient updated and was appreciative.  "

## 2024-11-04 NOTE — TELEPHONE ENCOUNTER
Per patient she went to  medication and patches at pharmacy. Per patient she was unable to get patches due to needing April Simons authorization. Advised patient I will send message to provider and auth team. Patient requested a call back once auth is completed and patches are ready for .       Patches: transdermal buprenorphine (Butrans) 10 mcg/hr TD patch     Pharmacy: [facility]      Thanks!

## 2024-11-05 DIAGNOSIS — G89.3 CANCER ASSOCIATED PAIN: ICD-10-CM

## 2024-11-05 LAB
6MAM UR QL CFM: NEGATIVE NG/ML
BZE UR QL CFM: NEGATIVE NG/ML
EDDP UR QL CFM: NEGATIVE NG/ML
FENTANYL UR QL CFM: NEGATIVE NG/ML
HYDROMORPHONE UR QL CFM: NEGATIVE NG/ML
LORAZEPAM UR QL CFM: ABNORMAL NG/ML
METHADONE UR QL CFM: NEGATIVE NG/ML
MORPHINE UR QL CFM: NEGATIVE NG/ML
NORFENTANYL UR QL CFM: NEGATIVE NG/ML
NOROXYCODONE UR QL CFM: NORMAL NG/ML
OXYCODONE UR QL CFM: NORMAL NG/ML
OXYMORPHONE UR QL CFM: NORMAL NG/ML
PARA-FLUOROFENTANYL QUANTIFICATION: NORMAL NG/ML
SL AMB ACETYL FENTANYL QUANTIFICATION: NORMAL NG/ML
SL AMB ACETYL NORFENTANYL QUANTIFICATION: NORMAL NG/ML
SL AMB ACRYL FENTANYL QUANTIFICATION: NORMAL NG/ML
SL AMB CARFENTANIL QUANTIFICATION: NORMAL NG/ML

## 2024-11-05 RX ORDER — OXYCODONE HYDROCHLORIDE 5 MG/1
5 TABLET ORAL EVERY 6 HOURS PRN
Qty: 24 TABLET | Refills: 0 | Status: SHIPPED | OUTPATIENT
Start: 2024-11-05 | End: 2024-11-12 | Stop reason: SDUPTHER

## 2024-11-05 NOTE — TELEPHONE ENCOUNTER
Refill must be reviewed and completed by the office or provider. The refill is unable to be approved or denied by the medication management team.      Patient Id Prescription # Filled Written Drug Label Qty Days Strength MME** Prescriber Pharmacy Payment REFILL #/Auth State Detail   1 [id number] 11/01/2024 11/01/2024 oxyCODONE HCL (Tablet) 20.0 5 5 MG 30.0 [facility] Medicare 0 / 0 [place]    1 [id number] 10/23/2024 10/22/2024 oxyCODONE HCL (Tablet) 70.0 12 5 MG 43.75 [facility] Medicare 0 / 0 [place]    1 [id number] 10/09/2024 10/09/2024 oxyCODONE HCL (Tablet) 75.0 15 5 MG 37.50 [facility] Medicare 0 / 0 [place] Patient Complete Details   1 [id number] 09/25/2024 09/25/2024 oxyCODONE HCL (Tablet) 75.0 15 5 MG 37.50 [facility] Medicare 0 / 0 [place]

## 2024-11-12 DIAGNOSIS — G89.3 CANCER ASSOCIATED PAIN: ICD-10-CM

## 2024-11-12 RX ORDER — OXYCODONE HYDROCHLORIDE 5 MG/1
5 TABLET ORAL EVERY 6 HOURS PRN
Qty: 24 TABLET | Refills: 0 | Status: SHIPPED | OUTPATIENT
Start: 2024-11-12 | End: 2024-11-18

## 2024-11-12 NOTE — TELEPHONE ENCOUNTER
Refill must be reviewed and completed by the office or provider. The refill is unable to be approved or denied by the medication management team.        Patient Id Prescription # Filled Written Drug Label Qty Days Strength MME** Prescriber Pharmacy Payment REFILL #/Auth State Detail  1 [id number] 11/07/2024 06/27/2024 Pregabalin (Capsule) 270.0 90 100 MG NA KEVIN MELVIN [facility] Medicare 1 / 1 [place]   1 [id number] 11/06/2024 11/05/2024 oxyCODONE HCL (Tablet) 24.0 6 5 MG 30.0 [facility] Medicare 0 / 0 [place]   1 [id number] 11/04/2024 10/30/2024 Buprenorphine (Patch, Extended Release) 4.0 28 10 MCG/1 HR NA [facility] Medicare 0 / 0 [place]

## 2024-11-12 NOTE — TELEPHONE ENCOUNTER
Reason for call:   [x] Refill   [] Prior Auth  [] Other:     Office:   [] PCP/Provider -   [x] Specialty/Provider -  WILBUR Braden     Medication: oxyCODONE (ROXICODONE) 5 immediate release tablet     Dose/Frequency: Take 1 tablet (5 mg total) by mouth every 6 (six) hours as needed for moderate pain     Quantity: 24 tab    Pharmacy: [facility]/pharmacy #[id number]  [place] - [address]     Does the patient have enough for 3 days?   [] Yes   [x] No - Send as HP to POD

## 2024-11-15 RX ORDER — CYCLOBENZAPRINE HCL 10 MG
TABLET ORAL
COMMUNITY
Start: 2024-09-19

## 2024-11-18 ENCOUNTER — OFFICE VISIT (OUTPATIENT)
Dept: PALLIATIVE MEDICINE | Facility: CLINIC | Age: 54
End: 2024-11-18
Payer: COMMERCIAL

## 2024-11-18 VITALS
WEIGHT: 203.2 LBS | BODY MASS INDEX: 36 KG/M2 | TEMPERATURE: 97.6 F | HEART RATE: 89 BPM | HEIGHT: 63 IN | RESPIRATION RATE: 18 BRPM | DIASTOLIC BLOOD PRESSURE: 78 MMHG | OXYGEN SATURATION: 97 % | SYSTOLIC BLOOD PRESSURE: 112 MMHG

## 2024-11-18 DIAGNOSIS — G89.3 CANCER ASSOCIATED PAIN: ICD-10-CM

## 2024-11-18 DIAGNOSIS — I42.9 CARDIOMYOPATHY, UNSPECIFIED TYPE (HCC): ICD-10-CM

## 2024-11-18 DIAGNOSIS — Z51.5 PALLIATIVE CARE PATIENT: ICD-10-CM

## 2024-11-18 DIAGNOSIS — F11.10 OPIOID ABUSE (HCC): Primary | ICD-10-CM

## 2024-11-18 DIAGNOSIS — F11.20 CONTINUOUS OPIOID DEPENDENCE (HCC): Chronic | ICD-10-CM

## 2024-11-18 DIAGNOSIS — C90.01 MULTIPLE MYELOMA IN REMISSION (HCC): Chronic | ICD-10-CM

## 2024-11-18 DIAGNOSIS — G89.3 CANCER RELATED PAIN: ICD-10-CM

## 2024-11-18 PROCEDURE — G2211 COMPLEX E/M VISIT ADD ON: HCPCS | Performed by: NURSE PRACTITIONER

## 2024-11-18 PROCEDURE — 99214 OFFICE O/P EST MOD 30 MIN: CPT | Performed by: NURSE PRACTITIONER

## 2024-11-18 RX ORDER — OXYCODONE HYDROCHLORIDE 5 MG/1
5 TABLET ORAL EVERY 8 HOURS PRN
Qty: 21 TABLET | Refills: 0 | Status: SHIPPED | OUTPATIENT
Start: 2024-11-18

## 2024-11-18 RX ORDER — BUPRENORPHINE 10 UG/H
1 PATCH TRANSDERMAL
Qty: 4 PATCH | Refills: 0 | Status: SHIPPED | OUTPATIENT
Start: 2024-11-18

## 2024-11-18 RX ORDER — OXYCODONE HYDROCHLORIDE 5 MG/1
5 TABLET ORAL EVERY 6 HOURS PRN
Qty: 24 TABLET | Refills: 0 | Status: CANCELLED | OUTPATIENT
Start: 2024-11-18

## 2024-11-18 NOTE — TELEPHONE ENCOUNTER
Refill must be reviewed and completed by the office or provider. The refill is unable to be approved or denied by the medication management team.        Patient Id Prescription # Filled Written Drug Label Qty Days Strength MME** Prescriber Pharmacy Payment REFILL #/Auth State Detail  1 [id number] 11/12/2024 11/12/2024 oxyCODONE HCL (Tablet) 24.0 6 5 MG 30.0 [facility] Medicare 0 / 0 [place]   1 [id number] 11/07/2024 06/27/2024 Pregabalin (Capsule) 270.0 90 100 MG NA KEVIN MELVIN [facility] Medicare 1 / 1 [place]   1 [id number] 11/06/2024 11/05/2024 oxyCODONE HCL (Tablet) 24.0 6 5 MG 30.0 [facility] Medicare 0 / 0 [place]   1 [id number] 11/04/2024 10/30/2024 Buprenorphine (Patch, Extended Release) 4.0 28 10 MCG/1 HR NA [facility] Medicare 0 / 0 [place]

## 2024-11-18 NOTE — ASSESSMENT & PLAN NOTE
At last appointment,   Continued opioid taper with plan to decrease 10-30% total daily opioids each 1-2 months, with overall goal to no daily opioid use.   Discussed s/sxs of opioid withdrawal, including nausea, vomiting, diarrhea, rhinorrhea, yawning, insomnia, agitation. All questions/concerns addressed.   Since that appointment, patient admits to oxycodone misuse October 31, 2024  Butrans patch initiated 11/4  Oxycodone 5 mg tablet, 4 tab max per day.  Reduce today to 3 tab max with plan to reduce to 2 tab (10 mg daily) on 12/2.  Reduce to 1 tab daily on 12/16  Stop oxycodone January 6

## 2024-11-18 NOTE — TELEPHONE ENCOUNTER
Reason for call:   [x] Refill   [] Prior Auth  [] Other:     Office:   [] PCP/Provider -   [x] Specialty/Provider - PALLIATIVE CARE JOSE     Medication: oxyCODONE (ROXICODONE) 5 immediate release tablet     Dose/Frequency: 5 mg, Every 6 hours PRN     Quantity: 24    [id number]    Does the patient have enough for 3 days?   [] Yes   [x] No - Send as HP to POD

## 2024-11-18 NOTE — ASSESSMENT & PLAN NOTE
Patient admitted to misuse of oxycodone and obtaining oxycodone from other sources (not prescribed).  Patient expresses wanting to be weaned from oxycodone.  Not receptive to drug rehab resources.  Urine drug screen every 2 weeks, in person appointments every two weeks.  Taper off oxycodone, receptive to ongoing wean.

## 2024-11-18 NOTE — ASSESSMENT & PLAN NOTE
Palliative Diagnosis: Hx of multiple myeloma  Palliative will follow for ongoing goals of care discussions as situation evolves.     Social Support:  Supportive listening provided  Normalized experience of patient  Provided anxiety containment  Advocated for patient/family with interdisciplinary team  Mediated conflict        Follow-up  Continue weaning process. Follow up 2 weeks-.       Patient now has custody of her two granddaughters, 4 and 6 years..     Orders:    transdermal buprenorphine (Butrans) 10 mcg/hr TD patch; Place 1 patch on the skin over 7 days every 7 days

## 2024-11-18 NOTE — ASSESSMENT & PLAN NOTE
Follows with [facility].  S/p chemotherapy and stem cell transplant.

## 2024-11-18 NOTE — PROGRESS NOTES
Name: aMrla Negron      : 1970      MRN: [id number]  Encounter Provider: WILBUR Braden  Encounter Date: 2024   Encounter department: [facility] PALLIATIVE CARE [employer]  :  Assessment & Plan  Opioid abuse (HCC)  Patient admitted to misuse of oxycodone and obtaining oxycodone from other sources (not prescribed).  Patient expresses wanting to be weaned from oxycodone.  Not receptive to drug rehab resources.  Urine drug screen every 2 weeks, in person appointments every two weeks.  Taper off oxycodone, receptive to ongoing wean.           Palliative care patient  Palliative Diagnosis: Hx of multiple myeloma  Palliative will follow for ongoing goals of care discussions as situation evolves.     Social Support:  Supportive listening provided  Normalized experience of patient  Provided anxiety containment  Advocated for patient/family with interdisciplinary team  Mediated conflict        Follow-up  Continue weaning process. Follow up 2 weeks-.       Patient now has custody of her two granddaughters, 4 and 6 years..     Orders:  •  transdermal buprenorphine (Butrans) 10 mcg/hr TD patch; Place 1 patch on the skin over 7 days every 7 days    Cancer related pain         Continuous opioid dependence (HCC)  At last appointment,   Continued opioid taper with plan to decrease 10-30% total daily opioids each 1-2 months, with overall goal to no daily opioid use.   Discussed s/sxs of opioid withdrawal, including nausea, vomiting, diarrhea, rhinorrhea, yawning, insomnia, agitation. All questions/concerns addressed.   Since that appointment, patient admits to oxycodone misuse 2024  Butrans patch initiated   Oxycodone 5 mg tablet, 4 tab max per day.  Reduce today to 3 tab max with plan to reduce to 2 tab (10 mg daily) on .  Reduce to 1 tab daily on   Stop oxycodone          Cancer associated pain    Orders:  •  oxyCODONE (ROXICODONE) 5 immediate release tablet; Take 1  tablet (5 mg total) by mouth every 8 (eight) hours as needed for moderate pain MAX 3 tablets/day, ongoing taper.  11/18 through 11/25 Max Daily Amount: 15 mg    Cardiomyopathy, unspecified type (HCC)         Multiple myeloma in remission (HCC)  Follows with [facility].  S/p chemotherapy and stem cell transplant.                PDMP Review: I have reviewed the patient's controlled substance dispensing history in the Prescription Drug Monitoring Program in compliance with the [facility] regulations before prescribing any controlled substances.    History of Present Illness   Marla Negron is a 53 y.o. female who presents for follow up.  The patient has been followed by [facility] for ongoing chronic pain after cancer treatment.  She was participating in tapering off oxycodone.  Patient independently called the office seeking advise at the end of OCtober as she admitted to oxycodone misuse.  The patient is now having Q 2 week appointments and UDS with goals of weaning off oxycodone.  Chronic pain now managed with Butrans, and reducing oxycodone 5 mg tablet use to 3 tabs daily.  Will plan on every two week reduction.     UDS results pending.  Current Outpatient Medications on File Prior to Visit   Medication Sig Dispense Refill   • amLODIPine (NORVASC) 10 mg tablet Take 1 tablet (10 mg total) by mouth every morning 90 tablet 1   • cyclobenzaprine (FLEXERIL) 10 mg tablet TAKE 1 TABLET BY MOUTH THREE TIMES A DAY AS NEEDED FOR MUSCLE SPASM 90 tablet 1   • cyclobenzaprine (FLEXERIL) 10 mg tablet take 1 tablet by mouth three times a day as needed for muscle spasm     • DULoxetine (CYMBALTA) 60 mg delayed release capsule Take 1 capsule (60 mg total) by mouth daily 90 capsule 2   • fluticasone (FLONASE) 50 mcg/act nasal spray 2 sprays into each nostril daily 48 g 1   • metoprolol succinate (TOPROL-XL) 25 mg 24 hr tablet Take 1 tablet (25 mg total) by mouth daily 90 tablet 1   • naloxone (NARCAN) 4 mg/0.1 mL nasal spray  "Administer 1 spray into a nostril. If no response after 2-3 minutes, give another dose in the other nostril using a new spray. 1 each 0   • pantoprazole (PROTONIX) 40 mg tablet Take 1 tablet (40 mg total) by mouth daily 90 tablet 1   • pregabalin (LYRICA) 100 mg capsule Take 1 capsule (100 mg total) by mouth 3 (three) times a day 270 capsule 1   • traZODone (DESYREL) 100 mg tablet Take 1 tablet (100 mg total) by mouth daily at bedtime 90 tablet 1   • [DISCONTINUED] oxyCODONE (ROXICODONE) 5 immediate release tablet Take 1 tablet (5 mg total) by mouth every 6 (six) hours as needed for moderate pain MAX 4 tablets/day, medication for 11/6-11/11 Max Daily Amount: 20 mg 24 tablet 0   • [DISCONTINUED] transdermal buprenorphine (Butrans) 10 mcg/hr TD patch Place 1 patch on the skin over 7 days every 7 days 4 patch 0   • albuterol (PROVENTIL HFA,VENTOLIN HFA) 90 mcg/act inhaler Inhale 2 puffs every 6 (six) hours as needed for wheezing or shortness of breath 18 g 11     No current facility-administered medications on file prior to visit.         Objective   /78 (BP Location: Left arm, Patient Position: Sitting, Cuff Size: Large)   Pulse 89   Temp 97.6 °F (36.4 °C) (Temporal)   Resp 18   Ht 5' 3\" (1.6 m)   Wt 92.2 kg (203 lb 3.2 oz)   SpO2 97%   BMI 36.00 kg/m²     Physical Exam  Vitals and nursing note reviewed.   Constitutional:       General: She is not in acute distress.     Appearance: She is well-developed.   HENT:      Head: Normocephalic and atraumatic.   Eyes:      Conjunctiva/sclera: Conjunctivae normal.   Cardiovascular:      Rate and Rhythm: Normal rate.      Heart sounds: No murmur heard.  Pulmonary:      Effort: Pulmonary effort is normal. No respiratory distress.   Abdominal:      Palpations: Abdomen is soft.      Tenderness: There is no abdominal tenderness.   Musculoskeletal:         General: No swelling.      Cervical back: Neck supple.   Skin:     General: Skin is warm and dry.      Capillary " Refill: Capillary refill takes less than 2 seconds.   Neurological:      General: No focal deficit present.      Mental Status: She is alert and oriented to person, place, and time.   Psychiatric:         Mood and Affect: Mood normal.     Recent labs:  Lab Results   Component Value Date/Time    SODIUM 138 06/22/2024 08:54 PM    SODIUM 140 10/27/2023 05:02 AM    K 3.9 06/22/2024 08:54 PM    K 3.5 10/27/2023 05:02 AM    BUN 10 06/22/2024 08:54 PM    BUN 12 10/27/2023 05:02 AM    CREATININE 1.05 06/22/2024 08:54 PM    CREATININE 0.84 10/27/2023 05:02 AM    GLUC 111 06/22/2024 08:54 PM    GLUC 79 10/27/2023 05:02 AM    CALCIUM 9.9 06/22/2024 08:54 PM    CALCIUM 8.8 10/27/2023 05:02 AM    AST 19 06/22/2024 08:54 PM    AST 27 10/26/2023 05:05 PM    ALT 12 06/22/2024 08:54 PM    ALT 11 10/26/2023 05:05 PM    ALB 4.5 06/22/2024 08:54 PM    ALB 4.6 10/26/2023 05:05 PM    TP 8.4 06/22/2024 08:54 PM    TP 8.4 (H) 10/26/2023 05:05 PM    EGFR 59 06/22/2024 08:54 PM    EGFR 82 10/27/2023 05:02 AM     Lab Results   Component Value Date/Time    HGB 17.6 (H) 06/22/2024 08:54 PM    WBC 11.95 (H) 06/22/2024 08:54 PM     06/22/2024 08:54 PM    INR 0.97 06/23/2024 03:11 AM    INR 1.0 10/26/2023 05:05 PM    PTT 28 06/23/2024 03:11 AM     Lab Results   Component Value Date/Time    [id number] 6.133 (H) 02/28/2024 02:22 PM       Recent Imaging:  No results found.    Administrative Statements   I have spent a total time of 30+  minutes in caring for this patient on the day of the visit/encounter including Risks and benefits of tx options, Patient and family education, Importance of tx compliance, Documenting in the medical record, Reviewing / ordering tests, medicine, procedures  , and Obtaining or reviewing history  . Topics discussed with the patient / family include symptom assessment and management, medication review, medication adjustment, psychosocial support, supportive listening, and anticipatory guidance.

## 2024-11-21 LAB
6MAM UR QL CFM: NEGATIVE NG/ML
BUPRENORPHINE UR QL CFM: ABNORMAL NG/ML
BZE UR QL CFM: NEGATIVE NG/ML
EDDP UR QL CFM: NEGATIVE NG/ML
FENTANYL UR QL CFM: NEGATIVE NG/ML
HYDROMORPHONE UR QL CFM: NEGATIVE NG/ML
MDMA UR QL CFM: NEGATIVE NG/ML
METHADONE UR QL CFM: NEGATIVE NG/ML
MORPHINE UR QL CFM: NEGATIVE NG/ML
NORBUPRENORPHINE UR QL CFM: ABNORMAL NG/ML
NORFENTANYL UR QL CFM: NEGATIVE NG/ML
NOROXYCODONE UR QL CFM: NEGATIVE NG/ML
OXYCODONE UR QL CFM: NEGATIVE NG/ML
OXYMORPHONE UR QL CFM: NORMAL NG/ML
PARA-FLUOROFENTANYL QUANTIFICATION: NORMAL NG/ML
PCP UR QL CFM: NEGATIVE NG/ML
SL AMB ACETYL FENTANYL QUANTIFICATION: NORMAL NG/ML
SL AMB ACETYL NORFENTANYL QUANTIFICATION: NORMAL NG/ML
SL AMB ACRYL FENTANYL QUANTIFICATION: NORMAL NG/ML
SL AMB CARFENTANIL QUANTIFICATION: NORMAL NG/ML

## 2024-11-25 DIAGNOSIS — G89.3 CANCER ASSOCIATED PAIN: ICD-10-CM

## 2024-11-25 RX ORDER — OXYCODONE HYDROCHLORIDE 5 MG/1
5 TABLET ORAL EVERY 8 HOURS PRN
Qty: 21 TABLET | Refills: 0 | Status: SHIPPED | OUTPATIENT
Start: 2024-11-25 | End: 2024-12-02 | Stop reason: SDUPTHER

## 2024-11-25 NOTE — TELEPHONE ENCOUNTER
Reason for call:   [x] Refill   [] Prior Auth  [] Other:     Office:   [] PCP/Provider -   [x] Specialty/Provider - Palliative care/Melony    Medication: Oxycodone 5mg    Dose/Frequency: 1 tab q8h prn    Quantity: 21    Pharmacy: [facility]/pharmacy #[id number] - [place] - [address] [phone]     Does the patient have enough for 3 days?   [] Yes   [x] No - Send as HP to POD

## 2024-11-25 NOTE — TELEPHONE ENCOUNTER
Refill must be reviewed and completed by the office or provider. The refill is unable to be approved or denied by the medication management team.        Patient Id Prescription # Filled Written Drug Label Qty Days Strength MME** Prescriber Pharmacy Payment REFILL #/Auth State Detail  1 [id number] 11/18/2024 11/18/2024 oxyCODONE HCL (Tablet) 21.0 7 5 MG 22.50 [facility] Medicare 0 / 0 [place]   1 [id number] 11/12/2024 11/12/2024 oxyCODONE HCL (Tablet) 24.0 6 5 MG 30.0 [facility] Medicare 0 / 0 [place]   1 [id number] 11/07/2024 06/27/2024 Pregabalin (Capsule) 270.0 90 100 MG NA KEVIN MELVIN [facility] Medicare 1 / 1 [place]   1 [id number] 11/06/2024 11/05/2024 oxyCODONE HCL (Tablet) 24.0 6 5 MG 30.0 [facility] Medicare 0 / 0 [place]   1 [id number] 11/04/2024 10/30/2024 Buprenorphine (Patch, Extended Release) 4.0 28 10 MCG/1 HR NA [facility] Medicare 0 / 0 [place]

## 2024-11-26 LAB
6MAM UR QL CFM: NEGATIVE NG/ML
BUPRENORPHINE UR QL CFM: ABNORMAL NG/ML
BZE UR QL CFM: NEGATIVE NG/ML
EDDP UR QL CFM: NEGATIVE NG/ML
FENTANYL UR QL CFM: NEGATIVE NG/ML
HYDROMORPHONE UR QL CFM: NEGATIVE NG/ML
MDMA UR QL CFM: NEGATIVE NG/ML
METHADONE UR QL CFM: NEGATIVE NG/ML
MORPHINE UR QL CFM: NEGATIVE NG/ML
NORBUPRENORPHINE UR QL CFM: ABNORMAL NG/ML
NORFENTANYL UR QL CFM: NEGATIVE NG/ML
NOROXYCODONE UR QL CFM: NORMAL NG/ML
OXYCODONE UR QL CFM: NORMAL NG/ML
OXYMORPHONE UR QL CFM: NORMAL NG/ML
PARA-FLUOROFENTANYL QUANTIFICATION: NORMAL NG/ML
PCP UR QL CFM: NEGATIVE NG/ML
SL AMB ACETYL FENTANYL QUANTIFICATION: NORMAL NG/ML
SL AMB ACETYL NORFENTANYL QUANTIFICATION: NORMAL NG/ML
SL AMB ACRYL FENTANYL QUANTIFICATION: NORMAL NG/ML
SL AMB CARFENTANIL QUANTIFICATION: NORMAL NG/ML
SL AMB N-DESMETHYL-TRAMADOL QUANTIFICATION: NEGATIVE NG/ML
TAPENTADOL UR QL CFM: NEGATIVE NG/ML
TRAMADOL UR QL CFM: NEGATIVE NG/ML
URATE/CREAT 24H UR: NEGATIVE NG/ML

## 2024-11-28 DIAGNOSIS — G62.9 SENSORY NEUROPATHY: ICD-10-CM

## 2024-11-28 DIAGNOSIS — G62.9 NEUROPATHY: Primary | ICD-10-CM

## 2024-11-29 ENCOUNTER — TELEPHONE (OUTPATIENT)
Age: 54
End: 2024-11-29

## 2024-11-29 DIAGNOSIS — Z51.5 PALLIATIVE CARE PATIENT: ICD-10-CM

## 2024-11-29 RX ORDER — DULOXETIN HYDROCHLORIDE 60 MG/1
60 CAPSULE, DELAYED RELEASE ORAL DAILY
Qty: 30 CAPSULE | Refills: 5 | Status: SHIPPED | OUTPATIENT
Start: 2024-11-29

## 2024-11-29 RX ORDER — BUPRENORPHINE 10 UG/H
1 PATCH TRANSDERMAL
Qty: 4 PATCH | Refills: 0 | Status: SHIPPED | OUTPATIENT
Start: 2024-11-29

## 2024-11-29 NOTE — TELEPHONE ENCOUNTER
April aware 2 patches fell off while showering patient out of patches     Reason for call:   [x] Refill   [] Prior Auth  [] Other:     Office:   [] PCP/Provider -   [x] Specialty/Provider - Bina SimonsApril    Medication: Buprenorphine Patch     Dose/Frequency: Q 7 Days    Quantity: 4    [facility] [place] [address]    Does the patient have enough for 3 days?   [] Yes   [x] No - Send as HP to POD

## 2024-11-29 NOTE — TELEPHONE ENCOUNTER
Patient calling.    HIPAA verified.    April Melony had called in script for: transdermal buprenorphine (Butrans) 10 mcg/hr TD patch     To: [facility]/pharmacy #[id number] - [place] - [address]  [address] [address]  Phone: [phone]  Fax: [phone]     OUT OF STOCK at that location.    [facility] in [place] DOES HAVE in stock.    Please send order to that location.

## 2024-11-29 NOTE — TELEPHONE ENCOUNTER
Returned phone call to patient to make her aware that we changed the pharmacy to [facility] for this medication, per her request and the script was sent to the provider for signature. She verbalized understanding and has no additional questions or concerns at this moment.

## 2024-11-29 NOTE — TELEPHONE ENCOUNTER
1 [id number] 11/04/2024 10/30/2024 Buprenorphine (Patch, Extended Release) 4.0 28 10 MCG/1 HR NA APRIL [facility] Medicare 0 / 0 [place]

## 2024-12-02 ENCOUNTER — OFFICE VISIT (OUTPATIENT)
Dept: PALLIATIVE MEDICINE | Facility: CLINIC | Age: 54
End: 2024-12-02
Payer: COMMERCIAL

## 2024-12-02 VITALS
WEIGHT: 203 LBS | SYSTOLIC BLOOD PRESSURE: 126 MMHG | OXYGEN SATURATION: 94 % | BODY MASS INDEX: 35.97 KG/M2 | TEMPERATURE: 98.5 F | HEART RATE: 78 BPM | RESPIRATION RATE: 18 BRPM | DIASTOLIC BLOOD PRESSURE: 84 MMHG | HEIGHT: 63 IN

## 2024-12-02 DIAGNOSIS — G89.3 CANCER ASSOCIATED PAIN: ICD-10-CM

## 2024-12-02 DIAGNOSIS — F11.20 CONTINUOUS OPIOID DEPENDENCE (HCC): Chronic | ICD-10-CM

## 2024-12-02 DIAGNOSIS — I10 PRIMARY HYPERTENSION: ICD-10-CM

## 2024-12-02 DIAGNOSIS — F11.10 OPIOID ABUSE (HCC): Primary | ICD-10-CM

## 2024-12-02 DIAGNOSIS — M79.2 NEUROPATHIC PAIN: ICD-10-CM

## 2024-12-02 DIAGNOSIS — R12 HEARTBURN: ICD-10-CM

## 2024-12-02 DIAGNOSIS — Z51.5 PALLIATIVE CARE PATIENT: ICD-10-CM

## 2024-12-02 DIAGNOSIS — G47.00 INSOMNIA, UNSPECIFIED TYPE: ICD-10-CM

## 2024-12-02 DIAGNOSIS — C90.01 MULTIPLE MYELOMA IN REMISSION (HCC): Chronic | ICD-10-CM

## 2024-12-02 PROCEDURE — G2211 COMPLEX E/M VISIT ADD ON: HCPCS | Performed by: NURSE PRACTITIONER

## 2024-12-02 PROCEDURE — 99213 OFFICE O/P EST LOW 20 MIN: CPT | Performed by: NURSE PRACTITIONER

## 2024-12-02 RX ORDER — OXYCODONE HYDROCHLORIDE 5 MG/1
5 TABLET ORAL EVERY 12 HOURS PRN
Qty: 14 TABLET | Refills: 0 | Status: SHIPPED | OUTPATIENT
Start: 2024-12-02 | End: 2024-12-09 | Stop reason: SDUPTHER

## 2024-12-02 RX ORDER — TRAZODONE HYDROCHLORIDE 100 MG/1
100 TABLET ORAL
Qty: 90 TABLET | Refills: 1 | Status: SHIPPED | OUTPATIENT
Start: 2024-12-02 | End: 2025-05-31

## 2024-12-02 RX ORDER — OXYCODONE HYDROCHLORIDE 5 MG/1
5 TABLET ORAL EVERY 8 HOURS PRN
Qty: 21 TABLET | Refills: 0 | Status: CANCELLED | OUTPATIENT
Start: 2024-12-02

## 2024-12-02 NOTE — ASSESSMENT & PLAN NOTE
Follows with [facility].  S/p chemotherapy and stem cell transplant.

## 2024-12-02 NOTE — ASSESSMENT & PLAN NOTE
Patient had been started on opioid taper in August with plan to decrease 10-30% total daily opioids each 1-2 months, with overall goal to no daily opioid use.   UDS today.     Since that appointment, patient admits to oxycodone misuse October 31, 2024  Butrans patch initiated 11/4  Oxycodone 5 mg tablet, 4 tab max per day.  Reduce today to 2 tab max with plan to reduce to 1 tab (5mg daily) on 12/16.  Stop oxycodone January 6.    Discussed s/sxs of opioid withdrawal, including nausea, vomiting, diarrhea, rhinorrhea, yawning, insomnia, agitation. All questions/concerns addressed.

## 2024-12-02 NOTE — ASSESSMENT & PLAN NOTE
Patient previously admitted to misuse of oxycodone and obtaining oxycodone from other sources (not prescribed).  Patient has been completing Urine drug screen every 2 weeks, in person appointments every two weeks.  Oxycodone tapered to 3 tabs daily previously.  Further reduction today.  UDS negative for Buprenorphine, patch seen present on patient.  Unable to provide urine at time of visit, will return later today.  Will not provide oxycodone script until urine provided.   Patient advised UDS must be completed at time of visit for upcoming appointments.

## 2024-12-02 NOTE — TELEPHONE ENCOUNTER
Reason for call:   [x] Refill   [] Prior Auth  [] Other:     Office:   [] PCP/Provider -   [x] Specialty/Provider - EVE Josephs,April    Medication: Oxycodone IR    Dose/Frequency: 5 mg Q 8 HRS PRN    Quantity: 21    [facility] [place] [address]     Does the patient have enough for 3 days?   [] Yes   [x] No - Send as HP to POD

## 2024-12-02 NOTE — PROGRESS NOTES
Name: Marla Negron      : 1970      MRN: [id number]  Encounter Provider: WILBUR Braden  Encounter Date: 2024   Encounter department: [facility] PALLIATIVE CARE [place]  :  Assessment & Plan  Opioid abuse (HCC)  Patient previously admitted to misuse of oxycodone and obtaining oxycodone from other sources (not prescribed).  Patient has been completing Urine drug screen every 2 weeks, in person appointments every two weeks.  Oxycodone tapered to 3 tabs daily previously.  Further reduction today.  UDS negative for Buprenorphine, patch seen present on patient.  Unable to provide urine at time of visit, will return later today.  Will not provide oxycodone script until urine provided.   Patient advised UDS must be completed at time of visit for upcoming appointments.             Continuous opioid dependence (HCC)    Patient had been started on opioid taper in August with plan to decrease 10-30% total daily opioids each 1-2 months, with overall goal to no daily opioid use.   UDS today.     Since that appointment, patient admits to oxycodone misuse 2024  Butrans patch initiated   Oxycodone 5 mg tablet, 4 tab max per day.  Reduce today to 2 tab max with plan to reduce to 1 tab (5mg daily) on .  Stop oxycodone .    Discussed s/sxs of opioid withdrawal, including nausea, vomiting, diarrhea, rhinorrhea, yawning, insomnia, agitation. All questions/concerns addressed.          Palliative care patient  Palliative Diagnosis: Hx of multiple myeloma     Social Support:  Supportive listening provided  Normalized experience of patient  Provided anxiety containment     Follow-up  Continue weaning process. Follow up 2 weeks-.    Patient now has custody of her two granddaughters, 4 and 6 years..      Orders:    transdermal buprenorphine (Butrans) 10 mcg/hr TD patch; Place 1 patch on the skin over 7 days every 7 days.            Cancer associated pain         Neuropathic pain  On  "lyrica and cymbalta, managed by PCP and neurology.   Plan to transition off opioids.           Multiple myeloma in remission (HCC)  Follows with [facility].  S/p chemotherapy and stem cell transplant.                 PDMP Review: I have reviewed the patient's controlled substance dispensing history in the Prescription Drug Monitoring Program in compliance with the [facility] regulations before prescribing any controlled substances.    History of Present Illness   Marla Negron is a 53 y.o. female with a history of multiple myeloma who presents for ongoing opioid taper in setting of misuse.  Patient does have chronic neuropathic pain s/p chemotherapy.     Medical History Reviewed by provider this encounter:     .     Objective   /84 (BP Location: Left arm, Patient Position: Sitting, Cuff Size: Standard)   Pulse 78   Temp 98.5 °F (36.9 °C) (Tympanic)   Resp 18   Ht 5' 3\" (1.6 m)   Wt 92.1 kg (203 lb)   SpO2 94%   BMI 35.96 kg/m²     Physical Exam  Vitals and nursing note reviewed.   Constitutional:       General: She is awake.   Pulmonary:      Effort: No respiratory distress or retractions.   Musculoskeletal:      Cervical back: Full passive range of motion without pain.   Skin:     General: Skin is warm.   Neurological:      Mental Status: She is alert.   Psychiatric:         Attention and Perception: Attention normal.         Behavior: Behavior is cooperative.         Cognition and Memory: Cognition and memory normal.         Recent labs:  Lab Results   Component Value Date/Time    SODIUM 138 06/22/2024 08:54 PM    SODIUM 140 10/27/2023 05:02 AM    K 3.9 06/22/2024 08:54 PM    K 3.5 10/27/2023 05:02 AM    BUN 10 06/22/2024 08:54 PM    BUN 12 10/27/2023 05:02 AM    CREATININE 1.05 06/22/2024 08:54 PM    CREATININE 0.84 10/27/2023 05:02 AM    GLUC 111 06/22/2024 08:54 PM    GLUC 79 10/27/2023 05:02 AM    CALCIUM 9.9 06/22/2024 08:54 PM    CALCIUM 8.8 10/27/2023 05:02 AM    AST 19 06/22/2024 " 08:54 PM    AST 27 10/26/2023 05:05 PM    ALT 12 06/22/2024 08:54 PM    ALT 11 10/26/2023 05:05 PM    ALB 4.5 06/22/2024 08:54 PM    ALB 4.6 10/26/2023 05:05 PM    TP 8.4 06/22/2024 08:54 PM    TP 8.4 (H) 10/26/2023 05:05 PM    EGFR 59 06/22/2024 08:54 PM    EGFR 82 10/27/2023 05:02 AM     Lab Results   Component Value Date/Time    HGB 17.6 (H) 06/22/2024 08:54 PM    WBC 11.95 (H) 06/22/2024 08:54 PM     06/22/2024 08:54 PM    INR 0.97 06/23/2024 03:11 AM    INR 1.0 10/26/2023 05:05 PM    PTT 28 06/23/2024 03:11 AM     Lab Results   Component Value Date/Time    [id number] 6.133 (H) 02/28/2024 02:22 PM       Recent Imaging:  No results found.    Administrative Statements   I have spent a total time of 20+  minutes in caring for this patient on the day of the visit/encounter including Risks and benefits of tx options, Instructions for management, and Obtaining or reviewing history  . Topics discussed with the patient / family include symptom assessment and management, medication review, medication adjustment, and supportive listening.

## 2024-12-02 NOTE — ASSESSMENT & PLAN NOTE
Palliative Diagnosis: Hx of multiple myeloma     Social Support:  Supportive listening provided  Normalized experience of patient  Provided anxiety containment     Follow-up  Continue weaning process. Follow up 2 weeks-.    Patient now has custody of her two granddaughters, 4 and 6 years..      Orders:    transdermal buprenorphine (Butrans) 10 mcg/hr TD patch; Place 1 patch on the skin over 7 days every 7 days.

## 2024-12-03 RX ORDER — CYCLOBENZAPRINE HCL 10 MG
10 TABLET ORAL 3 TIMES DAILY PRN
Qty: 90 TABLET | Refills: 0 | Status: SHIPPED | OUTPATIENT
Start: 2024-12-03

## 2024-12-04 RX ORDER — PANTOPRAZOLE SODIUM 40 MG/1
40 TABLET, DELAYED RELEASE ORAL DAILY
Qty: 90 TABLET | Refills: 1 | Status: SHIPPED | OUTPATIENT
Start: 2024-12-04 | End: 2025-06-02

## 2024-12-04 RX ORDER — METOPROLOL SUCCINATE 25 MG/1
25 TABLET, EXTENDED RELEASE ORAL DAILY
Qty: 90 TABLET | Refills: 1 | Status: SHIPPED | OUTPATIENT
Start: 2024-12-04

## 2024-12-05 LAB
6MAM UR QL CFM: NEGATIVE NG/ML
ACCEPTABLE CREAT UR QL: NORMAL MG/DL
ACCEPTIBLE SP GR UR QL: NORMAL
BUPRENORPHINE UR QL CFM: ABNORMAL NG/ML
BZE UR QL CFM: NEGATIVE NG/ML
CYCLOBENZAPRINE UR QL CFM: ABNORMAL NG/ML
EDDP UR QL CFM: NEGATIVE NG/ML
FENTANYL UR QL CFM: NEGATIVE NG/ML
HYDROMORPHONE UR QL CFM: NEGATIVE NG/ML
MDMA UR QL CFM: NEGATIVE NG/ML
METHADONE UR QL CFM: NEGATIVE NG/ML
NITRITE UR QL: NORMAL UG/ML
NORBUPRENORPHINE UR QL CFM: ABNORMAL NG/ML
NORFENTANYL UR QL CFM: NEGATIVE NG/ML
NOROXYCODONE UR QL CFM: ABNORMAL NG/ML
OXYCODONE UR QL CFM: ABNORMAL NG/ML
OXYMORPHONE UR QL CFM: ABNORMAL NG/ML
PARA-FLUOROFENTANYL QUANTIFICATION: NORMAL NG/ML
PCP UR QL CFM: NEGATIVE NG/ML
PROLACTIN SERPL-MCNC: NEGATIVE NG/ML
PROT S ACT/NOR PPP: NORMAL NG/ML
SERTRALINE UR QL CFM: NEGATIVE NG/ML
SL AMB ACETYL FENTANYL QUANTIFICATION: NORMAL NG/ML
SL AMB ACETYL NORFENTANYL QUANTIFICATION: NORMAL NG/ML
SL AMB ACRYL FENTANYL QUANTIFICATION: NORMAL NG/ML
SL AMB CARFENTANIL QUANTIFICATION: NORMAL NG/ML
SL AMB DESMETHYLVENLAFAXINE QUANTIFICATION: NEGATIVE NG/ML
SL AMB GABAPENTIN QUANTIFICATION: ABNORMAL NG/ML
SL AMB PREGABALIN QUANTIFICATION: NORMAL NG/ML
SL AMB VENLAFAXINE QUANTIFICATION: NEGATIVE NG/ML
SPECIMEN PH ACCEPTABLE UR: NORMAL
TRAZODONE UR QL CFM: ABNORMAL NG/ML

## 2024-12-09 DIAGNOSIS — G89.3 CANCER ASSOCIATED PAIN: ICD-10-CM

## 2024-12-09 RX ORDER — OXYCODONE HYDROCHLORIDE 5 MG/1
5 TABLET ORAL EVERY 12 HOURS PRN
Qty: 14 TABLET | Refills: 0 | Status: SHIPPED | OUTPATIENT
Start: 2024-12-09

## 2024-12-09 NOTE — TELEPHONE ENCOUNTER
Medication: oxycodone 5mg  PDMP   12/02/2024 12/02/2024 oxyCODONE HCL (Tablet) 14.0 7 5 MG 15.0 APRIL ELENA  12/01/2024 11/29/2024 Buprenorphine (Patch, Extended Release) 4.0 28 10 MCG/1 HR NA APRIL ELENA  11/25/2024 11/25/2024 oxyCODONE HCL (Tablet) 21.0 7 5 MG 22.50 APRIL ELENA [facility]

## 2024-12-09 NOTE — TELEPHONE ENCOUNTER
Reason for call:   [x] Refill   [] Prior Auth  [] Other:     Office:   [] PCP/Provider -   [x] Specialty/Provider - palliative care/Zhane Ty    Medication: oxyCODONE (ROXICODONE) 5 immediate release tablet     Dose/Frequency:     Take 1 tablet (5 mg total) by mouth every 12 (twelve) hours as needed for moderate pain MAX 2 tablets/day, ongoing taper. 12/2 thru 12/16 Max Daily Amount: 10 mg       Quantity: 14    Pharmacy: [facility]/pharmacy #[id number] - [place] - [address] [phone]     Does the patient have enough for 3 days?   [] Yes   [x] No - Send as HP to POD

## 2024-12-16 ENCOUNTER — OFFICE VISIT (OUTPATIENT)
Dept: PALLIATIVE MEDICINE | Facility: CLINIC | Age: 54
End: 2024-12-16
Payer: COMMERCIAL

## 2024-12-16 DIAGNOSIS — F11.10 OPIOID ABUSE (HCC): Primary | ICD-10-CM

## 2024-12-16 PROCEDURE — 99211 OFF/OP EST MAY X REQ PHY/QHP: CPT | Performed by: NURSE PRACTITIONER

## 2024-12-16 NOTE — PROGRESS NOTES
Patient came in to the office today to provide a urine sample.  Urine provided was cold. Called patient informed that urine was not a new sample and discussed previous Urine drug screen results and inconsistent results with prescriptions provided.  Informed patient she is being dismissed from the practice effective immediately, no further prescriptions will be provided.  Dismissal letter to be drafted.

## 2024-12-18 ENCOUNTER — TELEPHONE (OUTPATIENT)
Dept: PALLIATIVE MEDICINE | Facility: CLINIC | Age: 54
End: 2024-12-18

## 2024-12-18 NOTE — TELEPHONE ENCOUNTER
----- Message from WILBUR Patel sent at 12/16/2024 10:43 AM EST -----  Regarding: DISMISSED effective immediately  Marla Negron is dismissed effective immediately.  After admitting to opioid misuse, Marla was started on immediate opioid reduction.  She came to in person appointments every two weeks and provided urine for UDS.  Her resulted were inconsistent with prescriptions for past two screens but did not prove misuse, but did not correlate with prescriptions.  Today, she provided a cold urine sample.  I suspect the other samples were also not hers.  I called her and informed her she would be immediately dismissed without further prescriptions.  Offered her rehab service information which she declined.      Thank you,    April

## 2024-12-18 NOTE — TELEPHONE ENCOUNTER
Certified letter will be mailed once completed.     In the interim patient hs been dismissed in [facility].

## 2024-12-26 ENCOUNTER — TELEPHONE (OUTPATIENT)
Dept: PALLIATIVE MEDICINE | Facility: CLINIC | Age: 54
End: 2024-12-26

## 2024-12-26 NOTE — TELEPHONE ENCOUNTER
Prior Authorization [NEEDED] for [Buprenorphine 10MCG/HR Weekly Patches]    KEY# [id number]    [facility]  Phone: [phone]  Fax: [phone]

## 2024-12-26 NOTE — TELEPHONE ENCOUNTER
According to notes pt has been discharged from Practice effective 12/18/2024. Pt has a script for Butrans 10 mcg dated 11/29/2024 which would be exhausted if using every 7 days this week. Am I to obtain a Prior Auth for Butrans 10 mcg patches under the Provider [facility] as there is not a current script.    Please advise    Liana

## 2025-01-05 DIAGNOSIS — G62.9 NEUROPATHY: ICD-10-CM

## 2025-01-07 RX ORDER — CYCLOBENZAPRINE HCL 10 MG
10 TABLET ORAL 3 TIMES DAILY PRN
Qty: 90 TABLET | Refills: 0 | Status: SHIPPED | OUTPATIENT
Start: 2025-01-07

## 2025-01-19 DIAGNOSIS — M54.42 CHRONIC BILATERAL LOW BACK PAIN WITH BILATERAL SCIATICA: ICD-10-CM

## 2025-01-19 DIAGNOSIS — R12 HEARTBURN: ICD-10-CM

## 2025-01-19 DIAGNOSIS — M54.41 CHRONIC BILATERAL LOW BACK PAIN WITH BILATERAL SCIATICA: ICD-10-CM

## 2025-01-19 DIAGNOSIS — R09.81 NASAL CONGESTION: ICD-10-CM

## 2025-01-19 DIAGNOSIS — G89.29 CHRONIC BILATERAL LOW BACK PAIN WITH BILATERAL SCIATICA: ICD-10-CM

## 2025-01-19 DIAGNOSIS — G47.00 INSOMNIA, UNSPECIFIED TYPE: ICD-10-CM

## 2025-01-20 RX ORDER — PREGABALIN 100 MG/1
100 CAPSULE ORAL 3 TIMES DAILY
Qty: 270 CAPSULE | Refills: 0 | Status: SHIPPED | OUTPATIENT
Start: 2025-01-20 | End: 2025-07-19

## 2025-01-20 RX ORDER — FLUTICASONE PROPIONATE 50 MCG
2 SPRAY, SUSPENSION (ML) NASAL DAILY
Qty: 48 G | Refills: 0 | Status: SHIPPED | OUTPATIENT
Start: 2025-01-20

## 2025-01-20 RX ORDER — PANTOPRAZOLE SODIUM 40 MG/1
40 TABLET, DELAYED RELEASE ORAL DAILY
Qty: 90 TABLET | Refills: 1 | Status: SHIPPED | OUTPATIENT
Start: 2025-01-20 | End: 2025-07-19

## 2025-01-20 RX ORDER — TRAZODONE HYDROCHLORIDE 100 MG/1
100 TABLET ORAL
Qty: 90 TABLET | Refills: 0 | Status: SHIPPED | OUTPATIENT
Start: 2025-01-20 | End: 2025-07-19

## 2025-01-20 NOTE — TELEPHONE ENCOUNTER
Medication: Lyrica   PDMP  12/09/2024 12/09/2024 oxyCODONE HCL (Tablet) 14.0 7 5 MG 15.0 [facility] Medicare 0 / 0 PA   1 [id number] 12/02/2024 12/02/2024 oxyCODONE HCL (Tablet) 14.0 7 5 MG 15.0 [facility] Medicare 0 / 0 PA   1 [id number] 12/01/2024 11/29/2024 Buprenorphine (Patch, Extended Release) 4.0 28 10 MCG/1 HR NA [facility] Medicare 0 / 0 PA   1 [id number] 11/25/2024 11/25/2024 oxyCODONE HCL (Tablet) 21.0 7 5 MG 22.50 [facility] Medicare 0 / 0 PA   1 [id number] 11/18/2024 11/18/2024 oxyCODONE HCL (Tablet) 21.0 7 5 MG 22.50 [facility] Medicare 0 / 0 PA   1 [id number] 11/12/2024 11/12/2024 oxyCODONE HCL (Tablet) 24.0 6 5 MG 30.0 [facility] Medicare 0 / 0 PA   1 [id number] 11/07/2024 06/27/2024 Pregabalin (Capsule) 270.0 90 100 MG NA KEVIN MELVIN [facility] Medicare 1 / 1 PA  Active agreement on file -No

## 2025-01-24 DIAGNOSIS — G62.9 SENSORY NEUROPATHY: ICD-10-CM

## 2025-01-24 RX ORDER — DULOXETIN HYDROCHLORIDE 60 MG/1
60 CAPSULE, DELAYED RELEASE ORAL DAILY
Qty: 90 CAPSULE | Refills: 1 | Status: SHIPPED | OUTPATIENT
Start: 2025-01-24

## 2025-01-31 DIAGNOSIS — I10 PRIMARY HYPERTENSION: ICD-10-CM

## 2025-01-31 RX ORDER — METOPROLOL SUCCINATE 25 MG/1
25 TABLET, EXTENDED RELEASE ORAL DAILY
Qty: 90 TABLET | Refills: 1 | Status: SHIPPED | OUTPATIENT
Start: 2025-01-31

## 2025-01-31 RX ORDER — AMLODIPINE BESYLATE 10 MG/1
10 TABLET ORAL EVERY MORNING
Qty: 90 TABLET | Refills: 1 | Status: SHIPPED | OUTPATIENT
Start: 2025-01-31

## 2025-02-04 DIAGNOSIS — G62.9 NEUROPATHY: ICD-10-CM

## 2025-02-05 RX ORDER — CYCLOBENZAPRINE HCL 10 MG
TABLET ORAL
Qty: 90 TABLET | Refills: 0 | Status: SHIPPED | OUTPATIENT
Start: 2025-02-05

## 2025-02-10 ENCOUNTER — TELEPHONE (OUTPATIENT)
Dept: NEUROLOGY | Facility: CLINIC | Age: 55
End: 2025-02-10

## 2025-02-10 NOTE — TELEPHONE ENCOUNTER
Called and left  for pt - informed her that we need to r/s her 2/13 appt with Dr Martinez due to her being out of office. Provided call back number

## 2025-02-11 ENCOUNTER — OFFICE VISIT (OUTPATIENT)
Dept: NEUROLOGY | Facility: CLINIC | Age: 55
End: 2025-02-11
Payer: COMMERCIAL

## 2025-02-11 VITALS
DIASTOLIC BLOOD PRESSURE: 68 MMHG | TEMPERATURE: 98 F | SYSTOLIC BLOOD PRESSURE: 124 MMHG | BODY MASS INDEX: 35.97 KG/M2 | HEIGHT: 63 IN | HEART RATE: 88 BPM | OXYGEN SATURATION: 92 % | WEIGHT: 203 LBS

## 2025-02-11 DIAGNOSIS — G62.9 SENSORY NEUROPATHY: Primary | ICD-10-CM

## 2025-02-11 DIAGNOSIS — G89.29 CHRONIC BILATERAL LOW BACK PAIN WITH BILATERAL SCIATICA: ICD-10-CM

## 2025-02-11 DIAGNOSIS — M54.41 CHRONIC BILATERAL LOW BACK PAIN WITH BILATERAL SCIATICA: ICD-10-CM

## 2025-02-11 DIAGNOSIS — C90.01 MULTIPLE MYELOMA IN REMISSION (HCC): ICD-10-CM

## 2025-02-11 DIAGNOSIS — M54.42 CHRONIC BILATERAL LOW BACK PAIN WITH BILATERAL SCIATICA: ICD-10-CM

## 2025-02-11 PROCEDURE — 99214 OFFICE O/P EST MOD 30 MIN: CPT | Performed by: PSYCHIATRY & NEUROLOGY

## 2025-02-11 PROCEDURE — G2211 COMPLEX E/M VISIT ADD ON: HCPCS | Performed by: PSYCHIATRY & NEUROLOGY

## 2025-02-11 RX ORDER — PREGABALIN 25 MG/1
25 CAPSULE ORAL 3 TIMES DAILY
Qty: 90 CAPSULE | Refills: 1 | Status: SHIPPED | OUTPATIENT
Start: 2025-02-11

## 2025-02-11 NOTE — PROGRESS NOTES
Name: Marla Negron      : 1970      MRN: [id number]  Encounter Provider: Nellie Martinez MD  Encounter Date: 2025   Encounter department: NEUROLOGY [facility]  :  Assessment & Plan  Sensory neuropathy  This patient has longstanding history of peripheral neuropathy, with onset of symptoms in 2019, with further evaluation, noted to have monoclonal gammopathy that led to hematology workup and further Diagnosed withmultiple myeloma/amyloidosis, status post bone marrow transplant and chemotherapy.  Patient has been in remission in regards to her myeloma, has not been on any immunotherapy since summer 2023.  Continues to have neuropathic pain, in both upper and lower extremities which has significantly limited her day-to-day activities.  No muscle weakness, in addition has lower back pain with radicular symptoms to both lower extremities.  Recommended to increase the pregabalin to 125 mg 3 times daily, patient has been on opiates in the past, which she has been working with palliative care.  Discussed evaluation in pain management clinic for possible interventions for lower back pain, and possible spinal cord stimulator for her chronic neuropathic pain, patient agreeable, referral was provided today.  In addition, recommended blood work for B12, methylmalonic acid, will also check her labs to follow-up for monoclonal gammopathy.    Follow-up in 6 months.  Orders:    Ambulatory referral to Spine & Pain Management; Future    Sedimentation rate, automated; Future    Vitamin B12; Future    Methylmalonic acid, serum; Future    pregabalin (LYRICA) 25 mg capsule; Take 1 capsule (25 mg total) by mouth 3 (three) times a day In addition to 100mg tid    Multiple myeloma in remission (HCC)    Orders:    Protein electrophoresis, serum; Future    IgG, IgA, IgM; Future    Immunoglobulin free LT chains blood; Future    Beta 2 microglobulin, serum; Future    Chronic bilateral low back pain with  bilateral sciatica               History of Present Illness   HPI  I had the pleasure of seeing your patient in neurology clinic for neuromuscular follow-up.  As you know, patient is a 54-year-old woman , With peripheral neuropathy, with onset of symptoms in 2019, further diagnosed with multiple myeloma/amyloidosis, status post bone marrow transplant and chemotherapy, currently in remission.  She was last evaluated by us in September, now returns for follow-up.    Since last being seen, patient unfortunately continues to have neuropathic pain, mostly in her lower extremities but also in her hands.  Balance is affected, but she denies any falls.  Denies any muscle weakness, however pain limits her ambulation for long distances, she does not use any assistive devices on a regular basis, but does have a cane that she would use when she is tired.  She does report difficulty opening jars, does have chronic lower back pain, that radiates down the lower extremities, worse on the left compared to the right, does get cramping in the calves.  She did pursue physical therapy in the past, but was not helpful.  Was recommended to get evaluated in pain management clinic on her last visit, which she did not follow through.    For her neuropathic pain, she continues to take pregabalin 100 mg 3 times daily, along with duloxetine 60 mg daily.  She has been on chronic opioids, has been following with palliative care, who have been helping manage her pain, was tried on Butrans patch to be able to wean off the opioids.    Denies any other complaints today, has not been to hematology in a while, last labs from June 2024 were stable with normal SPEP without any monoclonal protein, quantitative immunoglobulins were unremarkable, beta-2 microglobulin, B1, B6 levels were normal, B12 was 323.    Feb 2024: Cryoglobulins, rheumatoid factor, hepatitis C, HIV, TSH were normal, CK was normal, complement levels negative, nuclear antigen,  centromere, scleroderma, Pooja 1,  double-stranded DNA, CCP, rheumatoid factor, ANDERS, Sjogren's were all unremarkable.     December 2023 Free kappa, lambda chains were within normal range, with normal kappa to lambda ratio.     EMG 7/2024: 1. There is neurophysiological evidence of moderate to severe axonal, large-fiber, sensory polyneuropathy. Compared to the previous study done at [facility] on 10/4/2021, there has been interval worsening. Left upper and lower extremity nerve conduction studies were present on the previous study and all but the radial sensory nerve action potential are absent on this study. 2. The neurophysiological pattern of abnormalities is consistent with chronic C6 radiculopathy on the left side. This is a new finding compared to the previous study done at [facility] on 10/4/2021. 3. The neurophysiological pattern of abnormalities is consistent with chronic L4-S1 radiculopathy on the left side. This is a new finding compared to the previous study done at [facility] on 10/4/2021.     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  Raw data not available for my review.    History from initial evaluation in June 2024:  Neuropathy symptoms have been going on since 2019, started off as burning, numbness and tingling. Have been gradually getting worse.  As a part of her evaluation, patient had blood work, that showed abnormal SPEP, further evaluated by hematology, had blurry and double vision at the time, eventually diagnosed with multiple myeloma after bone marrow biopsy.  Remainder of the details from hematology as noted below.  Over the last several years, she reports neuropathy symptoms have been gradually getting worse.  Continues to have predominantly pain and paresthesias in both upper and lower extremities.  Symptoms are worse in the morning, symptoms start from both feet till the mid legs and up to forearms in Ue's.   Does get muscle spasms in feet, now getting in hands, as well as proximal thighs  and even in upper arms. This has been getting worse, the spasms may last for minutes, on methocarbamol. Does not think it is helping. Spasms are mostly nocturnal, are happening once or twice during the day as well. Does report muscle weakness, which is generalized, uses a hand rail to support to go up a flight of stairs.  Balance is affected, has a tendency to fall, does not use any assistive devices.   Is able to drive.   Chronic lower back pain, with radicular symptoms, more to the left lower extremity, reports pain in the left hip/buttocks.  No bladder or bowel issues.    Hematology hx:Patient initially presented to oncology for abnormal serum protein electrophoresis, which was performed as an evaluation for neuropathy for neuropathic pain in upper and lower extremities.  She also had puffy eyes and diplopia, CT orbits was done suggesting an infiltrative disorder, did have dyspnea, elevated BNP, echo consistent with diastolic dysfunction.  Cardiac catheterization did not reveal any coronary artery disease, MRI of the heart showed hypertrophy and late enhancement seen with infiltrative disorders such as amyloidosis.  Bone marrow biopsy was performed, Congo red was negative, patient had 20% kappa restricted plasmacytosis, consistent with plasma cell dyscrasia.  Skeletal survey was negative for lytic lesions, PET/CT did not show any evidence of PET avid   Abdominal fat pad biopsy November 2021 negative for amyloid deposition.  PRIOR THERAPY:   1. Induction: RVd x 1 cycle   2. Induction: María-CyBorD, C1D1 11/1/21 bc she was treated as amyloidosis    TRANSPLANT HISTORY:  1. Consolidation: Melphalan (140 mg/m2)-conditioned autologous PBSCT, stem cells infused on 3/30/22      Besides this she has HTN and dyslipidemia. Hospitalized last week for SOB which has been a chronic issue with [facility].  CT chest June 23rd, 2024: Mild plate atelectasis at the lung bases, otherwise clear lungs.      No h/o alcohol use, prior hx of  "smoking, no other drugs.     Review of Systems   Constitutional:  Negative for appetite change, fatigue and fever.   HENT: Negative.  Negative for hearing loss, tinnitus, trouble swallowing and voice change.    Eyes: Negative.  Negative for photophobia, pain and visual disturbance.   Respiratory: Negative.  Negative for shortness of breath.    Cardiovascular: Negative.  Negative for palpitations.   Gastrointestinal: Negative.  Negative for nausea and vomiting.   Endocrine: Negative.  Negative for cold intolerance.   Genitourinary: Negative.  Negative for dysuria, frequency and urgency.   Musculoskeletal:  Positive for gait problem (balance issues). Negative for back pain, myalgias, neck pain and neck stiffness.        Generalized muscle pain- daily   B/l feet invert at night      Skin: Negative.  Negative for rash.   Allergic/Immunologic: Negative.    Neurological:  Positive for numbness (b/l feet- worse). Negative for dizziness, tremors, seizures, syncope, facial asymmetry, speech difficulty, weakness, light-headedness and headaches.   Hematological: Negative.  Does not bruise/bleed easily.   Psychiatric/Behavioral: Negative.  Negative for confusion, hallucinations and sleep disturbance.    All other systems reviewed and are negative.   I have personally reviewed the MA's review of systems and made changes as necessary.         Objective   /68 (BP Location: Left arm, Patient Position: Sitting, Cuff Size: Adult)   Pulse 88   Temp 98 °F (36.7 °C) (Temporal)   Ht 5' 3\" (1.6 m)   Wt 92.1 kg (203 lb)   SpO2 92%   BMI 35.96 kg/m²     Physical Exam  On general exam, patient was not in any acute distress.  HEENT was unremarkable.  Extremities did not reveal any edema.    Neurological Exam  Neurologically, patient was awake and alert, speech was fluent without any dysarthria.  Current examination did not reveal any ptosis, normal extraocular movements, normal strength of eye closure muscles, normal facial " strength, no tongue atrophy or fasciculations.  On motor exam, strength was normal in neck flexors, extensors, and all muscle groups in both upper and lower extremities, proximally and distally.  Decreased in a length-dependent fashion to pinprick and temperature up to mid legs bilaterally, patient did have some hypersensitivity to pin in the lower legs and up to the wrist bilaterally, with allodynia.  Vibration was 8 to 10 seconds at the toes, and proprioception was relatively intact.  Gait was antalgic.

## 2025-02-12 NOTE — ASSESSMENT & PLAN NOTE
Orders:    Protein electrophoresis, serum; Future    IgG, IgA, IgM; Future    Immunoglobulin free LT chains blood; Future    Beta 2 microglobulin, serum; Future

## 2025-02-12 NOTE — ASSESSMENT & PLAN NOTE
This patient has longstanding history of peripheral neuropathy, with onset of symptoms in 2019, with further evaluation, noted to have monoclonal gammopathy that led to hematology workup and further Diagnosed withmultiple myeloma/amyloidosis, status post bone marrow transplant and chemotherapy.  Patient has been in remission in regards to her myeloma, has not been on any immunotherapy since summer 2023.  Continues to have neuropathic pain, in both upper and lower extremities which has significantly limited her day-to-day activities.  No muscle weakness, in addition has lower back pain with radicular symptoms to both lower extremities.  Recommended to increase the pregabalin to 125 mg 3 times daily, patient has been on opiates in the past, which she has been working with palliative care.  Discussed evaluation in pain management clinic for possible interventions for lower back pain, and possible spinal cord stimulator for her chronic neuropathic pain, patient agreeable, referral was provided today.  In addition, recommended blood work for B12, methylmalonic acid, will also check her labs to follow-up for monoclonal gammopathy.    Follow-up in 6 months.  Orders:    Ambulatory referral to Spine & Pain Management; Future    Sedimentation rate, automated; Future    Vitamin B12; Future    Methylmalonic acid, serum; Future    pregabalin (LYRICA) 25 mg capsule; Take 1 capsule (25 mg total) by mouth 3 (three) times a day In addition to 100mg tid

## 2025-02-14 ENCOUNTER — APPOINTMENT (OUTPATIENT)
Dept: LAB | Facility: HOSPITAL | Age: 55
End: 2025-02-14
Payer: COMMERCIAL

## 2025-02-14 ENCOUNTER — RESULTS FOLLOW-UP (OUTPATIENT)
Age: 55
End: 2025-02-14

## 2025-02-14 DIAGNOSIS — G62.9 SENSORY NEUROPATHY: ICD-10-CM

## 2025-02-14 DIAGNOSIS — Z13.6 SCREENING FOR CARDIOVASCULAR CONDITION: ICD-10-CM

## 2025-02-14 DIAGNOSIS — I10 PRIMARY HYPERTENSION: Chronic | ICD-10-CM

## 2025-02-14 DIAGNOSIS — C90.01 MULTIPLE MYELOMA IN REMISSION (HCC): ICD-10-CM

## 2025-02-14 LAB
ALBUMIN SERPL BCG-MCNC: 3.7 G/DL (ref 3.5–5)
ALP SERPL-CCNC: 97 U/L (ref 34–104)
ALT SERPL W P-5'-P-CCNC: 12 U/L (ref 7–52)
ANION GAP SERPL CALCULATED.3IONS-SCNC: 11 MMOL/L (ref 4–13)
AST SERPL W P-5'-P-CCNC: 10 U/L (ref 13–39)
BASOPHILS # BLD AUTO: 0.04 THOUSANDS/ÂΜL (ref 0–0.1)
BASOPHILS NFR BLD AUTO: 0 % (ref 0–1)
BILIRUB SERPL-MCNC: 0.31 MG/DL (ref 0.2–1)
BUN SERPL-MCNC: 20 MG/DL (ref 5–25)
CALCIUM SERPL-MCNC: 8.9 MG/DL (ref 8.4–10.2)
CHLORIDE SERPL-SCNC: 101 MMOL/L (ref 96–108)
CHOLEST SERPL-MCNC: 175 MG/DL (ref ?–200)
CO2 SERPL-SCNC: 23 MMOL/L (ref 21–32)
CREAT SERPL-MCNC: 1.48 MG/DL (ref 0.6–1.3)
EOSINOPHIL # BLD AUTO: 0.21 THOUSAND/ÂΜL (ref 0–0.61)
EOSINOPHIL NFR BLD AUTO: 2 % (ref 0–6)
ERYTHROCYTE [DISTWIDTH] IN BLOOD BY AUTOMATED COUNT: 15.7 % (ref 11.6–15.1)
ERYTHROCYTE [SEDIMENTATION RATE] IN BLOOD: 26 MM/HOUR (ref 0–29)
GFR SERPL CREATININE-BSD FRML MDRD: 39 ML/MIN/1.73SQ M
GLUCOSE P FAST SERPL-MCNC: 133 MG/DL (ref 65–99)
HCT VFR BLD AUTO: 50.6 % (ref 34.8–46.1)
HDLC SERPL-MCNC: 48 MG/DL
HGB BLD-MCNC: 16.3 G/DL (ref 11.5–15.4)
IGA SERPL-MCNC: 220 MG/DL (ref 66–433)
IGG SERPL-MCNC: 1525 MG/DL (ref 635–1741)
IGM SERPL-MCNC: 85 MG/DL (ref 45–281)
IMM GRANULOCYTES # BLD AUTO: 0.08 THOUSAND/UL (ref 0–0.2)
IMM GRANULOCYTES NFR BLD AUTO: 1 % (ref 0–2)
LDLC SERPL CALC-MCNC: 91 MG/DL (ref 0–100)
LYMPHOCYTES # BLD AUTO: 3.77 THOUSANDS/ÂΜL (ref 0.6–4.47)
LYMPHOCYTES NFR BLD AUTO: 32 % (ref 14–44)
MCH RBC QN AUTO: 29.6 PG (ref 26.8–34.3)
MCHC RBC AUTO-ENTMCNC: 32.2 G/DL (ref 31.4–37.4)
MCV RBC AUTO: 92 FL (ref 82–98)
MONOCYTES # BLD AUTO: 1.04 THOUSAND/ÂΜL (ref 0.17–1.22)
MONOCYTES NFR BLD AUTO: 9 % (ref 4–12)
NEUTROPHILS # BLD AUTO: 6.82 THOUSANDS/ÂΜL (ref 1.85–7.62)
NEUTS SEG NFR BLD AUTO: 56 % (ref 43–75)
NRBC BLD AUTO-RTO: 0 /100 WBCS
PLATELET # BLD AUTO: 193 THOUSANDS/UL (ref 149–390)
PMV BLD AUTO: 9.7 FL (ref 8.9–12.7)
POTASSIUM SERPL-SCNC: 3.8 MMOL/L (ref 3.5–5.3)
PROT SERPL-MCNC: 7.2 G/DL (ref 6.4–8.4)
RBC # BLD AUTO: 5.5 MILLION/UL (ref 3.81–5.12)
SODIUM SERPL-SCNC: 135 MMOL/L (ref 135–147)
TRIGL SERPL-MCNC: 182 MG/DL (ref ?–150)
VIT B12 SERPL-MCNC: 228 PG/ML (ref 180–914)
WBC # BLD AUTO: 11.96 THOUSAND/UL (ref 4.31–10.16)

## 2025-02-14 PROCEDURE — 85652 RBC SED RATE AUTOMATED: CPT

## 2025-02-14 PROCEDURE — 83918 ORGANIC ACIDS TOTAL QUANT: CPT

## 2025-02-14 PROCEDURE — 82607 VITAMIN B-12: CPT

## 2025-02-14 PROCEDURE — 82784 ASSAY IGA/IGD/IGG/IGM EACH: CPT

## 2025-02-14 PROCEDURE — 36415 COLL VENOUS BLD VENIPUNCTURE: CPT

## 2025-02-14 PROCEDURE — 80061 LIPID PANEL: CPT

## 2025-02-14 PROCEDURE — 80053 COMPREHEN METABOLIC PANEL: CPT

## 2025-02-14 PROCEDURE — 85025 COMPLETE CBC W/AUTO DIFF WBC: CPT

## 2025-02-14 PROCEDURE — 84165 PROTEIN E-PHORESIS SERUM: CPT

## 2025-02-14 NOTE — TELEPHONE ENCOUNTER
----- Message from Ayleen Bloom PA-C sent at 2/14/2025 10:01 AM EST -----  Kidney function slightly decreased from previous.  Recommend patient stay well-hydrated and avoid nephrotoxic agents like ibuprofen.  Mild elevations in triglycerides, but otherwise cholesterol normal.  Otherwise lab work stable and no significant changes.

## 2025-02-18 LAB
ALBUMIN SERPL ELPH-MCNC: 3.64 G/DL (ref 3.2–5.1)
ALBUMIN SERPL ELPH-MCNC: 53.6 % (ref 48–70)
ALPHA1 GLOB SERPL ELPH-MCNC: 0.27 G/DL (ref 0.15–0.47)
ALPHA1 GLOB SERPL ELPH-MCNC: 4 % (ref 1.8–7)
ALPHA2 GLOB SERPL ELPH-MCNC: 0.73 G/DL (ref 0.42–1.04)
ALPHA2 GLOB SERPL ELPH-MCNC: 10.7 % (ref 5.9–14.9)
BETA GLOB ABNORMAL SERPL ELPH-MCNC: 0.45 G/DL (ref 0.31–0.57)
BETA1 GLOB SERPL ELPH-MCNC: 6.6 % (ref 4.7–7.7)
BETA2 GLOB SERPL ELPH-MCNC: 6.2 % (ref 3.1–7.9)
BETA2+GAMMA GLOB SERPL ELPH-MCNC: 0.42 G/DL (ref 0.2–0.58)
GAMMA GLOB ABNORMAL SERPL ELPH-MCNC: 1.29 G/DL (ref 0.4–1.66)
GAMMA GLOB SERPL ELPH-MCNC: 18.9 % (ref 6.9–22.3)
IGG/ALB SER: 1.16 {RATIO} (ref 1.1–1.8)
METHYLMALONATE SERPL-SCNC: 314 NMOL/L (ref 0–378)
PROT PATTERN SERPL ELPH-IMP: NORMAL
PROT SERPL-MCNC: 6.8 G/DL (ref 6.4–8.2)

## 2025-02-18 PROCEDURE — 84165 PROTEIN E-PHORESIS SERUM: CPT | Performed by: STUDENT IN AN ORGANIZED HEALTH CARE EDUCATION/TRAINING PROGRAM

## 2025-02-21 ENCOUNTER — RESULTS FOLLOW-UP (OUTPATIENT)
Dept: NEUROLOGY | Facility: CLINIC | Age: 55
End: 2025-02-21

## 2025-03-12 DIAGNOSIS — G62.9 NEUROPATHY: ICD-10-CM

## 2025-03-13 RX ORDER — CYCLOBENZAPRINE HCL 10 MG
10 TABLET ORAL 3 TIMES DAILY
Qty: 90 TABLET | Refills: 1 | Status: SHIPPED | OUTPATIENT
Start: 2025-03-13

## 2025-03-21 ENCOUNTER — RA CDI HCC (OUTPATIENT)
Dept: OTHER | Facility: HOSPITAL | Age: 55
End: 2025-03-21

## 2025-03-28 ENCOUNTER — APPOINTMENT (OUTPATIENT)
Age: 55
End: 2025-03-28
Payer: COMMERCIAL

## 2025-03-28 ENCOUNTER — OFFICE VISIT (OUTPATIENT)
Age: 55
End: 2025-03-28
Payer: COMMERCIAL

## 2025-03-28 VITALS
HEART RATE: 91 BPM | SYSTOLIC BLOOD PRESSURE: 106 MMHG | TEMPERATURE: 94.5 F | OXYGEN SATURATION: 93 % | DIASTOLIC BLOOD PRESSURE: 70 MMHG | WEIGHT: 208 LBS | HEIGHT: 63 IN | BODY MASS INDEX: 36.86 KG/M2

## 2025-03-28 DIAGNOSIS — Z12.31 ENCOUNTER FOR SCREENING MAMMOGRAM FOR BREAST CANCER: ICD-10-CM

## 2025-03-28 DIAGNOSIS — E78.1 HYPERTRIGLYCERIDEMIA: ICD-10-CM

## 2025-03-28 DIAGNOSIS — Z94.84 H/O AUTOLOGOUS STEM CELL TRANSPLANT (HCC): ICD-10-CM

## 2025-03-28 DIAGNOSIS — C90.01 MULTIPLE MYELOMA IN REMISSION (HCC): ICD-10-CM

## 2025-03-28 DIAGNOSIS — R79.89 BLOOD CREATININE INCREASED COMPARED WITH PRIOR MEASUREMENT: ICD-10-CM

## 2025-03-28 DIAGNOSIS — I10 PRIMARY HYPERTENSION: Chronic | ICD-10-CM

## 2025-03-28 DIAGNOSIS — I42.9 CARDIOMYOPATHY, UNSPECIFIED TYPE (HCC): ICD-10-CM

## 2025-03-28 DIAGNOSIS — E66.01 OBESITY, MORBID (HCC): Primary | ICD-10-CM

## 2025-03-28 DIAGNOSIS — F17.210 CIGARETTE NICOTINE DEPENDENCE WITHOUT COMPLICATION: ICD-10-CM

## 2025-03-28 DIAGNOSIS — E85.9 AMYLOIDOSIS, UNSPECIFIED TYPE (HCC): ICD-10-CM

## 2025-03-28 DIAGNOSIS — F11.20 CONTINUOUS OPIOID DEPENDENCE (HCC): Chronic | ICD-10-CM

## 2025-03-28 DIAGNOSIS — F11.10 OPIOID ABUSE (HCC): ICD-10-CM

## 2025-03-28 LAB
ANION GAP SERPL CALCULATED.3IONS-SCNC: 7 MMOL/L (ref 4–13)
BUN SERPL-MCNC: 18 MG/DL (ref 5–25)
CALCIUM SERPL-MCNC: 9.1 MG/DL (ref 8.4–10.2)
CHLORIDE SERPL-SCNC: 105 MMOL/L (ref 96–108)
CO2 SERPL-SCNC: 26 MMOL/L (ref 21–32)
CREAT SERPL-MCNC: 1.1 MG/DL (ref 0.6–1.3)
GFR SERPL CREATININE-BSD FRML MDRD: 56 ML/MIN/1.73SQ M
GLUCOSE P FAST SERPL-MCNC: 101 MG/DL (ref 65–99)
POTASSIUM SERPL-SCNC: 4.5 MMOL/L (ref 3.5–5.3)
SODIUM SERPL-SCNC: 138 MMOL/L (ref 135–147)

## 2025-03-28 PROCEDURE — 36415 COLL VENOUS BLD VENIPUNCTURE: CPT

## 2025-03-28 PROCEDURE — 99214 OFFICE O/P EST MOD 30 MIN: CPT

## 2025-03-28 PROCEDURE — G2211 COMPLEX E/M VISIT ADD ON: HCPCS

## 2025-03-28 PROCEDURE — 80048 BASIC METABOLIC PNL TOTAL CA: CPT

## 2025-03-28 RX ORDER — TIRZEPATIDE 5 MG/.5ML
5 INJECTION, SOLUTION SUBCUTANEOUS WEEKLY
Qty: 2 ML | Refills: 1 | Status: SHIPPED | OUTPATIENT
Start: 2025-04-25 | End: 2025-06-20

## 2025-03-28 RX ORDER — TIRZEPATIDE 2.5 MG/.5ML
2.5 INJECTION, SOLUTION SUBCUTANEOUS WEEKLY
Qty: 2 ML | Refills: 0 | Status: SHIPPED | OUTPATIENT
Start: 2025-03-28

## 2025-03-28 NOTE — ASSESSMENT & PLAN NOTE
Prior Authorization Clinical Questions for Weight Management Pharmacotherapy    1. Does the patient have a contrainidcation to medication prescribed for weight management?: No  2. Does the patient have a diagnosis of obesity, confirmed by a BMI greater than or equal to 30 kg/m^2?: Yes  3. Does the patient have a BMI of greater than or equal to 27 kg/m^2 with at least one weight-related comorbidity/risk factor/complication (e.g. diabetes, dyslipidemia, coronary artery disease)?: Yes  4. Weight-related co-morbidities/risk factors: prediabetes, dyslipidemia, hypertension, cardiovascular disease, GERD, osteoarthritis, depression, cancer  5. WEGOVY CVA Indication: Does patient have established documented cardiovascular disease (history of a prior heart attack (myocardial infarction), stroke, or symptomatic peripheral arterial disease (PAD)?: N/A  6. ZEPBOUND JEMAL Indication: Does patient have documented JEMAL diagnosed via sleep study (insurance will require copy of sleep study results for approval)?: No  7. Has the patient been on a weight loss regimen of low-calorie diet, increased physical activity, and lifestyle modifications for a minimum of 6 months?: Yes  8. Has the patient completed a comprehensive weight loss program (ie, Weight Watchers, Noom, Bariatrics, other tameka on phone)? If so, what?: Yes   -- Q8 Further Explanation: Weight Watchers   9. Does the patient have a history of type 2 diabetes?: No  10. Has the member tried and failed other weight loss medication within the past 12 months?: No  11. Will the member use requested medication in combination with another GLP agonist or weight loss drug?: No  12. Is the medication a controlled substance?: No     Baseline weight (in pounds): 208 lbs     Reviewed risks vs. Benefits of GLP1 medications. Most common side effects are nausea and constipation, headaches, GERD, and palpitations and discussed ways to mitigate side effects. No family history of thyroid cancer and  no personal history of pancreatitis or thyroid cancer.     Patient elects to start start on Zepbound. Cautioned that paying out-of-pocket, it could be around $650 per month or more. The vials cost $399 for the 2.5 mg dose for a month and $549 for the 5 mg dose for a month. The preauthorization process can take up to 6 weeks to occur.   If preauth is denied, will consider alternatives such as Ozempic, though will have the same issues with coverage.   Follow up in 3 months once starting medication to review side effects and consider uptitration of dosing.      Orders:  •  Zepbound 2.5 MG/0.5ML auto-injector; Inject 0.5 mL (2.5 mg total) under the skin once a week  •  Zepbound 5 MG/0.5ML auto-injector; Inject 0.5 mL (5 mg total) under the skin once a week Do not start before April 25, 2025.

## 2025-03-28 NOTE — ASSESSMENT & PLAN NOTE
Stable, following with oncology through [facility].  She still has not made a follow up appointment.

## 2025-03-28 NOTE — ASSESSMENT & PLAN NOTE
Was following with heme-onc, has not followed up with them since 2022.  Status post stem cell transplant.

## 2025-03-28 NOTE — PROGRESS NOTES
Name: Marla Negron      : 1970      MRN: [id number]  Encounter Provider: Zhane Lynn PA-C  Encounter Date: 3/28/2025   Encounter department: [facility]  :  Assessment & Plan  Obesity, morbid (HCC)  Prior Authorization Clinical Questions for Weight Management Pharmacotherapy    1. Does the patient have a contrainidcation to medication prescribed for weight management?: No  2. Does the patient have a diagnosis of obesity, confirmed by a BMI greater than or equal to 30 kg/m^2?: Yes  3. Does the patient have a BMI of greater than or equal to 27 kg/m^2 with at least one weight-related comorbidity/risk factor/complication (e.g. diabetes, dyslipidemia, coronary artery disease)?: Yes  4. Weight-related co-morbidities/risk factors: prediabetes, dyslipidemia, hypertension, cardiovascular disease, GERD, osteoarthritis, depression, cancer  5. WEGOVY CVA Indication: Does patient have established documented cardiovascular disease (history of a prior heart attack (myocardial infarction), stroke, or symptomatic peripheral arterial disease (PAD)?: N/A  6. ZEPBOUND JEMAL Indication: Does patient have documented JEMAL diagnosed via sleep study (insurance will require copy of sleep study results for approval)?: No  7. Has the patient been on a weight loss regimen of low-calorie diet, increased physical activity, and lifestyle modifications for a minimum of 6 months?: Yes  8. Has the patient completed a comprehensive weight loss program (ie, Weight Watchers, Noom, Bariatrics, other tameka on phone)? If so, what?: Yes   -- Q8 Further Explanation: Weight Watchers   9. Does the patient have a history of type 2 diabetes?: No  10. Has the member tried and failed other weight loss medication within the past 12 months?: No  11. Will the member use requested medication in combination with another GLP agonist or weight loss drug?: No  12. Is the medication a controlled substance?: No     Baseline weight  (in pounds): 208 lbs     Reviewed risks vs. Benefits of GLP1 medications. Most common side effects are nausea and constipation, headaches, GERD, and palpitations and discussed ways to mitigate side effects. No family history of thyroid cancer and no personal history of pancreatitis or thyroid cancer.     Patient elects to start start on Zepbound. Cautioned that paying out-of-pocket, it could be around $650 per month or more. The vials cost $399 for the 2.5 mg dose for a month and $549 for the 5 mg dose for a month. The preauthorization process can take up to 6 weeks to occur.   If preauth is denied, will consider alternatives such as Ozempic, though will have the same issues with coverage.   Follow up in 3 months once starting medication to review side effects and consider uptitration of dosing.      Orders:  •  Zepbound 2.5 MG/0.5ML auto-injector; Inject 0.5 mL (2.5 mg total) under the skin once a week  •  Zepbound 5 MG/0.5ML auto-injector; Inject 0.5 mL (5 mg total) under the skin once a week Do not start before April 25, 2025.    Cardiomyopathy, unspecified type (HCC)  Secondary to possible amyloidosis though biopsies were negative, continue on metoprolol and amlodipine.  She needs to reestablish with cardiology as it was recommended she have yearly follow-up.  Last echo in January 2023 was normal.       Multiple myeloma in remission (HCC)  Was following with heme-onc, has not followed up with them since 2022.  Status post stem cell transplant.       Amyloidosis, unspecified type (HCC)  This was nonspecific on follow up fat biopsy.       Primary hypertension  Well controlled, continue current antihypertensives.       Cigarette nicotine dependence without complication  Discussed cessation. She is down to 4 cigarettes a day, but she started vaping.  Discussed nicotine effects on CV health.  Orders:  •  CT lung screening program; Future    Blood creatinine increased compared with prior measurement  Elevated on last  check one month ago.  Will repeat today.  Stay well hydrated.  She was stick at the time of the lab draw.  Orders:  •  Basic metabolic panel; Future    Continuous opioid dependence (HCC)  She has been off opiates since December 2024, dismissed from palliative care.         Opioid abuse (HCC)  Unfortunately she was dismissed from palliative care due to admitted misuse of oxycodone and inconsistent urine drug screen results.       H/O autologous stem cell transplant (HCC)  Stable, following with oncology through [place].  She still has not made a follow up appointment.       Hypertriglyceridemia  Discussed lifestyle vs. Medication management.  Pt elects to continue with trying diet.       Encounter for screening mammogram for breast cancer  Overdue.  Orders:  •  Mammo screening bilateral w 3d and cad; Future           History of Present Illness   HPI    Patient is here for 6-month follow-up with labs.    Patient was dismissed from palliative care due to inconsistent urine drug screen results and behavior.  She was on oxycodone and buprenorphine patch.  This was discontinued back in December 2024.    History of monoclonal gammopathy that led to multiple myeloma/amyloidosis diagnosis.  She is status post bone marrow transplant and chemo.  She has been in remission and has not been on any immunotherapy.  She follows with neurology, last appointment in February 2025, for neuropathic pain in upper and lower extremities.  It was recommended she increase her pregabalin to 125 mg 3 times a day.  She was referred to pain management to discuss spinal cord stimulator for chronic neuropathic pain in her low back.  Neurology also placed orders to follow-up on the mono gammopathy.    Pt is interested in weight loss medication, in particular GLP1s.  She struggles with overeating and has chronic pain making it hard to exercise.      Review of Systems   Constitutional: Negative.    HENT: Negative.     Respiratory:  Negative for  "cough and shortness of breath.    Cardiovascular:  Negative for chest pain.   Gastrointestinal: Negative.    Genitourinary: Negative.    Musculoskeletal:  Negative for gait problem.   Skin:  Negative for rash.   Neurological:  Negative for syncope, light-headedness and headaches.   All other systems reviewed and are negative.      Objective   /70 (Patient Position: Sitting, Cuff Size: Large)   Pulse 91   Temp (!) 94.5 °F (34.7 °C)   Ht 5' 3\" (1.6 m)   Wt 94.3 kg (208 lb)   SpO2 93%   BMI 36.85 kg/m²      Physical Exam  Constitutional:       General: She is not in acute distress.     Appearance: Normal appearance. She is morbidly obese. She is not toxic-appearing.   HENT:      Head: Normocephalic and atraumatic.      Right Ear: Hearing and external ear normal.      Left Ear: Hearing and external ear normal.      Nose: Nose normal.      Mouth/Throat:      Lips: Pink.      Mouth: Mucous membranes are moist. No oral lesions.      Dentition: Normal dentition.      Pharynx: Oropharynx is clear.   Eyes:      General: Lids are normal. No scleral icterus.     Conjunctiva/sclera: Conjunctivae normal.      Pupils: Pupils are equal, round, and reactive to light.   Neck:      Thyroid: No thyromegaly.   Cardiovascular:      Rate and Rhythm: Normal rate and regular rhythm.      Pulses:           Radial pulses are 2+ on the right side and 2+ on the left side.        Posterior tibial pulses are 2+ on the right side and 2+ on the left side.      Heart sounds: Normal heart sounds.   Pulmonary:      Effort: Pulmonary effort is normal. No respiratory distress.      Breath sounds: Normal breath sounds.   Abdominal:      General: Bowel sounds are normal. There is no distension.      Palpations: Abdomen is soft.      Tenderness: There is no abdominal tenderness.   Musculoskeletal:      Cervical back: Normal range of motion and neck supple.      Right lower leg: No edema.      Left lower leg: No edema.   Skin:     General: Skin " is warm and dry.      Coloration: Skin is not jaundiced.   Neurological:      Mental Status: She is alert and oriented to person, place, and time.      Motor: No weakness or tremor.      Gait: Gait is intact.   Psychiatric:         Mood and Affect: Mood normal.         Behavior: Behavior is cooperative.

## 2025-03-28 NOTE — ASSESSMENT & PLAN NOTE
Discussed cessation. She is down to 4 cigarettes a day, but she started vaping.  Discussed nicotine effects on CV health.  Orders:  •  CT lung screening program; Future

## 2025-03-28 NOTE — ASSESSMENT & PLAN NOTE
Unfortunately she was dismissed from palliative care due to admitted misuse of oxycodone and inconsistent urine drug screen results.

## 2025-03-31 ENCOUNTER — RESULTS FOLLOW-UP (OUTPATIENT)
Age: 55
End: 2025-03-31

## 2025-03-31 DIAGNOSIS — R06.02 SHORTNESS OF BREATH: ICD-10-CM

## 2025-03-31 RX ORDER — ALBUTEROL SULFATE 90 UG/1
2 INHALANT RESPIRATORY (INHALATION) EVERY 6 HOURS PRN
Qty: 18 G | Refills: 0 | Status: SHIPPED | OUTPATIENT
Start: 2025-03-31

## 2025-03-31 NOTE — TELEPHONE ENCOUNTER
----- Message from Brayden Hooper MD sent at 3/31/2025 12:27 PM EDT -----  Please let patient know creatinine has normalized. Continue to stay well hydrated

## 2025-04-08 ENCOUNTER — TELEPHONE (OUTPATIENT)
Age: 55
End: 2025-04-08

## 2025-04-08 NOTE — TELEPHONE ENCOUNTER
PA for Zepbound 5 MG/0.5ML auto-injector SUBMITTED to      via    [x]CMM-KEY: [id number]  []Surescripts-Case ID #   []Availity-Auth ID # NDC #   []Faxed to plan   []Other website   []Phone call Case ID #     [x]PA sent as URGENT    All office notes, labs and other pertaining documents and studies sent. Clinical questions answered. Awaiting determination from insurance company.     Turnaround time for your insurance to make a decision on your Prior Authorization can take 7-21 business days.

## 2025-04-08 NOTE — TELEPHONE ENCOUNTER
Patient advised by pharmacy she will need a prior authorization as well as diagnosis code for Zepbound 5 mg.    Patient will need medication for 4/25/25 scheduled injection date.    Patient advised of expected turn-around times, and expressed understanding. She will call back in about one week for an update.

## 2025-04-09 NOTE — TELEPHONE ENCOUNTER
PA for Zepbound 5 MG/0.5ML auto-injector  EXCLUDED from plan       Reason:(Screenshot if applicable)        Message sent to office clinical pool Yes    Appeal is not warranted unless the following is met:    DX of JEMAL and the below cardiac events:   You have established cardiovascular disease as evidenced by one of the following:    (1) Prior myocardial infarction    (2) Prior ischemic or hemorrhagic stroke    (3) Symptomatic peripheral arterial disease evidenced by one of the following:     (A) Intermittent claudication with ankle-brachial index less than 0.85 (at rest)     (B) Peripheral arterial revascularization procedure     (C) Amputation due to atherosclerotic disease

## 2025-04-23 DIAGNOSIS — R06.02 SHORTNESS OF BREATH: ICD-10-CM

## 2025-04-23 RX ORDER — ALBUTEROL SULFATE 90 UG/1
INHALANT RESPIRATORY (INHALATION)
Qty: 18 G | Refills: 5 | Status: SHIPPED | OUTPATIENT
Start: 2025-04-23

## 2025-04-28 DIAGNOSIS — G89.29 CHRONIC BILATERAL LOW BACK PAIN WITH BILATERAL SCIATICA: ICD-10-CM

## 2025-04-28 DIAGNOSIS — M54.41 CHRONIC BILATERAL LOW BACK PAIN WITH BILATERAL SCIATICA: ICD-10-CM

## 2025-04-28 DIAGNOSIS — R12 HEARTBURN: ICD-10-CM

## 2025-04-28 DIAGNOSIS — I10 PRIMARY HYPERTENSION: ICD-10-CM

## 2025-04-28 DIAGNOSIS — M54.42 CHRONIC BILATERAL LOW BACK PAIN WITH BILATERAL SCIATICA: ICD-10-CM

## 2025-04-28 DIAGNOSIS — G62.9 SENSORY NEUROPATHY: ICD-10-CM

## 2025-04-29 RX ORDER — DULOXETIN HYDROCHLORIDE 60 MG/1
60 CAPSULE, DELAYED RELEASE ORAL DAILY
Qty: 90 CAPSULE | Refills: 1 | Status: SHIPPED | OUTPATIENT
Start: 2025-04-29

## 2025-04-29 RX ORDER — PANTOPRAZOLE SODIUM 40 MG/1
40 TABLET, DELAYED RELEASE ORAL DAILY
Qty: 90 TABLET | Refills: 1 | Status: SHIPPED | OUTPATIENT
Start: 2025-04-29 | End: 2025-10-26

## 2025-04-29 RX ORDER — PREGABALIN 100 MG/1
100 CAPSULE ORAL 3 TIMES DAILY
Qty: 270 CAPSULE | Refills: 0 | Status: SHIPPED | OUTPATIENT
Start: 2025-04-29 | End: 2025-10-26

## 2025-04-29 RX ORDER — METOPROLOL SUCCINATE 25 MG/1
25 TABLET, EXTENDED RELEASE ORAL DAILY
Qty: 90 TABLET | Refills: 1 | Status: SHIPPED | OUTPATIENT
Start: 2025-04-29

## 2025-04-29 RX ORDER — AMLODIPINE BESYLATE 10 MG/1
10 TABLET ORAL EVERY MORNING
Qty: 90 TABLET | Refills: 1 | Status: SHIPPED | OUTPATIENT
Start: 2025-04-29

## 2025-04-29 NOTE — TELEPHONE ENCOUNTER
Patient Id Prescription # Filled Written Drug Label Qty Days Strength MME** Prescriber Pharmacy Payment REFILL #/Auth State Detail  1 [id number] 03/17/2025 02/11/2025 Pregabalin (Capsule) 90.0 30 25 MG NA [facility] Medicare 1 / 1 PA   1 [id number] 02/12/2025 02/11/2025 Pregabalin (Capsule) 90.0 30 25 MG [facility] Medicare 0 / 1 PA   1 [id number] 02/03/2025 01/20/2025 Pregabalin (Capsule) 270.0 90 100 MG [facility] Medicare 0 / 0 PA   1 [id number] 12/09/2024 12/09/2024 oxyCODONE HCL (Tablet) 14.0 7 5 MG 15.0 [facility] Medicare 0 / 0 PA   1 [id number] 12/02/2024 12/02/2024 oxyCODONE HCL (Tablet) 14.0 7 5 MG 15.0 [facility] Medicare 0 / 0 PA   1 [id number] 12/01/2024 11/29/2024 Buprenorphine (Patch, Extended Release) 4.0 28 10 MCG/1 HR NA [facility] Medicare 0 / 0 PA   1 [id number] 11/25/2024 11/25/2024 oxyCODONE HCL (Tablet) 21.0 7 5 MG 22.50 [facility] Medicare 0 / 0 PA   1 [id number] 11/18/2024 11/18/2024 oxyCODONE HCL (Tablet) 21.0 7 5 MG 22.50 [facility] Medicare 0 / 0 PA   1 [id number] 11/12/2024 11/12/2024 oxyCODONE HCL (Tablet) 24.0 6 5 MG 30.0 [facility] Medicare 0 / 0 PA   1 [id number] 11/07/2024 06/27/2024 Pregabalin (Capsule) 270.0 90 100 MG NA [facility] Medicare 1 / 1 PA

## 2025-05-12 DIAGNOSIS — G62.9 SENSORY NEUROPATHY: ICD-10-CM

## 2025-05-12 DIAGNOSIS — R09.81 NASAL CONGESTION: ICD-10-CM

## 2025-05-12 DIAGNOSIS — G47.00 INSOMNIA, UNSPECIFIED TYPE: ICD-10-CM

## 2025-05-12 DIAGNOSIS — R12 HEARTBURN: ICD-10-CM

## 2025-05-12 DIAGNOSIS — G62.9 NEUROPATHY: ICD-10-CM

## 2025-05-12 RX ORDER — FLUTICASONE PROPIONATE 50 MCG
2 SPRAY, SUSPENSION (ML) NASAL DAILY
Qty: 48 G | Refills: 0 | Status: SHIPPED | OUTPATIENT
Start: 2025-05-12

## 2025-05-12 RX ORDER — TRAZODONE HYDROCHLORIDE 100 MG/1
100 TABLET ORAL
Qty: 90 TABLET | Refills: 0 | Status: SHIPPED | OUTPATIENT
Start: 2025-05-12 | End: 2025-11-08

## 2025-05-12 RX ORDER — PREGABALIN 25 MG/1
25 CAPSULE ORAL 3 TIMES DAILY
Qty: 90 CAPSULE | Refills: 1 | Status: SHIPPED | OUTPATIENT
Start: 2025-05-12

## 2025-05-12 RX ORDER — CYCLOBENZAPRINE HCL 10 MG
10 TABLET ORAL 3 TIMES DAILY PRN
Qty: 90 TABLET | Refills: 0 | Status: SHIPPED | OUTPATIENT
Start: 2025-05-12

## 2025-05-12 RX ORDER — PANTOPRAZOLE SODIUM 40 MG/1
40 TABLET, DELAYED RELEASE ORAL DAILY
Qty: 90 TABLET | Refills: 0 | OUTPATIENT
Start: 2025-05-12 | End: 2025-11-08

## 2025-05-12 NOTE — TELEPHONE ENCOUNTER
Medication: Lyriac 25mg   PDMP  05/01/2025 04/29/2025 Pregabalin (Capsule) 270.0 90 100 MG NA [facility] Medicare 0 / 0 PA   1 [id number] ** 03/17/2025 02/11/2025 Pregabalin (Capsule) 90.0 30 25 MG [facility] Medicare 1 / 1 PA   1 [id number] ** 02/12/2025 02/11/2025 Pregabalin (Capsule) 90.0 30 25 MG NA [facility] Medicare 0 / 1 PA   1 [id number] ** 02/03/2025 01/20/2025 Pregabalin (Capsule) 270.0 90 100 MG NA [facility] Medicare 0 / 0 PA   1 [id number] ** 12/09/2024 12/09/2024 oxyCODONE HCL (Tablet) 14.0 7 5 MG 15.0 [facility] Medicare 0 / 0 PA   1 [id number] ** 12/02/2024 12/02/2024 oxyCODONE HCL (Tablet) 14.0 7 5 MG 15.0 [facility] Medicare 0 / 0 PA   1 [id number] ** 12/01/2024 11/29/2024 Buprenorphine (Patch, Extended Release) 4.0 28 10 MCG/1 HR NA [facility] Medicare  Active agreement on file -No

## 2025-06-07 DIAGNOSIS — G62.9 NEUROPATHY: ICD-10-CM

## 2025-06-09 RX ORDER — CYCLOBENZAPRINE HCL 10 MG
10 TABLET ORAL 3 TIMES DAILY PRN
Qty: 90 TABLET | Refills: 0 | Status: SHIPPED | OUTPATIENT
Start: 2025-06-09

## 2025-06-11 ENCOUNTER — TELEPHONE (OUTPATIENT)
Dept: NEUROLOGY | Facility: CLINIC | Age: 55
End: 2025-06-11

## 2025-06-11 NOTE — TELEPHONE ENCOUNTER
LMOM to r/s pt's appt w/ [facility] in September due to provider leaving dept. Offered sooner appt w/ [facility] on 6/25/25 at 1.

## 2025-06-17 NOTE — TELEPHONE ENCOUNTER
3rd attempt to reach pt.LMOM to r/s pt's appt w/ [facility] in September due to provider leaving dept. Offered sooner appt w/ [facility] on 6/25/25 at 2.  Cancelled pt's appt and sent letter.

## 2025-07-07 DIAGNOSIS — G62.9 NEUROPATHY: ICD-10-CM

## 2025-07-07 RX ORDER — CYCLOBENZAPRINE HCL 10 MG
TABLET ORAL
Qty: 90 TABLET | Refills: 0 | Status: SHIPPED | OUTPATIENT
Start: 2025-07-07

## 2025-07-09 DIAGNOSIS — M54.41 CHRONIC BILATERAL LOW BACK PAIN WITH BILATERAL SCIATICA: ICD-10-CM

## 2025-07-09 DIAGNOSIS — R12 HEARTBURN: ICD-10-CM

## 2025-07-09 DIAGNOSIS — M54.42 CHRONIC BILATERAL LOW BACK PAIN WITH BILATERAL SCIATICA: ICD-10-CM

## 2025-07-09 DIAGNOSIS — G89.29 CHRONIC BILATERAL LOW BACK PAIN WITH BILATERAL SCIATICA: ICD-10-CM

## 2025-07-09 RX ORDER — PREGABALIN 100 MG/1
100 CAPSULE ORAL 3 TIMES DAILY
Qty: 270 CAPSULE | Refills: 0 | Status: SHIPPED | OUTPATIENT
Start: 2025-07-09 | End: 2026-01-05

## 2025-07-09 NOTE — TELEPHONE ENCOUNTER
Medication: Lyricisidro   PDMP  06/13/2025 05/12/2025 Pregabalin (Capsule) 90.0 30 25 MG NA [facility] MELV[facility] Medicare 1 / 1 PA   1 [id number] ** 05/12/2025 05/12/2025 Pregabalin (Capsule) 90.0 30 25 MG NA [facility] MELV[facility] Medicare 0 / 1 PA   1 [id number] ** 05/01/2025 04/29/2025 Pregabalin (Capsule) 270.0 90 100 MG NA [facility] MELV[facility] Medicare 0 / 0 PA   1 [id number] ** 03/17/2025 02/11/2025 Pregabalin (Capsule) 90.0 30 25 MG NA [facility] MELVIN  Active agreement on file -No

## 2025-07-10 RX ORDER — PANTOPRAZOLE SODIUM 40 MG/1
40 TABLET, DELAYED RELEASE ORAL DAILY
Qty: 90 TABLET | Refills: 1 | Status: SHIPPED | OUTPATIENT
Start: 2025-07-10 | End: 2026-01-06

## 2025-07-11 DIAGNOSIS — R06.02 SHORTNESS OF BREATH: ICD-10-CM

## 2025-07-11 DIAGNOSIS — E66.01 OBESITY, MORBID (HCC): ICD-10-CM

## 2025-07-11 DIAGNOSIS — G62.9 SENSORY NEUROPATHY: ICD-10-CM

## 2025-07-11 RX ORDER — TIRZEPATIDE 2.5 MG/.5ML
2.5 INJECTION, SOLUTION SUBCUTANEOUS WEEKLY
Qty: 2 ML | Refills: 0 | OUTPATIENT
Start: 2025-07-11

## 2025-07-14 RX ORDER — ALBUTEROL SULFATE 90 UG/1
2 INHALANT RESPIRATORY (INHALATION) EVERY 6 HOURS PRN
Qty: 18 G | Refills: 0 | Status: SHIPPED | OUTPATIENT
Start: 2025-07-14

## 2025-07-14 RX ORDER — DULOXETIN HYDROCHLORIDE 60 MG/1
60 CAPSULE, DELAYED RELEASE ORAL DAILY
Qty: 90 CAPSULE | Refills: 0 | OUTPATIENT
Start: 2025-07-14

## 2025-07-15 ENCOUNTER — TELEPHONE (OUTPATIENT)
Age: 55
End: 2025-07-15

## 2025-07-18 DIAGNOSIS — G62.9 SENSORY NEUROPATHY: ICD-10-CM

## 2025-07-21 RX ORDER — PREGABALIN 25 MG/1
CAPSULE ORAL
Qty: 90 CAPSULE | Refills: 1 | Status: SHIPPED | OUTPATIENT
Start: 2025-07-21

## 2025-07-21 NOTE — TELEPHONE ENCOUNTER
Patient Id Prescription # Sold Filled Written Drug Label Qty Days Strength MME* Prescriber Pharmacy Payment REFILL #/Auth State Detail  1 [id number] ** 06/13/2025 05/12/2025 Pregabalin (Capsule) 90.0 30 25 MG NA [facility] Medicare 1 / 1 PA   1 [id number] ** 05/12/2025 05/12/2025 Pregabalin (Capsule) 90.0 30 25 MG [facility] Medicare 0 / 1 PA   1 [id number] ** 05/01/2025 04/29/2025 Pregabalin (Capsule) 270.0 90 100 MG [facility] Medicare 0 / 0 PA   1 [id number] ** 03/17/2025 02/11/2025 Pregabalin (Capsule) 90.0 30 25 MG [facility] Medicare 1 / 1 PA   1 [id number] ** 02/12/2025 02/11/2025 Pregabalin (Capsule) 90.0 30 25 MG [facility] Medicare 0 / 1 PA   1 [id number] ** 02/03/2025 01/20/2025 Pregabalin (Capsule) 270.0 90 100 MG [facility] Medicare 0 / 0 PA   1 [id number] ** 12/09/2024 12/09/2024 oxyCODONE HCL (Tablet) 14.0 7 5 MG 15.0 [facility] Medicare 0 / 0 PA   1 [id number] ** 12/02/2024 12/02/2024 oxyCODONE HCL (Tablet) 14.0 7 5 MG 15.0 [facility] Medicare 0 / 0 PA   1 [id number] ** 12/01/2024 11/29/2024 Buprenorphine (Patch, Extended Release) 4.0 28 10 MCG/1 HR NA [facility] Medicare 0 / 0 PA   1 [id number] ** 11/25/2024 11/25/2024 oxyCODONE HCL (Tablet) 21.0 7 5 MG 22.50 [facility] Medicare 0 / 0 PA

## 2025-08-05 DIAGNOSIS — G62.9 NEUROPATHY: ICD-10-CM

## 2025-08-05 DIAGNOSIS — G47.00 INSOMNIA, UNSPECIFIED TYPE: ICD-10-CM

## 2025-08-05 DIAGNOSIS — R09.81 NASAL CONGESTION: ICD-10-CM

## 2025-08-05 DIAGNOSIS — G62.9 SENSORY NEUROPATHY: ICD-10-CM

## 2025-08-05 DIAGNOSIS — I10 PRIMARY HYPERTENSION: ICD-10-CM

## 2025-08-05 RX ORDER — PREGABALIN 25 MG/1
CAPSULE ORAL
Qty: 90 CAPSULE | Refills: 0 | Status: CANCELLED | OUTPATIENT
Start: 2025-08-05

## 2025-08-05 RX ORDER — CYCLOBENZAPRINE HCL 10 MG
TABLET ORAL
Qty: 90 TABLET | Refills: 6 | Status: SHIPPED | OUTPATIENT
Start: 2025-08-05

## 2025-08-07 RX ORDER — FLUTICASONE PROPIONATE 50 MCG
2 SPRAY, SUSPENSION (ML) NASAL DAILY
Qty: 48 G | Refills: 0 | Status: SHIPPED | OUTPATIENT
Start: 2025-08-07

## 2025-08-07 RX ORDER — METOPROLOL SUCCINATE 25 MG/1
25 TABLET, EXTENDED RELEASE ORAL DAILY
Qty: 90 TABLET | Refills: 1 | Status: SHIPPED | OUTPATIENT
Start: 2025-08-07

## 2025-08-07 RX ORDER — AMLODIPINE BESYLATE 10 MG/1
10 TABLET ORAL EVERY MORNING
Qty: 90 TABLET | Refills: 1 | Status: SHIPPED | OUTPATIENT
Start: 2025-08-07 | End: 2025-08-14

## 2025-08-07 RX ORDER — TRAZODONE HYDROCHLORIDE 100 MG/1
100 TABLET ORAL
Qty: 90 TABLET | Refills: 1 | Status: SHIPPED | OUTPATIENT
Start: 2025-08-07 | End: 2026-02-03

## 2025-08-14 ENCOUNTER — OFFICE VISIT (OUTPATIENT)
Age: 55
End: 2025-08-14
Payer: COMMERCIAL

## 2025-08-15 ENCOUNTER — TELEPHONE (OUTPATIENT)
Age: 55
End: 2025-08-15

## (undated) DEVICE — THYROID SHEET: Brand: CONVERTORS

## (undated) DEVICE — BULB SYRINGE,IRRIGATION WITH PROTECTIVE CAP: Brand: DOVER

## (undated) DEVICE — GAUZE SPONGES,16 PLY: Brand: CURITY

## (undated) DEVICE — VIAL DECANTER

## (undated) DEVICE — ADHESIVE SKIN HIGH VISCOSITY EXOFIN 1ML

## (undated) DEVICE — GAUZE SPONGES,USP TYPE VII GAUZE, 12 PLY: Brand: CURITY

## (undated) DEVICE — SPONGE 4 X 4 XRAY 16 PLY STRL LF RFD

## (undated) DEVICE — SUT VICRYL 4-0 SH 27 IN J415H

## (undated) DEVICE — PACK UNIVERSAL NECK

## (undated) DEVICE — GLOVE SRG BIOGEL ECLIPSE 8

## (undated) DEVICE — 3M™ TEGADERM™ TRANSPARENT FILM DRESSING FRAME STYLE, 1626W, 4 IN X 4-3/4 IN (10 CM X 12 CM), 50/CT 4CT/CASE: Brand: 3M™ TEGADERM™

## (undated) DEVICE — SURGICEL 4 X 8

## (undated) DEVICE — SUT VICRYL 2-0 18 IN J911T

## (undated) DEVICE — SUT SILK 2-0 18 IN A185H

## (undated) DEVICE — SUT SILK 2-0 SH CR/8 18 IN C012D

## (undated) DEVICE — HARMONIC FOCUS SHEARS 9CM LENGTH + ADAPTIVE TISSUE TECHNOLOGY FOR USE WITH BLUE HAND PIECE ONLY: Brand: HARMONIC FOCUS

## (undated) DEVICE — NERVE STIMULATOR HAND HELD

## (undated) DEVICE — SUT VICRYL 2-0 SH 27 IN UNDYED J417H

## (undated) DEVICE — PAD GROUNDING ADULT

## (undated) DEVICE — INTENDED FOR TISSUE SEPARATION, AND OTHER PROCEDURES THAT REQUIRE A SHARP SURGICAL BLADE TO PUNCTURE OR CUT.: Brand: BARD-PARKER SAFETY BLADES SIZE 15, STERILE

## (undated) DEVICE — MEDI-VAC YANK SUCT HNDL W/TPRD BULBOUS TIP: Brand: CARDINAL HEALTH

## (undated) DEVICE — GLOVE INDICATOR PI UNDERGLOVE SZ 8 BLUE

## (undated) DEVICE — SUT MONOCRYL PLUS 4-0 PS-2 18 IN MCP496G

## (undated) DEVICE — SUT VICRYL 3-0 SH 27 IN J416H

## (undated) DEVICE — 3M™ STERI-STRIP™ REINFORCED ADHESIVE SKIN CLOSURES, R1547, 1/2 IN X 4 IN (12 MM X 100 MM), 6 STRIPS/ENVELOPE: Brand: 3M™ STERI-STRIP™

## (undated) DEVICE — 3M™ STERI-STRIP™ REINFORCED ADHESIVE SKIN CLOSURES, R1542, 1/4 IN X 1-1/2 IN (6 MM X 38 MM), 6 STRIPS/ENVELOPE: Brand: 3M™ STERI-STRIP™

## (undated) DEVICE — CHLORAPREP HI-LITE 26ML ORANGE